# Patient Record
Sex: MALE | Race: WHITE | NOT HISPANIC OR LATINO
[De-identification: names, ages, dates, MRNs, and addresses within clinical notes are randomized per-mention and may not be internally consistent; named-entity substitution may affect disease eponyms.]

---

## 2019-03-03 ENCOUNTER — TRANSCRIPTION ENCOUNTER (OUTPATIENT)
Age: 54
End: 2019-03-03

## 2019-03-19 ENCOUNTER — TRANSCRIPTION ENCOUNTER (OUTPATIENT)
Age: 54
End: 2019-03-19

## 2019-04-20 ENCOUNTER — TRANSCRIPTION ENCOUNTER (OUTPATIENT)
Age: 54
End: 2019-04-20

## 2019-08-17 PROBLEM — Z00.00 ENCOUNTER FOR PREVENTIVE HEALTH EXAMINATION: Status: ACTIVE | Noted: 2019-08-17

## 2019-09-16 ENCOUNTER — APPOINTMENT (OUTPATIENT)
Dept: SURGERY | Facility: CLINIC | Age: 54
End: 2019-09-16
Payer: COMMERCIAL

## 2019-09-16 VITALS
BODY MASS INDEX: 26.08 KG/M2 | WEIGHT: 182.19 LBS | TEMPERATURE: 98.4 F | HEART RATE: 84 BPM | OXYGEN SATURATION: 98 % | HEIGHT: 70 IN | SYSTOLIC BLOOD PRESSURE: 124 MMHG | DIASTOLIC BLOOD PRESSURE: 78 MMHG

## 2019-09-16 PROCEDURE — 99202 OFFICE O/P NEW SF 15 MIN: CPT

## 2019-09-16 NOTE — HISTORY OF PRESENT ILLNESS
[de-identified] : 53 yo male who presents to the office for evaluation of umbilical hernia. He states that the area has gotten more symptomatic over the past few months. He denied any obstructive symptoms associated with the hernia.

## 2019-09-16 NOTE — ASSESSMENT
[FreeTextEntry1] : 53 yo la e with reducible umbilical hernia\par Plan: Open umbilical hernia, possible mesh\par All risk, benefit, alternatives explained and the patient  expressed full understanding.

## 2019-09-16 NOTE — PHYSICAL EXAM
[JVD] : no jugular venous distention  [Normal Breath Sounds] : Normal breath sounds [Normal Heart Sounds] : normal heart sounds [No HSM] : no hepatosplenomegaly [Normal Rate and Rhythm] : normal rate and rhythm [Tender] : was nontender [Enlarged] : not enlarged [No Rash or Lesion] : No rash or lesion [Alert] : alert [Oriented to Person] : oriented to person [Oriented to Place] : oriented to place [Oriented to Time] : oriented to time [Calm] : calm [de-identified] : alert and oriented x 3 [de-identified] : normal [de-identified] : umbilical hernia

## 2019-09-16 NOTE — CONSULT LETTER
[Dear  ___] : Dear  [unfilled], [Please see my note below.] : Please see my note below. [Consult Letter:] : I had the pleasure of evaluating your patient, [unfilled]. [Consult Closing:] : Thank you very much for allowing me to participate in the care of this patient.  If you have any questions, please do not hesitate to contact me. [FreeTextEntry3] : Sincerely, \par \par \par Josue Brush MD, FACS\par \par  of Surgery\par [facility]\par System Chief, Residency Program\par [facility] \par \par [employer] [facility]\par Co-Director of [facility] Clerkship\par [employer] [facility]\par

## 2019-10-14 ENCOUNTER — OUTPATIENT (OUTPATIENT)
Dept: OUTPATIENT SERVICES | Facility: HOSPITAL | Age: 54
LOS: 1 days | Discharge: HOME | End: 2019-10-14
Payer: COMMERCIAL

## 2019-10-14 VITALS
OXYGEN SATURATION: 97 % | DIASTOLIC BLOOD PRESSURE: 81 MMHG | WEIGHT: 179.02 LBS | HEIGHT: 70 IN | SYSTOLIC BLOOD PRESSURE: 122 MMHG | HEART RATE: 86 BPM | RESPIRATION RATE: 18 BRPM | TEMPERATURE: 98 F

## 2019-10-14 DIAGNOSIS — Z98.890 OTHER SPECIFIED POSTPROCEDURAL STATES: Chronic | ICD-10-CM

## 2019-10-14 DIAGNOSIS — K42.9 UMBILICAL HERNIA WITHOUT OBSTRUCTION OR GANGRENE: ICD-10-CM

## 2019-10-14 DIAGNOSIS — Z01.818 ENCOUNTER FOR OTHER PREPROCEDURAL EXAMINATION: ICD-10-CM

## 2019-10-14 LAB
ANION GAP SERPL CALC-SCNC: 5 MMOL/L — SIGNIFICANT CHANGE UP (ref 5–17)
APTT BLD: 33.7 SEC — SIGNIFICANT CHANGE UP (ref 28.5–37)
BASOPHILS # BLD AUTO: 0 K/UL — SIGNIFICANT CHANGE UP (ref 0–0.2)
BASOPHILS NFR BLD AUTO: 0 % — SIGNIFICANT CHANGE UP (ref 0–2)
BUN SERPL-MCNC: 25 MG/DL — HIGH (ref 7–23)
CALCIUM SERPL-MCNC: 9 MG/DL — SIGNIFICANT CHANGE UP (ref 8.5–10.1)
CHLORIDE SERPL-SCNC: 109 MMOL/L — HIGH (ref 96–108)
CO2 SERPL-SCNC: 26 MMOL/L — SIGNIFICANT CHANGE UP (ref 22–31)
CREAT SERPL-MCNC: 1.11 MG/DL — SIGNIFICANT CHANGE UP (ref 0.5–1.3)
EOSINOPHIL # BLD AUTO: 0.04 K/UL — SIGNIFICANT CHANGE UP (ref 0–0.5)
EOSINOPHIL NFR BLD AUTO: 1.6 % — SIGNIFICANT CHANGE UP (ref 0–6)
GLUCOSE SERPL-MCNC: 90 MG/DL — SIGNIFICANT CHANGE UP (ref 70–99)
HCT VFR BLD CALC: 37.6 % — LOW (ref 39–50)
HGB BLD-MCNC: 12.4 G/DL — LOW (ref 13–17)
IMM GRANULOCYTES NFR BLD AUTO: 0.8 % — SIGNIFICANT CHANGE UP (ref 0–1.5)
INR BLD: 1.22 RATIO — HIGH (ref 0.88–1.16)
LYMPHOCYTES # BLD AUTO: 0.52 K/UL — LOW (ref 1–3.3)
LYMPHOCYTES # BLD AUTO: 21 % — SIGNIFICANT CHANGE UP (ref 13–44)
MANUAL SMEAR VERIFICATION: SIGNIFICANT CHANGE UP
MCHC RBC-ENTMCNC: 28.9 PG — SIGNIFICANT CHANGE UP (ref 27–34)
MCHC RBC-ENTMCNC: 33 GM/DL — SIGNIFICANT CHANGE UP (ref 32–36)
MCV RBC AUTO: 87.6 FL — SIGNIFICANT CHANGE UP (ref 80–100)
MONOCYTES # BLD AUTO: 0.3 K/UL — SIGNIFICANT CHANGE UP (ref 0–0.9)
MONOCYTES NFR BLD AUTO: 12.1 % — SIGNIFICANT CHANGE UP (ref 2–14)
NEUTROPHILS # BLD AUTO: 1.6 K/UL — LOW (ref 1.8–7.4)
NEUTROPHILS NFR BLD AUTO: 64.5 % — SIGNIFICANT CHANGE UP (ref 43–77)
NRBC # BLD: 0 /100 WBCS — SIGNIFICANT CHANGE UP (ref 0–0)
PLAT MORPH BLD: NORMAL — SIGNIFICANT CHANGE UP
PLATELET # BLD AUTO: 206 K/UL — SIGNIFICANT CHANGE UP (ref 150–400)
POTASSIUM SERPL-MCNC: 4.2 MMOL/L — SIGNIFICANT CHANGE UP (ref 3.5–5.3)
POTASSIUM SERPL-SCNC: 4.2 MMOL/L — SIGNIFICANT CHANGE UP (ref 3.5–5.3)
PROTHROM AB SERPL-ACNC: 13.7 SEC — HIGH (ref 10–12.9)
RBC # BLD: 4.29 M/UL — SIGNIFICANT CHANGE UP (ref 4.2–5.8)
RBC # FLD: 13.4 % — SIGNIFICANT CHANGE UP (ref 10.3–14.5)
RBC BLD AUTO: NORMAL — SIGNIFICANT CHANGE UP
SODIUM SERPL-SCNC: 140 MMOL/L — SIGNIFICANT CHANGE UP (ref 135–145)
WBC # BLD: 2.48 K/UL — LOW (ref 3.8–10.5)
WBC # FLD AUTO: 2.48 K/UL — LOW (ref 3.8–10.5)

## 2019-10-14 PROCEDURE — 93010 ELECTROCARDIOGRAM REPORT: CPT

## 2019-10-14 NOTE — H&P PST ADULT - ASSESSMENT
56M no pmhx c/o umbilical bulge and discomfort found to have umbilical hernia here for PST for scheduled open umbilical hernia repair, possible mesh  CAPRINI SCORE    AGE RELATED RISK FACTORS                                                       MOBILITY RELATED FACTORS  [x ] Age 41-60 years                                            (1 Point)                  [ ] Bed rest                                                        (1 Point)  [ ] Age: 61-74 years                                           (2 Points)                [ ] Plaster cast                                                   (2 Points)  [ ] Age= 75 years                                              (3 Points)                 [ ] Bed bound for more than 72 hours                   (2 Points)    DISEASE RELATED RISK FACTORS                                               GENDER SPECIFIC FACTORS  [ ] Edema in the lower extremities                       (1 Point)                  [ ] Pregnancy                                                     (1 Point)  [ ] Varicose veins                                               (1 Point)                  [ ] Post-partum < 6 weeks                                   (1 Point)             [x ] BMI > 25 Kg/m2                                            (1 Point)                  [ ] Hormonal therapy  or oral contraception            (1 Point)                 [ ] Sepsis (in the previous month)                        (1 Point)                  [ ] History of pregnancy complications  [ ] Pneumonia or serious lung disease                                               [ ] Unexplained or recurrent                       (1 Point)           (in the previous month)                               (1 Point)  [ ] Abnormal pulmonary function test                     (1 Point)                 SURGERY RELATED RISK FACTORS  [ ] Acute myocardial infarction                              (1 Point)                 [ ]  Section                                            (1 Point)  [ ] Congestive heart failure (in the previous month)  (1 Point)                 [ ] Minor surgery                                                 (1 Point)   [ ] Inflammatory bowel disease                             (1 Point)                 [ ] Arthroscopic surgery                                        (2 Points)  [ ] Central venous access                                    (2 Points)                [x ] General surgery lasting more than 45 minutes   (2 Points)       [ ] Stroke (in the previous month)                          (5 Points)               [ ] Elective arthroplasty                                        (5 Points)                                                                                                                                               HEMATOLOGY RELATED FACTORS                                                 TRAUMA RELATED RISK FACTORS  [ ] Prior episodes of VTE                                     (3 Points)                 [ ] Fracture of the hip, pelvis, or leg                       (5 Points)  [ ] Positive family history for VTE                         (3 Points)                 [ ] Acute spinal cord injury (in the previous month)  (5 Points)  [ ] Prothrombin 65895 A                                      (3 Points)                 [ ] Paralysis  (less than 1 month)                          (5 Points)  [ ] Factor V Leiden                                             (3 Points)                 [ ] Multiple Trauma within 1 month                         (5 Points)  [ ] Lupus anticoagulants                                     (3 Points)                                                           [ ] Anticardiolipin antibodies                                (3 Points)                                                       [ ] High homocysteine in the blood                      (3 Points)                                             [ ] Other congenital or acquired thrombophilia       (3 Points)                                                [ ] Heparin induced thrombocytopenia                  (3 Points)                                          Total Score [      4    ]

## 2019-10-14 NOTE — H&P PST ADULT - NSANTHOSAYNRD_GEN_A_CORE
No. GIL screening performed.  STOP BANG Legend: 0-2 = LOW Risk; 3-4 = INTERMEDIATE Risk; 5-8 = HIGH Risk

## 2019-10-14 NOTE — H&P PST ADULT - NSICDXPASTSURGICALHX_GEN_ALL_CORE_FT
PAST SURGICAL HISTORY:  S/P ACL surgery 2016    S/P hardware removal right femur 1994    S/P ORIF (open reduction internal fixation) fracture right femur 1992

## 2019-10-14 NOTE — H&P PST ADULT - ATTENDING COMMENTS
all risk, benefit, alternatives explained and the patient expressed full understanding.     Pt will get repeat CBC to check WBC.

## 2019-10-14 NOTE — H&P PST ADULT - HISTORY OF PRESENT ILLNESS
56M pmh 56M no pmhx c/o umbilical bulge and discomfort found to have umbilical hernia here for PST for scheduled open umbilical hernia repair, possible mesh

## 2019-10-17 ENCOUNTER — TRANSCRIPTION ENCOUNTER (OUTPATIENT)
Age: 54
End: 2019-10-17

## 2019-10-18 ENCOUNTER — APPOINTMENT (OUTPATIENT)
Dept: SURGERY | Facility: HOSPITAL | Age: 54
End: 2019-10-18
Payer: COMMERCIAL

## 2019-10-18 ENCOUNTER — OUTPATIENT (OUTPATIENT)
Dept: OUTPATIENT SERVICES | Facility: HOSPITAL | Age: 54
LOS: 1 days | Discharge: HOME | End: 2019-10-18
Payer: COMMERCIAL

## 2019-10-18 ENCOUNTER — RESULT REVIEW (OUTPATIENT)
Age: 54
End: 2019-10-18

## 2019-10-18 VITALS
SYSTOLIC BLOOD PRESSURE: 120 MMHG | HEART RATE: 108 BPM | OXYGEN SATURATION: 95 % | DIASTOLIC BLOOD PRESSURE: 89 MMHG | RESPIRATION RATE: 20 BRPM

## 2019-10-18 VITALS
HEART RATE: 109 BPM | HEIGHT: 70 IN | OXYGEN SATURATION: 98 % | DIASTOLIC BLOOD PRESSURE: 86 MMHG | RESPIRATION RATE: 16 BRPM | SYSTOLIC BLOOD PRESSURE: 127 MMHG | TEMPERATURE: 99 F | WEIGHT: 175.93 LBS

## 2019-10-18 DIAGNOSIS — Z98.890 OTHER SPECIFIED POSTPROCEDURAL STATES: Chronic | ICD-10-CM

## 2019-10-18 LAB
HCT VFR BLD CALC: 39.7 % — SIGNIFICANT CHANGE UP (ref 39–50)
HGB BLD-MCNC: 13.1 G/DL — SIGNIFICANT CHANGE UP (ref 13–17)
MCHC RBC-ENTMCNC: 28.7 PG — SIGNIFICANT CHANGE UP (ref 27–34)
MCHC RBC-ENTMCNC: 33 GM/DL — SIGNIFICANT CHANGE UP (ref 32–36)
MCV RBC AUTO: 87.1 FL — SIGNIFICANT CHANGE UP (ref 80–100)
NRBC # BLD: 0 /100 WBCS — SIGNIFICANT CHANGE UP (ref 0–0)
PLATELET # BLD AUTO: 210 K/UL — SIGNIFICANT CHANGE UP (ref 150–400)
RBC # BLD: 4.56 M/UL — SIGNIFICANT CHANGE UP (ref 4.2–5.8)
RBC # FLD: 13.5 % — SIGNIFICANT CHANGE UP (ref 10.3–14.5)
WBC # BLD: 2.88 K/UL — LOW (ref 3.8–10.5)
WBC # FLD AUTO: 2.88 K/UL — LOW (ref 3.8–10.5)

## 2019-10-18 PROCEDURE — 49585: CPT

## 2019-10-18 PROCEDURE — 88302 TISSUE EXAM BY PATHOLOGIST: CPT | Mod: 26

## 2019-10-18 PROCEDURE — 49560: CPT | Mod: AS

## 2019-10-18 RX ORDER — FENTANYL CITRATE 50 UG/ML
25 INJECTION INTRAVENOUS
Refills: 0 | Status: DISCONTINUED | OUTPATIENT
Start: 2019-10-18 | End: 2019-10-18

## 2019-10-18 RX ORDER — FENTANYL CITRATE 50 UG/ML
50 INJECTION INTRAVENOUS ONCE
Refills: 0 | Status: DISCONTINUED | OUTPATIENT
Start: 2019-10-18 | End: 2019-10-18

## 2019-10-18 RX ORDER — SODIUM CHLORIDE 9 MG/ML
3 INJECTION INTRAMUSCULAR; INTRAVENOUS; SUBCUTANEOUS EVERY 8 HOURS
Refills: 0 | Status: DISCONTINUED | OUTPATIENT
Start: 2019-10-18 | End: 2019-10-18

## 2019-10-18 RX ORDER — ONDANSETRON 8 MG/1
4 TABLET, FILM COATED ORAL ONCE
Refills: 0 | Status: DISCONTINUED | OUTPATIENT
Start: 2019-10-18 | End: 2019-10-18

## 2019-10-18 RX ORDER — SODIUM CHLORIDE 9 MG/ML
1000 INJECTION, SOLUTION INTRAVENOUS
Refills: 0 | Status: DISCONTINUED | OUTPATIENT
Start: 2019-10-18 | End: 2019-10-18

## 2019-10-18 NOTE — ASU DISCHARGE PLAN (ADULT/PEDIATRIC) - CARE PROVIDER_API CALL
Josue Brush)  Surgery  [address] [place] [address]  Phone: [phone]  Fax: [phone]  Follow Up Time:

## 2019-10-18 NOTE — ASU DISCHARGE PLAN (ADULT/PEDIATRIC) - ASU DC SPECIAL INSTRUCTIONSFT
Rest as needed. Do not lift anything heavier than 10 pounds. You may take motrin or advil for mild pain as needed, in addition to prescribed narcotic medication. You may take over the counter stool softeners as needed. Call for any fever over 101, nausea, vomiting, severe pain, no passing of gas or bowel movement. You may remove dressing in 48 hours, shower and pat dry abdomen. Leave the white steri strips in place; they will fall off in 5-7 days. Rest as needed. Do not lift anything heavier than 10 pounds. You may take motrin or advil for mild pain as needed, in addition to prescribed narcotic medication. You may take over the counter stool softeners as needed. Call for any fever over 101, nausea, vomiting, severe pain, no passing of gas or bowel movement. You may remove dressing in 48 hours, shower and pat dry abdomen. Leave the white steri strips in place; they will fall off in 5-7 days. Please call to schedule a follow up appointment with Dr Brush in 10-14 days.

## 2019-10-18 NOTE — ASU PATIENT PROFILE, ADULT - PSH
S/P ACL surgery  2016  S/P hardware removal  right femur 1994  S/P ORIF (open reduction internal fixation) fracture  right femur 1992

## 2019-10-18 NOTE — ASU DISCHARGE PLAN (ADULT/PEDIATRIC) - CALL YOUR DOCTOR IF YOU HAVE ANY OF THE FOLLOWING:
Wound/Surgical Site with redness, or foul smelling discharge or pus/Bleeding that does not stop/Swelling that gets worse/Nausea and vomiting that does not stop/Pain not relieved by Medications

## 2019-10-23 DIAGNOSIS — K42.0 UMBILICAL HERNIA WITH OBSTRUCTION, WITHOUT GANGRENE: ICD-10-CM

## 2019-10-28 ENCOUNTER — APPOINTMENT (OUTPATIENT)
Dept: SURGERY | Facility: CLINIC | Age: 54
End: 2019-10-28
Payer: COMMERCIAL

## 2019-10-28 VITALS
TEMPERATURE: 98.1 F | BODY MASS INDEX: 25.65 KG/M2 | DIASTOLIC BLOOD PRESSURE: 75 MMHG | WEIGHT: 179.19 LBS | OXYGEN SATURATION: 97 % | SYSTOLIC BLOOD PRESSURE: 138 MMHG | HEART RATE: 71 BPM | HEIGHT: 70 IN

## 2019-10-28 DIAGNOSIS — K42.9 UMBILICAL HERNIA W/OUT OBSTRUCTION OR GANGRENE: ICD-10-CM

## 2019-10-28 PROCEDURE — 99024 POSTOP FOLLOW-UP VISIT: CPT

## 2019-10-28 NOTE — HISTORY OF PRESENT ILLNESS
[de-identified] : Pt seen and examined. Pt is s/p repair of umbilical hernia. Wound edges are healing well. He will come back in six weeks for follow up visit.

## 2019-10-31 ENCOUNTER — TRANSCRIPTION ENCOUNTER (OUTPATIENT)
Age: 54
End: 2019-10-31

## 2019-12-09 ENCOUNTER — APPOINTMENT (OUTPATIENT)
Dept: SURGERY | Facility: CLINIC | Age: 54
End: 2019-12-09
Payer: COMMERCIAL

## 2019-12-09 VITALS
TEMPERATURE: 98.6 F | OXYGEN SATURATION: 97 % | BODY MASS INDEX: 25.05 KG/M2 | DIASTOLIC BLOOD PRESSURE: 72 MMHG | HEART RATE: 86 BPM | SYSTOLIC BLOOD PRESSURE: 120 MMHG | WEIGHT: 175 LBS | HEIGHT: 70 IN

## 2019-12-09 PROCEDURE — 99024 POSTOP FOLLOW-UP VISIT: CPT

## 2019-12-09 NOTE — HISTORY OF PRESENT ILLNESS
[de-identified] : Pt seen and examined. Pt is s/p repair of umbilical hernia six weeks ago.  Pt states that he had little issue with the wound but said that it got better over time. He denied any other issues and states that he feels well.

## 2020-01-29 ENCOUNTER — TRANSCRIPTION ENCOUNTER (OUTPATIENT)
Age: 55
End: 2020-01-29

## 2020-02-20 ENCOUNTER — OUTPATIENT (OUTPATIENT)
Dept: OUTPATIENT SERVICES | Facility: HOSPITAL | Age: 55
LOS: 1 days | End: 2020-02-20
Payer: COMMERCIAL

## 2020-02-20 VITALS
SYSTOLIC BLOOD PRESSURE: 136 MMHG | DIASTOLIC BLOOD PRESSURE: 88 MMHG | HEART RATE: 96 BPM | WEIGHT: 154.98 LBS | RESPIRATION RATE: 16 BRPM | TEMPERATURE: 99 F | OXYGEN SATURATION: 98 % | HEIGHT: 70 IN

## 2020-02-20 DIAGNOSIS — J34.2 DEVIATED NASAL SEPTUM: ICD-10-CM

## 2020-02-20 DIAGNOSIS — Z98.890 OTHER SPECIFIED POSTPROCEDURAL STATES: Chronic | ICD-10-CM

## 2020-02-20 LAB
HCT VFR BLD CALC: 37.6 % — LOW (ref 39–50)
HGB BLD-MCNC: 12.1 G/DL — LOW (ref 13–17)
MCHC RBC-ENTMCNC: 28.2 PG — SIGNIFICANT CHANGE UP (ref 27–34)
MCHC RBC-ENTMCNC: 32.2 GM/DL — SIGNIFICANT CHANGE UP (ref 32–36)
MCV RBC AUTO: 87.6 FL — SIGNIFICANT CHANGE UP (ref 80–100)
NRBC # BLD: 0 /100 WBCS — SIGNIFICANT CHANGE UP (ref 0–0)
PLATELET # BLD AUTO: 185 K/UL — SIGNIFICANT CHANGE UP (ref 150–400)
RBC # BLD: 4.29 M/UL — SIGNIFICANT CHANGE UP (ref 4.2–5.8)
RBC # FLD: 14.2 % — SIGNIFICANT CHANGE UP (ref 10.3–14.5)
WBC # BLD: 2.63 K/UL — LOW (ref 3.8–10.5)
WBC # FLD AUTO: 2.63 K/UL — LOW (ref 3.8–10.5)

## 2020-02-20 PROCEDURE — 85610 PROTHROMBIN TIME: CPT

## 2020-02-20 PROCEDURE — 85027 COMPLETE CBC AUTOMATED: CPT

## 2020-02-20 PROCEDURE — G0463: CPT

## 2020-02-20 RX ORDER — LIDOCAINE HCL 20 MG/ML
0.2 VIAL (ML) INJECTION ONCE
Refills: 0 | Status: DISCONTINUED | OUTPATIENT
Start: 2020-02-28 | End: 2020-03-14

## 2020-02-20 RX ORDER — SODIUM CHLORIDE 9 MG/ML
3 INJECTION INTRAMUSCULAR; INTRAVENOUS; SUBCUTANEOUS EVERY 8 HOURS
Refills: 0 | Status: DISCONTINUED | OUTPATIENT
Start: 2020-02-28 | End: 2020-03-14

## 2020-02-20 NOTE — H&P PST ADULT - HISTORY OF PRESENT ILLNESS
55 y/o male c/o difficulty breathing through left nostril x 3 years. Toshannon he presents to PST for scheduled Septoplasty on 2/28/20. Denies any palpitations, SOB, N/V, fever or chills.

## 2020-02-20 NOTE — H&P PST ADULT - NSICDXPROBLEM_GEN_ALL_CORE_FT
PROBLEM DIAGNOSES  Problem: Deviated septum  Assessment and Plan: Septoplasty   Pre-op education provided - all questions answered

## 2020-02-21 LAB
INR BLD: 1.14 RATIO — SIGNIFICANT CHANGE UP (ref 0.88–1.16)
PROTHROM AB SERPL-ACNC: 13 SEC — SIGNIFICANT CHANGE UP (ref 10–13.1)

## 2020-02-27 ENCOUNTER — TRANSCRIPTION ENCOUNTER (OUTPATIENT)
Age: 55
End: 2020-02-27

## 2020-02-27 RX ORDER — SODIUM CHLORIDE 9 MG/ML
1000 INJECTION, SOLUTION INTRAVENOUS
Refills: 0 | Status: DISCONTINUED | OUTPATIENT
Start: 2020-02-28 | End: 2020-03-14

## 2020-02-27 RX ORDER — OXYCODONE HYDROCHLORIDE 5 MG/1
5 TABLET ORAL ONCE
Refills: 0 | Status: DISCONTINUED | OUTPATIENT
Start: 2020-02-28 | End: 2020-02-28

## 2020-02-27 RX ORDER — ONDANSETRON 8 MG/1
4 TABLET, FILM COATED ORAL ONCE
Refills: 0 | Status: DISCONTINUED | OUTPATIENT
Start: 2020-02-28 | End: 2020-03-14

## 2020-02-28 ENCOUNTER — OUTPATIENT (OUTPATIENT)
Dept: OUTPATIENT SERVICES | Facility: HOSPITAL | Age: 55
LOS: 1 days | End: 2020-02-28
Payer: COMMERCIAL

## 2020-02-28 ENCOUNTER — RESULT REVIEW (OUTPATIENT)
Age: 55
End: 2020-02-28

## 2020-02-28 VITALS
TEMPERATURE: 98 F | RESPIRATION RATE: 20 BRPM | WEIGHT: 154.98 LBS | DIASTOLIC BLOOD PRESSURE: 79 MMHG | OXYGEN SATURATION: 99 % | SYSTOLIC BLOOD PRESSURE: 127 MMHG | HEIGHT: 70 IN | HEART RATE: 93 BPM

## 2020-02-28 VITALS
OXYGEN SATURATION: 96 % | RESPIRATION RATE: 16 BRPM | HEART RATE: 102 BPM | SYSTOLIC BLOOD PRESSURE: 101 MMHG | DIASTOLIC BLOOD PRESSURE: 67 MMHG

## 2020-02-28 DIAGNOSIS — Z98.890 OTHER SPECIFIED POSTPROCEDURAL STATES: Chronic | ICD-10-CM

## 2020-02-28 DIAGNOSIS — J34.2 DEVIATED NASAL SEPTUM: ICD-10-CM

## 2020-02-28 PROCEDURE — 88300 SURGICAL PATH GROSS: CPT

## 2020-02-28 PROCEDURE — 30520 REPAIR OF NASAL SEPTUM: CPT

## 2020-02-28 PROCEDURE — 88300 SURGICAL PATH GROSS: CPT | Mod: 26

## 2020-02-28 NOTE — ASU DISCHARGE PLAN (ADULT/PEDIATRIC) - NURSING INSTRUCTIONS
Next dose of Tylenol will be on or after ___610pm________ ,today/tonight, If needed for pain/cramps. Your first dose of Tylenol was given at ___1210pm________. Do not exceed more than 4000mg of Tylenol in one 24 hour setting.

## 2020-02-28 NOTE — ASU DISCHARGE PLAN (ADULT/PEDIATRIC) - ASU DC SPECIAL INSTRUCTIONSFT
saline nasal spray 3 sprays both nostrils 5x/day  Bacitracin to left side of nasal opening at site of incision 3x/day  No nose blowing x 2 weeks  Avoid heavy lifting and straining x 2 weeks.

## 2020-03-04 LAB — SURGICAL PATHOLOGY STUDY: SIGNIFICANT CHANGE UP

## 2021-02-12 PROBLEM — J34.2 DEVIATED NASAL SEPTUM: Chronic | Status: ACTIVE | Noted: 2020-02-20

## 2021-03-08 ENCOUNTER — APPOINTMENT (OUTPATIENT)
Dept: SURGERY | Facility: CLINIC | Age: 56
End: 2021-03-08
Payer: COMMERCIAL

## 2021-03-08 VITALS
TEMPERATURE: 98 F | SYSTOLIC BLOOD PRESSURE: 139 MMHG | OXYGEN SATURATION: 97 % | WEIGHT: 175 LBS | DIASTOLIC BLOOD PRESSURE: 82 MMHG | HEART RATE: 104 BPM | BODY MASS INDEX: 25.05 KG/M2 | HEIGHT: 70 IN

## 2021-03-08 DIAGNOSIS — K43.9 VENTRAL HERNIA W/OUT OBSTRUCTION OR GANGRENE: ICD-10-CM

## 2021-03-08 PROCEDURE — 99212 OFFICE O/P EST SF 10 MIN: CPT

## 2021-03-08 PROCEDURE — 99072 ADDL SUPL MATRL&STAF TM PHE: CPT

## 2021-03-08 NOTE — ASSESSMENT
[FreeTextEntry1] : 56 yo male with epigastric hernia.\par Plan: CT of the abdomen to rule out occult midline abdominal wall hernia. Plan of the operation after the CT scan. Based on what we find, we will have various operative options.

## 2021-03-08 NOTE — PHYSICAL EXAM
[JVD] : no jugular venous distention  [Normal Thyroid] : the thyroid was normal [Normal Breath Sounds] : Normal breath sounds [Normal Heart Sounds] : normal heart sounds [Normal Rate and Rhythm] : normal rate and rhythm [Abdominal Masses] : No abdominal masses [Abdomen Tenderness] : ~T ~M No abdominal tenderness [No HSM] : no hepatosplenomegaly [Tender] : was nontender [Enlarged] : not enlarged [No Rash or Lesion] : No rash or lesion [Alert] : alert [Oriented to Person] : oriented to person [Oriented to Place] : oriented to place [Oriented to Time] : oriented to time [Calm] : calm [de-identified] : alert and oriented x 3 [de-identified] : normal [de-identified] : reducible epigastric hernia

## 2021-03-08 NOTE — HISTORY OF PRESENT ILLNESS
[de-identified] : 54 yo male patient known to me from previous surgery who presents to the office for evaluation of periumbilical hernia. He states that it started to bother him and noted a bulge. The bulge is reducible and located supraumbilically. He denied any obstructive symptoms associated with it.

## 2021-03-09 DIAGNOSIS — K57.32 DIVERTICULITIS OF LARGE INTESTINE W/OUT PERFORATION OR ABSCESS W/OUT BLEEDING: ICD-10-CM

## 2021-06-10 NOTE — PRE-ANESTHESIA EVALUATION ADULT - NSANTHTIREDRD_ENT_A_CORE
Refill Approved    Rx renewed per Medication Renewal Policy. Medication was last renewed on 08/04/2019.  Last office visit was 08/07/2019 with PCP.    Pauly Mcpherson, [facility] Triage/Med Refill 7/27/2020     Requested Prescriptions   Pending Prescriptions Disp Refills     metoprolol succinate (TOPROL-XL) 50 MG 24 hr tablet [Pharmacy Med Name: METOPROLOL ER SUCCINATE 50MG TABS] 90 tablet 3     Sig: TAKE 1 TABLET(50 MG) BY MOUTH DAILY       Beta-Blockers Refill Protocol Passed - 7/25/2020  5:07 AM        Passed - PCP or prescribing provider visit in past 12 months or next 3 months     Last office visit with prescriber/PCP: 8/7/2019 Ronnie Bundy MD OR same dept: 8/7/2019 Ronnie Bundy MD OR same specialty: 8/7/2019 Ronnie Bundy MD  Last physical: 1/9/2019 Last MTM visit: Visit date not found   Next visit within 3 mo: Visit date not found  Next physical within 3 mo: Visit date not found  Prescriber OR PCP: Ronnie uBndy MD  Last diagnosis associated with med order: 1. Essential hypertension  - metoprolol succinate (TOPROL-XL) 50 MG 24 hr tablet [Pharmacy Med Name: METOPROLOL ER SUCCINATE 50MG TABS]; TAKE 1 TABLET(50 MG) BY MOUTH DAILY  Dispense: 90 tablet; Refill: 3    If protocol passes may refill for 12 months if within 3 months of last provider visit (or a total of 15 months).             Passed - Blood pressure filed in past 12 months     BP Readings from Last 1 Encounters:   08/07/19 120/72                             No

## 2021-09-11 NOTE — ASU DISCHARGE PLAN (ADULT/PEDIATRIC) - DISCHARGE TO
Problem: Pain:  Goal: Pain level will decrease  Description: Pain level will decrease  9/11/2021 0208 by Bhavin Chambers RN  Outcome: Ongoing  Goal: Control of acute pain  Description: Control of acute pain  9/11/2021 1129 by Viri Ospina RN  Outcome: Ongoing  9/11/2021 0208 by Bhavin Chambers RN  Outcome: Ongoing  Goal: Control of chronic pain  Description: Control of chronic pain  9/11/2021 0208 by Bhavin Chambers RN  Outcome: Ongoing  Goal: Patient's pain/discomfort is manageable  Description: Patient's pain/discomfort is manageable  9/11/2021 0208 by Bhavin Chambers RN  Outcome: Ongoing     Problem: Skin Integrity:  Goal: Will show no infection signs and symptoms  Description: Will show no infection signs and symptoms  9/11/2021 1129 by Viri Ospina RN  Outcome: Ongoing  9/11/2021 0208 by Bhavin Chambers RN  Outcome: Ongoing  Goal: Absence of new skin breakdown  Description: Absence of new skin breakdown  9/11/2021 0208 by Bhavin Chambers RN  Outcome: Ongoing     Problem: Falls - Risk of:  Goal: Will remain free from falls  Description: Will remain free from falls  9/11/2021 0208 by Bhavin Chambers RN  Outcome: Ongoing  Goal: Absence of physical injury  Description: Absence of physical injury  9/11/2021 1129 by Viri Ospina RN  Outcome: Ongoing  9/11/2021 0208 by Bhavin Chambers RN  Outcome: Ongoing     Problem: Nausea/Vomiting:  Goal: Absence of nausea/vomiting  Description: Absence of nausea/vomiting  9/11/2021 0208 by Bhavin Chambers RN  Outcome: Ongoing  Goal: Able to drink  Description: Able to drink  9/11/2021 0208 by Bhavin Chambers RN  Outcome: Ongoing  Goal: Able to eat  Description: Able to eat  9/11/2021 0208 by Bhavin Chambers RN  Outcome: Ongoing  Goal: Ability to achieve adequate nutritional intake will improve  Description: Ability to achieve adequate nutritional intake will improve  9/11/2021 0208 by Bhavin Chambers RN  Outcome: Ongoing     Problem: Infection:  Goal: Will remain free from infection  Description: Will remain free from infection  9/11/2021 0208 by Luis Fernando Anderson RN  Outcome: Ongoing     Problem: Safety:  Goal: Free from accidental physical injury  Description: Free from accidental physical injury  9/11/2021 0208 by Luis Fernando Anderson RN  Outcome: Ongoing  Goal: Free from intentional harm  Description: Free from intentional harm  9/11/2021 0208 by Luis Fernando Anderson RN  Outcome: Ongoing     Problem: Daily Care:  Goal: Daily care needs are met  Description: Daily care needs are met  9/11/2021 0208 by Luis Fernando Anderson RN  Outcome: Ongoing     Problem: Skin Integrity:  Goal: Skin integrity will stabilize  Description: Skin integrity will stabilize  9/11/2021 0208 by Luis Fernando Anderson RN  Outcome: Ongoing     Problem: Discharge Planning:  Goal: Patients continuum of care needs are met  Description: Patients continuum of care needs are met  9/11/2021 0208 by Luis Fernando Anderson RN  Outcome: Ongoing     Problem: Non-Violent Restraints  Goal: Removal from restraints as soon as assessed to be safe  9/11/2021 0208 by Luis Fernando Anderson RN  Outcome: Ongoing  Goal: No harm/injury to patient while restraints in use  9/11/2021 0208 by Luis Fernando Anderson RN  Outcome: Ongoing  Goal: Patient's dignity will be maintained  9/11/2021 0208 by Luis Fernando Anderson RN  Outcome: Ongoing     Problem: Nutrition  Goal: Optimal nutrition therapy  9/11/2021 0208 by Luis Fernando Anderson RN  Outcome: Ongoing Home

## 2022-01-04 ENCOUNTER — APPOINTMENT (OUTPATIENT)
Dept: OTOLARYNGOLOGY | Facility: CLINIC | Age: 57
End: 2022-01-04
Payer: COMMERCIAL

## 2022-01-04 VITALS
SYSTOLIC BLOOD PRESSURE: 136 MMHG | HEIGHT: 70 IN | BODY MASS INDEX: 24.34 KG/M2 | DIASTOLIC BLOOD PRESSURE: 80 MMHG | HEART RATE: 101 BPM | WEIGHT: 170 LBS

## 2022-01-04 PROCEDURE — 99243 OFF/OP CNSLTJ NEW/EST LOW 30: CPT

## 2022-01-04 RX ORDER — CIPROFLOXACIN HYDROCHLORIDE 500 MG/1
500 TABLET, FILM COATED ORAL
Qty: 28 | Refills: 0 | Status: COMPLETED | COMMUNITY
Start: 2021-03-09 | End: 2022-01-04

## 2022-01-04 RX ORDER — METRONIDAZOLE 500 MG/1
500 TABLET ORAL 3 TIMES DAILY
Qty: 42 | Refills: 0 | Status: COMPLETED | COMMUNITY
Start: 2021-03-09 | End: 2022-01-04

## 2022-01-04 NOTE — REASON FOR VISIT
[Initial Consultation] : an initial consultation for [Spouse] : spouse [FreeTextEntry2] : Referred by Dr. Red Haddad for papillary thyroid carcinoma.

## 2022-01-04 NOTE — CONSULT LETTER
[Dear  ___] : Dear  [unfilled], [Consult Letter:] : I had the pleasure of evaluating your patient, [unfilled]. [Please see my note below.] : Please see my note below. [Consult Closing:] : Thank you very much for allowing me to participate in the care of this patient.  If you have any questions, please do not hesitate to contact me. [Sincerely,] : Sincerely, [FreeTextEntry2] : Red Haddad MD ([place], [place]) [FreeTextEntry3] : Paxton Wright MD, FACS\par \par    [facility] [place]\par Associate Chair\par    Department of Otolaryngology\par \par Professor\par Otolaryngology & Molecular Medicine\par [employer]\par

## 2022-01-04 NOTE — HISTORY OF PRESENT ILLNESS
[de-identified] : 56 year old male referred by Dr. Red Haddad (ENT), presents today for initial evaluation of bilateral papillary thyroid carcinomas. FNA completed 12/13/2021 A) Left supraclavicular lymph node: Polymorphous population of lymphocytes. no evidence of mets. B) Thyroid, right: Papillary thyroid carcinoma (malignant, bethesda category VI) C) Papillary thyroid carcinoma L (malignant, bethesda category VI). CT soft tissue neck w/ cont completed 12/27/2021. Patient reports dry mouth, frequent throat clearing and some unintentional weight loss. Denies dysphagia, odynophagia, dyspnea, fever and chills. No history of smoking. Family history of thyroid disease but no history of thyroid cancer.\par \par Thus started w manning hypothyroidism.  Pt had CCT after routine CxR after susp COVID.   On recent neck CT there is evidnce L suprrav clav, paratrac and mediastinal adenopathy.  Did get Vaccination.  Has lost wt loss 18 lbs over 2 years-initially intended.  \par \par Pts endo Florencia Domingo MD in [place]. . NCT ordered by Dr Domingo.

## 2022-01-05 ENCOUNTER — NON-APPOINTMENT (OUTPATIENT)
Age: 57
End: 2022-01-05

## 2022-01-11 ENCOUNTER — OUTPATIENT (OUTPATIENT)
Dept: OUTPATIENT SERVICES | Facility: HOSPITAL | Age: 57
LOS: 1 days | End: 2022-01-11
Payer: COMMERCIAL

## 2022-01-11 ENCOUNTER — APPOINTMENT (OUTPATIENT)
Dept: NUCLEAR MEDICINE | Facility: IMAGING CENTER | Age: 57
End: 2022-01-11
Payer: COMMERCIAL

## 2022-01-11 DIAGNOSIS — Z98.890 OTHER SPECIFIED POSTPROCEDURAL STATES: Chronic | ICD-10-CM

## 2022-01-11 DIAGNOSIS — C73 MALIGNANT NEOPLASM OF THYROID GLAND: ICD-10-CM

## 2022-01-11 DIAGNOSIS — R59.1 GENERALIZED ENLARGED LYMPH NODES: ICD-10-CM

## 2022-01-11 DIAGNOSIS — Z00.8 ENCOUNTER FOR OTHER GENERAL EXAMINATION: ICD-10-CM

## 2022-01-11 PROCEDURE — 78815 PET IMAGE W/CT SKULL-THIGH: CPT | Mod: 26,PI

## 2022-01-11 PROCEDURE — 78815 PET IMAGE W/CT SKULL-THIGH: CPT

## 2022-01-11 PROCEDURE — A9552: CPT

## 2022-01-12 ENCOUNTER — RESULT REVIEW (OUTPATIENT)
Age: 57
End: 2022-01-12

## 2022-01-12 ENCOUNTER — OUTPATIENT (OUTPATIENT)
Dept: OUTPATIENT SERVICES | Facility: HOSPITAL | Age: 57
LOS: 1 days | End: 2022-01-12
Payer: COMMERCIAL

## 2022-01-12 ENCOUNTER — APPOINTMENT (OUTPATIENT)
Dept: ULTRASOUND IMAGING | Facility: IMAGING CENTER | Age: 57
End: 2022-01-12
Payer: COMMERCIAL

## 2022-01-12 DIAGNOSIS — Z98.890 OTHER SPECIFIED POSTPROCEDURAL STATES: Chronic | ICD-10-CM

## 2022-01-12 DIAGNOSIS — Z00.8 ENCOUNTER FOR OTHER GENERAL EXAMINATION: ICD-10-CM

## 2022-01-12 PROCEDURE — 88189 FLOWCYTOMETRY/READ 16 & >: CPT

## 2022-01-12 PROCEDURE — 88172 CYTP DX EVAL FNA 1ST EA SITE: CPT

## 2022-01-12 PROCEDURE — 88365 INSITU HYBRIDIZATION (FISH): CPT | Mod: 26,59

## 2022-01-12 PROCEDURE — 88360 TUMOR IMMUNOHISTOCHEM/MANUAL: CPT

## 2022-01-12 PROCEDURE — 88173 CYTOPATH EVAL FNA REPORT: CPT

## 2022-01-12 PROCEDURE — 88342 IMHCHEM/IMCYTCHM 1ST ANTB: CPT | Mod: 26,59

## 2022-01-12 PROCEDURE — 88364 INSITU HYBRIDIZATION (FISH): CPT | Mod: 26

## 2022-01-12 PROCEDURE — 88188 FLOWCYTOMETRY/READ 9-15: CPT

## 2022-01-12 PROCEDURE — 38505 NEEDLE BIOPSY LYMPH NODES: CPT | Mod: LT

## 2022-01-12 PROCEDURE — 88341 IMHCHEM/IMCYTCHM EA ADD ANTB: CPT | Mod: 26,59

## 2022-01-12 PROCEDURE — 88342 IMHCHEM/IMCYTCHM 1ST ANTB: CPT

## 2022-01-12 PROCEDURE — 76942 ECHO GUIDE FOR BIOPSY: CPT

## 2022-01-12 PROCEDURE — 88341 IMHCHEM/IMCYTCHM EA ADD ANTB: CPT

## 2022-01-12 PROCEDURE — 88185 FLOWCYTOMETRY/TC ADD-ON: CPT

## 2022-01-12 PROCEDURE — 38505 NEEDLE BIOPSY LYMPH NODES: CPT

## 2022-01-12 PROCEDURE — 88173 CYTOPATH EVAL FNA REPORT: CPT | Mod: 26

## 2022-01-12 PROCEDURE — 88360 TUMOR IMMUNOHISTOCHEM/MANUAL: CPT | Mod: 26

## 2022-01-12 PROCEDURE — 88305 TISSUE EXAM BY PATHOLOGIST: CPT | Mod: 26

## 2022-01-12 PROCEDURE — 76942 ECHO GUIDE FOR BIOPSY: CPT | Mod: 26

## 2022-01-12 PROCEDURE — 88184 FLOWCYTOMETRY/ TC 1 MARKER: CPT

## 2022-01-12 PROCEDURE — 88365 INSITU HYBRIDIZATION (FISH): CPT

## 2022-01-12 PROCEDURE — 87205 SMEAR GRAM STAIN: CPT

## 2022-01-12 PROCEDURE — 88305 TISSUE EXAM BY PATHOLOGIST: CPT

## 2022-01-13 ENCOUNTER — RESULT REVIEW (OUTPATIENT)
Age: 57
End: 2022-01-13

## 2022-01-13 LAB — TM INTERPRETATION: SIGNIFICANT CHANGE UP

## 2022-01-14 LAB — NON-GYNECOLOGICAL CYTOLOGY STUDY: SIGNIFICANT CHANGE UP

## 2022-01-21 LAB — NON-GYNECOLOGICAL CYTOLOGY STUDY: SIGNIFICANT CHANGE UP

## 2022-01-24 ENCOUNTER — OUTPATIENT (OUTPATIENT)
Dept: OUTPATIENT SERVICES | Facility: HOSPITAL | Age: 57
LOS: 1 days | End: 2022-01-24

## 2022-01-24 VITALS
RESPIRATION RATE: 18 BRPM | TEMPERATURE: 98 F | WEIGHT: 160.06 LBS | HEIGHT: 67 IN | SYSTOLIC BLOOD PRESSURE: 119 MMHG | HEART RATE: 99 BPM | DIASTOLIC BLOOD PRESSURE: 77 MMHG | OXYGEN SATURATION: 98 %

## 2022-01-24 DIAGNOSIS — C73 MALIGNANT NEOPLASM OF THYROID GLAND: ICD-10-CM

## 2022-01-24 DIAGNOSIS — Z98.890 OTHER SPECIFIED POSTPROCEDURAL STATES: Chronic | ICD-10-CM

## 2022-01-24 DIAGNOSIS — Z90.89 ACQUIRED ABSENCE OF OTHER ORGANS: Chronic | ICD-10-CM

## 2022-01-24 LAB
ANION GAP SERPL CALC-SCNC: 11 MMOL/L — SIGNIFICANT CHANGE UP (ref 7–14)
BUN SERPL-MCNC: 39 MG/DL — HIGH (ref 7–23)
CALCIUM SERPL-MCNC: 8.6 MG/DL — SIGNIFICANT CHANGE UP (ref 8.4–10.5)
CHLORIDE SERPL-SCNC: 107 MMOL/L — SIGNIFICANT CHANGE UP (ref 98–107)
CO2 SERPL-SCNC: 21 MMOL/L — LOW (ref 22–31)
CREAT SERPL-MCNC: 1.62 MG/DL — HIGH (ref 0.5–1.3)
GLUCOSE SERPL-MCNC: 107 MG/DL — HIGH (ref 70–99)
HCT VFR BLD CALC: 25 % — LOW (ref 39–50)
HGB BLD-MCNC: 8.2 G/DL — LOW (ref 13–17)
MCHC RBC-ENTMCNC: 30 PG — SIGNIFICANT CHANGE UP (ref 27–34)
MCHC RBC-ENTMCNC: 32.8 GM/DL — SIGNIFICANT CHANGE UP (ref 32–36)
MCV RBC AUTO: 91.6 FL — SIGNIFICANT CHANGE UP (ref 80–100)
NRBC # BLD: 0 /100 WBCS — SIGNIFICANT CHANGE UP
NRBC # FLD: 0 K/UL — SIGNIFICANT CHANGE UP
PLATELET # BLD AUTO: 167 K/UL — SIGNIFICANT CHANGE UP (ref 150–400)
POTASSIUM SERPL-MCNC: 4.6 MMOL/L — SIGNIFICANT CHANGE UP (ref 3.5–5.3)
POTASSIUM SERPL-SCNC: 4.6 MMOL/L — SIGNIFICANT CHANGE UP (ref 3.5–5.3)
RBC # BLD: 2.73 M/UL — LOW (ref 4.2–5.8)
RBC # FLD: 15.6 % — HIGH (ref 10.3–14.5)
SODIUM SERPL-SCNC: 139 MMOL/L — SIGNIFICANT CHANGE UP (ref 135–145)
WBC # BLD: 3.92 K/UL — SIGNIFICANT CHANGE UP (ref 3.8–10.5)
WBC # FLD AUTO: 3.92 K/UL — SIGNIFICANT CHANGE UP (ref 3.8–10.5)

## 2022-01-24 RX ORDER — SODIUM CHLORIDE 9 MG/ML
1000 INJECTION, SOLUTION INTRAVENOUS
Refills: 0 | Status: DISCONTINUED | OUTPATIENT
Start: 2022-01-27 | End: 2022-01-27

## 2022-01-24 NOTE — H&P PST ADULT - NEGATIVE ENMT SYMPTOMS
no hearing difficulty/no ear pain/no tinnitus/no vertigo/no sinus symptoms/no nasal discharge/no nasal obstruction/no post-nasal discharge/no throat pain no hearing difficulty/no ear pain/no tinnitus/no vertigo/no sinus symptoms/no nasal congestion/no nasal discharge/no nasal obstruction/no post-nasal discharge/no nose bleeds/no abnormal taste sensation/no throat pain

## 2022-01-24 NOTE — H&P PST ADULT - PROBLEM SELECTOR PLAN 1
Schedule for total thyroidectomy with parathyroid Hormone left lymph node biopsy tentatively on 01/27/2022. Pre op instructions, famotidine, chlorhexidine gluconate soap given and explained. Pt verbalized understanding.   Pt reported that covid test was done today

## 2022-01-24 NOTE — H&P PST ADULT - NSICDXPASTMEDICALHX_GEN_ALL_CORE_FT
PAST MEDICAL HISTORY:  Deviated septum      PAST MEDICAL HISTORY:  Deviated septum     Gout     Malignant neoplasm of thyroid gland

## 2022-01-24 NOTE — H&P PST ADULT - NSICDXPASTSURGICALHX_GEN_ALL_CORE_FT
PAST SURGICAL HISTORY:  H/O umbilical hernia repair     History of nasal septoplasty 02/2020    History of tonsillectomy as a child    S/P ACL surgery Right; 2016    S/P hardware removal right femur 1994    S/P ORIF (open reduction internal fixation) fracture right femur 1992

## 2022-01-24 NOTE — H&P PST ADULT - HISTORY OF PRESENT ILLNESS
57 y/o male with h/o Hypothyroidism, thyroid nodule, Gout presents to PST  S/P biopsy of thyroid gland - thyroid cancer 57 y/o male with h/o Hypothyroidism, thyroid nodule, Gout presents to PST for pre op evaluation -S/P biopsy of thyroid gland which showed "thyroid cancer". Now schedule for total thyroidectomy with Parathyroid Hormone assay left lymph node biopsy

## 2022-01-26 ENCOUNTER — NON-APPOINTMENT (OUTPATIENT)
Age: 57
End: 2022-01-26

## 2022-01-26 ENCOUNTER — APPOINTMENT (OUTPATIENT)
Dept: OTOLARYNGOLOGY | Facility: CLINIC | Age: 57
End: 2022-01-26
Payer: COMMERCIAL

## 2022-01-26 ENCOUNTER — TRANSCRIPTION ENCOUNTER (OUTPATIENT)
Age: 57
End: 2022-01-26

## 2022-01-26 PROCEDURE — 99214 OFFICE O/P EST MOD 30 MIN: CPT

## 2022-01-26 NOTE — CONSULT LETTER
[Dear  ___] : Dear  [unfilled], [Courtesy Letter:] : I had the pleasure of seeing your patient, [unfilled], in my office today. [Please see my note below.] : Please see my note below. [Sincerely,] : Sincerely, [FreeTextEntry2] : Red Haddad MD ([place], [place]) [FreeTextEntry3] : Paxton Wright MD, FACS\par \par    [facility] [place]\par Associate Chair\par    Department of Otolaryngology\par \par Professor\par Otolaryngology & Molecular Medicine\par [facility] [employer]\par

## 2022-01-26 NOTE — HISTORY OF PRESENT ILLNESS
[de-identified] : Mr. Bo presents for preop discussion. \par Denies pain, dysphagia, dysphonia and or dyspnea \par \par Has PTCA R and L lobe and supraclav and med adenopathy.  The former atypical node on FNA

## 2022-01-26 NOTE — ASU PATIENT PROFILE, ADULT - VISION (WITH CORRECTIVE LENSES IF THE PATIENT USUALLY WEARS THEM):
Normal vision: sees adequately in most situations; can see medication labels, newsprint distance and labels/Partially impaired: cannot see medication labels or newsprint, but can see obstacles in path, and the surrounding layout; can count fingers at arm's length

## 2022-01-27 ENCOUNTER — RESULT REVIEW (OUTPATIENT)
Age: 57
End: 2022-01-27

## 2022-01-27 ENCOUNTER — INPATIENT (INPATIENT)
Facility: HOSPITAL | Age: 57
LOS: 0 days | Discharge: HOME | End: 2022-01-28
Attending: OTOLARYNGOLOGY | Admitting: OTOLARYNGOLOGY
Payer: COMMERCIAL

## 2022-01-27 ENCOUNTER — APPOINTMENT (OUTPATIENT)
Dept: OTOLARYNGOLOGY | Facility: HOSPITAL | Age: 57
End: 2022-01-27

## 2022-01-27 VITALS
HEART RATE: 100 BPM | OXYGEN SATURATION: 97 % | TEMPERATURE: 99 F | HEIGHT: 67 IN | WEIGHT: 160.06 LBS | RESPIRATION RATE: 16 BRPM | DIASTOLIC BLOOD PRESSURE: 74 MMHG | SYSTOLIC BLOOD PRESSURE: 115 MMHG

## 2022-01-27 DIAGNOSIS — Z98.890 OTHER SPECIFIED POSTPROCEDURAL STATES: Chronic | ICD-10-CM

## 2022-01-27 DIAGNOSIS — Z90.89 ACQUIRED ABSENCE OF OTHER ORGANS: Chronic | ICD-10-CM

## 2022-01-27 DIAGNOSIS — R09.89 OTHER SPECIFIED SYMPTOMS AND SIGNS INVOLVING THE CIRCULATORY AND RESPIRATORY SYSTEMS: ICD-10-CM

## 2022-01-27 DIAGNOSIS — C73 MALIGNANT NEOPLASM OF THYROID GLAND: ICD-10-CM

## 2022-01-27 PROBLEM — M10.9 GOUT, UNSPECIFIED: Chronic | Status: ACTIVE | Noted: 2022-01-24

## 2022-01-27 LAB
ABO + RH PNL BLD: NORMAL
ANISOCYTOSIS BLD QL: SLIGHT — SIGNIFICANT CHANGE UP
BASE EXCESS BLDV CALC-SCNC: -7.2 MMOL/L — LOW (ref -2–3)
BASOPHILS # BLD AUTO: 0 K/UL — SIGNIFICANT CHANGE UP (ref 0–0.2)
BASOPHILS NFR BLD AUTO: 0 % — SIGNIFICANT CHANGE UP (ref 0–2)
CA-I SERPL-SCNC: 1.18 MMOL/L — SIGNIFICANT CHANGE UP (ref 1.15–1.33)
CHLORIDE BLDV-SCNC: SIGNIFICANT CHANGE UP MMOL/L (ref 96–108)
CO2 BLDV-SCNC: 20 MMOL/L — LOW (ref 22–26)
ELLIPTOCYTES BLD QL SMEAR: SLIGHT — SIGNIFICANT CHANGE UP
EOSINOPHIL # BLD AUTO: 0 K/UL — SIGNIFICANT CHANGE UP (ref 0–0.5)
EOSINOPHIL NFR BLD AUTO: 0 % — SIGNIFICANT CHANGE UP (ref 0–6)
GAS PNL BLDV: 139 MMOL/L — SIGNIFICANT CHANGE UP (ref 136–145)
GAS PNL BLDV: SIGNIFICANT CHANGE UP
GAS PNL BLDV: SIGNIFICANT CHANGE UP
GLUCOSE BLDV-MCNC: 95 MG/DL — SIGNIFICANT CHANGE UP (ref 70–99)
HCO3 BLDV-SCNC: 19 MMOL/L — LOW (ref 22–29)
HCT VFR BLD CALC: 27 % — LOW (ref 39–50)
HCT VFR BLDA CALC: 26 % — LOW (ref 39–51)
HGB BLD CALC-MCNC: 8.8 G/DL — LOW (ref 13–17)
HGB BLD-MCNC: 8.6 G/DL — LOW (ref 13–17)
HYPOCHROMIA BLD QL: SLIGHT — SIGNIFICANT CHANGE UP
IANC: 2.43 K/UL — SIGNIFICANT CHANGE UP (ref 1.5–8.5)
LACTATE BLDV-MCNC: 1.5 MMOL/L — SIGNIFICANT CHANGE UP (ref 0.5–2)
LYMPHOCYTES # BLD AUTO: 0.11 K/UL — LOW (ref 1–3.3)
LYMPHOCYTES # BLD AUTO: 3.5 % — LOW (ref 13–44)
MCHC RBC-ENTMCNC: 29.4 PG — SIGNIFICANT CHANGE UP (ref 27–34)
MCHC RBC-ENTMCNC: 31.9 GM/DL — LOW (ref 32–36)
MCV RBC AUTO: 92.2 FL — SIGNIFICANT CHANGE UP (ref 80–100)
METAMYELOCYTES # FLD: 0.9 % — SIGNIFICANT CHANGE UP (ref 0–1)
MONOCYTES # BLD AUTO: 0.05 K/UL — SIGNIFICANT CHANGE UP (ref 0–0.9)
MONOCYTES NFR BLD AUTO: 1.8 % — LOW (ref 2–14)
NEUTROPHILS # BLD AUTO: 2.78 K/UL — SIGNIFICANT CHANGE UP (ref 1.8–7.4)
NEUTROPHILS NFR BLD AUTO: 91.2 % — HIGH (ref 43–77)
OVALOCYTES BLD QL SMEAR: SLIGHT — SIGNIFICANT CHANGE UP
PCO2 BLDV: 39 MMHG — LOW (ref 42–55)
PH BLDV: 7.29 — LOW (ref 7.32–7.43)
PLAT MORPH BLD: NORMAL — SIGNIFICANT CHANGE UP
PLATELET # BLD AUTO: 162 K/UL — SIGNIFICANT CHANGE UP (ref 150–400)
PLATELET COUNT - ESTIMATE: NORMAL — SIGNIFICANT CHANGE UP
PO2 BLDV: 73 MMHG — SIGNIFICANT CHANGE UP
POIKILOCYTOSIS BLD QL AUTO: SIGNIFICANT CHANGE UP
POLYCHROMASIA BLD QL SMEAR: SLIGHT — SIGNIFICANT CHANGE UP
POTASSIUM BLDV-SCNC: 4.4 MMOL/L — SIGNIFICANT CHANGE UP (ref 3.5–5.1)
PTH INTACT, INTRAOP TIMING 2: SIGNIFICANT CHANGE UP
PTH INTACT, INTRAOPERATIVE 2: 40 PG/ML — SIGNIFICANT CHANGE UP (ref 15–65)
PTH-INTACT IO % DIF SERPL: 50 PG/ML — SIGNIFICANT CHANGE UP (ref 15–65)
RBC # BLD: 2.93 M/UL — LOW (ref 4.2–5.8)
RBC # FLD: 15.7 % — HIGH (ref 10.3–14.5)
RBC BLD AUTO: ABNORMAL
SAO2 % BLDV: 96 % — SIGNIFICANT CHANGE UP
SMUDGE CELLS # BLD: PRESENT — SIGNIFICANT CHANGE UP
VARIANT LYMPHS # BLD: 2.6 % — SIGNIFICANT CHANGE UP (ref 0–6)
WBC # BLD: 3.05 K/UL — LOW (ref 3.8–10.5)
WBC # FLD AUTO: 3.05 K/UL — LOW (ref 3.8–10.5)

## 2022-01-27 PROCEDURE — 88307 TISSUE EXAM BY PATHOLOGIST: CPT | Mod: 26

## 2022-01-27 PROCEDURE — 88365 INSITU HYBRIDIZATION (FISH): CPT | Mod: 26,59

## 2022-01-27 PROCEDURE — 38510 BIOPSY/REMOVAL LYMPH NODES: CPT | Mod: GC,LT

## 2022-01-27 PROCEDURE — 88341 IMHCHEM/IMCYTCHM EA ADD ANTB: CPT | Mod: 26,59

## 2022-01-27 PROCEDURE — 88360 TUMOR IMMUNOHISTOCHEM/MANUAL: CPT | Mod: 26

## 2022-01-27 PROCEDURE — G0452: CPT | Mod: 26

## 2022-01-27 PROCEDURE — 88342 IMHCHEM/IMCYTCHM 1ST ANTB: CPT | Mod: 26,59

## 2022-01-27 PROCEDURE — 88189 FLOWCYTOMETRY/READ 16 & >: CPT

## 2022-01-27 PROCEDURE — 60240 REMOVAL OF THYROID: CPT | Mod: GC

## 2022-01-27 PROCEDURE — 88364 INSITU HYBRIDIZATION (FISH): CPT | Mod: 26

## 2022-01-27 PROCEDURE — 88367 INSITU HYBRIDIZATION AUTO: CPT | Mod: 26

## 2022-01-27 DEVICE — SURGICEL 2 X 14": Type: IMPLANTABLE DEVICE | Status: FUNCTIONAL

## 2022-01-27 DEVICE — TUBE EMG NIM TRIVANTAGE 7MM: Type: IMPLANTABLE DEVICE | Status: FUNCTIONAL

## 2022-01-27 DEVICE — LIGATING CLIPS WECK HORIZON MEDIUM (BLUE) 24: Type: IMPLANTABLE DEVICE | Status: FUNCTIONAL

## 2022-01-27 DEVICE — LIGATING CLIPS WECK HORIZON SMALL-WIDE (RED) 24: Type: IMPLANTABLE DEVICE | Status: FUNCTIONAL

## 2022-01-27 RX ORDER — BENZOCAINE AND MENTHOL 5; 1 G/100ML; G/100ML
1 LIQUID ORAL
Refills: 0 | Status: DISCONTINUED | OUTPATIENT
Start: 2022-01-27 | End: 2022-01-28

## 2022-01-27 RX ORDER — OXYCODONE HYDROCHLORIDE 5 MG/1
5 TABLET ORAL EVERY 6 HOURS
Refills: 0 | Status: DISCONTINUED | OUTPATIENT
Start: 2022-01-27 | End: 2022-01-28

## 2022-01-27 RX ORDER — INFLUENZA VIRUS VACCINE 15; 15; 15; 15 UG/.5ML; UG/.5ML; UG/.5ML; UG/.5ML
0.5 SUSPENSION INTRAMUSCULAR ONCE
Refills: 0 | Status: DISCONTINUED | OUTPATIENT
Start: 2022-01-27 | End: 2022-01-28

## 2022-01-27 RX ORDER — OXYCODONE HYDROCHLORIDE 5 MG/1
5 TABLET ORAL ONCE
Refills: 0 | Status: DISCONTINUED | OUTPATIENT
Start: 2022-01-27 | End: 2022-01-27

## 2022-01-27 RX ORDER — OXYCODONE HYDROCHLORIDE 5 MG/1
10 TABLET ORAL EVERY 6 HOURS
Refills: 0 | Status: DISCONTINUED | OUTPATIENT
Start: 2022-01-27 | End: 2022-01-28

## 2022-01-27 RX ORDER — LANOLIN ALCOHOL/MO/W.PET/CERES
3 CREAM (GRAM) TOPICAL AT BEDTIME
Refills: 0 | Status: DISCONTINUED | OUTPATIENT
Start: 2022-01-27 | End: 2022-01-28

## 2022-01-27 RX ORDER — FENTANYL CITRATE 50 UG/ML
50 INJECTION INTRAVENOUS
Refills: 0 | Status: DISCONTINUED | OUTPATIENT
Start: 2022-01-27 | End: 2022-01-27

## 2022-01-27 RX ORDER — ONDANSETRON 8 MG/1
4 TABLET, FILM COATED ORAL ONCE
Refills: 0 | Status: DISCONTINUED | OUTPATIENT
Start: 2022-01-27 | End: 2022-01-27

## 2022-01-27 RX ORDER — SODIUM CHLORIDE 9 MG/ML
500 INJECTION, SOLUTION INTRAVENOUS
Refills: 0 | Status: DISCONTINUED | OUTPATIENT
Start: 2022-01-27 | End: 2022-01-27

## 2022-01-27 RX ORDER — ALLOPURINOL 300 MG
300 TABLET ORAL DAILY
Refills: 0 | Status: DISCONTINUED | OUTPATIENT
Start: 2022-01-27 | End: 2022-01-28

## 2022-01-27 RX ORDER — CEPHALEXIN 500 MG
500 CAPSULE ORAL EVERY 12 HOURS
Refills: 0 | Status: COMPLETED | OUTPATIENT
Start: 2022-01-27 | End: 2022-01-28

## 2022-01-27 RX ORDER — ACETAMINOPHEN 500 MG
650 TABLET ORAL EVERY 6 HOURS
Refills: 0 | Status: DISCONTINUED | OUTPATIENT
Start: 2022-01-27 | End: 2022-01-28

## 2022-01-27 RX ORDER — LEVOTHYROXINE SODIUM 125 MCG
112 TABLET ORAL DAILY
Refills: 0 | Status: DISCONTINUED | OUTPATIENT
Start: 2022-01-27 | End: 2022-01-28

## 2022-01-27 RX ADMIN — FENTANYL CITRATE 50 MICROGRAM(S): 50 INJECTION INTRAVENOUS at 16:20

## 2022-01-27 RX ADMIN — Medication 500 MILLIGRAM(S): at 18:42

## 2022-01-27 RX ADMIN — FENTANYL CITRATE 50 MICROGRAM(S): 50 INJECTION INTRAVENOUS at 16:45

## 2022-01-27 RX ADMIN — SODIUM CHLORIDE 75 MILLILITER(S): 9 INJECTION, SOLUTION INTRAVENOUS at 16:22

## 2022-01-27 RX ADMIN — OXYCODONE HYDROCHLORIDE 10 MILLIGRAM(S): 5 TABLET ORAL at 20:19

## 2022-01-27 RX ADMIN — OXYCODONE HYDROCHLORIDE 10 MILLIGRAM(S): 5 TABLET ORAL at 19:49

## 2022-01-27 NOTE — BRIEF OPERATIVE NOTE - NSICDXBRIEFPROCEDURE_GEN_ALL_CORE_FT
PROCEDURES:  Thyroidectomy, total 27-Jan-2022 15:28:02  Peter Ames  Biopsy, lymph node, open 27-Jan-2022 15:28:13  Peter Ames

## 2022-01-27 NOTE — H&P ADULT - HISTORY OF PRESENT ILLNESS
ENT H&P    HPI: 56M w/ PTC and cervical/mediastinal lymphadenopathy s/p total thyroid and L level IV cervical node biopsy 1/27    INTERVAL:    1/27: No acute periop events. Extubated and transferred to PACU with no issues. Awake, interactive, pain well controlled.    ICU Vital Signs Last 24 Hrs  T(C): 36.9 (27 Jan 2022 18:00), Max: 37.3 (27 Jan 2022 07:58)  T(F): 98.4 (27 Jan 2022 18:00), Max: 99.2 (27 Jan 2022 07:58)  HR: 93 (27 Jan 2022 18:00) (93 - 108)  BP: 132/74 (27 Jan 2022 18:00) (115/74 - 146/88)  BP(mean): 90 (27 Jan 2022 17:15) (3 - 104)  ABP: --  ABP(mean): --  RR: 18 (27 Jan 2022 18:00) (16 - 22)  SpO2: 97% (27 Jan 2022 18:00) (92% - 100%)    PHYSICAL EXAM:    CONSTITUTIONAL: Well nourished, well developed, NON-DYSMORPHIC, and in no acute distress.    HEAD: normocephalic, atraumatic.  NECK: Neck incision c/d/i. OPHELIA 1x ss.  RESPIRATORY: Respirations unlabored, no increased work of breathing with use of accessory muscles and retractions. No stridor.  CARDIAC: Warm extremities, no cyanosis.       A/P: 56M w/ PTC and cervical/mediastinal lymphadenopathy s/p total thyroid and L level IV cervical node biopsy 1/27    - Postop CBC stable, f/u am CBC  - PTH 40, no repletions or Ca checks  - TOV 12am  - C/w home meds  - C/w Keflex  - SCDs  - Pain/nausea control  - Synthroid  - OPHELIA care

## 2022-01-27 NOTE — PATIENT PROFILE ADULT - FALL HARM RISK - HARM RISK INTERVENTIONS

## 2022-01-28 ENCOUNTER — TRANSCRIPTION ENCOUNTER (OUTPATIENT)
Age: 57
End: 2022-01-28

## 2022-01-28 VITALS
RESPIRATION RATE: 18 BRPM | SYSTOLIC BLOOD PRESSURE: 112 MMHG | TEMPERATURE: 99 F | DIASTOLIC BLOOD PRESSURE: 70 MMHG | HEART RATE: 106 BPM | OXYGEN SATURATION: 95 %

## 2022-01-28 LAB
HCT VFR BLD CALC: 29.8 % — LOW (ref 39–50)
HCV AB S/CO SERPL IA: 0.28 S/CO — SIGNIFICANT CHANGE UP (ref 0–0.99)
HCV AB SERPL-IMP: SIGNIFICANT CHANGE UP
HGB BLD-MCNC: 9.6 G/DL — LOW (ref 13–17)
MCHC RBC-ENTMCNC: 29.1 PG — SIGNIFICANT CHANGE UP (ref 27–34)
MCHC RBC-ENTMCNC: 32.2 GM/DL — SIGNIFICANT CHANGE UP (ref 32–36)
MCV RBC AUTO: 90.3 FL — SIGNIFICANT CHANGE UP (ref 80–100)
NRBC # BLD: 0 /100 WBCS — SIGNIFICANT CHANGE UP
NRBC # FLD: 0 K/UL — SIGNIFICANT CHANGE UP
PLATELET # BLD AUTO: 208 K/UL — SIGNIFICANT CHANGE UP (ref 150–400)
RBC # BLD: 3.3 M/UL — LOW (ref 4.2–5.8)
RBC # FLD: 15.9 % — HIGH (ref 10.3–14.5)
WBC # BLD: 6.03 K/UL — SIGNIFICANT CHANGE UP (ref 3.8–10.5)
WBC # FLD AUTO: 6.03 K/UL — SIGNIFICANT CHANGE UP (ref 3.8–10.5)

## 2022-01-28 PROCEDURE — 99024 POSTOP FOLLOW-UP VISIT: CPT

## 2022-01-28 PROCEDURE — 38510 BIOPSY/REMOVAL LYMPH NODES: CPT | Mod: GC,LT

## 2022-01-28 PROCEDURE — 60240 REMOVAL OF THYROID: CPT | Mod: GC

## 2022-01-28 RX ORDER — LEVOTHYROXINE SODIUM 125 MCG
1 TABLET ORAL
Qty: 0 | Refills: 0 | DISCHARGE

## 2022-01-28 RX ORDER — LEVOTHYROXINE SODIUM 125 MCG
1 TABLET ORAL
Qty: 30 | Refills: 0
Start: 2022-01-28 | End: 2022-02-26

## 2022-01-28 RX ORDER — OXYCODONE HYDROCHLORIDE 5 MG/1
1 TABLET ORAL
Qty: 12 | Refills: 0
Start: 2022-01-28 | End: 2022-01-30

## 2022-01-28 RX ADMIN — Medication 500 MILLIGRAM(S): at 05:38

## 2022-01-28 RX ADMIN — Medication 112 MICROGRAM(S): at 05:39

## 2022-01-28 RX ADMIN — OXYCODONE HYDROCHLORIDE 10 MILLIGRAM(S): 5 TABLET ORAL at 02:00

## 2022-01-28 RX ADMIN — OXYCODONE HYDROCHLORIDE 10 MILLIGRAM(S): 5 TABLET ORAL at 02:30

## 2022-01-28 NOTE — DISCHARGE NOTE PROVIDER - NSDCFUADDINST_GEN_ALL_CORE_FT
Wound Care: Keep the incision site clean and dry, do not get wet for at least 48 hours (2 days). Can shower after 48 hours. Let water run over incision site, pat dry, do not scrub.   Keep steri strips (white stickers) to remain in place, will fall off on there own  Pain Control: Alternate Tylenol 650mg and ibuprofen 600mg every 6 hours for pain. Do not take more than 4000mg of either medication in 24 hour period. Oxycodone every 6 hours as needed for severe pain. Take stool softener while taking oxycodone as can cause constipation  Activity: No heavy lifting or strenuous exercise for 2-4 weeks.   Diet: no restrictions, advance as tolerated.  Follow up with Dr. Wright as scheduled

## 2022-01-28 NOTE — DISCHARGE NOTE PROVIDER - NSDCMRMEDTOKEN_GEN_ALL_CORE_FT
allopurinol 300 mg oral tablet: 1 tab(s) orally once a day  levothyroxine 100 mcg (0.1 mg) oral tablet: 1 tab(s) orally once a day

## 2022-01-28 NOTE — DISCHARGE NOTE PROVIDER - CARE PROVIDER_API CALL
Paxton Wright)  [facility]; Otolaryngology  [address] [place] [address]  Phone: [phone]  Fax: [phone]  Follow Up Time:

## 2022-01-28 NOTE — DISCHARGE NOTE PROVIDER - NSDCCPCAREPLAN_GEN_ALL_CORE_FT
PRINCIPAL DISCHARGE DIAGNOSIS  Diagnosis: Malignant neoplasm of thyroid gland  Assessment and Plan of Treatment: - Please follow up Dr. Wright as scheduled

## 2022-01-28 NOTE — PROGRESS NOTE ADULT - SUBJECTIVE AND OBJECTIVE BOX
ENT Progress Note    HPI: 56M w/ PTC and cervical/mediastinal lymphadenopathy s/p total thyroid and L level IV cervical node biopsy 1/27.    Interval:  1/28: examined at bedside this morning. NAEON. No issues. PTH 40, hgb 9.6 this morning.    Vital Signs Last 24 Hrs  T(C): 37.3 (28 Jan 2022 10:04), Max: 37.4 (27 Jan 2022 21:30)  T(F): 99.1 (28 Jan 2022 10:04), Max: 99.3 (27 Jan 2022 21:30)  HR: 106 (28 Jan 2022 10:04) (82 - 108)  BP: 112/70 (28 Jan 2022 10:04) (112/70 - 146/88)  BP(mean): 90 (27 Jan 2022 17:15) (90 - 104)  RR: 18 (28 Jan 2022 10:04) (16 - 22)  SpO2: 95% (28 Jan 2022 10:04) (92% - 100%)    Awake, NAD  Neck soft, flat, incision c/d/i, OPHELIA no output  Nonlabored respiration on RA

## 2022-01-28 NOTE — DISCHARGE NOTE PROVIDER - HOSPITAL COURSE
56 year old male with malignant neoplasm of thyroid gland S/P total thyroidectomy and left level IV cervical lymph node biopsy on 1/27/2022. Had OPHELIA drains that were monitored for output to prevent seroma formation. OPHELIA drains were removed. Calcium and PTH levels are monitored post-operatively, stable. Tolerating regular PO diet, pain is controlled. Patient was cleared for discharge home By Dr. Wright on 1/28/2022. All prescriptions were sent to a pharmacy that was agreed on with the patient.

## 2022-01-28 NOTE — DISCHARGE NOTE NURSING/CASE MANAGEMENT/SOCIAL WORK - NSDCPEFALRISK_GEN_ALL_CORE
For information on Fall & Injury Prevention, visit: [email or web address] OR  [email or web address] OR  https://www.cdc.gov/steadi/patient.html

## 2022-01-28 NOTE — PROGRESS NOTE ADULT - ASSESSMENT
56M w/ PTC and cervical/mediastinal lymphadenopathy s/p total thyroid and L level IV cervical node biopsy 1/27. No issues this morning, minimal OPHELIA output. Hgb stable, afebrile, vitals stable.    - dc OPHELIA  - f/u CBC stable  - dc home

## 2022-01-28 NOTE — DISCHARGE NOTE PROVIDER - CARE PROVIDERS DIRECT ADDRESSES
[email or web address]

## 2022-01-28 NOTE — DISCHARGE NOTE NURSING/CASE MANAGEMENT/SOCIAL WORK - PATIENT PORTAL LINK FT
You can access the FollowMyHealth Patient Portal offered by [facility] by registering at the following website: [email or web address]. By joining [employer]’s FollowMyHealth portal, you will also be able to view your health information using other applications (apps) compatible with our system.

## 2022-02-01 LAB — SURGICAL PATHOLOGY STUDY: SIGNIFICANT CHANGE UP

## 2022-02-03 ENCOUNTER — APPOINTMENT (OUTPATIENT)
Dept: OTOLARYNGOLOGY | Facility: CLINIC | Age: 57
End: 2022-02-03
Payer: COMMERCIAL

## 2022-02-03 VITALS — TEMPERATURE: 97.7 F

## 2022-02-03 LAB — TM INTERPRETATION: SIGNIFICANT CHANGE UP

## 2022-02-03 PROCEDURE — 99024 POSTOP FOLLOW-UP VISIT: CPT

## 2022-02-03 NOTE — REASON FOR VISIT
[Post-Operative Visit] : a post-operative visit [FreeTextEntry2] : s/p excisional biopsy of left level 4 lymph node as well as a total thyroidectomy on 1/27/2022

## 2022-02-03 NOTE — CONSULT LETTER
[Dear  ___] : Dear  [unfilled], [Courtesy Letter:] : I had the pleasure of seeing your patient, [unfilled], in my office today. [Please see my note below.] : Please see my note below. [Sincerely,] : Sincerely, [DrMick  ___] : Dr. GATICA [FreeTextEntry2] : Red Haddad MD ([place], [place])   [FreeTextEntry3] : Jil Castillo NP\par Paxton Wright MD, FACS\par \par    [facility]\par Associate Chair\par    Department of Otolaryngology\par \par Professor\par Otolaryngology & Molecular Medicine\par [facility]\par \par [address] [place] [address]\[place] [address]\par Tel: [phone]\par \par [facility]\par Associate Chair\par Department of Otolaryngology\par Professor of Otolaryngology & Molecular Medicine\par [facility]\par \par

## 2022-02-04 LAB — CHROM ANALY OVERALL INTERP SPEC-IMP: SIGNIFICANT CHANGE UP

## 2022-02-11 LAB — HEMATOPATHOLOGY REPORT: SIGNIFICANT CHANGE UP

## 2022-02-28 LAB
T3 SERPL-MCNC: 69 NG/DL
T3FREE SERPL-MCNC: 1.52 PG/ML
T4 FREE SERPL-MCNC: 1.3 NG/DL
T4 SERPL-MCNC: 9 UG/DL

## 2022-03-15 ENCOUNTER — APPOINTMENT (OUTPATIENT)
Dept: OTOLARYNGOLOGY | Facility: CLINIC | Age: 57
End: 2022-03-15
Payer: COMMERCIAL

## 2022-03-15 DIAGNOSIS — R59.1 GENERALIZED ENLARGED LYMPH NODES: ICD-10-CM

## 2022-03-15 PROCEDURE — 99024 POSTOP FOLLOW-UP VISIT: CPT

## 2022-03-15 NOTE — HISTORY OF PRESENT ILLNESS
[None] : No associated symptoms are reported. [de-identified] : Mr. Bo is accompanied by his wife. He is s/p excisional biopsy of left level 4 lymph node as well as a total thyroidectomy on 1/27/2022  for pathological left lower cervical adenopathy, suspicious for lymphoproliferative and PTCA. Presents today for post op follow up. Reports feeling tired. Following up with hematologist, scheduled for bone marrow biopsy next Thursday. Denies pain, dysphagia,and or dyspnea. Some hoarseness, he was treated with antibiotic.Denies any numbness or tingling. On Levothyroxine 125 mcg daily managed by  Dr. Domingo (Endocrinologist).  \par

## 2022-03-15 NOTE — CONSULT LETTER
[Dear  ___] : Dear  [unfilled], [DrMick  ___] : Dr. GATICA [Courtesy Letter:] : I had the pleasure of seeing your patient, [unfilled], in my office today. [Please see my note below.] : Please see my note below. [Sincerely,] : Sincerely, [FreeTextEntry2] : Red Haddad MD ([place], [place]) \par  \par  [FreeTextEntry3] : Paxton Wright MD, FACS\par \par    [facility]\par Associate Chair\par    Department of Otolaryngology\par \par Professor\par Otolaryngology & Molecular Medicine\par [employer] [employer]\par

## 2022-03-17 ENCOUNTER — INPATIENT (INPATIENT)
Facility: HOSPITAL | Age: 57
LOS: 12 days | Discharge: HOME | End: 2022-03-30
Attending: GENERAL PRACTICE | Admitting: GENERAL PRACTICE
Payer: COMMERCIAL

## 2022-03-17 VITALS
HEIGHT: 67 IN | TEMPERATURE: 98 F | OXYGEN SATURATION: 100 % | SYSTOLIC BLOOD PRESSURE: 109 MMHG | HEART RATE: 117 BPM | DIASTOLIC BLOOD PRESSURE: 57 MMHG | RESPIRATION RATE: 17 BRPM

## 2022-03-17 DIAGNOSIS — R33.9 RETENTION OF URINE, UNSPECIFIED: ICD-10-CM

## 2022-03-17 DIAGNOSIS — Z98.890 OTHER SPECIFIED POSTPROCEDURAL STATES: Chronic | ICD-10-CM

## 2022-03-17 DIAGNOSIS — A41.9 SEPSIS, UNSPECIFIED ORGANISM: ICD-10-CM

## 2022-03-17 DIAGNOSIS — N17.9 ACUTE KIDNEY FAILURE, UNSPECIFIED: ICD-10-CM

## 2022-03-17 DIAGNOSIS — N11.1 CHRONIC OBSTRUCTIVE PYELONEPHRITIS: ICD-10-CM

## 2022-03-17 DIAGNOSIS — D64.9 ANEMIA, UNSPECIFIED: ICD-10-CM

## 2022-03-17 DIAGNOSIS — E03.9 HYPOTHYROIDISM, UNSPECIFIED: ICD-10-CM

## 2022-03-17 DIAGNOSIS — Z90.89 ACQUIRED ABSENCE OF OTHER ORGANS: Chronic | ICD-10-CM

## 2022-03-17 DIAGNOSIS — N12 TUBULO-INTERSTITIAL NEPHRITIS, NOT SPECIFIED AS ACUTE OR CHRONIC: ICD-10-CM

## 2022-03-17 LAB
ALBUMIN SERPL ELPH-MCNC: 3 G/DL — LOW (ref 3.3–5)
ALP SERPL-CCNC: 87 U/L — SIGNIFICANT CHANGE UP (ref 40–120)
ALT FLD-CCNC: 20 U/L — SIGNIFICANT CHANGE UP (ref 4–41)
ANION GAP SERPL CALC-SCNC: 11 MMOL/L — SIGNIFICANT CHANGE UP (ref 7–14)
APPEARANCE UR: ABNORMAL
APTT BLD: 34 SEC — SIGNIFICANT CHANGE UP (ref 27–36.3)
AST SERPL-CCNC: 65 U/L — HIGH (ref 4–40)
BACTERIA # UR AUTO: ABNORMAL
BASOPHILS # BLD AUTO: 0.01 K/UL — SIGNIFICANT CHANGE UP (ref 0–0.2)
BASOPHILS NFR BLD AUTO: 0.3 % — SIGNIFICANT CHANGE UP (ref 0–2)
BILIRUB DIRECT SERPL-MCNC: <0.2 MG/DL — SIGNIFICANT CHANGE UP (ref 0–0.3)
BILIRUB SERPL-MCNC: 0.3 MG/DL — SIGNIFICANT CHANGE UP (ref 0.2–1.2)
BILIRUB UR-MCNC: NEGATIVE — SIGNIFICANT CHANGE UP
BLD GP AB SCN SERPL QL: NEGATIVE — SIGNIFICANT CHANGE UP
BLOOD GAS VENOUS COMPREHENSIVE RESULT: SIGNIFICANT CHANGE UP
BUN SERPL-MCNC: 49 MG/DL — HIGH (ref 7–23)
CA-I BLD-SCNC: 1.22 MMOL/L — SIGNIFICANT CHANGE UP (ref 1.15–1.29)
CALCIUM SERPL-MCNC: 8.8 MG/DL — SIGNIFICANT CHANGE UP (ref 8.4–10.5)
CHLORIDE SERPL-SCNC: 104 MMOL/L — SIGNIFICANT CHANGE UP (ref 98–107)
CO2 SERPL-SCNC: 19 MMOL/L — LOW (ref 22–31)
COLOR SPEC: ABNORMAL
CREAT SERPL-MCNC: 2.23 MG/DL — HIGH (ref 0.5–1.3)
CRP SERPL-MCNC: 39.5 MG/L — HIGH
DAT POLY-SP REAG RBC QL: POSITIVE — SIGNIFICANT CHANGE UP
DIFF PNL FLD: ABNORMAL
EGFR: 33 ML/MIN/1.73M2 — LOW
EOSINOPHIL # BLD AUTO: 0.09 K/UL — SIGNIFICANT CHANGE UP (ref 0–0.5)
EOSINOPHIL NFR BLD AUTO: 2.9 % — SIGNIFICANT CHANGE UP (ref 0–6)
EPI CELLS # UR: 2 /HPF — SIGNIFICANT CHANGE UP (ref 0–5)
ERYTHROCYTE [SEDIMENTATION RATE] IN BLOOD: >140 MM/HR — HIGH (ref 1–15)
FLUAV AG NPH QL: SIGNIFICANT CHANGE UP
FLUBV AG NPH QL: SIGNIFICANT CHANGE UP
GLUCOSE SERPL-MCNC: 88 MG/DL — SIGNIFICANT CHANGE UP (ref 70–99)
GLUCOSE UR QL: NEGATIVE — SIGNIFICANT CHANGE UP
HAPTOGLOB SERPL-MCNC: 126 MG/DL — SIGNIFICANT CHANGE UP (ref 34–200)
HCT VFR BLD CALC: 24.1 % — LOW (ref 39–50)
HGB BLD-MCNC: 7.7 G/DL — LOW (ref 13–17)
IANC: 2.52 K/UL — SIGNIFICANT CHANGE UP (ref 1.5–8.5)
IMM GRANULOCYTES NFR BLD AUTO: 1 % — SIGNIFICANT CHANGE UP (ref 0–1.5)
INR BLD: 1.11 RATIO — SIGNIFICANT CHANGE UP (ref 0.88–1.16)
KETONES UR-MCNC: ABNORMAL
LDH SERPL L TO P-CCNC: 439 U/L — HIGH (ref 135–225)
LEUKOCYTE ESTERASE UR-ACNC: ABNORMAL
LYMPHOCYTES # BLD AUTO: 0.27 K/UL — LOW (ref 1–3.3)
LYMPHOCYTES # BLD AUTO: 8.7 % — LOW (ref 13–44)
MAGNESIUM SERPL-MCNC: 2 MG/DL — SIGNIFICANT CHANGE UP (ref 1.6–2.6)
MCHC RBC-ENTMCNC: 28.7 PG — SIGNIFICANT CHANGE UP (ref 27–34)
MCHC RBC-ENTMCNC: 32 GM/DL — SIGNIFICANT CHANGE UP (ref 32–36)
MCV RBC AUTO: 89.9 FL — SIGNIFICANT CHANGE UP (ref 80–100)
MONOCYTES # BLD AUTO: 0.19 K/UL — SIGNIFICANT CHANGE UP (ref 0–0.9)
MONOCYTES NFR BLD AUTO: 6.1 % — SIGNIFICANT CHANGE UP (ref 2–14)
NEUTROPHILS # BLD AUTO: 2.52 K/UL — SIGNIFICANT CHANGE UP (ref 1.8–7.4)
NEUTROPHILS NFR BLD AUTO: 81 % — HIGH (ref 43–77)
NITRITE UR-MCNC: NEGATIVE — SIGNIFICANT CHANGE UP
NRBC # BLD: 0 /100 WBCS — SIGNIFICANT CHANGE UP
NRBC # FLD: 0 K/UL — SIGNIFICANT CHANGE UP
PH UR: 6 — SIGNIFICANT CHANGE UP (ref 5–8)
PHOSPHATE SERPL-MCNC: 4.8 MG/DL — HIGH (ref 2.5–4.5)
PLATELET # BLD AUTO: 193 K/UL — SIGNIFICANT CHANGE UP (ref 150–400)
POTASSIUM SERPL-MCNC: 4.6 MMOL/L — SIGNIFICANT CHANGE UP (ref 3.5–5.3)
POTASSIUM SERPL-SCNC: 4.6 MMOL/L — SIGNIFICANT CHANGE UP (ref 3.5–5.3)
PROT SERPL-MCNC: 8 G/DL — SIGNIFICANT CHANGE UP (ref 6–8.3)
PROT UR-MCNC: ABNORMAL
PROTHROM AB SERPL-ACNC: 12.9 SEC — SIGNIFICANT CHANGE UP (ref 10.5–13.4)
RBC # BLD: 2.68 M/UL — LOW (ref 4.2–5.8)
RBC # FLD: 16 % — HIGH (ref 10.3–14.5)
RBC CASTS # UR COMP ASSIST: >10 /HPF — HIGH (ref 0–4)
RH IG SCN BLD-IMP: POSITIVE — SIGNIFICANT CHANGE UP
RSV RNA NPH QL NAA+NON-PROBE: SIGNIFICANT CHANGE UP
SARS-COV-2 RNA SPEC QL NAA+PROBE: SIGNIFICANT CHANGE UP
SODIUM SERPL-SCNC: 134 MMOL/L — LOW (ref 135–145)
SP GR SPEC: 1.01 — SIGNIFICANT CHANGE UP (ref 1–1.05)
TSH SERPL-MCNC: 4.06 UIU/ML — SIGNIFICANT CHANGE UP (ref 0.27–4.2)
URATE SERPL-MCNC: 7.3 MG/DL — SIGNIFICANT CHANGE UP (ref 3.4–8.8)
UROBILINOGEN FLD QL: SIGNIFICANT CHANGE UP
WBC # BLD: 3.11 K/UL — LOW (ref 3.8–10.5)
WBC # FLD AUTO: 3.11 K/UL — LOW (ref 3.8–10.5)
WBC UR QL: 10 /HPF — HIGH (ref 0–5)

## 2022-03-17 PROCEDURE — 99223 1ST HOSP IP/OBS HIGH 75: CPT

## 2022-03-17 PROCEDURE — 86077 PHYS BLOOD BANK SERV XMATCH: CPT

## 2022-03-17 PROCEDURE — 71045 X-RAY EXAM CHEST 1 VIEW: CPT | Mod: 26

## 2022-03-17 PROCEDURE — 99285 EMERGENCY DEPT VISIT HI MDM: CPT

## 2022-03-17 RX ORDER — ALLOPURINOL 300 MG
300 TABLET ORAL DAILY
Refills: 0 | Status: DISCONTINUED | OUTPATIENT
Start: 2022-03-17 | End: 2022-03-23

## 2022-03-17 RX ORDER — LANOLIN ALCOHOL/MO/W.PET/CERES
3 CREAM (GRAM) TOPICAL AT BEDTIME
Refills: 0 | Status: DISCONTINUED | OUTPATIENT
Start: 2022-03-17 | End: 2022-03-30

## 2022-03-17 RX ORDER — ONDANSETRON 8 MG/1
4 TABLET, FILM COATED ORAL EVERY 8 HOURS
Refills: 0 | Status: DISCONTINUED | OUTPATIENT
Start: 2022-03-17 | End: 2022-03-30

## 2022-03-17 RX ORDER — ACETAMINOPHEN 500 MG
650 TABLET ORAL ONCE
Refills: 0 | Status: COMPLETED | OUTPATIENT
Start: 2022-03-17 | End: 2022-03-17

## 2022-03-17 RX ORDER — INFLUENZA VIRUS VACCINE 15; 15; 15; 15 UG/.5ML; UG/.5ML; UG/.5ML; UG/.5ML
0.5 SUSPENSION INTRAMUSCULAR ONCE
Refills: 0 | Status: DISCONTINUED | OUTPATIENT
Start: 2022-03-17 | End: 2022-03-30

## 2022-03-17 RX ORDER — ACETAMINOPHEN 500 MG
650 TABLET ORAL EVERY 6 HOURS
Refills: 0 | Status: DISCONTINUED | OUTPATIENT
Start: 2022-03-17 | End: 2022-03-30

## 2022-03-17 RX ORDER — CEFTRIAXONE 500 MG/1
1000 INJECTION, POWDER, FOR SOLUTION INTRAMUSCULAR; INTRAVENOUS EVERY 24 HOURS
Refills: 0 | Status: DISCONTINUED | OUTPATIENT
Start: 2022-03-18 | End: 2022-03-19

## 2022-03-17 RX ORDER — SODIUM CHLORIDE 9 MG/ML
1000 INJECTION, SOLUTION INTRAVENOUS ONCE
Refills: 0 | Status: COMPLETED | OUTPATIENT
Start: 2022-03-17 | End: 2022-03-17

## 2022-03-17 RX ORDER — ALPRAZOLAM 0.25 MG
0.25 TABLET ORAL ONCE
Refills: 0 | Status: DISCONTINUED | OUTPATIENT
Start: 2022-03-17 | End: 2022-03-17

## 2022-03-17 RX ORDER — CEFTRIAXONE 500 MG/1
1000 INJECTION, POWDER, FOR SOLUTION INTRAMUSCULAR; INTRAVENOUS ONCE
Refills: 0 | Status: COMPLETED | OUTPATIENT
Start: 2022-03-17 | End: 2022-03-17

## 2022-03-17 RX ORDER — SODIUM CHLORIDE 9 MG/ML
1000 INJECTION, SOLUTION INTRAVENOUS
Refills: 0 | Status: DISCONTINUED | OUTPATIENT
Start: 2022-03-17 | End: 2022-03-20

## 2022-03-17 RX ORDER — LEVOTHYROXINE SODIUM 125 MCG
112 TABLET ORAL DAILY
Refills: 0 | Status: DISCONTINUED | OUTPATIENT
Start: 2022-03-17 | End: 2022-03-30

## 2022-03-17 RX ADMIN — SODIUM CHLORIDE 100 MILLILITER(S): 9 INJECTION, SOLUTION INTRAVENOUS at 23:41

## 2022-03-17 RX ADMIN — Medication 650 MILLIGRAM(S): at 18:50

## 2022-03-17 RX ADMIN — Medication 0.25 MILLIGRAM(S): at 23:41

## 2022-03-17 RX ADMIN — SODIUM CHLORIDE 1000 MILLILITER(S): 9 INJECTION, SOLUTION INTRAVENOUS at 19:25

## 2022-03-17 RX ADMIN — CEFTRIAXONE 100 MILLIGRAM(S): 500 INJECTION, POWDER, FOR SOLUTION INTRAMUSCULAR; INTRAVENOUS at 18:50

## 2022-03-17 NOTE — ED PROVIDER NOTE - CLINICAL SUMMARY MEDICAL DECISION MAKING FREE TEXT BOX
Angelo, PGY3: likely pt with pyelo on presentation today, unclear if pt with another underlying undiagnosed heme/onc/ai disorder. Will send septic labs, hemolytic labs, urine, cultures, fluids, abx. GUERDA Edwards

## 2022-03-17 NOTE — ED ADULT TRIAGE NOTE - CHIEF COMPLAINT QUOTE
Pt c/o hematuria. Pt was seen at [facility] last night for urinary retention and Wayne was placed. Pt was also found to be anemic and received 1 unit on PRBCs. PMH anemia, thyroid CA

## 2022-03-17 NOTE — H&P ADULT - NSHPPHYSICALEXAM_GEN_ALL_CORE
PHYSICAL EXAM:  GENERAL: NAD, well-developed, well-nourished  HEAD:  Atraumatic, Normocephalic  EYES: EOMI, PERRL, conjunctiva and sclera clear  NECK: Supple, No JVD  CHEST/LUNG: Clear to auscultation bilaterally; No wheezes, rales or rhonchi  HEART: Regular rate and rhythm; No murmurs, rubs, or gallops, (+)S1, S2  : red tinged urine   ABDOMEN: Soft, Nontender, Nondistended; Normal Bowel sounds, left flank tenderness  EXTREMITIES:  2+ Peripheral Pulses, No clubbing, cyanosis, or edema  PSYCH: normal mood and affect  NEUROLOGY: AAOx3, non-focal  SKIN: No rashes or lesions

## 2022-03-17 NOTE — ED ADULT NURSE NOTE - CHIEF COMPLAINT QUOTE
Pt c/o hematuria. Pt was seen at [facility] last night for urinary retention and Wayne was placed. Pt was also found to be anemic and received 1 unit on PRBCs. PMH anemia, thyroid CA detailed exam

## 2022-03-17 NOTE — ED ADULT TRIAGE NOTE - MODE OF ARRIVAL
[History reviewed] : History reviewed. [Medications and Allergies reviewed] : Medications and allergies reviewed. Walk in

## 2022-03-17 NOTE — ED PROVIDER NOTE - ATTENDING CONTRIBUTION TO CARE
Patient is a 57 yo M with history of thyroid cancer here for hematuria and difficulty urinating. He went to [facility], had a cho placed and had a CT done. He reports persistent left flank pain. He states he Dr. Muhammad sent him in for admission for bone marrow biopsy. Patient has been having weight loss and fatigue and was found to be jammie positive with polyclonal gammopathy.     VS noted  Gen. no acute distress, Non toxic   HEENT: EOMI, mmm  Lungs: CTAB/L no C/ W /R   CVS: tachycardia  Abd; Soft non tender, non distended, left CVA tenderness  Ext: no edema  Skin: no rash  Neuro AAOx3 non focal clear speech  a/p: pyelonephritis - sent in for further evaluation for jammie positive test and persistent symptoms of fatigue/ weight loss. Plan for Abx, labs, admission.   - Dione DIAZ

## 2022-03-17 NOTE — ED PROVIDER NOTE - CARE PLAN
1 Principal Discharge DX:	Pyelonephritis  Secondary Diagnosis:	Fatigue  Secondary Diagnosis:	Weight loss

## 2022-03-17 NOTE — H&P ADULT - PROBLEM SELECTOR PLAN 5
Acute on chronic   Hb 7.7 ( baseline 8-9)  Reports he was to have this followed up as an outpatient - per ED was to have a BM biopsy tomorrow?  Hematology consult in AM

## 2022-03-17 NOTE — ED PROVIDER NOTE - OBJECTIVE STATEMENT
56M PMH Thyroid Ca (s/p surgery on synthroid), Gout on Allopurinol, presents to the ED with hematuria for the past 5 days, had trouble urinating this morning passing small clots and went to [facility], had CT scan done (no obstructive stone, no renal mass), had cho placed morning of 3/17, got cefpodoxime took one dose today, states he's been having L flank pain since he went to [facility] this morning.   Of note, pt states that he's been having fatigue, unintentional 10 lb wt loss for the past few months, seen by Dr. 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 Spoke with Dr. Muhammad, states that pt had cervical LAD not long ago with negative biopsies. Dr. Muhammad sent pt to hospital to get bone marrow bx (spoke with Dr. Felix with IR, states he can get bm bx on 3/18 and to admit pt to Dr. Edwards).

## 2022-03-17 NOTE — ED ADULT NURSE NOTE - OBJECTIVE STATEMENT
Break coverage RN: Pt arriving to room 23 A&Ox4 ambulatory c/o hematuria x 5 days. Pt had Wayne catheter placed at [facility] today. Pt endorsing worsening L flank pain. Pt arriving with leg bag. Bag replaced in ED. Wayne is draining red urine. Pt rectally febrile. Respirations even and unlabored. Sinus tachy on monitor. Denies CP, SOB. 20g IV placed in LAC. Labs drawn and sent. Medicated as per EMAR.

## 2022-03-17 NOTE — H&P ADULT - NSHPREVIEWOFSYSTEMS_GEN_ALL_CORE

## 2022-03-17 NOTE — H&P ADULT - NSHPLABSRESULTS_GEN_ALL_CORE
labs reviewed                        7.7    3.11  )-----------( 193      ( 17 Mar 2022 18:54 )             24.1       03-17    134<L>  |  104  |  49<H>  ----------------------------<  88  4.6   |  19<L>  |  2.23<H>    Ca    8.8      17 Mar 2022 18:46  Phos  4.8     03-17  Mg     2.00     -17    TPro  8.0  /  Alb  3.0<L>  /  TBili  0.3  /  DBili  <0.2  /  AST  65<H>  /  ALT  20  /  AlkPhos  87  03-17            PT/INR - ( 17 Mar 2022 18:54 )   PT: 12.9 sec;   INR: 1.11 ratio         PTT - ( 17 Mar 2022 18:54 )  PTT:34.0 sec    18:54 - VBG - pH: 7.32  | pCO2: 39    | pO2: 28    | Lactate: 0.9        Urinalysis Basic - ( 17 Mar 2022 19:29 )    Color: Red / Appearance: Turbid / S.014 / pH: x  Gluc: x / Ketone: Trace  / Bili: Negative / Urobili: <2 mg/dL   Blood: x / Protein: 100 mg/dL / Nitrite: Negative   Leuk Esterase: Moderate / RBC: >10 /HPF / WBC 10 /HPF   Sq Epi: x / Non Sq Epi: 2 /HPF / Bacteria: Few        CXR interpreted by myself: : bilateral unspecific pulmonary opacities

## 2022-03-17 NOTE — ED PROVIDER NOTE - PHYSICAL EXAMINATION
GENERAL: no acute distress, non-toxic appearing, tactile warmth  HEENT: normal conjunctiva, oral mucosa moist  CARDIAC: tachy rate to 110s and regular rhythm, bp reassuring  PULM: clear to ascultation bilaterally, sats mid 90s on RA, slightly tachypnic  GI: abdomen nondistended, soft, nontender  : +L CVA tenderness, no suprapubic tenderness, cho in place with straw colored urine  NEURO: alert and oriented x 3, normal speech, moving all extremities without lateralization  MSK: no visible deformities, no peripheral edema, calf tenderness/redness/swelling  SKIN: no visible rashes  PSYCH: appropriate mood and affect

## 2022-03-17 NOTE — H&P ADULT - HISTORY OF PRESENT ILLNESS
56 yr old male with a pmh of Thyroid Ca (s/p surgery on synthroid), Gout on Allopurinol, presents to the ED with hematuria for the past 5 days - went to [facility] with negative imaging and was sent home with abx- today developed left flank pain and urinary rentention.   Patient has had fatigue and unintentional weight loss over the past few months. Per ED note "seen by Dr. 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 Spoke with Dr. Muhammad, states that pt had cervical LAD not long ago with negative biopsies. Dr. Muhammad sent pt to hospital to get bone marrow bx (spoke with Dr. Felix with IR, states he can get bm bx on 3/18 and to admit pt to Dr. Edwards)"    Denies  headache, dizziness, chest pain, palpitations, SOB, joint pain, diarrhea/constipation  Vitals in the ED: T 101, , /85, RR 23 satting 97% RA

## 2022-03-17 NOTE — H&P ADULT - PROBLEM SELECTOR PLAN 4
New  Cr 2.23 (1.6 in 1/2022)  likely 2/2 retention  Urine studies ordered   IVF   avoid nephrotoxic agents

## 2022-03-17 NOTE — PATIENT PROFILE ADULT - FALL HARM RISK - HARM RISK INTERVENTIONS

## 2022-03-17 NOTE — ED PROVIDER NOTE - SEPSIS CONTRAINDICATION FLUID ADMINISTRATION
Please see message from patient. Patient also sent another message after this:        PS - can I confess I kind of don't want to stop it because it's been such a relief not to be clawing holes in my feet? Maybe there's a safer version of this drug I could try?     Not applicable

## 2022-03-17 NOTE — ED PROVIDER NOTE - PROGRESS NOTE DETAILS
Angelo, PGY3: called dr west pts admitted  Told him that I will hold off on placing IR procedure order/consult until pts acute pyelo is treated. He understands plan  Fluid status reassessed, HR improved, bp reassuring, will hold further fluid bolus at this time

## 2022-03-18 ENCOUNTER — RESULT REVIEW (OUTPATIENT)
Age: 57
End: 2022-03-18

## 2022-03-18 DIAGNOSIS — R31.0 GROSS HEMATURIA: ICD-10-CM

## 2022-03-18 LAB
ANA TITR SER: NEGATIVE — SIGNIFICANT CHANGE UP
ANION GAP SERPL CALC-SCNC: 12 MMOL/L — SIGNIFICANT CHANGE UP (ref 7–14)
B PERT DNA SPEC QL NAA+PROBE: SIGNIFICANT CHANGE UP
B PERT+PARAPERT DNA PNL SPEC NAA+PROBE: SIGNIFICANT CHANGE UP
BASOPHILS # BLD AUTO: 0.01 K/UL — SIGNIFICANT CHANGE UP (ref 0–0.2)
BASOPHILS NFR BLD AUTO: 0.4 % — SIGNIFICANT CHANGE UP (ref 0–2)
BORDETELLA PARAPERTUSSIS (RAPRVP): SIGNIFICANT CHANGE UP
BUN SERPL-MCNC: 44 MG/DL — HIGH (ref 7–23)
C PNEUM DNA SPEC QL NAA+PROBE: SIGNIFICANT CHANGE UP
CALCIUM SERPL-MCNC: 8.7 MG/DL — SIGNIFICANT CHANGE UP (ref 8.4–10.5)
CHLORIDE SERPL-SCNC: 108 MMOL/L — HIGH (ref 98–107)
CO2 SERPL-SCNC: 17 MMOL/L — LOW (ref 22–31)
CREAT SERPL-MCNC: 1.85 MG/DL — HIGH (ref 0.5–1.3)
D DIMER BLD IA.RAPID-MCNC: 504 NG/ML DDU — HIGH
EGFR: 42 ML/MIN/1.73M2 — LOW
EOSINOPHIL # BLD AUTO: 0.08 K/UL — SIGNIFICANT CHANGE UP (ref 0–0.5)
EOSINOPHIL NFR BLD AUTO: 3.5 % — SIGNIFICANT CHANGE UP (ref 0–6)
FLUAV SUBTYP SPEC NAA+PROBE: SIGNIFICANT CHANGE UP
FLUBV RNA SPEC QL NAA+PROBE: SIGNIFICANT CHANGE UP
GLUCOSE SERPL-MCNC: 88 MG/DL — SIGNIFICANT CHANGE UP (ref 70–99)
HADV DNA SPEC QL NAA+PROBE: SIGNIFICANT CHANGE UP
HCOV 229E RNA SPEC QL NAA+PROBE: SIGNIFICANT CHANGE UP
HCOV HKU1 RNA SPEC QL NAA+PROBE: SIGNIFICANT CHANGE UP
HCOV NL63 RNA SPEC QL NAA+PROBE: SIGNIFICANT CHANGE UP
HCOV OC43 RNA SPEC QL NAA+PROBE: SIGNIFICANT CHANGE UP
HCT VFR BLD CALC: 24.1 % — LOW (ref 39–50)
HCT VFR BLD CALC: 25.5 % — LOW (ref 39–50)
HGB BLD-MCNC: 8 G/DL — LOW (ref 13–17)
HGB BLD-MCNC: 8 G/DL — LOW (ref 13–17)
HMPV RNA SPEC QL NAA+PROBE: SIGNIFICANT CHANGE UP
HPIV1 RNA SPEC QL NAA+PROBE: SIGNIFICANT CHANGE UP
HPIV2 RNA SPEC QL NAA+PROBE: SIGNIFICANT CHANGE UP
HPIV3 RNA SPEC QL NAA+PROBE: SIGNIFICANT CHANGE UP
HPIV4 RNA SPEC QL NAA+PROBE: SIGNIFICANT CHANGE UP
IANC: 1.7 K/UL — SIGNIFICANT CHANGE UP (ref 1.5–8.5)
IMM GRANULOCYTES NFR BLD AUTO: 1.3 % — SIGNIFICANT CHANGE UP (ref 0–1.5)
LYMPHOCYTES # BLD AUTO: 0.33 K/UL — LOW (ref 1–3.3)
LYMPHOCYTES # BLD AUTO: 14.4 % — SIGNIFICANT CHANGE UP (ref 13–44)
M PNEUMO DNA SPEC QL NAA+PROBE: SIGNIFICANT CHANGE UP
MCHC RBC-ENTMCNC: 28.2 PG — SIGNIFICANT CHANGE UP (ref 27–34)
MCHC RBC-ENTMCNC: 29.5 PG — SIGNIFICANT CHANGE UP (ref 27–34)
MCHC RBC-ENTMCNC: 31.4 GM/DL — LOW (ref 32–36)
MCHC RBC-ENTMCNC: 33.2 GM/DL — SIGNIFICANT CHANGE UP (ref 32–36)
MCV RBC AUTO: 88.9 FL — SIGNIFICANT CHANGE UP (ref 80–100)
MCV RBC AUTO: 89.8 FL — SIGNIFICANT CHANGE UP (ref 80–100)
MONOCYTES # BLD AUTO: 0.14 K/UL — SIGNIFICANT CHANGE UP (ref 0–0.9)
MONOCYTES NFR BLD AUTO: 6.1 % — SIGNIFICANT CHANGE UP (ref 2–14)
NEUTROPHILS # BLD AUTO: 1.7 K/UL — LOW (ref 1.8–7.4)
NEUTROPHILS NFR BLD AUTO: 74.3 % — SIGNIFICANT CHANGE UP (ref 43–77)
NRBC # BLD: 0 /100 WBCS — SIGNIFICANT CHANGE UP
NRBC # BLD: 0 /100 WBCS — SIGNIFICANT CHANGE UP
NRBC # FLD: 0 K/UL — SIGNIFICANT CHANGE UP
NRBC # FLD: 0 K/UL — SIGNIFICANT CHANGE UP
PLATELET # BLD AUTO: 190 K/UL — SIGNIFICANT CHANGE UP (ref 150–400)
PLATELET # BLD AUTO: 204 K/UL — SIGNIFICANT CHANGE UP (ref 150–400)
POTASSIUM SERPL-MCNC: 4.3 MMOL/L — SIGNIFICANT CHANGE UP (ref 3.5–5.3)
POTASSIUM SERPL-SCNC: 4.3 MMOL/L — SIGNIFICANT CHANGE UP (ref 3.5–5.3)
RAPID RVP RESULT: SIGNIFICANT CHANGE UP
RBC # BLD: 2.71 M/UL — LOW (ref 4.2–5.8)
RBC # BLD: 2.84 M/UL — LOW (ref 4.2–5.8)
RBC # FLD: 16.1 % — HIGH (ref 10.3–14.5)
RBC # FLD: 16.2 % — HIGH (ref 10.3–14.5)
RHEUMATOID FACT SERPL-ACNC: <10 IU/ML — SIGNIFICANT CHANGE UP (ref 0–13)
RSV RNA SPEC QL NAA+PROBE: SIGNIFICANT CHANGE UP
RV+EV RNA SPEC QL NAA+PROBE: SIGNIFICANT CHANGE UP
SARS-COV-2 RNA SPEC QL NAA+PROBE: SIGNIFICANT CHANGE UP
SODIUM SERPL-SCNC: 137 MMOL/L — SIGNIFICANT CHANGE UP (ref 135–145)
WBC # BLD: 2.29 K/UL — LOW (ref 3.8–10.5)
WBC # BLD: 3.35 K/UL — LOW (ref 3.8–10.5)
WBC # FLD AUTO: 2.29 K/UL — LOW (ref 3.8–10.5)
WBC # FLD AUTO: 3.35 K/UL — LOW (ref 3.8–10.5)

## 2022-03-18 PROCEDURE — 88360 TUMOR IMMUNOHISTOCHEM/MANUAL: CPT | Mod: 26

## 2022-03-18 PROCEDURE — 88313 SPECIAL STAINS GROUP 2: CPT | Mod: 26

## 2022-03-18 PROCEDURE — 77012 CT SCAN FOR NEEDLE BIOPSY: CPT | Mod: 26

## 2022-03-18 PROCEDURE — 88291 CYTO/MOLECULAR REPORT: CPT

## 2022-03-18 PROCEDURE — 99222 1ST HOSP IP/OBS MODERATE 55: CPT

## 2022-03-18 PROCEDURE — 88189 FLOWCYTOMETRY/READ 16 & >: CPT

## 2022-03-18 PROCEDURE — 99254 IP/OBS CNSLTJ NEW/EST MOD 60: CPT | Mod: GC

## 2022-03-18 PROCEDURE — 88365 INSITU HYBRIDIZATION (FISH): CPT | Mod: 26,59

## 2022-03-18 PROCEDURE — 88342 IMHCHEM/IMCYTCHM 1ST ANTB: CPT | Mod: 26,59

## 2022-03-18 PROCEDURE — 88367 INSITU HYBRIDIZATION AUTO: CPT | Mod: 26

## 2022-03-18 PROCEDURE — 85097 BONE MARROW INTERPRETATION: CPT

## 2022-03-18 PROCEDURE — 38222 DX BONE MARROW BX & ASPIR: CPT | Mod: RT

## 2022-03-18 PROCEDURE — 88341 IMHCHEM/IMCYTCHM EA ADD ANTB: CPT | Mod: 26,59

## 2022-03-18 PROCEDURE — 88364 INSITU HYBRIDIZATION (FISH): CPT | Mod: 26

## 2022-03-18 RX ORDER — FOLIC ACID 0.8 MG
1 TABLET ORAL DAILY
Refills: 0 | Status: DISCONTINUED | OUTPATIENT
Start: 2022-03-18 | End: 2022-03-30

## 2022-03-18 RX ORDER — CHLORHEXIDINE GLUCONATE 213 G/1000ML
1 SOLUTION TOPICAL DAILY
Refills: 0 | Status: DISCONTINUED | OUTPATIENT
Start: 2022-03-18 | End: 2022-03-30

## 2022-03-18 RX ORDER — SENNA PLUS 8.6 MG/1
2 TABLET ORAL AT BEDTIME
Refills: 0 | Status: DISCONTINUED | OUTPATIENT
Start: 2022-03-18 | End: 2022-03-30

## 2022-03-18 RX ADMIN — Medication 300 MILLIGRAM(S): at 13:18

## 2022-03-18 RX ADMIN — SENNA PLUS 2 TABLET(S): 8.6 TABLET ORAL at 21:59

## 2022-03-18 RX ADMIN — Medication 650 MILLIGRAM(S): at 21:59

## 2022-03-18 RX ADMIN — CHLORHEXIDINE GLUCONATE 1 APPLICATION(S): 213 SOLUTION TOPICAL at 11:55

## 2022-03-18 RX ADMIN — Medication 112 MICROGRAM(S): at 05:23

## 2022-03-18 RX ADMIN — Medication 650 MILLIGRAM(S): at 22:30

## 2022-03-18 RX ADMIN — CEFTRIAXONE 100 MILLIGRAM(S): 500 INJECTION, POWDER, FOR SOLUTION INTRAMUSCULAR; INTRAVENOUS at 18:56

## 2022-03-18 NOTE — CONSULT NOTE ADULT - ASSESSMENT
Interventional Radiology Brief Consult Note     Evaluate for Procedure: Bone marrow biopsy      HPI: 56 yr old male with history of Thyroid Ca (s/p surgery on synthroid) admitted for hematuria with flank pain and urinary retention. Found to have positive jammie test with polyclonal gammopathy. IR consulted for bone marrow biopsy.        Allergies:      Medications (Abx/Cardiac/Anticoagulation/Blood Products)   cefTRIAXone   IVPB: 100 mL/Hr IV Intermittent (03-17 @ 18:50)     Data:   T(C): 37.3   HR: 106   BP: 115/71   RR: 18   SpO2: 97%     -WBC 2.29 / HgB 8.0 / Hct 24.1 / Plt 190   -Na 137 / Cl 108 / BUN 44 / Glucose 88   -K 4.3 / CO2 17 / Cr 1.85   -ALT -- / Alk Phos -- / T.Bili --   -INR 1.11 / PTT 34.0     Assessment/Plan:      56 yr old male with history of Thyroid Ca (s/p surgery on synthroid) admitted for hematuria with flank pain and urinary retention. Found to have positive jammie test with polyclonal gammopathy. IR consulted for bone marrow biopsy.     -Bone marrow biopsy today, 03/18/2022   -Please place IR procedure order under Dr. Felix and write pre-procedure note if not already performed

## 2022-03-18 NOTE — PROCEDURE NOTE - PROCEDURE FINDINGS AND DETAILS
CT guided bone marrow biopsy. Right iliac marrow aspirate and core sent to lab for further evaluation.  Pt tolerated procedure without difficulty. Full report to follow

## 2022-03-18 NOTE — CONSULT NOTE ADULT - ASSESSMENT
56 years old male with PMHx of Papillary thyroid cancer s/p total thyroidectomy on synthroid 1/27/22, Gout on Allopurinol, presents to the ED with hematuria for the past 5 days    ID is consulted for UTI  Presented with painless hematuria and urinary retention then left flank pain  Febrile on admission with leukopenia  UA 10 WBC and grossly hematuria; UCx pending  Reported with unintentional weight loss, fatigue and sometimes night sweat  CXR showed nonspecific peripheral opacities  ESR >140, CRP 39.5  BCx pending    Started on Rocephin 3/17    Positive GIANNA 1:640 in 2021  UA is not indicative of UTI, left flank pain is likely secondary to urinary retention  Patient also reports recent sinusitis, treated with Augmentin  Will undergo bone marrow biopsy today to evaluate anemia and leukopenia  Could this be secondary to autoimmune process?      IMPRESSION:  Painless hematuria  Leukopenia  Anemia  Fever    RECOMMENDATIONS:  - D/C Rocephin  - CT chest and A/P without contrast  - Follow up blood culture and urine culture  - Follow up bone marrow biopsy result  - Trend WBC, fever curve, transaminases, creatinine daily  - Will continue to follow      * THIS IS AN INCOMPLETE NOTE. FINAL RECOMMENDATION WILL BE UPDATED AFTER DISCUSSING WITH ATTENDING *   56 years old male with PMHx of Papillary thyroid cancer s/p total thyroidectomy on synthroid 1/27/22, Gout on Allopurinol, presents to the ED with hematuria for the past 5 days    ID is consulted for UTI  Presented with painless hematuria and urinary retention then left flank pain  Febrile on admission with leukopenia  UA 10 WBC and grossly hematuria; UCx pending  Reported with unintentional weight loss, fatigue and sometimes night sweat  CXR showed nonspecific peripheral opacities  ESR >140, CRP 39.5  BCx pending    Started on Rocephin 3/17    Has not been feeling well since 2 yrs ago, had extreme fatigue in June 2021, improved in winter but recur again in January 2022  Positive GIANNA 1:640 in 2021 but repeat rheum panel done in Sept 2021 was unremarkable, including negative GIANNA  Had CT A/P done showing kidney stone and GGOs in lower lungs  UA is not indicative of UTI, left flank pain is likely secondary to urinary retention  Patient also reports recent sinusitis, treated with Augmentin  Will undergo bone marrow biopsy today to evaluate anemia and leukopenia  Could this be secondary to autoimmune process? less likely tick-borne diseases or viral process      IMPRESSION:  Painless hematuria  Leukopenia  Anemia  Fever    RECOMMENDATIONS:  - D/C Rocephin  - CT chest  - Check HIV, QuantiFeron, RVP panel  - Follow up blood culture and urine culture  - Follow up bone marrow biopsy result  - Consider Rheumatology eval  - Trend WBC, fever curve, transaminases, creatinine daily  - Will continue to follow      Patient is seen and examined with attending and case is discussed with primary team      Radha Trujillo DO  PGY4 ID fellow  Please contact me via page or text through [employer] Teams  If after 5PM or on weekends, please call [phone]     56 years old male with PMHx of Papillary thyroid cancer s/p total thyroidectomy on synthroid 1/27/22, Gout on Allopurinol, presents to the ED with hematuria for the past 5 days    ID is consulted for UTI  Presented with painless hematuria and urinary retention then left flank pain  Febrile on admission with leukopenia  UA 10 WBC and grossly hematuria; UCx pending  Reported with unintentional weight loss, fatigue and sometimes night sweat  CXR showed nonspecific peripheral opacities  ESR >140, CRP 39.5  BCx pending    Started on Rocephin 3/17    Has not been feeling well since 2 yrs ago, had extreme fatigue in June 2021, improved in winter but recur again in January 2022  Positive GIANNA 1:640 in 2021 but repeat rheum panel done in Sept 2021 was unremarkable, including negative GIANNA  Had CT A/P done showing kidney stone and GGOs in lower lungs  UA is not indicative of UTI, left flank pain is likely secondary to urinary retention  Patient also reports recent sinusitis, treated with Augmentin  Will undergo bone marrow biopsy today to evaluate anemia and leukopenia  Could this be secondary to autoimmune process? less likely tick-borne diseases or viral process      IMPRESSION:  Painless hematuria  Leukopenia  Anemia  Fever    RECOMMENDATIONS:  - D/C Rocephin  - CT chest  - Check HIV, QuantiFeron, RVP panel, mycoplasma IgM and IgG, Parvo PCR, urine legionella  - Follow up blood culture and urine culture  - Follow up bone marrow biopsy result  - Consider Rheumatology eval  - Trend WBC, fever curve, transaminases, creatinine daily  - Will continue to follow      Patient is seen and examined with attending and case is discussed with primary team      Radha Trujillo,   PGY4 ID fellow  Please contact me via page or text through [employer] Teams  If after 5PM or on weekends, please call [phone]

## 2022-03-18 NOTE — CONSULT NOTE ADULT - PROBLEM SELECTOR RECOMMENDATION 9
Urinary catheter removed since was no longer draining  Pt voided about 250 cc of red urine - no clots - and his PVR = 0-25cc  Would leave catheter out and monitor color and output  Pain Control  Trend Creatinine  Treatment of UTI as per ID  IR bx as per Med  Upload outside imaging studies  No evidence of Urologic obstruction so there is no acute urologic intervention at present

## 2022-03-18 NOTE — CONSULT NOTE ADULT - SUBJECTIVE AND OBJECTIVE BOX
HPI:  56 yr old male with a pmh of Thyroid Ca (s/p surgery on synthroid), Gout on Allopurinol, presented to the [facility] ED with hematuria for the past 5 days.  On Tuesday he was seen by his Urologist, Dr. Godinez, who ordered a CT A/P w Contrast.  On Wednesday night he was unable to void and  went to [facility] where a cho was placed for retention.  He was discharged and subsequently developed L flank pain on Thursday and came to [facility] ER for eval.   Additionally,  patient has had fatigue and unintentional weight loss over the past few months.  He was recently seen by Dr. Muhammad (hematologist), and found to be jammie positive with polyclonal gammopathy.  Dr. Muhammad sent pt to hospital to get bone marrow bx (spoke with Dr. Felix with IR, states he can get bm bx on 3/18 and to admit pt to Dr. Edwards)    Of note, Pt admits to nocturia x 2-3 and being started on flomax this week by his Urologist, Dr. Godinez.  [facility] shows 8mm R renal stone, 9mm L renal stone, and 3mm bladder stone.  No evidence of renal / bladder mass or hydronephrosis.  Additionally, he states that his kidney stones are known and his Urologist is making plans to address them on an outpatient elective basis,    PAST MEDICAL & SURGICAL HISTORY:    Deviated septum    Gout    Malignant neoplasm of thyroid gland    S/P ORIF (open reduction internal fixation) fracture  right femur     S/P ACL surgery  Right; 2016    S/P hardware removal  right femur     H/O umbilical hernia repair    History of nasal septoplasty  2020    History of tonsillectomy  as a child        MEDICATIONS  (STANDING):    allopurinol 300 milliGRAM(s) Oral daily  cefTRIAXone   IVPB 1000 milliGRAM(s) IV Intermittent every 24 hours  chlorhexidine 2% Cloths 1 Application(s) Topical daily  folic acid 1 milliGRAM(s) Oral daily  influenza   Vaccine 0.5 milliLiter(s) IntraMuscular once  lactated ringers. 1000 milliLiter(s) (100 mL/Hr) IV Continuous <Continuous>  levothyroxine 112 MICROGram(s) Oral daily  senna 2 Tablet(s) Oral at bedtime    MEDICATIONS  (PRN):  acetaminophen     Tablet .. 650 milliGRAM(s) Oral every 6 hours PRN Temp greater or equal to 38C (100.4F), Mild Pain (1 - 3)  aluminum hydroxide/magnesium hydroxide/simethicone Suspension 30 milliLiter(s) Oral every 4 hours PRN Dyspepsia  bisacodyl Suppository 10 milliGRAM(s) Rectal once PRN Constipation  melatonin 3 milliGRAM(s) Oral at bedtime PRN Insomnia  ondansetron Injectable 4 milliGRAM(s) IV Push every 8 hours PRN Nausea and/or Vomiting      FAMILY HISTORY:  No pertinent family history in first degree relatives        Allergies    No Known Allergies        SOCIAL HISTORY:      Does not use tobacco products, consume alcohol or partake in illicit drug use      REVIEW OF SYSTEMS: Otherwise negative as stated in HPI      Vital Signs Last 24 Hrs  T(C): 37.3 (18 Mar 2022 12:22), Max: 38.3 (17 Mar 2022 18:29)  T(F): 99.2 (18 Mar 2022 12:22), Max: 101 (17 Mar 2022 18:29)  HR: 106 (18 Mar 2022 12:22) (101 - 123)  BP: 115/71 (18 Mar 2022 12:22) (115/71 - 128/88)  BP(mean): --  RR: 18 (18 Mar 2022 12:22) (18 - 23)  SpO2: 97% (18 Mar 2022 12:22) (96% - 100%)    PHYSICAL EXAM:    General: [x ] Awake and Alert in no acute distress    Respiratory and Thorax: [x  ] no resp distress   	  Cardiovascular: [x ] S1,S2 Reg Rate    Gastrointestinal: [x ]soft  [x ] non tender,   Mild L CVAT     Genitourinary: Glans Circumcised [x ]Y  [x ]N, [ ]lesions,  + 16f cho noted not draining                               Testes  Descended [x ]Y  [ ]N,    Tender [ ]Y  [x ]N,   Epididymis Tender  [ ]Y [x ]N,                         	    Musculoskeletal / Extremities:  Edema [ ]Y [x ]N,  AROM x 4 [x ]Y  [ ]N  	          LABS:                        8.0    2.29  )-----------( 190      ( 18 Mar 2022 06:59 )             24.1     03-18    137  |  108<H>  |  44<H>  ----------------------------<  88  4.3   |  17<L>  |  1.85<H>    Ca    8.7      18 Mar 2022 06:59  Phos  4.8       Mg     2.00         TPro  8.0  /  Alb  3.0<L>  /  TBili  0.3  /  DBili  <0.2  /  AST  65<H>  /  ALT  20  /  AlkPhos  87      PT/INR - ( 17 Mar 2022 18:54 )   PT: 12.9 sec;   INR: 1.11 ratio         PTT - ( 17 Mar 2022 18:54 )  PTT:34.0 sec  Urinalysis Basic - ( 17 Mar 2022 19:29 )    Color: Red / Appearance: Turbid / S.014 / pH: x  Gluc: x / Ketone: Trace  / Bili: Negative / Urobili: <2 mg/dL   Blood: x / Protein: 100 mg/dL / Nitrite: Negative   Leuk Esterase: Moderate / RBC: >10 /HPF / WBC 10 /HPF   Sq Epi: x / Non Sq Epi: 2 /HPF / Bacteria: Few      URINE CX:  pending    RADIOLOGY:  Gemma Taveras : 8mm R renal stone, 9mm L renal stone, and 3mm bladder stone.  No evidence of renal / bladder mass or hydronephrosis.   HPI:  56 yr old male with a pmh of Thyroid Ca (s/p surgery on synthroid), Gout on Allopurinol, presented to the [facility] ED with hematuria for the past 5 days.  On Tuesday he was seen by his Urologist, Dr. Godinez, who ordered a CT A/P w Contrast.  On Wednesday night he was unable to void and  went to [facility] where a cho was placed for retention.  He was discharged and subsequently developed L flank pain on Thursday and came to [facility] ER for eval.   Additionally,  patient has had fatigue and unintentional weight loss over the past few months.  He was recently seen by Dr. Muhammad (hematologist), and found to be jammie positive with polyclonal gammopathy.  Dr. Muhammad sent pt to hospital to get bone marrow bx (spoke with Dr. Felix with IR, states he can get bm bx on 3/18 and to admit pt to Dr. Edwards)    Of note, Pt admits to nocturia x 2-3 and being started on flomax this week by his Urologist, Dr. Godinez. Denies prior history of urinary retention or hematuria. Denies smoking history. States he had recent prior cystoscopy during workup for nephrolithiasis that was normal. Also states he has his PSA checked annually without any issue.   [facility] shows 8mm R renal stone, 9mm L renal stone, and 3mm bladder stone.  No evidence of renal / bladder mass or hydronephrosis.  Additionally, he states that his kidney stones are known and his Urologist is making plans to address them on an outpatient elective basis.     PAST MEDICAL & SURGICAL HISTORY:    Deviated septum    Gout    Malignant neoplasm of thyroid gland    S/P ORIF (open reduction internal fixation) fracture  right femur     S/P ACL surgery  Right; 2016    S/P hardware removal  right femur     H/O umbilical hernia repair    History of nasal septoplasty  2020    History of tonsillectomy  as a child        MEDICATIONS  (STANDING):    allopurinol 300 milliGRAM(s) Oral daily  cefTRIAXone   IVPB 1000 milliGRAM(s) IV Intermittent every 24 hours  chlorhexidine 2% Cloths 1 Application(s) Topical daily  folic acid 1 milliGRAM(s) Oral daily  influenza   Vaccine 0.5 milliLiter(s) IntraMuscular once  lactated ringers. 1000 milliLiter(s) (100 mL/Hr) IV Continuous <Continuous>  levothyroxine 112 MICROGram(s) Oral daily  senna 2 Tablet(s) Oral at bedtime    MEDICATIONS  (PRN):  acetaminophen     Tablet .. 650 milliGRAM(s) Oral every 6 hours PRN Temp greater or equal to 38C (100.4F), Mild Pain (1 - 3)  aluminum hydroxide/magnesium hydroxide/simethicone Suspension 30 milliLiter(s) Oral every 4 hours PRN Dyspepsia  bisacodyl Suppository 10 milliGRAM(s) Rectal once PRN Constipation  melatonin 3 milliGRAM(s) Oral at bedtime PRN Insomnia  ondansetron Injectable 4 milliGRAM(s) IV Push every 8 hours PRN Nausea and/or Vomiting      FAMILY HISTORY:  No pertinent family history in first degree relatives        Allergies    No Known Allergies        SOCIAL HISTORY:      Does not use tobacco products, consume alcohol or partake in illicit drug use      REVIEW OF SYSTEMS: Otherwise negative as stated in HPI      Vital Signs Last 24 Hrs  T(C): 37.3 (18 Mar 2022 12:22), Max: 38.3 (17 Mar 2022 18:29)  T(F): 99.2 (18 Mar 2022 12:22), Max: 101 (17 Mar 2022 18:29)  HR: 106 (18 Mar 2022 12:22) (101 - 123)  BP: 115/71 (18 Mar 2022 12:22) (115/71 - 128/88)  BP(mean): --  RR: 18 (18 Mar 2022 12:22) (18 - 23)  SpO2: 97% (18 Mar 2022 12:22) (96% - 100%)    PHYSICAL EXAM:    General: [x ] Awake and Alert in no acute distress    Respiratory and Thorax: [x  ] no resp distress   	  Cardiovascular: [x ] S1,S2 Reg Rate    Gastrointestinal: [x ]soft  [x ] non tender,   Mild L CVAT     Genitourinary: Glans Circumcised [x ]Y  [x ]N, [ ]lesions,  + 16f cho noted not draining well, clear pink urine. Cho removed. Voided twice with low PVR and pain resolved. On reassessment 2 hours later, patient comfortable with soft abdomen.                                Testes  Descended [x ]Y  [ ]N,    Tender [ ]Y  [x ]N,   Epididymis Tender  [ ]Y [x ]N,                         	    Musculoskeletal / Extremities:  Edema [ ]Y [x ]N,  AROM x 4 [x ]Y  [ ]N  	          LABS:                        8.0    2.29  )-----------( 190      ( 18 Mar 2022 06:59 )             24.1     03-18    137  |  108<H>  |  44<H>  ----------------------------<  88  4.3   |  17<L>  |  1.85<H>    Ca    8.7      18 Mar 2022 06:59  Phos  4.8     03-17  Mg     2.00     -17    TPro  8.0  /  Alb  3.0<L>  /  TBili  0.3  /  DBili  <0.2  /  AST  65<H>  /  ALT  20  /  AlkPhos  87  -17    PT/INR - ( 17 Mar 2022 18:54 )   PT: 12.9 sec;   INR: 1.11 ratio         PTT - ( 17 Mar 2022 18:54 )  PTT:34.0 sec  Urinalysis Basic - ( 17 Mar 2022 19:29 )    Color: Red / Appearance: Turbid / S.014 / pH: x  Gluc: x / Ketone: Trace  / Bili: Negative / Urobili: <2 mg/dL   Blood: x / Protein: 100 mg/dL / Nitrite: Negative   Leuk Esterase: Moderate / RBC: >10 /HPF / WBC 10 /HPF   Sq Epi: x / Non Sq Epi: 2 /HPF / Bacteria: Few      URINE CX:  pending    RADIOLOGY:  Gemma Laureanoiri : 8mm R renal stone, 9mm L renal stone, and 3mm bladder stone.  No evidence of renal / bladder mass or hydronephrosis.    < from: NM PET/CT Onc FDG Skull to Thigh, Inital (22 @ 00:00) >  KIDNEYS/URINARY BLADDER: Physiologic excreted FDG activity. Bilateral   nonobstructing lower pole and left interpolar renal calculi.    < end of copied text >

## 2022-03-18 NOTE — CHART NOTE - NSCHARTNOTEFT_GEN_A_CORE
PRE-INTERVENTIONAL RADIOLOGY PROCEDURE NOTE    Patient Age: 56    Patient Gender: male    Procedure (including site / side if known): bone marrow bx    Diagnosis / Indication: thyroid ca    Interventional Radiology Attending Physician: dr myers    Ordering Attending Physician: dr. west    Pertinent medical history: thyroid ca    Patient and Family aware? Yes

## 2022-03-18 NOTE — CONSULT NOTE ADULT - ASSESSMENT
patient with reported adenopathy and had a biopsy and reportedly negative for malignancy. recent CT A/P was done and there was no adenopathy and no other evidence of malignancy. He is anemic. Evaluation notes an AOCD process based upon high ferritin and low iron and TIBC/ LDH high, but normal haptoglobin and bilirubin so despite that he is Aguila positive it would appear that he is not actively hemolyzing. can start folic acid. Anemia in part due to hematuria and he also has kidney disease. Also reportedly guaiac positive. He also has a leucopenia and mild neutropenia, which could be an auto immune process. he will be seen by rheumatology. ANC is adequate and no need for G-CSF at this time. he will be having a bone marrow biopsy with IR and anemia should be managed supportively. Hematuria management per urology. Would also get a GI evaluation for guaiac positive stool. labs done in office negative for a monoclonal protein and he was not iron/B12 deficient.

## 2022-03-18 NOTE — CONSULT NOTE ADULT - ASSESSMENT
56y male with recent dx of jammie positive with polyclonal gammopathy and nephrolithiasis having  gross hematuria and recent episode of urinary retention  56y male with recent dx of jammie positive with polyclonal gammopathy and nephrolithiasis having  gross hematuria and recent episode of urinary retention. Cho catheter removed, pain resolved and patient voiding well with PVR 0-25cc.     -- no acute  intervention  -- continue flomax  -- continue to monitor outputs  -- monitor urine color and HCT  -- check PVR q 8 hrs for 24 hours  -- please call urology if signs/symptoms of urinary retention; discussed with patient possibility of replacing cho catheter if necessary   -- discussed with patient possible etiology of hematuria including nephrolithiasis and BPH, however patient will follow-up with primary urologist, Dr. Godinez for complete hematuria workup.   -- f/u ID, IR and hematology     Discussed with on-call attending, Dr. Torres.     Parveen Longo   Urology #[id number]     56y male with recent dx of jammie positive with polyclonal gammopathy and nephrolithiasis having  gross hematuria and recent episode of urinary retention. Cho catheter removed, pain resolved and patient voiding well with PVR 0-25cc.     -- no acute  intervention  -- continue flomax  -- continue to monitor outputs  -- monitor urine color and HCT  -- check PVR q 8 hrs for 24 hours  -- please call urology if signs/symptoms of urinary retention; discussed with patient possibility of replacing cho catheter if necessary   -- discussed with patient possible etiology of hematuria including nephrolithiasis and BPH, however patient will follow-up with primary urologist, Dr. Godinez for complete hematuria workup.   -- f/u urine culture   -- f/u ID, IR and hematology     Discussed with on-call attending, Dr. Torres.     Parveen Longo   Urology #[id number]

## 2022-03-18 NOTE — CONSULT NOTE ADULT - ATTENDING COMMENTS
56 year old with prior diagnosis of thyroid cancer  Recent presentation with hematuria with retention    Presented for evaluation.  Fever at the time of presentation    1) Hematuria  given vantin prior to presenting but pt did not take it  UA without significant pyuria    Not clear that he has UTI  Hold antibiotics pending urine and blood cultures    Consider CT a/p to rule out obstruction/ pyelonephritis    2) Fever  Follow urine and blood cultures    H/o + GIANNA; elevated inflammatory markers  Has sinus disease as well  Consider rheum eval    3) Anemia and leukopenia  heme eval in progress 56 year old with prior diagnosis of thyroid cancer  Recent presentation with hematuria with retention    Overall states, he has not felt well for over 6 months.  Over the summer, he had a period of fatigue. It got better but then began to feel worse in December    Prior CT chest had ground glass opacity (8/2021) Was found to have + GIANNA (althought reepat was engative)  He was given steroids with improvement in symptoms.    Does not photsesntivity with rash after sun exposure.     Presented for evaluation of hematuria.  Fever at the time of presentation    1) Hematuria  given vantin prior to presenting but pt did not take it  UA without significant pyuria    Not clear that he has UTI  Hold antibiotics pending urine and blood cultures    Recent CT a/p with stones    2) Fever  Follow urine and blood cultures    H/o + GIANNA; elevated inflammatory markers  Has sinus disease as well  CT with ground glass in August- has chronic cough    Check urine legionella  check Mycoplasma IgM and IgG    Consider rheum eval  Repeat CT chest  Check HIV status    3) Anemia and leukopenia  heme eval in progress  Check parvovirus PCR    Not clear that this is infectious cause of his presentation 56 year old with prior diagnosis of thyroid cancer  Recent presentation with hematuria with retention    Overall states, he has not felt well for over 6 months.  Over the summer, he had a period of fatigue. It got better but then began to feel worse in December    Prior CT chest had ground glass opacity (8/2021) Was found to have + GIANNA (althought reepat was engative)  He was given steroids with improvement in symptoms.    Does not photosensitivity with rash after sun exposure.     Presented for evaluation of hematuria.  Fever at the time of presentation    1) Hematuria  given vantin prior to presenting but pt did not take it  UA without significant pyuria    Not clear that he has UTI  Hold antibiotics pending urine and blood cultures    Recent CT a/p with stones    2) Fever  Follow urine and blood cultures    H/o + GIANNA; elevated inflammatory markers  Has sinus disease as well  CT with ground glass in August- has chronic cough    Check HIV status- pt provided verbal consent  Check urine legionella  check Mycoplasma IgM and IgG    Consider rheum eval  Repeat CT chest  Check HIV status    3) Anemia and leukopenia  heme eval in progress  Check parvovirus PCR    Not clear that this is infectious cause of his presentation

## 2022-03-18 NOTE — CONSULT NOTE ADULT - SUBJECTIVE AND OBJECTIVE BOX
INFECTIOUS DISEASE CONSULT NOTE    Patient is a 56y old  Male who presents with a chief complaint of urinary retention (17 Mar 2022 23:17)    HPI:  56 yr old male with a pmh of Thyroid Ca (s/p surgery on synthroid), Gout on Allopurinol, presents to the ED with hematuria for the past 5 days - went to [facility] with negative imaging and was sent home with abx- today developed left flank pain and urinary rentention.   Patient has had fatigue and unintentional weight loss over the past few months. Per ED note "seen by Dr. 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 Spoke with Dr. Muhammad, states that pt had cervical LAD not long ago with negative biopsies. Dr. Muhammad sent pt to hospital to get bone marrow bx (spoke with Dr. Felix with IR, states he can get bm bx on 3/18 and to admit pt to Dr. Edwards)"    Denies  headache, dizziness, chest pain, palpitations, SOB, joint pain, diarrhea/constipation  Vitals in the ED: T 101, , /85, RR 23 satting 97% RA (17 Mar 2022 23:17)       REVIEW OF SYSTEMS:  CONSTITUTIONAL: No unintentional weight loss; no weakness; no fevers or chills  HEENT: No decrease hearing, tinnitus, ear pain, rhinorrhea, congestion, or sore throat  RESPIRATORY: No cough, wheezing, hemoptysis; no shortness of breath/shortness of breath on exertion; no orthopnea  CARDIOVASCULAR: No chest pain or palpitations  GASTROINTESTINAL: No abdominal or epigastric pain; no change in appetite; no early satiety; no nausea, vomiting, or hematemesis; no diarrhea or constipation; no melena or hematochezia; no change of stool caliber  GENITOURINARY: No dysuria, increased in urinary frequency or hematuria; no urethral discharge  NEUROLOGICAL: No headache/dizziness; no focal numbness or motor weakness  MSK: No joint pain, erythema, or swelling; no back pain  SKIN: No itching, rashes; no recent insect bites  All other ROS negative except noted above    Prior hospital charts reviewed [Yes]  Primary team notes reviewed [Yes]  Other consultant notes reviewed [Yes]    PAST MEDICAL & SURGICAL HISTORY:  Deviated septum    Gout    Malignant neoplasm of thyroid gland    S/P ORIF (open reduction internal fixation) fracture  right femur     S/P ACL surgery  Right; 2016    S/P hardware removal  right femur     H/O umbilical hernia repair    History of nasal septoplasty  2020    History of tonsillectomy  as a child        SOCIAL HISTORY:  - Born in [place], [place] in 19[place]  - Currently working as / Retired  - Lives with _____; no pets  - No recent travel  - Denies tobacco use  - Denies alcohol use  - Denies illicit drug use  - Currently sexually active, has one male/female sexual partner    FAMILY HISTORY:  No pertinent family history in first degree relatives        Allergies:  No Known Allergies      ANTIMICROBIALS:  cefTRIAXone   IVPB 1000 every 24 hours      ANTIMICROBIALS (past 90 days):  MEDICATIONS  (STANDING):    cefTRIAXone   IVPB   100 mL/Hr IV Intermittent (22 @ 18:50)        OTHER MEDS:   MEDICATIONS  (STANDING):  acetaminophen     Tablet .. 650 every 6 hours PRN  allopurinol 300 daily  aluminum hydroxide/magnesium hydroxide/simethicone Suspension 30 every 4 hours PRN  bisacodyl Suppository 10 once PRN  influenza   Vaccine 0.5 once  levothyroxine 112 daily  melatonin 3 at bedtime PRN  ondansetron Injectable 4 every 8 hours PRN  senna 2 at bedtime      VITALS:  Vital Signs Last 24 Hrs  T(F): 99.2 (22 @ 12:22), Max: 101 (22 @ 18:29)    Vital Signs Last 24 Hrs  HR: 106 (22 @ 12:22) (101 - 123)  BP: 115/71 (22 @ 12:22) (109/57 - 128/88)  RR: 18 (22 @ 12:22)  SpO2: 97% (22 @ 12:22) (96% - 100%)  Wt(kg): --    EXAM:  GENERAL: NAD, lying in bed comfortably  HEAD: Atraumatic, Normocephalic  EYES: EOMI, PERRLA, conjunctiva pink and cornea white  ENT: Normal external ears and nose, no discharges; moist mucous membranes, no erythema on posterior oropharynx  NECK: Supple, nontender to palpation; no JVD  CHEST/LUNG: Clear to auscultation bilaterally; No rales, rhonchi, wheezing, or rubs. Unlabored respirations  HEART: Regular rate and rhythm; No murmurs, rubs, or gallops  ABDOMEN: Soft, nontender, nondistended; no rebound tenderness, no guarding; no hepatomegaly; normoactive/hyperactive/hypoactive bowel sounds  EXTREMITIES:  2+ Peripheral Pulses, brisk capillary refill. No clubbing, cyanosis, or petal edema  NERVOUS SYSTEM: Alert and oriented to person, time, place and situation, speech clear. No focal deficits   MSK: FROM all 4 extremities, full and equal strength; no joint erythema, swelling or pain  SKIN: No rashes or lesions, no superficial thrombophlebitis    Labs:                        8.0    2.29  )-----------( 190      ( 18 Mar 2022 06:59 )             24.1         137  |  108<H>  |  44<H>  ----------------------------<  88  4.3   |  17<L>  |  1.85<H>    Ca    8.7      18 Mar 2022 06:59  Phos  4.8       Mg     2.00         TPro  8.0  /  Alb  3.0<L>  /  TBili  0.3  /  DBili  <0.2  /  AST  65<H>  /  ALT  20  /  AlkPhos  87        WBC Trend:  WBC Count: 2.29 (22 @ 06:59)  WBC Count: 3.11 (22 @ 18:54)      Auto Neutrophil #: 1.70 K/uL (22 @ 06:59)  Auto Neutrophil #: 2.52 K/uL (22 @ 18:54)  Auto Neutrophil #: 2.78 K/uL (22 @ 15:40)      Creatine Trend:  Creatinine, Serum: 1.85 ()  Creatinine, Serum: 2.23 ()      Liver Biochemical Testing Trend:  Alanine Aminotransferase (ALT/SGPT): 20 ()  Aspartate Aminotransferase (AST/SGOT): 65 (22 @ 18:46)  Bilirubin Total, Serum: 0.3 ()  Bilirubin Direct, Serum: <0.2 ()      Trend LDH  22 @ 18:46  439<H>      Auto Eosinophil %: 3.5 % (22 @ 06:59)  Auto Eosinophil %: 2.9 % (22 @ 18:54)      Urinalysis Basic - ( 17 Mar 2022 19:29 )    Color: Red / Appearance: Turbid / S.014 / pH: x  Gluc: x / Ketone: Trace  / Bili: Negative / Urobili: <2 mg/dL   Blood: x / Protein: 100 mg/dL / Nitrite: Negative   Leuk Esterase: Moderate / RBC: >10 /HPF / WBC 10 /HPF   Sq Epi: x / Non Sq Epi: 2 /HPF / Bacteria: Few        MICROBIOLOGY:                                    C-Reactive Protein, Serum: 39.5 ()        Lactate Dehydrogenase, Serum: 439 ()        Blood Gas Venous - Lactate: 0.9 ( @ 18:54)        RADIOLOGY:  imaging below personally reviewed   INFECTIOUS DISEASE CONSULT NOTE    Patient is a 56y old  Male who presents with a chief complaint of urinary retention (17 Mar 2022 23:17)    HPI:  56 yr old male with a pmh of Thyroid Ca (s/p surgery on synthroid), Gout on Allopurinol, presents to the ED with hematuria for the past 5 days - went to [facility] with negative imaging and was sent home with abx- today developed left flank pain and urinary rentention.   Patient has had fatigue and unintentional weight loss over the past few months. Per ED note "seen by Dr. 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 Spoke with Dr. Muhammad, states that pt had cervical LAD not long ago with negative biopsies. Dr. Muhammad sent pt to hospital to get bone marrow bx (spoke with Dr. Felix with IR, states he can get bm bx on 3/18 and to admit pt to Dr. Edwards)"    Denies  headache, dizziness, chest pain, palpitations, SOB, joint pain, diarrhea/constipation  Vitals in the ED: T 101, , /85, RR 23 satting 97% RA (17 Mar 2022 23:17)       REVIEW OF SYSTEMS:  CONSTITUTIONAL: + fever, chronic fatigue   HEENT: No sore throat  RESPIRATORY: No cough; no shortness of breath  CARDIOVASCULAR: No chest pain or palpitations  GASTROINTESTINAL: No abdominal or epigastric pain  GENITOURINARY: + hematuria, No dysuria  NEUROLOGICAL: No headache/dizziness  MSK: No joint pain, erythema, or swelling  SKIN: No itching, rashes  All other ROS negative except noted above    Prior hospital charts reviewed [Yes]  Primary team notes reviewed [Yes]  Other consultant notes reviewed [Yes]    PAST MEDICAL & SURGICAL HISTORY:  Deviated septum    Gout    Malignant neoplasm of thyroid gland    S/P ORIF (open reduction internal fixation) fracture  right femur     S/P ACL surgery  Right; 2016    S/P hardware removal  right femur     H/O umbilical hernia repair    History of nasal septoplasty  2020    History of tonsillectomy  as a child        SOCIAL HISTORY:  - [place] in [place]  - Currently working in a repair shop for cars  - Lives with wife daughter and son; no pets  - No recent travel since Pandemic, no travel to [facility] area  - Denies tobacco use  - Denies alcohol use  - Denies illicit drug use    FAMILY HISTORY:  No pertinent family history in first degree relatives        Allergies:  No Known Allergies      ANTIMICROBIALS:  cefTRIAXone   IVPB 1000 every 24 hours      ANTIMICROBIALS (past 90 days):  MEDICATIONS  (STANDING):    cefTRIAXone   IVPB   100 mL/Hr IV Intermittent (22 @ 18:50)        OTHER MEDS:   MEDICATIONS  (STANDING):  acetaminophen     Tablet .. 650 every 6 hours PRN  allopurinol 300 daily  aluminum hydroxide/magnesium hydroxide/simethicone Suspension 30 every 4 hours PRN  bisacodyl Suppository 10 once PRN  influenza   Vaccine 0.5 once  levothyroxine 112 daily  melatonin 3 at bedtime PRN  ondansetron Injectable 4 every 8 hours PRN  senna 2 at bedtime      VITALS:  Vital Signs Last 24 Hrs  T(F): 99.2 (22 @ 12:22), Max: 101 (22 @ 18:29)    Vital Signs Last 24 Hrs  HR: 106 (22 @ 12:22) (101 - 123)  BP: 115/71 (22 @ 12:22) (109/57 - 128/88)  RR: 18 (22 @ 12:22)  SpO2: 97% (22 @ 12:22) (96% - 100%)  Wt(kg): --    EXAM:  GENERAL: NAD, lying in bed comfortably  HEAD: Atraumatic, Normocephalic  EYES: EOMI, PERRLA, conjunctiva pink and cornea white  ENT: Normal external ears and nose, no discharges; moist mucous membranes, no erythema on posterior oropharynx  NECK: Supple, nontender to palpation; no JVD  CHEST/LUNG: Clear to auscultation bilaterally; No rales, rhonchi, wheezing, or rubs. Unlabored respirations  HEART: Regular rate and rhythm; No murmurs, rubs, or gallops  ABDOMEN: Soft, nontender, nondistended; no rebound tenderness, no guarding; no hepatomegaly; normoactive/hyperactive/hypoactive bowel sounds  EXTREMITIES:  2+ Peripheral Pulses, brisk capillary refill. No clubbing, cyanosis, or petal edema  NERVOUS SYSTEM: Alert and oriented to person, time, place and situation, speech clear. No focal deficits   MSK: FROM all 4 extremities, full and equal strength; no joint erythema, swelling or pain  SKIN: No rashes or lesions, no superficial thrombophlebitis    Labs:                        8.0    2.29  )-----------( 190      ( 18 Mar 2022 06:59 )             24.1         137  |  108<H>  |  44<H>  ----------------------------<  88  4.3   |  17<L>  |  1.85<H>    Ca    8.7      18 Mar 2022 06:59  Phos  4.8       Mg     2.00         TPro  8.0  /  Alb  3.0<L>  /  TBili  0.3  /  DBili  <0.2  /  AST  65<H>  /  ALT  20  /  AlkPhos  87        WBC Trend:  WBC Count: 2.29 (22 @ 06:59)  WBC Count: 3.11 (22 @ 18:54)      Auto Neutrophil #: 1.70 K/uL (22 @ 06:59)  Auto Neutrophil #: 2.52 K/uL (22 @ 18:54)  Auto Neutrophil #: 2.78 K/uL (22 @ 15:40)      Creatine Trend:  Creatinine, Serum: 1.85 ()  Creatinine, Serum: 2.23 ()      Liver Biochemical Testing Trend:  Alanine Aminotransferase (ALT/SGPT): 20 ()  Aspartate Aminotransferase (AST/SGOT): 65 (22 @ 18:46)  Bilirubin Total, Serum: 0.3 ()  Bilirubin Direct, Serum: <0.2 ()      Trend LDH  22 @ 18:46  439<H>      Auto Eosinophil %: 3.5 % (22 @ 06:59)  Auto Eosinophil %: 2.9 % (22 @ 18:54)      Urinalysis Basic - ( 17 Mar 2022 19:29 )    Color: Red / Appearance: Turbid / S.014 / pH: x  Gluc: x / Ketone: Trace  / Bili: Negative / Urobili: <2 mg/dL   Blood: x / Protein: 100 mg/dL / Nitrite: Negative   Leuk Esterase: Moderate / RBC: >10 /HPF / WBC 10 /HPF   Sq Epi: x / Non Sq Epi: 2 /HPF / Bacteria: Few        MICROBIOLOGY:    C-Reactive Protein, Serum: 39.5 ()        Lactate Dehydrogenase, Serum: 439 ()        Blood Gas Venous - Lactate: 0.9 ( @ 18:54)        RADIOLOGY:  imaging below personally reviewed    < from: Xray Chest 1 View- PORTABLE-Urgent (Xray Chest 1 View- PORTABLE-Urgent .) (22 @ 17:51) >    IMPRESSION: Nonspecific faint peripheral bilateral opacities could be   seen in early multifocal pneumonia such as Covid unlikely to represent   pulmonary metastases.        < end of copied text >

## 2022-03-18 NOTE — CONSULT NOTE ADULT - SUBJECTIVE AND OBJECTIVE BOX
Chief Complaint:  Patient is a 56y old  Male who presents with a chief complaint of urinary retention (18 Mar 2022 13:02)      HPI: patient known to Dr. Muhammad. He was seen in the office once. he was seen for anemia and leucopenia. The anemia evaluation noted a high LDH but normal haptoglobin and bilirubin. The ferritin was high and the iron and TIBC were low. the B12 and folate were not low. The SHYANNE was polyclonal. He has had some adenopathy and reportedly a LN biopsy was negative for malignancy. He did just have a CT A/P done a couple of weeks ago and there was no adenopathy of sign of malignancy. He was at [facility] with hematuria. He was transfused with PRBc x 1. He went home. He is now admitted here with hematuria and urology to see patient now. he does have a cho in. He is scheduled for an IR bone marrow biopsy today. Reportedly he was guaiac positive in ER at [facility]. He reports that he last had a colonoscopy in 2019.     Medications:  acetaminophen     Tablet .. 650 milliGRAM(s) Oral every 6 hours PRN  allopurinol 300 milliGRAM(s) Oral daily  aluminum hydroxide/magnesium hydroxide/simethicone Suspension 30 milliLiter(s) Oral every 4 hours PRN  bisacodyl Suppository 10 milliGRAM(s) Rectal once PRN  cefTRIAXone   IVPB 1000 milliGRAM(s) IV Intermittent every 24 hours  chlorhexidine 2% Cloths 1 Application(s) Topical daily  influenza   Vaccine 0.5 milliLiter(s) IntraMuscular once  lactated ringers. 1000 milliLiter(s) IV Continuous <Continuous>  levothyroxine 112 MICROGram(s) Oral daily  melatonin 3 milliGRAM(s) Oral at bedtime PRN  ondansetron Injectable 4 milliGRAM(s) IV Push every 8 hours PRN  senna 2 Tablet(s) Oral at bedtime        Allergies:  No Known Allergies      FAMILY HISTORY:  Mother had colon cancer. father had lymphoma.        PMH/PSurghx:    PAST MEDICAL & SURGICAL HISTORY:  Deviated septum    Gout    Malignant neoplasm of thyroid gland    S/P ORIF (open reduction internal fixation) fracture  right femur 1992    S/P ACL surgery  Right; 2016    S/P hardware removal  right femur 1994    H/O umbilical hernia repair    History of nasal septoplasty  02/2020    History of tonsillectomy  as a child    REVIEW OF SYSTEMS      General: fatigue, decreased appetitive and weight loss. No fevers, chills, sweats    Skin/Breast: No rash, bruising  	  Ophthalmologic: No change  	  ENMT:	No nosebleeds or sore throat    Respiratory and Thorax: Mild MALAGON. Mild cough. No wheeze or hemoptysis  	  Cardiovascular:	No CP    Gastrointestinal:	No N/V/D, abd pain    Genitourinary:	has lower pelvic discomfort with hematuria    Musculoskeletal:	 No back pain, leg pain, swelling    Neurological:	No HA, dizziness, numbness    Psychiatric: A little anxiety with all this is going on      Vitals:  Vital Signs Last 24 Hrs  T(C): 37.3 (18 Mar 2022 12:22), Max: 38.3 (17 Mar 2022 18:29)  T(F): 99.2 (18 Mar 2022 12:22), Max: 101 (17 Mar 2022 18:29)  HR: 106 (18 Mar 2022 12:22) (101 - 123)  BP: 115/71 (18 Mar 2022 12:22) (109/57 - 128/88)  BP(mean): --  RR: 18 (18 Mar 2022 12:22) (17 - 23)  SpO2: 97% (18 Mar 2022 12:22) (96% - 100%)    Pex:  alert NAD  EOMI anicteric sclera  Neck Supple No LNA  Cv s1 S2 RRR  Lungs clear B/L  abd soft NT ND +BS  No LE edema or tenderness    Labs:                        8.0    2.29  )-----------( 190      ( 18 Mar 2022 06:59 )             24.1     CBC Full  -  ( 18 Mar 2022 06:59 )  WBC Count : 2.29 K/uL  RBC Count : 2.71 M/uL  Hemoglobin : 8.0 g/dL  Hematocrit : 24.1 %  Platelet Count - Automated : 190 K/uL  Mean Cell Volume : 88.9 fL  Mean Cell Hemoglobin : 29.5 pg  Mean Cell Hemoglobin Concentration : 33.2 gm/dL  Auto Neutrophil # : 1.70 K/uL  Auto Lymphocyte # : 0.33 K/uL  Auto Monocyte # : 0.14 K/uL  Auto Eosinophil # : 0.08 K/uL  Auto Basophil # : 0.01 K/uL  Auto Neutrophil % : 74.3 %  Auto Lymphocyte % : 14.4 %  Auto Monocyte % : 6.1 %  Auto Eosinophil % : 3.5 %  Auto Basophil % : 0.4 %    03-18    137  |  108<H>  |  44<H>  ----------------------------<  88  4.3   |  17<L>  |  1.85<H>    Ca    8.7      18 Mar 2022 06:59  Phos  4.8     03-17  Mg     2.00     03-17    TPro  8.0  /  Alb  3.0<L>  /  TBili  0.3  /  DBili  <0.2  /  AST  65<H>  /  ALT  20  /  AlkPhos  87  03-17    PT/INR - ( 17 Mar 2022 18:54 )   PT: 12.9 sec;   INR: 1.11 ratio         PTT - ( 17 Mar 2022 18:54 )  PTT:34.0 sec    LDH  439    haptoglobin 126      uric acid 7.3    Direct Aguila positive    [id number]

## 2022-03-18 NOTE — CHART NOTE - NSCHARTNOTEFT_GEN_A_CORE
Seen and evaluated patient at bedside with Dr. Edwards-     pt with ana hematuria noted in cho bag- catheter was placed for urinary retention at outside hospital.  hx of cholelithiasis and has had recent imaging this past week- Will try to obtain results. KUB XRAY ordered to evaluate for stone. Pt known to Dr. Godinez (urology)- house urology called for hematuria- will f/u recommendations    ID consulted for pyelonephritis- currently on IV CTX, awaiting blood and urine cx     Per hematology- rheumatology eval requested for autoimmune workup. Pt is scheduled for bone marrow biopsy today. Seen and evaluated patient at bedside with Dr. Edwards-     pt with ana hematuria noted in cho bag- catheter was placed for urinary retention at outside hospital.  hx of cholelithiasis and has had recent imaging this past week- Will try to obtain results- Wife at bedside will attempt to get copy of CD today. KUB XRAY ordered to evaluate for stone. Pt known to Dr. Godinez (urology, will notify his private urologist of admission)- house urology called for hematuria- will f/u recommendations    ID consulted for pyelonephritis- currently on IV CTX, awaiting blood and urine cx     Per hematology- rheumatology eval requested for autoimmune workup. Pt is scheduled for bone marrow biopsy today.

## 2022-03-18 NOTE — PROGRESS NOTE ADULT - ASSESSMENT
_________________________________________________________________________________________  ========>>  M E D I C A L   A T T E N D I N G    F O L L O W  U P  N O T E  <<=========  -----------------------------------------------------------------------------------------------------    - Patient seen and examined by me earlier today.   - In summary,  KATHRYN MALIN is a 56y year old man admitted with hematuria..   - Patient today overall doing ok, comfortable, eating OK. pt nervous and anxious being in the hospital...       + some discomfort with cho     ==================>> REVIEW OF SYSTEM <<=================    GEN: no fever, no chills, as above   RESP: no SOB, no cough, no sputum  CVS: no chest pain, no palpitations, no edema  GI: no abdominal pain, no nausea, + had normal brown stool today   : ++ hematuria in foely bag, + some discomfort with cho   Neuro: no headache, no dizziness  Derm : no itching, no rash    ==================>> PHYSICAL EXAM <<=================    GEN: A&O X 3 , NAD , comfortable, pleasant, a bit anxious   HEENT: NCAT, PERRL, MMM, hearing intact  Neck: supple , no JVD appreciated  CVS: S1S2 , regular , a bit tachy  PULM: CTA B/L,  no W/R/R appreciated  ABD.: soft. non tender, non distended,  bowel sounds present  Extrem: intact pulses , no edema      + cho with red urine        + left sided CVA tenderness   PSYCH : flat affect, somewhat anxious                            ( Note Written / Date of service :  22 )    ==================>> MEDICATIONS <<====================    MEDICATIONS  (STANDING):  allopurinol 300 milliGRAM(s) Oral daily  cefTRIAXone   IVPB 1000 milliGRAM(s) IV Intermittent every 24 hours  chlorhexidine 2% Cloths 1 Application(s) Topical daily  folic acid 1 milliGRAM(s) Oral daily  influenza   Vaccine 0.5 milliLiter(s) IntraMuscular once  lactated ringers. 1000 milliLiter(s) (100 mL/Hr) IV Continuous <Continuous>  levothyroxine 112 MICROGram(s) Oral daily  senna 2 Tablet(s) Oral at bedtime    MEDICATIONS  (PRN):  acetaminophen     Tablet .. 650 milliGRAM(s) Oral every 6 hours PRN Temp greater or equal to 38C (100.4F), Mild Pain (1 - 3)  aluminum hydroxide/magnesium hydroxide/simethicone Suspension 30 milliLiter(s) Oral every 4 hours PRN Dyspepsia  bisacodyl Suppository 10 milliGRAM(s) Rectal once PRN Constipation  melatonin 3 milliGRAM(s) Oral at bedtime PRN Insomnia  ondansetron Injectable 4 milliGRAM(s) IV Push every 8 hours PRN Nausea and/or Vomiting    ___________  Active diet:  Diet, NPO:   NPO for Procedure/Test     NPO Start Date: 18-Mar-2022,   NPO Start Time: 08:45  Except Medications  Diet, Regular  ___________________    ==================>> VITAL SIGNS <<==================    T(C): 37.3 (22 @ 12:22), Max: 38.3 (22 @ 18:29)  HR: 106 (22 @ 12:22) (101 - 123)  BP: 115/71 (22 @ 12:22) (109/57 - 128/88)  RR: 18 (22 @ 12:22) (17 - 23)  SpO2: 97% (22 @ 12:22) (96% - 100%)     I&O's Summary    17 Mar 2022 07:01  -  18 Mar 2022 07:00  --------------------------------------------------------  IN: 0 mL / OUT: 900 mL / NET: -900 mL     ==================>> LAB AND IMAGING <<==================                        8.0    2.29  )-----------( 190      ( 18 Mar 2022 06:59 )             24.1        03-18    137  |  108<H>  |  44<H>  ----------------------------<  88  4.3   |  17<L>  |  1.85<H>    Creatinine:  1.85  <<==, 2.23  <<==    Ca    8.7      18 Mar 2022 06:59  Phos  4.8       Mg     2.00         TPro  8.0  /  Alb  3.0<L>  /  TBili  0.3  /  DBili  <0.2  /  AST  65<H>  /  ALT  20  /  AlkPhos  87  -    PT/INR - ( 17 Mar 2022 18:54 )   PT: 12.9 sec;   INR: 1.11 ratio    PTT - ( 17 Mar 2022 18:54 )  PTT:34.0 sec               Urinalysis Basic - ( 17 Mar 2022 19:29 )  Color: Red / Appearance: Turbid / S.014 / pH: x  Gluc: x / Ketone: Trace  / Bili: Negative / Urobili: <2 mg/dL   Blood: x / Protein: 100 mg/dL / Nitrite: Negative   Leuk Esterase: Moderate / RBC: >10 /HPF / WBC 10 /HPF   Sq Epi: x / Non Sq Epi: 2 /HPF / Bacteria: Few    TSH:      4.06   (22)           ^^^ Inflammatory markers :  ^^^  C R P :          39.5 (22)  <<--      E S R :        >140 (22)          D-Dimer :          504 (22) <<--  ___________________________________________________________________________________  ===============>>  A S S E S S M E N T   A N D   P L A N <<===============  ------------------------------------------------------------------------------------------    · Assessment	  56 yr old male presenting with urinary retention found to have sepsis 2/2 pyelonephritis     Problem/Plan - 1:  ·  Problem: sepsis, likely acute hemorrhagic cystitis with pyelonephritis with urinary retention and obstructive Uropathy with JULIO      possible / rule out infected stone  : pt with history of nephrolithiasis, had Urinary retention, with cho in outside hospital> AMA> here > cho changed in ED  follow cultures  on Rocephin  ID,  to follow   KUB > CT abd vs Urogram with contrast when crea better ?   Avoid nephrotoxic medications.  monitor BMP, I/O     crea improved with IVH > continue     Problem/Plan - 2:  ·  Problem: pt with past + GIANNA, + jammie +      pt also with h/o sinusitis, and now Centaurea      rule out autoimmune disorders ./ Trever's etc..   rheumatology to eval  Bone marrow already arranged by hematologist >will follow    Problem/Plan - 3:  ·  Problem: Anemia.   ·  Plan: Acute on chronic   BM biopsy tomorrow today   Hematology on board   montior  transfuse as needed  check occult blood     Problem/Plan - 6:  ·  Problem: Hypothyroidism post total thyroidectomy for cancer   ·  Plan: Chronic stable  Continue home levothyroxine.  Thyroid Stimulating Hormone, Serum: 4.06 uIU/mL (22 @ 18:46)    ** pt with elevated D Dimer      reported hypoxemia > now seems to be doing ok on room air as documented..      tachycardia from sepsis     pt was alos very anxious     creatinine elevated so will hold off of CTA..     will check LE venous duplex for now   monitor    -GI/DVT Prophylaxis per protocol.    --------------------------------------------  Case discussed with pt, family at bedside, PA, specialists     Today I had a prolonged conversation with the patient, KATHRYN MALIN, and next of kin at bedside, specialists, ..  re diagnosis, findings, and plan of care, options, alternatives, prognosis... . Patient verbalized clear understanding, all questions answered.    patient seen according to [facility] cost code ( 30375 )     Additional time spent  35 min.    Education given on findings and plan of care  ___________________________  H. TAZ Edwards.  Pager: [phone]

## 2022-03-18 NOTE — PRE PROCEDURE NOTE - HISTORY OF PRESENT ILLNESS
Interventional Radiology  Pre-Procedure Note    This is a 56y  Male  with pancytopenia    HPI:  56 yr old male with a pmh of Thyroid Ca (s/p surgery on synthroid), Gout on Allopurinol, presents to the ED with hematuria for the past 5 days - went to [facility] with negative imaging and was sent home with abx- today developed left flank pain and urinary rentention.   Patient has had fatigue and unintentional weight loss over the past few months. Per ED note "seen by Dr. 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 Spoke with Dr. Muhammad, states that pt had cervical LAD not long ago with negative biopsies. Dr. Muhammad sent pt to hospital to get bone marrow bx (spoke with Dr. Felix with IR, states he can get bm bx on 3/18 and to admit pt to Dr. Edwards)"    Denies  headache, dizziness, chest pain, palpitations, SOB, joint pain, diarrhea/constipation  Vitals in the ED: T 101, , /85, RR 23 satting 97% RA (17 Mar 2022 23:17)      PAST MEDICAL & SURGICAL HISTORY:  Deviated septum    Gout    Malignant neoplasm of thyroid gland    S/P ORIF (open reduction internal fixation) fracture  right femur 1992    S/P ACL surgery  Right; 2016    S/P hardware removal  right femur 1994    H/O umbilical hernia repair    History of nasal septoplasty  02/2020    History of tonsillectomy  as a child        Social History:     FAMILY HISTORY:  No pertinent family history in first degree relatives        Allergies: No Known Allergies      Current Medications: acetaminophen     Tablet .. 650 milliGRAM(s) Oral every 6 hours PRN  allopurinol 300 milliGRAM(s) Oral daily  aluminum hydroxide/magnesium hydroxide/simethicone Suspension 30 milliLiter(s) Oral every 4 hours PRN  bisacodyl Suppository 10 milliGRAM(s) Rectal once PRN  cefTRIAXone   IVPB 1000 milliGRAM(s) IV Intermittent every 24 hours  chlorhexidine 2% Cloths 1 Application(s) Topical daily  folic acid 1 milliGRAM(s) Oral daily  influenza   Vaccine 0.5 milliLiter(s) IntraMuscular once  lactated ringers. 1000 milliLiter(s) IV Continuous <Continuous>  levothyroxine 112 MICROGram(s) Oral daily  melatonin 3 milliGRAM(s) Oral at bedtime PRN  ondansetron Injectable 4 milliGRAM(s) IV Push every 8 hours PRN  senna 2 Tablet(s) Oral at bedtime      Labs:                         8.0    2.29  )-----------( 190      ( 18 Mar 2022 06:59 )             24.1       03-18    137  |  108<H>  |  44<H>  ----------------------------<  88  4.3   |  17<L>  |  1.85<H>    Ca    8.7      18 Mar 2022 06:59  Phos  4.8     03-17  Mg     2.00     03-17    TPro  8.0  /  Alb  3.0<L>  /  TBili  0.3  /  DBili  <0.2  /  AST  65<H>  /  ALT  20  /  AlkPhos  87  03-17      Assessment/Plan:   This is a 56y Male  presents with pancytopenia  Patient presents to IR for bone marrow biopsy. Case discussed with Dr. Muhammad.   Procedure/ risks/ benefits/ goals/ alternatives were explained. All questions answered. Informed content obtained from patient. Consent placed in chart.

## 2022-03-19 LAB
ALBUMIN SERPL ELPH-MCNC: 2.9 G/DL — LOW (ref 3.3–5)
ALP SERPL-CCNC: 77 U/L — SIGNIFICANT CHANGE UP (ref 40–120)
ALT FLD-CCNC: 17 U/L — SIGNIFICANT CHANGE UP (ref 4–41)
ANION GAP SERPL CALC-SCNC: 12 MMOL/L — SIGNIFICANT CHANGE UP (ref 7–14)
AST SERPL-CCNC: 69 U/L — HIGH (ref 4–40)
BASOPHILS # BLD AUTO: 0.01 K/UL — SIGNIFICANT CHANGE UP (ref 0–0.2)
BASOPHILS NFR BLD AUTO: 0.3 % — SIGNIFICANT CHANGE UP (ref 0–2)
BILIRUB SERPL-MCNC: 0.3 MG/DL — SIGNIFICANT CHANGE UP (ref 0.2–1.2)
BUN SERPL-MCNC: 41 MG/DL — HIGH (ref 7–23)
CALCIUM SERPL-MCNC: 8.6 MG/DL — SIGNIFICANT CHANGE UP (ref 8.4–10.5)
CHLORIDE SERPL-SCNC: 104 MMOL/L — SIGNIFICANT CHANGE UP (ref 98–107)
CO2 SERPL-SCNC: 20 MMOL/L — LOW (ref 22–31)
CREAT SERPL-MCNC: 1.78 MG/DL — HIGH (ref 0.5–1.3)
CULTURE RESULTS: NO GROWTH — SIGNIFICANT CHANGE UP
EGFR: 44 ML/MIN/1.73M2 — LOW
EOSINOPHIL # BLD AUTO: 0.06 K/UL — SIGNIFICANT CHANGE UP (ref 0–0.5)
EOSINOPHIL NFR BLD AUTO: 1.9 % — SIGNIFICANT CHANGE UP (ref 0–6)
GLUCOSE SERPL-MCNC: 93 MG/DL — SIGNIFICANT CHANGE UP (ref 70–99)
HCT VFR BLD CALC: 23.6 % — LOW (ref 39–50)
HGB BLD-MCNC: 7.4 G/DL — LOW (ref 13–17)
HIV 1+2 AB+HIV1 P24 AG SERPL QL IA: (no result)
IANC: 2.2 K/UL — SIGNIFICANT CHANGE UP (ref 1.5–8.5)
IMM GRANULOCYTES NFR BLD AUTO: 0.6 % — SIGNIFICANT CHANGE UP (ref 0–1.5)
LYMPHOCYTES # BLD AUTO: 0.67 K/UL — LOW (ref 1–3.3)
LYMPHOCYTES # BLD AUTO: 21.1 % — SIGNIFICANT CHANGE UP (ref 13–44)
MAGNESIUM SERPL-MCNC: 1.8 MG/DL — SIGNIFICANT CHANGE UP (ref 1.6–2.6)
MCHC RBC-ENTMCNC: 28.6 PG — SIGNIFICANT CHANGE UP (ref 27–34)
MCHC RBC-ENTMCNC: 31.4 GM/DL — LOW (ref 32–36)
MCV RBC AUTO: 91.1 FL — SIGNIFICANT CHANGE UP (ref 80–100)
MONOCYTES # BLD AUTO: 0.22 K/UL — SIGNIFICANT CHANGE UP (ref 0–0.9)
MONOCYTES NFR BLD AUTO: 6.9 % — SIGNIFICANT CHANGE UP (ref 2–14)
MRSA PCR RESULT.: SIGNIFICANT CHANGE UP
NEUTROPHILS # BLD AUTO: 2.2 K/UL — SIGNIFICANT CHANGE UP (ref 1.8–7.4)
NEUTROPHILS NFR BLD AUTO: 69.2 % — SIGNIFICANT CHANGE UP (ref 43–77)
NRBC # BLD: 0 /100 WBCS — SIGNIFICANT CHANGE UP
NRBC # FLD: 0 K/UL — SIGNIFICANT CHANGE UP
PHOSPHATE SERPL-MCNC: 3.9 MG/DL — SIGNIFICANT CHANGE UP (ref 2.5–4.5)
PLATELET # BLD AUTO: 214 K/UL — SIGNIFICANT CHANGE UP (ref 150–400)
POTASSIUM SERPL-MCNC: 4.8 MMOL/L — SIGNIFICANT CHANGE UP (ref 3.5–5.3)
POTASSIUM SERPL-SCNC: 4.8 MMOL/L — SIGNIFICANT CHANGE UP (ref 3.5–5.3)
PROT SERPL-MCNC: 7.8 G/DL — SIGNIFICANT CHANGE UP (ref 6–8.3)
RBC # BLD: 2.59 M/UL — LOW (ref 4.2–5.8)
RBC # FLD: 16.1 % — HIGH (ref 10.3–14.5)
S AUREUS DNA NOSE QL NAA+PROBE: DETECTED
SODIUM SERPL-SCNC: 136 MMOL/L — SIGNIFICANT CHANGE UP (ref 135–145)
SPECIMEN SOURCE: SIGNIFICANT CHANGE UP
WBC # BLD: 3.18 K/UL — LOW (ref 3.8–10.5)
WBC # FLD AUTO: 3.18 K/UL — LOW (ref 3.8–10.5)

## 2022-03-19 PROCEDURE — 99232 SBSQ HOSP IP/OBS MODERATE 35: CPT | Mod: GC

## 2022-03-19 PROCEDURE — 93970 EXTREMITY STUDY: CPT | Mod: 26

## 2022-03-19 PROCEDURE — 99253 IP/OBS CNSLTJ NEW/EST LOW 45: CPT

## 2022-03-19 RX ORDER — ACETAMINOPHEN 500 MG
1000 TABLET ORAL ONCE
Refills: 0 | Status: COMPLETED | OUTPATIENT
Start: 2022-03-19 | End: 2022-03-19

## 2022-03-19 RX ORDER — MUPIROCIN 20 MG/G
1 OINTMENT TOPICAL
Refills: 0 | Status: DISCONTINUED | OUTPATIENT
Start: 2022-03-19 | End: 2022-03-30

## 2022-03-19 RX ADMIN — Medication 400 MILLIGRAM(S): at 21:29

## 2022-03-19 RX ADMIN — Medication 1000 MILLIGRAM(S): at 21:44

## 2022-03-19 RX ADMIN — CHLORHEXIDINE GLUCONATE 1 APPLICATION(S): 213 SOLUTION TOPICAL at 12:05

## 2022-03-19 RX ADMIN — Medication 650 MILLIGRAM(S): at 08:50

## 2022-03-19 RX ADMIN — Medication 650 MILLIGRAM(S): at 13:56

## 2022-03-19 RX ADMIN — Medication 112 MICROGRAM(S): at 06:43

## 2022-03-19 RX ADMIN — Medication 100 MILLIGRAM(S): at 21:32

## 2022-03-19 RX ADMIN — CEFTRIAXONE 100 MILLIGRAM(S): 500 INJECTION, POWDER, FOR SOLUTION INTRAMUSCULAR; INTRAVENOUS at 17:38

## 2022-03-19 RX ADMIN — Medication 650 MILLIGRAM(S): at 07:55

## 2022-03-19 RX ADMIN — SENNA PLUS 2 TABLET(S): 8.6 TABLET ORAL at 21:33

## 2022-03-19 RX ADMIN — Medication 650 MILLIGRAM(S): at 14:50

## 2022-03-19 RX ADMIN — Medication 300 MILLIGRAM(S): at 12:02

## 2022-03-19 RX ADMIN — Medication 1 MILLIGRAM(S): at 12:05

## 2022-03-19 NOTE — PROGRESS NOTE ADULT - SUBJECTIVE AND OBJECTIVE BOX
Date of Service  : 22 @ 16:50    INTERVAL HPI/OVERNIGHT EVENTS: Seen and examined with wife and son in room . I feel fine. When I can go home ?   Vital Signs Last 24 Hrs  T(C): 36.7 (19 Mar 2022 13:30), Max: 38.4 (18 Mar 2022 21:23)  T(F): 98.1 (19 Mar 2022 13:30), Max: 101.1 (18 Mar 2022 21:23)  HR: 101 (19 Mar 2022 13:30) (75 - 112)  BP: 117/71 (19 Mar 2022 13:30) (109/66 - 123/77)  BP(mean): --  RR: 18 (19 Mar 2022 13:30) (17 - 18)  SpO2: 98% (19 Mar 2022 13:30) (93% - 98%)  I&O's Summary    18 Mar 2022 07:01  -  19 Mar 2022 07:00  --------------------------------------------------------  IN: 1910 mL / OUT: 1460 mL / NET: 450 mL      MEDICATIONS  (STANDING):  allopurinol 300 milliGRAM(s) Oral daily  cefTRIAXone   IVPB 1000 milliGRAM(s) IV Intermittent every 24 hours  chlorhexidine 2% Cloths 1 Application(s) Topical daily  folic acid 1 milliGRAM(s) Oral daily  influenza   Vaccine 0.5 milliLiter(s) IntraMuscular once  lactated ringers. 1000 milliLiter(s) (100 mL/Hr) IV Continuous <Continuous>  levothyroxine 112 MICROGram(s) Oral daily  senna 2 Tablet(s) Oral at bedtime    MEDICATIONS  (PRN):  acetaminophen     Tablet .. 650 milliGRAM(s) Oral every 6 hours PRN Temp greater or equal to 38C (100.4F), Mild Pain (1 - 3)  aluminum hydroxide/magnesium hydroxide/simethicone Suspension 30 milliLiter(s) Oral every 4 hours PRN Dyspepsia  bisacodyl Suppository 10 milliGRAM(s) Rectal once PRN Constipation  melatonin 3 milliGRAM(s) Oral at bedtime PRN Insomnia  ondansetron Injectable 4 milliGRAM(s) IV Push every 8 hours PRN Nausea and/or Vomiting    LABS:                        7.4    3.18  )-----------( 214      ( 19 Mar 2022 07:03 )             23.6     03-    136  |  104  |  41<H>  ----------------------------<  93  4.8   |  20<L>  |  1.78<H>    Ca    8.6      19 Mar 2022 07:03  Phos  3.9     -  Mg     1.80         TPro  7.8  /  Alb  2.9<L>  /  TBili  0.3  /  DBili  x   /  AST  69<H>  /  ALT  17  /  AlkPhos  77  -    PT/INR - ( 17 Mar 2022 18:54 )   PT: 12.9 sec;   INR: 1.11 ratio         PTT - ( 17 Mar 2022 18:54 )  PTT:34.0 sec  Urinalysis Basic - ( 17 Mar 2022 19:29 )    Color: Red / Appearance: Turbid / S.014 / pH: x  Gluc: x / Ketone: Trace  / Bili: Negative / Urobili: <2 mg/dL   Blood: x / Protein: 100 mg/dL / Nitrite: Negative   Leuk Esterase: Moderate / RBC: >10 /HPF / WBC 10 /HPF   Sq Epi: x / Non Sq Epi: 2 /HPF / Bacteria: Few      CAPILLARY BLOOD GLUCOSE            Urinalysis Basic - ( 17 Mar 2022 19:29 )    Color: Red / Appearance: Turbid / S.014 / pH: x  Gluc: x / Ketone: Trace  / Bili: Negative / Urobili: <2 mg/dL   Blood: x / Protein: 100 mg/dL / Nitrite: Negative   Leuk Esterase: Moderate / RBC: >10 /HPF / WBC 10 /HPF   Sq Epi: x / Non Sq Epi: 2 /HPF / Bacteria: Few      REVIEW OF SYSTEMS:  CONSTITUTIONAL: No fever, weight loss, or fatigue  EYES: No eye pain, visual disturbances, or discharge  ENMT:  No difficulty hearing, tinnitus, vertigo; No sinus or throat pain  NECK: No pain or stiffness  RESPIRATORY: No cough, wheezing, chills or hemoptysis; No shortness of breath  CARDIOVASCULAR: No chest pain, palpitations, dizziness, or leg swelling  GASTROINTESTINAL: No abdominal or epigastric pain. No nausea, vomiting, or hematemesis; No diarrhea or constipation. No melena or hematochezia.  GENITOURINARY: No dysuria, frequency, hematuria, or incontinence  NEUROLOGICAL: No headaches, memory loss, loss of strength, numbness, or tremors      Consultant(s) Notes Reviewed:  [x ] YES  [ ] NO    PHYSICAL EXAM:  GENERAL: NAD, well-groomed, well-developed,not in any distress ,  HEAD:  Atraumatic, Normocephalic  NECK: Supple, No JVD, Normal thyroid  NERVOUS SYSTEM:  Alert & Oriented X3, No focal deficit   CHEST/LUNG: Good air entry bilateral with no  rales, rhonchi, wheezing, or rubs  HEART: Regular rate and rhythm; No murmurs, rubs, or gallops  ABDOMEN: Soft, Nontender, Nondistended; Bowel sounds present  EXTREMITIES:  2+ Peripheral Pulses, No clubbing, cyanosis, or edema    Care Discussed with Consultants/Other Providers [ x] YES  [ ] NO

## 2022-03-19 NOTE — PROGRESS NOTE ADULT - SUBJECTIVE AND OBJECTIVE BOX
Follow Up:  fever, hematuria    Interval History: pt still febrile and tachy last night, prelim HIV came back positive    ROS:      All other systems negative    Constitutional: + fever  Head: no trauma  Eyes: no vision changes, no eye pain  ENT:  no sore throat, no rhinorrhea  Cardiovascular:  no chest pain, no palpitation  Respiratory:  no SOB, no cough  GI:  no abd pain, no vomiting, no diarrhea  urinary: cho removed, voiding well  musculoskeletal:  no joint pain, no joint swelling  skin:  no rash        Allergies  No Known Allergies        ANTIMICROBIALS:  cefTRIAXone   IVPB 1000 every 24 hours      OTHER MEDS:  acetaminophen     Tablet .. 650 milliGRAM(s) Oral every 6 hours PRN  allopurinol 300 milliGRAM(s) Oral daily  aluminum hydroxide/magnesium hydroxide/simethicone Suspension 30 milliLiter(s) Oral every 4 hours PRN  bisacodyl Suppository 10 milliGRAM(s) Rectal once PRN  chlorhexidine 2% Cloths 1 Application(s) Topical daily  folic acid 1 milliGRAM(s) Oral daily  influenza   Vaccine 0.5 milliLiter(s) IntraMuscular once  lactated ringers. 1000 milliLiter(s) IV Continuous <Continuous>  levothyroxine 112 MICROGram(s) Oral daily  melatonin 3 milliGRAM(s) Oral at bedtime PRN  ondansetron Injectable 4 milliGRAM(s) IV Push every 8 hours PRN  senna 2 Tablet(s) Oral at bedtime      Vital Signs Last 24 Hrs  T(C): 36.7 (19 Mar 2022 13:30), Max: 38.4 (18 Mar 2022 21:23)  T(F): 98.1 (19 Mar 2022 13:30), Max: 101.1 (18 Mar 2022 21:23)  HR: 101 (19 Mar 2022 13:30) (75 - 112)  BP: 117/71 (19 Mar 2022 13:30) (109/66 - 123/77)  BP(mean): --  RR: 18 (19 Mar 2022 13:30) (17 - 18)  SpO2: 98% (19 Mar 2022 13:30) (93% - 98%)    Physical Exam:  General:    NAD,  non toxic  Cardio:      S1, S2,  Respiratory:    clear b/l,    no wheezing  abd:     soft,   BS +,   no tenderness  :   no CVAT,  no suprapubic tenderness,   no  cho  Musculoskeletal:   no joint swelling  vascular: no phlebitis  Skin:    no rash                          7.4    3.18  )-----------( 214      ( 19 Mar 2022 07:03 )             23.6           136  |  104  |  41<H>  ----------------------------<  93  4.8   |  20<L>  |  1.78<H>    Ca    8.6      19 Mar 2022 07:03  Phos  3.9       Mg     1.80         TPro  7.8  /  Alb  2.9<L>  /  TBili  0.3  /  DBili  x   /  AST  69<H>  /  ALT  17  /  AlkPhos  77        Urinalysis Basic - ( 17 Mar 2022 19:29 )    Color: Red / Appearance: Turbid / S.014 / pH: x  Gluc: x / Ketone: Trace  / Bili: Negative / Urobili: <2 mg/dL   Blood: x / Protein: 100 mg/dL / Nitrite: Negative   Leuk Esterase: Moderate / RBC: >10 /HPF / WBC 10 /HPF   Sq Epi: x / Non Sq Epi: 2 /HPF / Bacteria: Few        MICROBIOLOGY:  v  .Blood Blood-Venous  22   No growth to date.  --  --      Clean Catch Clean Catch (Midstream)  22   No growth  --  --          Rapid RVP Result: NotDetec ( @ 19:47)        RADIOLOGY:  Images independently visualized and reviewed personally, findings as below  < from: US Duplex Venous Lower Ext Complete, Bilateral (22 @ 11:36) >  IMPRESSION:  No evidence of deep venous thrombosis in either lower extremity.    < end of copied text >  < from: Xray Chest 1 View- PORTABLE-Urgent (Xray Chest 1 View- PORTABLE-Urgent .) (22 @ 17:51) >    IMPRESSION: Nonspecific faint peripheral bilateral opacities could be   seen in early multifocal pneumonia such as Covid unlikely to represent   pulmonary metastases.    < end of copied text >

## 2022-03-19 NOTE — PROGRESS NOTE ADULT - SUBJECTIVE AND OBJECTIVE BOX
leukopenia, anemia  hpi  55 yo man presented with hematuria still of unclear etiology has leukopenia, anemia  workup as outpt revealed jammie pos mild elevation ldh nl retic , hapto  elevated ferritin nl spep  now s/p transfusion, had IR bone marrow biopsy yesterday  pmh sh fh unchanged 7 pt ros hemturia persists otherwise neg  physical  comfortable  vs  t100 75 110/68 18 95 sat  lungs clear  cor s1s2  abd soft nontender no hsm  ext no edema  skin warm dry    data  wbc 3180 hgb 7.4 plt [id number] urine dark red  nl diff  cr 1.78 alb 2.9 ast69

## 2022-03-19 NOTE — PROGRESS NOTE ADULT - SUBJECTIVE AND OBJECTIVE BOX
Subjective  Voiding cherry, no clots. PVR 185cc this AM. Patient is comfortable, feels he is emptying well. Denies additional  complaints.    Objective    Vital signs  T(F): , Max: 101.1 (03-18-22 @ 21:23)  HR: 75 (03-19-22 @ 06:00)  BP: 110/68 (03-19-22 @ 06:00)  SpO2: 95% (03-19-22 @ 06:00)  Wt(kg): --    Output     OUT:    Indwelling Catheter - Urethral (mL): 600 mL    Voided (mL): 860 mL  Total OUT: 1460 mL    Total NET: -1460 mL          Gen: NAD  Abd: soft, nontender, nondistended  : no cho, cherry urine in urinal    Labs      03-19 @ 07:03    WBC 3.18  / Hct 23.6  / SCr 1.78     03-18 @ 19:08    WBC 3.35  / Hct 25.5  / SCr --           Culture - Blood (collected 03-17-22 @ 22:54)  Source: .Blood Blood-Peripheral  Preliminary Report (03-18-22 @ 23:02):    No growth to date.    Culture - Blood (collected 03-17-22 @ 22:54)  Source: .Blood Blood-Venous  Preliminary Report (03-18-22 @ 23:02):    No growth to date.    Culture - Urine (collected 03-17-22 @ 18:50)  Source: Clean Catch Clean Catch (Midstream)  Final Report (03-19-22 @ 07:35):    No growth

## 2022-03-19 NOTE — PROGRESS NOTE ADULT - ASSESSMENT
56 m with Papillary thyroid cancer s/p total thyroidectomy on synthroid 1/27/22, Gout on Allopurinol, presents to the ED with hematuria for the past 5 days  had painless hematuria and urinary retention then left flank pain  Febrile with leukopenia  UA 10 WBC and grossly hematuria; UCx negative, blood cx negative  Reported with unintentional weight loss, fatigue and sometimes night sweat  CXR showed nonspecific peripheral opacities  ESR >140, CRP 39.5, Positive GIANNA 1:640 in 2021 but repeat rheum panel done in Sept 2021 was unremarkable, including negative GIANNA, now jammie positive but no other signs of hemolysis  s/p bone marrow biopsy 3/18  now prelim HIV positive  Started on Rocephin 3/17      hematuria with negative urine cx, fever with recent weight loss, fatigue, leukopenia, anemia, positive jammie but  negative other hemolysis labs, had positive GIANNA before  now prelim HIV positive    * f/u the final HIV test  * f/u the bone marrow biopsy  * chest/abd/pelvis CT with contrast  * discontinue ceftriaxone

## 2022-03-19 NOTE — PROGRESS NOTE ADULT - ASSESSMENT
56 yr old male presenting with urinary retention found to have sepsis 2/2 pyelonephritis      Problem/Plan - 1:  ·  Problem: sepsis, likely acute hemorrhagic cystitis with pyelonephritis with urinary retention and obstructive Uropathy with JULIO      possible / rule out infected stone  : Resolving .   pt with history of nephrolithiasis, had Urinary retention, with cho in outside hospital> AMA> here > cho changed in ED  follow cultures  on Rocephin  ID,  to follow   KUB > CT abd vs Urogram with contrast when crea better ?   Avoid nephrotoxic medications.  monitor BMP, I/O     crea improved with IVH > continue      Problem/Plan - 2:  ·  Problem: pt with past + GIANNA, + jammie +      pt also with h/o sinusitis, and now Centaurea      rule out autoimmune disorders ./ Trever's etc..   rheumatology to evaluated .   Hematology  helping.   S/P Bone marrow .     Problem/Plan - 3:  ·  Problem: Anemia.   ·  Plan: Acute on chronic   S/P BM biopsy .  Hematology on board   montior  transfuse as needed  check occult blood      Problem/Plan - 6:  ·  Problem: Hypothyroidism post total thyroidectomy for cancer   ·  Plan: Chronic stable  Continue home levothyroxine.  Thyroid Stimulating Hormone, Serum: 4.06 uIU/mL (03.17.22 @ 18:46)    ** pt with elevated D Dimer      reported hypoxemia > now seems to be doing ok on room air as documented..      tachycardia from sepsis     pt was alos very anxious     creatinine elevated so will hold off of CTA..      venous duplex Negative for DVT .     -GI/DVT Prophylaxis per protocol.

## 2022-03-19 NOTE — PROGRESS NOTE ADULT - ASSESSMENT
leukopenia anemia unclear etiology  ckd likely contributory but out of proportion  hematuria contributing but not iron deficient  jammie pos but no other signs of hemolysis  neg LN biopsy  await bone marrow results

## 2022-03-19 NOTE — PROGRESS NOTE ADULT - ASSESSMENT
56y male with recent dx of jammie positive with polyclonal gammopathy and nephrolithiasis having  gross hematuria and recent episode of urinary retention. Cho catheter removed, pain resolved and patient voiding well with PVR 0-25cc.     -- no acute  intervention  -- continue flomax  -- continue to monitor outputs  -- monitor urine color and HCT  -- check PVR q 8 hrs for 24 hours, most recent PVR 185cc (acceptable)  -- please call urology if signs/symptoms of urinary retention; discussed with patient possibility of replacing cho catheter if necessary  -- discussed with patient possible etiology of hematuria including nephrolithiasis and BPH in setting of coagulopathy, however patient will follow-up with primary urologist, Dr. Godinez for complete hematuria workup  -- f/u urine culture   -- f/u ID, IR and hematology     Patient seen and discussed with on-call attending, Dr. Torres.

## 2022-03-20 LAB
ALBUMIN SERPL ELPH-MCNC: 2.6 G/DL — LOW (ref 3.3–5)
ALP SERPL-CCNC: 64 U/L — SIGNIFICANT CHANGE UP (ref 40–120)
ALT FLD-CCNC: 16 U/L — SIGNIFICANT CHANGE UP (ref 4–41)
ANION GAP SERPL CALC-SCNC: 11 MMOL/L — SIGNIFICANT CHANGE UP (ref 7–14)
ANION GAP SERPL CALC-SCNC: 11 MMOL/L — SIGNIFICANT CHANGE UP (ref 7–14)
APPEARANCE UR: ABNORMAL
AST SERPL-CCNC: 67 U/L — HIGH (ref 4–40)
BACTERIA # UR AUTO: ABNORMAL
BASOPHILS # BLD AUTO: 0.01 K/UL — SIGNIFICANT CHANGE UP (ref 0–0.2)
BASOPHILS NFR BLD AUTO: 0.5 % — SIGNIFICANT CHANGE UP (ref 0–2)
BILIRUB SERPL-MCNC: 0.3 MG/DL — SIGNIFICANT CHANGE UP (ref 0.2–1.2)
BILIRUB UR-MCNC: NEGATIVE — SIGNIFICANT CHANGE UP
BLD GP AB SCN SERPL QL: NEGATIVE — SIGNIFICANT CHANGE UP
BUN SERPL-MCNC: 37 MG/DL — HIGH (ref 7–23)
BUN SERPL-MCNC: 37 MG/DL — HIGH (ref 7–23)
CALCIUM SERPL-MCNC: 8.1 MG/DL — LOW (ref 8.4–10.5)
CALCIUM SERPL-MCNC: 8.2 MG/DL — LOW (ref 8.4–10.5)
CHLORIDE SERPL-SCNC: 96 MMOL/L — LOW (ref 98–107)
CHLORIDE SERPL-SCNC: 98 MMOL/L — SIGNIFICANT CHANGE UP (ref 98–107)
CO2 SERPL-SCNC: 18 MMOL/L — LOW (ref 22–31)
CO2 SERPL-SCNC: 19 MMOL/L — LOW (ref 22–31)
COLOR SPEC: ABNORMAL
CREAT ?TM UR-MCNC: 28 MG/DL — SIGNIFICANT CHANGE UP
CREAT SERPL-MCNC: 1.61 MG/DL — HIGH (ref 0.5–1.3)
CREAT SERPL-MCNC: 1.71 MG/DL — HIGH (ref 0.5–1.3)
DIFF PNL FLD: ABNORMAL
EGFR: 46 ML/MIN/1.73M2 — LOW
EGFR: 49 ML/MIN/1.73M2 — LOW
EOSINOPHIL # BLD AUTO: 0.01 K/UL — SIGNIFICANT CHANGE UP (ref 0–0.5)
EOSINOPHIL NFR BLD AUTO: 0.5 % — SIGNIFICANT CHANGE UP (ref 0–6)
EPI CELLS # UR: 1 /HPF — SIGNIFICANT CHANGE UP (ref 0–5)
GLUCOSE SERPL-MCNC: 130 MG/DL — HIGH (ref 70–99)
GLUCOSE SERPL-MCNC: 88 MG/DL — SIGNIFICANT CHANGE UP (ref 70–99)
GLUCOSE UR QL: NEGATIVE — SIGNIFICANT CHANGE UP
HCT VFR BLD CALC: 18.6 % — CRITICAL LOW (ref 39–50)
HCT VFR BLD CALC: 20.3 % — CRITICAL LOW (ref 39–50)
HGB BLD-MCNC: 6.3 G/DL — CRITICAL LOW (ref 13–17)
HGB BLD-MCNC: 6.6 G/DL — CRITICAL LOW (ref 13–17)
HYALINE CASTS # UR AUTO: 1 /LPF — SIGNIFICANT CHANGE UP (ref 0–7)
IANC: 1.44 K/UL — LOW (ref 1.5–8.5)
IMM GRANULOCYTES NFR BLD AUTO: 1.4 % — SIGNIFICANT CHANGE UP (ref 0–1.5)
KETONES UR-MCNC: NEGATIVE — SIGNIFICANT CHANGE UP
LEUKOCYTE ESTERASE UR-ACNC: ABNORMAL
LYMPHOCYTES # BLD AUTO: 0.46 K/UL — LOW (ref 1–3.3)
LYMPHOCYTES # BLD AUTO: 22.1 % — SIGNIFICANT CHANGE UP (ref 13–44)
M PNEUMO IGG SER IA-ACNC: 1.13 INDEX — HIGH (ref 0–0.9)
M PNEUMO IGG SER IA-ACNC: POSITIVE
M PNEUMO IGM SER-ACNC: 0.26 INDEX — SIGNIFICANT CHANGE UP (ref 0–0.9)
MAGNESIUM SERPL-MCNC: 1.6 MG/DL — SIGNIFICANT CHANGE UP (ref 1.6–2.6)
MAGNESIUM SERPL-MCNC: 1.6 MG/DL — SIGNIFICANT CHANGE UP (ref 1.6–2.6)
MCHC RBC-ENTMCNC: 28.8 PG — SIGNIFICANT CHANGE UP (ref 27–34)
MCHC RBC-ENTMCNC: 29.4 PG — SIGNIFICANT CHANGE UP (ref 27–34)
MCHC RBC-ENTMCNC: 32.5 GM/DL — SIGNIFICANT CHANGE UP (ref 32–36)
MCHC RBC-ENTMCNC: 33.9 GM/DL — SIGNIFICANT CHANGE UP (ref 32–36)
MCV RBC AUTO: 86.9 FL — SIGNIFICANT CHANGE UP (ref 80–100)
MCV RBC AUTO: 88.6 FL — SIGNIFICANT CHANGE UP (ref 80–100)
MONOCYTES # BLD AUTO: 0.13 K/UL — SIGNIFICANT CHANGE UP (ref 0–0.9)
MONOCYTES NFR BLD AUTO: 6.3 % — SIGNIFICANT CHANGE UP (ref 2–14)
MYCOPLASMA AG SPEC QL: NEGATIVE — SIGNIFICANT CHANGE UP
NEUTROPHILS # BLD AUTO: 1.44 K/UL — LOW (ref 1.8–7.4)
NEUTROPHILS NFR BLD AUTO: 69.2 % — SIGNIFICANT CHANGE UP (ref 43–77)
NITRITE UR-MCNC: NEGATIVE — SIGNIFICANT CHANGE UP
NRBC # BLD: 0 /100 WBCS — SIGNIFICANT CHANGE UP
NRBC # BLD: 0 /100 WBCS — SIGNIFICANT CHANGE UP
NRBC # FLD: 0 K/UL — SIGNIFICANT CHANGE UP
NRBC # FLD: 0 K/UL — SIGNIFICANT CHANGE UP
OB PNL STL: POSITIVE
OSMOLALITY SERPL: 280 MOSM/KG — SIGNIFICANT CHANGE UP (ref 275–295)
OSMOLALITY UR: 209 MOSM/KG — SIGNIFICANT CHANGE UP (ref 50–1200)
PH UR: 6.5 — SIGNIFICANT CHANGE UP (ref 5–8)
PHOSPHATE SERPL-MCNC: 4.1 MG/DL — SIGNIFICANT CHANGE UP (ref 2.5–4.5)
PHOSPHATE SERPL-MCNC: 4.8 MG/DL — HIGH (ref 2.5–4.5)
PLATELET # BLD AUTO: 173 K/UL — SIGNIFICANT CHANGE UP (ref 150–400)
PLATELET # BLD AUTO: 173 K/UL — SIGNIFICANT CHANGE UP (ref 150–400)
POTASSIUM SERPL-MCNC: 3.8 MMOL/L — SIGNIFICANT CHANGE UP (ref 3.5–5.3)
POTASSIUM SERPL-MCNC: 4.4 MMOL/L — SIGNIFICANT CHANGE UP (ref 3.5–5.3)
POTASSIUM SERPL-SCNC: 3.8 MMOL/L — SIGNIFICANT CHANGE UP (ref 3.5–5.3)
POTASSIUM SERPL-SCNC: 4.4 MMOL/L — SIGNIFICANT CHANGE UP (ref 3.5–5.3)
PROT ?TM UR-MCNC: 355 MG/DL — SIGNIFICANT CHANGE UP
PROT SERPL-MCNC: 7.2 G/DL — SIGNIFICANT CHANGE UP (ref 6–8.3)
PROT UR-MCNC: ABNORMAL
PROT/CREAT UR-RTO: 12.7 RATIO — HIGH (ref 0–0.2)
RBC # BLD: 2.14 M/UL — LOW (ref 4.2–5.8)
RBC # BLD: 2.29 M/UL — LOW (ref 4.2–5.8)
RBC # FLD: 15.8 % — HIGH (ref 10.3–14.5)
RBC # FLD: 15.9 % — HIGH (ref 10.3–14.5)
RBC CASTS # UR COMP ASSIST: >50 /HPF — HIGH (ref 0–4)
RH IG SCN BLD-IMP: POSITIVE — SIGNIFICANT CHANGE UP
SODIUM SERPL-SCNC: 126 MMOL/L — LOW (ref 135–145)
SODIUM SERPL-SCNC: 127 MMOL/L — LOW (ref 135–145)
SODIUM SERPL-SCNC: 127 MMOL/L — LOW (ref 135–145)
SODIUM UR-SCNC: 50 MMOL/L — SIGNIFICANT CHANGE UP
SP GR SPEC: 1.01 — SIGNIFICANT CHANGE UP (ref 1–1.05)
URATE SERPL-MCNC: 6 MG/DL — SIGNIFICANT CHANGE UP (ref 3.4–8.8)
URATE UR-MCNC: 9.6 MG/DL — SIGNIFICANT CHANGE UP
UROBILINOGEN FLD QL: SIGNIFICANT CHANGE UP
WBC # BLD: 2.08 K/UL — LOW (ref 3.8–10.5)
WBC # BLD: 2.32 K/UL — LOW (ref 3.8–10.5)
WBC # FLD AUTO: 2.08 K/UL — LOW (ref 3.8–10.5)
WBC # FLD AUTO: 2.32 K/UL — LOW (ref 3.8–10.5)
WBC UR QL: 4 /HPF — SIGNIFICANT CHANGE UP (ref 0–5)

## 2022-03-20 PROCEDURE — 74176 CT ABD & PELVIS W/O CONTRAST: CPT | Mod: 26

## 2022-03-20 RX ORDER — SODIUM CHLORIDE 9 MG/ML
1 INJECTION INTRAMUSCULAR; INTRAVENOUS; SUBCUTANEOUS THREE TIMES A DAY
Refills: 0 | Status: DISCONTINUED | OUTPATIENT
Start: 2022-03-20 | End: 2022-03-21

## 2022-03-20 RX ORDER — AZITHROMYCIN 500 MG/1
TABLET, FILM COATED ORAL
Refills: 0 | Status: DISCONTINUED | OUTPATIENT
Start: 2022-03-20 | End: 2022-03-23

## 2022-03-20 RX ORDER — ACETAMINOPHEN 500 MG
1000 TABLET ORAL ONCE
Refills: 0 | Status: COMPLETED | OUTPATIENT
Start: 2022-03-20 | End: 2022-03-20

## 2022-03-20 RX ORDER — MUPIROCIN 20 MG/G
1 OINTMENT TOPICAL ONCE
Refills: 0 | Status: DISCONTINUED | OUTPATIENT
Start: 2022-03-20 | End: 2022-03-20

## 2022-03-20 RX ORDER — AZITHROMYCIN 500 MG/1
500 TABLET, FILM COATED ORAL ONCE
Refills: 0 | Status: COMPLETED | OUTPATIENT
Start: 2022-03-20 | End: 2022-03-20

## 2022-03-20 RX ORDER — SODIUM CHLORIDE 9 MG/ML
1000 INJECTION INTRAMUSCULAR; INTRAVENOUS; SUBCUTANEOUS
Refills: 0 | Status: DISCONTINUED | OUTPATIENT
Start: 2022-03-20 | End: 2022-03-23

## 2022-03-20 RX ORDER — AZITHROMYCIN 500 MG/1
500 TABLET, FILM COATED ORAL EVERY 24 HOURS
Refills: 0 | Status: DISCONTINUED | OUTPATIENT
Start: 2022-03-21 | End: 2022-03-23

## 2022-03-20 RX ORDER — IBUPROFEN 200 MG
400 TABLET ORAL ONCE
Refills: 0 | Status: COMPLETED | OUTPATIENT
Start: 2022-03-20 | End: 2022-03-20

## 2022-03-20 RX ORDER — SODIUM CHLORIDE 0.65 %
1 AEROSOL, SPRAY (ML) NASAL
Refills: 0 | Status: DISCONTINUED | OUTPATIENT
Start: 2022-03-20 | End: 2022-03-30

## 2022-03-20 RX ORDER — OXYCODONE HYDROCHLORIDE 5 MG/1
5 TABLET ORAL ONCE
Refills: 0 | Status: DISCONTINUED | OUTPATIENT
Start: 2022-03-20 | End: 2022-03-20

## 2022-03-20 RX ADMIN — SODIUM CHLORIDE 100 MILLILITER(S): 9 INJECTION, SOLUTION INTRAVENOUS at 11:08

## 2022-03-20 RX ADMIN — SODIUM CHLORIDE 75 MILLILITER(S): 9 INJECTION INTRAMUSCULAR; INTRAVENOUS; SUBCUTANEOUS at 12:53

## 2022-03-20 RX ADMIN — Medication 650 MILLIGRAM(S): at 17:35

## 2022-03-20 RX ADMIN — OXYCODONE HYDROCHLORIDE 5 MILLIGRAM(S): 5 TABLET ORAL at 13:10

## 2022-03-20 RX ADMIN — Medication 1000 MILLIGRAM(S): at 21:23

## 2022-03-20 RX ADMIN — Medication 650 MILLIGRAM(S): at 15:54

## 2022-03-20 RX ADMIN — Medication 300 MILLIGRAM(S): at 11:07

## 2022-03-20 RX ADMIN — MUPIROCIN 1 APPLICATION(S): 20 OINTMENT TOPICAL at 06:29

## 2022-03-20 RX ADMIN — Medication 650 MILLIGRAM(S): at 07:28

## 2022-03-20 RX ADMIN — Medication 112 MICROGRAM(S): at 06:28

## 2022-03-20 RX ADMIN — CHLORHEXIDINE GLUCONATE 1 APPLICATION(S): 213 SOLUTION TOPICAL at 11:08

## 2022-03-20 RX ADMIN — Medication 3 MILLIGRAM(S): at 00:21

## 2022-03-20 RX ADMIN — Medication 650 MILLIGRAM(S): at 06:28

## 2022-03-20 RX ADMIN — Medication 1 SPRAY(S): at 03:38

## 2022-03-20 RX ADMIN — Medication 400 MILLIGRAM(S): at 21:08

## 2022-03-20 RX ADMIN — AZITHROMYCIN 255 MILLIGRAM(S): 500 TABLET, FILM COATED ORAL at 17:48

## 2022-03-20 RX ADMIN — SODIUM CHLORIDE 1 GRAM(S): 9 INJECTION INTRAMUSCULAR; INTRAVENOUS; SUBCUTANEOUS at 17:29

## 2022-03-20 RX ADMIN — MUPIROCIN 1 APPLICATION(S): 20 OINTMENT TOPICAL at 17:29

## 2022-03-20 RX ADMIN — OXYCODONE HYDROCHLORIDE 5 MILLIGRAM(S): 5 TABLET ORAL at 12:13

## 2022-03-20 RX ADMIN — Medication 1 MILLIGRAM(S): at 11:07

## 2022-03-20 NOTE — CONSULT NOTE ADULT - SUBJECTIVE AND OBJECTIVE BOX
Patient is a 56y Male whom presented to the hospital with ckd and todd , hyponatremia    PAST MEDICAL & SURGICAL HISTORY:  Deviated septum    Gout    Malignant neoplasm of thyroid gland    S/P ORIF (open reduction internal fixation) fracture  right femur 1992    S/P ACL surgery  Right; 2016    S/P hardware removal  right femur 1994    H/O umbilical hernia repair    History of nasal septoplasty  02/2020    History of tonsillectomy  as a child        MEDICATIONS  (STANDING):  allopurinol 300 milliGRAM(s) Oral daily  azithromycin  IVPB      chlorhexidine 2% Cloths 1 Application(s) Topical daily  folic acid 1 milliGRAM(s) Oral daily  ibuprofen  Tablet. 400 milliGRAM(s) Oral once  influenza   Vaccine 0.5 milliLiter(s) IntraMuscular once  levothyroxine 112 MICROGram(s) Oral daily  mupirocin 2% Ointment 1 Application(s) Both Nostrils two times a day  senna 2 Tablet(s) Oral at bedtime  sodium chloride 1 Gram(s) Oral three times a day  sodium chloride 0.9%. 1000 milliLiter(s) (75 mL/Hr) IV Continuous <Continuous>      Allergies    No Known Allergies    Intolerances        SOCIAL HISTORY:  Denies ETOh,Smoking,     FAMILY HISTORY:  No pertinent family history in first degree relatives        REVIEW OF SYSTEMS:    CONSTITUTIONAL: No weakness, fevers or chills  RESPIRATORY: No cough, wheezing, hemoptysis; No shortness of breath  CARDIOVASCULAR: No chest pain or palpitations  GASTROINTESTINAL: No abdominal or epigastric pain. No nausea, vomiting,     No diarrhea or constipation. No melena     VITAL:  T(C): , Max: 39.2 (03-20-22 @ 15:50)  T(F): , Max: 102.5 (03-20-22 @ 15:50)  HR: 97 (03-20-22 @ 12:46)  BP: 116/76 (03-20-22 @ 12:46)  BP(mean): --  RR: 18 (03-20-22 @ 12:46)  SpO2: 97% (03-20-22 @ 12:46)  Wt(kg): --    I and O's:    03-19 @ 07:01  -  03-20 @ 07:00  --------------------------------------------------------  IN: 0 mL / OUT: 875 mL / NET: -875 mL    03-20 @ 07:01  -  03-20 @ 19:37  --------------------------------------------------------  IN: 0 mL / OUT: 1000 mL / NET: -1000 mL          PHYSICAL EXAM:    Constitutional: NAD  HEENT: conjunctive   clear   Neck:  No JVD  Respiratory: CTAB  Cardiovascular: S1 and S2  Gastrointestinal: BS+, soft,   Extremities: No peripheral edema  Neurological: A/O x 3, no focal deficits    LABS:                        6.6    2.32  )-----------( 173      ( 20 Mar 2022 09:46 )             20.3     03-20    127<L>  |  x   |  x   ----------------------------<  x   x    |  x   |  x     Ca    8.1<L>      20 Mar 2022 11:51  Phos  4.8     03-20  Mg     1.60     03-20    TPro  7.2  /  Alb  2.6<L>  /  TBili  0.3  /  DBili  x   /  AST  67<H>  /  ALT  16  /  AlkPhos  64  03-20      Urine Studies:    Sodium, Random Urine: 50 mmol/L (03-20 @ 17:48)  Osmolality, Random Urine: 209 mosm/kg (03-20 @ 17:48)        RADIOLOGY & ADDITIONAL STUDIES:

## 2022-03-20 NOTE — CHART NOTE - NSCHARTNOTEFT_GEN_A_CORE
Notified by RN bright red blood in stool.   Informed , told to transfuse and trend CBC.  Signed off to night team.

## 2022-03-20 NOTE — CHART NOTE - NSCHARTNOTEFT_GEN_A_CORE
Rheumatology called for evaluation of vasculitis. Chart reviewed with attending, formal consult to be seen in the AM    56 yr old male with a pmh of Thyroid Ca (s/p surgery on synthroid), Gout on Allopurinol, presents to the ED with hematuria for the past 5 days - went to [facility] with negative imaging and was sent home with abx- today developed left flank pain and urinary rentention.   Patient has had fatigue and unintentional weight loss over the past few months. Per ED note "seen by Dr. 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 Dr. Muhammad sent pt to hospital to get bone marrow bx (spoke with Dr. Felix with IR, states he can get bm bx on 3/18 and to admit pt to Dr. Edwards)"  On admission found to have SIRS criteria positive, leukopenia and anemia (lysis labs negative), JULIO on CKD, +UA with hematuria, pyruria, proteinuria, prelim+ HIV, CT A/P capturing GGO in the lungs b/l and also with blood products in collecting system, hydronephrosis.   s/p BMBX  ID, Heme/Onc, and urology following.     Vasculitis can present with constitutional symptoms, diffuse LAD, pulmonary + renal involvement, cytopenias. Other etiologies such as infectious and malignant must also be considered, especially given prelim HIV (?opportunistic infection) and recent thyroid CA dx. Reported positive GIANNA 1:640 per ID note, however repeat this admission has GIANNA negative.   -repeat UA. Check urine p/c ratio, ANCA, MPO, PR3, hepatitis, quantiferon TB, cryoglobulins, ferriting, trig   -f/u infectious w/u  -formal eval in AM    NANO Mauricio Rheumatology called for evaluation of vasculitis. Chart reviewed with attending, formal consult to be seen in the AM    56 yr old male with a pmh of Thyroid Ca (s/p surgery on synthroid), Gout on Allopurinol, presents to the ED with hematuria for the past 5 days - went to [facility] with negative imaging and was sent home with abx- today developed left flank pain and urinary rentention.   Patient has had fatigue and unintentional weight loss over the past few months. Per ED note "seen by Dr. 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 Dr. Muhammad sent pt to hospital to get bone marrow bx (spoke with Dr. Felix with IR, states he can get bm bx on 3/18 and to admit pt to Dr. Edwards)"  On admission found to have SIRS criteria positive, leukopenia and anemia (lysis labs negative), JULIO on CKD, +UA with hematuria, pyruria, proteinuria, prelim+ HIV, CT A/P capturing GGO in the lungs b/l and also with blood products in collecting system, hydronephrosis.   s/p BMBX  ID, Heme/Onc, and urology following.     Vasculitis can present with constitutional symptoms, diffuse LAD, pulmonary + renal involvement, cytopenias. Other etiologies such as infectious and malignant must also be considered, especially given prelim HIV (?opportunistic infection), comment of EBV+ cells on LN biopsy and recent thyroid CA dx. Reported positive GIANNA 1:640 per ID note, however repeat this admission has GIANNA negative.   -repeat UA. Check urine p/c ratio, ANCA, MPO, PR3, hepatitis, quantiferon TB, cryoglobulins, ferriting, trig, EBV serologies   -f/u infectious w/u  -formal eval in AM    DW Dr. Mauricio

## 2022-03-20 NOTE — CONSULT NOTE ADULT - ASSESSMENT
56 yr old male with a pmh of Thyroid Ca (s/p surgery on synthroid), Gout on Allopurinol, presents to the ED with hematuria for the past 5 days - went to [facility] with negative imaging and was sent home with abx- today developed left flank pain and urinary rentention.   Patient has had fatigue and unintentional weight loss over the past few months. Per ED note "seen by Dr. 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 Spoke with Dr. Muhammad, states that pt had cervical LAD not long ago with negative biopsies. Dr. Muhammad sent pt to hospital to get bone marrow bx   poke with Dr. Felix with IR, states he can get bm bx on 3/18 and to admit pt to Dr. Edwards)"  Denies  headache, dizziness, chest pain, palpitations, SOB, joint pain, diarrhea/constipation  Vitals in the ED: T 101, , /85, RR 23 satting 97% RA (17 Mar 2022 23:17)      ACUTE RENAL FAILURE:   Serum creatinine is improving   There is no progression . No uremic symptoms  No evidence of anemia .  Fluid status stable.  Will continue to avoid nephrotoxic drugs.  Patient remains asymptomatic.   Continue current therapy.  hold  diuretic.  hold   ACE inhibitor.  hold   ARB.  Additional evaluation:   ECG,    echocardiogram,     CXR,  will obtained recent   renal ultrasound to evalaute kidney size and possible stones , if cr is not improving     hyponatremia will check ua , urine osmolality , urine sodium , urine uric acid , serum sodium , serum osmolality , serum uric acid , f/u with hyponatremia work up , f/u with bmp , monitor i and o

## 2022-03-20 NOTE — PROGRESS NOTE ADULT - ASSESSMENT
_________________________________________________________________________________________  ========>>  M E D I C A L   A T T E N D I N G    F O L L O W  U P  N O T E  <<=========  -----------------------------------------------------------------------------------------------------    - Patient seen and examined by me earlier today.   - In summary,  KATHRYN MALIN is a 56y year old man admitted with hematuria..   - Patient today overall doing ok, comfortable, eating OK.  david      noted events and discussed with medical team : pt with urinary retention,. post cho re-insertion, pt with hematuria ...      ==================>> REVIEW OF SYSTEM <<=================    GEN: no fever, no chills, as above   RESP: no SOB, no cough, no sputum  CVS: no chest pain, no palpitations, no edema  GI: no abdominal pain, no nausea, + had normal brown stool , no bleeding reported   : ++ hematuria in foely bag,  some discomfort with cho   Neuro: no headache, no dizziness  Derm : no itching, no rash    ==================>> PHYSICAL EXAM <<=================    GEN: A&O X 3 , NAD , comfortable, pleasant, less anxious   HEENT: NCAT, PERRL, MMM, hearing intact  Neck: supple , no JVD appreciated  CVS: S1S2 , regular , a bit tachy  PULM: CTA B/L,  no W/R/R appreciated  ABD.: soft. non tender, non distended,  bowel sounds present  Extrem: intact pulses , no edema      + cho with red urine        + left sided CVA tenderness   PSYCH : flat affect, somewhat anxious                             ( Note written / Date of service 03-20-22 )    ==================>> MEDICATIONS <<====================    allopurinol 300 milliGRAM(s) Oral daily  chlorhexidine 2% Cloths 1 Application(s) Topical daily  folic acid 1 milliGRAM(s) Oral daily  influenza   Vaccine 0.5 milliLiter(s) IntraMuscular once  lactated ringers. 1000 milliLiter(s) IV Continuous <Continuous>  levothyroxine 112 MICROGram(s) Oral daily  mupirocin 2% Ointment 1 Application(s) Both Nostrils two times a day  senna 2 Tablet(s) Oral at bedtime    MEDICATIONS  (PRN):  acetaminophen     Tablet .. 650 milliGRAM(s) Oral every 6 hours PRN Temp greater or equal to 38C (100.4F), Mild Pain (1 - 3)  aluminum hydroxide/magnesium hydroxide/simethicone Suspension 30 milliLiter(s) Oral every 4 hours PRN Dyspepsia  bisacodyl Suppository 10 milliGRAM(s) Rectal once PRN Constipation  guaiFENesin Oral Liquid (Sugar-Free) 100 milliGRAM(s) Oral every 6 hours PRN Cough  melatonin 3 milliGRAM(s) Oral at bedtime PRN Insomnia  ondansetron Injectable 4 milliGRAM(s) IV Push every 8 hours PRN Nausea and/or Vomiting  sodium chloride 0.65% Nasal 1 Spray(s) Both Nostrils four times a day PRN Nasal Congestion    ___________  Active diet:  Diet, Regular  ___________________    ==================>> VITAL SIGNS <<==================  Height (cm): 177.8  Weight (kg): 69.4  BMI (kg/m2): 22  Vital Signs Last 24 HrsT(C): 38.1 (03-20-22 @ 07:49)  T(F): 100.6 (03-20-22 @ 07:49), Max: 102.4 (03-19-22 @ 20:58)  HR: 112 (03-20-22 @ 06:08) (101 - 112)  BP: 116/73 (03-20-22 @ 06:08)  RR: 18 (03-20-22 @ 06:08) (18 - 18)  SpO2: 97% (03-20-22 @ 06:08) (94% - 98%)       ==================>> LAB AND IMAGING <<==================                        6.6    2.32  )-----------( 173      ( 20 Mar 2022 09:46 )             20.3        03-20    127<L>  |  98  |  37<H>  ----------------------------<  88  4.4   |  18<L>  |  1.61<H>    Sodium:   127  <==, 136  <==, 137  <==, 134  <==  Creatinine:  1.61  <<==, 1.78  <<==, 1.85  <<==, 2.23  <<==    Ca    8.2<L>      20 Mar 2022 07:44  Phos  4.1     03-20  Mg     1.60     03-20    TPro  7.2  /  Alb  2.6<L>  /  TBili  0.3  /  DBili  x   /  AST  67<H>  /  ALT  16  /  AlkPhos  64  03-20    WBC count:   2.32 <<== ,  2.08 <<== ,  3.18 <<== ,  3.35 <<== ,  2.29 <<== ,  3.11 <<==   Hemoglobin:   6.6 <<==,  6.3 <<==,  7.4 <<==,  8.0 <<==,  8.0 <<==,  7.7 <<==  platelets:  173 <==, 173 <==, 214 <==, 204 <==, 190 <==, 193 <==       AST:          67 <== , 69 <== , 65 <==      ALT:        16  <== , 17  <== , 20  <==      AP:        64  <=, 77  <=, 87  <=     Bili:        0.3  <=, 0.3  <=, 0.3  <=    _______________________  C U L T U R E S :    Culture - Blood (collected 17 Mar 2022 22:54)  Source: .Blood Blood-Peripheral  Preliminary Report (18 Mar 2022 23:02):    No growth to date.    Culture - Blood (collected 17 Mar 2022 22:54)  Source: .Blood Blood-Venous  Preliminary Report (18 Mar 2022 23:02):    No growth to date.    Culture - Urine (collected 17 Mar 2022 18:50)  Source: Clean Catch Clean Catch (Midstream)  Final Report (19 Mar 2022 07:35):    No growth    HIV-1/2 Combo Result: Prelim Reactive The HIV Ag/Ab Combo test performed screens for HIV-1 p24 antigen,  antibodies to HIV-1 (group M and group O), and antibodies to HIV-2. All  specimens repeatedly reactive will reflex to an HIV 1/2 antibody  confirmation and differentiation test. This assay detects p24 antigen  which may be present prior to the development of HIV antibodies,  therefore a reactive result with a negative HIV 1/2 AB Confirmation  should be followed up with HIV-1 RNA, HIV-2 RNA and repeat testing in 4-8  weeks. A nonreactive result does not preclude previous exposure to or  infection with HIV-1 or HIV-2. (03.19.22 @ 07:03)    pending HIV confirmatory tests    PENDNING BONE marrow Bx results    KUB never done !! >> changed to CT non con today, pending    ___________________________________________________________________________________  ===============>>  A S S E S S M E N T   A N D   P L A N <<===============  ------------------------------------------------------------------------------------------    · Assessment	  56 yr old male presenting with urinary retention found to have sepsis 2/2 pyelonephritis     Problem/Plan - 1:  ·  Problem: sepsis, likely acute hemorrhagic cystitis with pyelonephritis with urinary retention and obstructive Uropathy with JULIO      concern for infected / impacted stone    follow cultures  off Abx for now : monitoring   ID,  on board  pending HIV test results    CT 'stone hunt' ...   monitor BMP, I/O     crea improved with IVH > continue      to consider Pan CT with contrast per ID if no sources otherwise and crea batter     Problem/Plan - 2:  ·  Problem: pt with past + GIANNA, + jammie +      pt also with h/o sinusitis, and now Centaurea      rule out autoimmune disorders ./ Trever's etc..   rheumatology has not seen pt yet !!  Bone marrow Bx done >follow results   GIANNA, RA negative     Problem/Plan - 3:  ·  Problem: Anemia.   ·  Plan: Acute on chronic   BM biopsy > follow results   Hematology on board   montior  transfuse one unite today   check occult blood  >> not done yet !!    Problem/Plan - 6:  ·  Problem: Hypothyroidism post total thyroidectomy for cancer   ·  Plan: Chronic stable  Continue home levothyroxine.  Thyroid Stimulating Hormone, Serum: 4.06 uIU/mL (03.17.22 @ 18:46)    ** pt with elevated D Dimer      reported hypoxemia > now seems to be doing ok on room air as documented..      tachycardia from sepsis     pt was alos very anxious<> better now and remains off O2     creatinine elevated so will hold off of CTA..     LE venous duplex negative for DVT   monitor    -GI/DVT Prophylaxis per protocol.    --------------------------------------------  Case discussed with pt, family at bedside, PA    Today I had a prolonged conversation with the patient, KATHRYN MALIN, and next of kin at bedside, specialists, ..  re diagnosis, findings, and plan of care, options, alternatives, prognosis... . Patient verbalized clear understanding, all questions answered.    patient seen according to [facility] cost code ( 94801 )     Additional time spent  35 min.    Education given on findings and plan of care  ___________________________  H. TAZ Edwards.  Pager: [phone]       _________________________________________________________________________________________  ========>>  M E D I C A L   A T T E N D I N G    F O L L O W  U P  N O T E  <<=========  -----------------------------------------------------------------------------------------------------    - Patient seen and examined by me earlier today.   - In summary,  KATHRYN MALIN is a 56y year old man admitted with hematuria..   - Patient today overall doing ok, comfortable, eating OK.  david      noted events and discussed with medical team : pt with urinary retention,. post cho re-insertion, pt with hematuria ...      ==================>> REVIEW OF SYSTEM <<=================    GEN: no fever, no chills, as above   RESP: no SOB, no cough, no sputum  CVS: no chest pain, no palpitations, no edema  GI: no abdominal pain, no nausea, + had normal brown stool , no bleeding reported   : ++ hematuria in foely bag,  some discomfort with cho   Neuro: no headache, no dizziness  Derm : no itching, no rash    ==================>> PHYSICAL EXAM <<=================    GEN: A&O X 3 , NAD , comfortable, pleasant, less anxious   HEENT: NCAT, PERRL, MMM, hearing intact  Neck: supple , no JVD appreciated  CVS: S1S2 , regular , a bit tachy  PULM: CTA B/L,  no W/R/R appreciated  ABD.: soft. non tender, non distended,  bowel sounds present  Extrem: intact pulses , no edema      + cho with red urine        + left sided CVA tenderness   PSYCH : flat affect, somewhat anxious                             ( Note written / Date of service 03-20-22 )    ==================>> MEDICATIONS <<====================    allopurinol 300 milliGRAM(s) Oral daily  chlorhexidine 2% Cloths 1 Application(s) Topical daily  folic acid 1 milliGRAM(s) Oral daily  influenza   Vaccine 0.5 milliLiter(s) IntraMuscular once  lactated ringers. 1000 milliLiter(s) IV Continuous <Continuous>  levothyroxine 112 MICROGram(s) Oral daily  mupirocin 2% Ointment 1 Application(s) Both Nostrils two times a day  senna 2 Tablet(s) Oral at bedtime    MEDICATIONS  (PRN):  acetaminophen     Tablet .. 650 milliGRAM(s) Oral every 6 hours PRN Temp greater or equal to 38C (100.4F), Mild Pain (1 - 3)  aluminum hydroxide/magnesium hydroxide/simethicone Suspension 30 milliLiter(s) Oral every 4 hours PRN Dyspepsia  bisacodyl Suppository 10 milliGRAM(s) Rectal once PRN Constipation  guaiFENesin Oral Liquid (Sugar-Free) 100 milliGRAM(s) Oral every 6 hours PRN Cough  melatonin 3 milliGRAM(s) Oral at bedtime PRN Insomnia  ondansetron Injectable 4 milliGRAM(s) IV Push every 8 hours PRN Nausea and/or Vomiting  sodium chloride 0.65% Nasal 1 Spray(s) Both Nostrils four times a day PRN Nasal Congestion    ___________  Active diet:  Diet, Regular  ___________________    ==================>> VITAL SIGNS <<==================  Height (cm): 177.8  Weight (kg): 69.4  BMI (kg/m2): 22  Vital Signs Last 24 HrsT(C): 38.1 (03-20-22 @ 07:49)  T(F): 100.6 (03-20-22 @ 07:49), Max: 102.4 (03-19-22 @ 20:58)  HR: 112 (03-20-22 @ 06:08) (101 - 112)  BP: 116/73 (03-20-22 @ 06:08)  RR: 18 (03-20-22 @ 06:08) (18 - 18)  SpO2: 97% (03-20-22 @ 06:08) (94% - 98%)       ==================>> LAB AND IMAGING <<==================                        6.6    2.32  )-----------( 173      ( 20 Mar 2022 09:46 )             20.3        03-20    127<L>  |  98  |  37<H>  ----------------------------<  88  4.4   |  18<L>  |  1.61<H>    Sodium:   127  <==, 136  <==, 137  <==, 134  <==  Creatinine:  1.61  <<==, 1.78  <<==, 1.85  <<==, 2.23  <<==    Ca    8.2<L>      20 Mar 2022 07:44  Phos  4.1     03-20  Mg     1.60     03-20    TPro  7.2  /  Alb  2.6<L>  /  TBili  0.3  /  DBili  x   /  AST  67<H>  /  ALT  16  /  AlkPhos  64  03-20    WBC count:   2.32 <<== ,  2.08 <<== ,  3.18 <<== ,  3.35 <<== ,  2.29 <<== ,  3.11 <<==   Hemoglobin:   6.6 <<==,  6.3 <<==,  7.4 <<==,  8.0 <<==,  8.0 <<==,  7.7 <<==  platelets:  173 <==, 173 <==, 214 <==, 204 <==, 190 <==, 193 <==       AST:          67 <== , 69 <== , 65 <==      ALT:        16  <== , 17  <== , 20  <==      AP:        64  <=, 77  <=, 87  <=     Bili:        0.3  <=, 0.3  <=, 0.3  <=    _______________________  C U L T U R E S :    Culture - Blood (collected 17 Mar 2022 22:54)  Source: .Blood Blood-Peripheral  Preliminary Report (18 Mar 2022 23:02):    No growth to date.    Culture - Blood (collected 17 Mar 2022 22:54)  Source: .Blood Blood-Venous  Preliminary Report (18 Mar 2022 23:02):    No growth to date.    Culture - Urine (collected 17 Mar 2022 18:50)  Source: Clean Catch Clean Catch (Midstream)  Final Report (19 Mar 2022 07:35):    No growth    HIV-1/2 Combo Result: Prelim Reactive The HIV Ag/Ab Combo test performed screens for HIV-1 p24 antigen,  antibodies to HIV-1 (group M and group O), and antibodies to HIV-2. All  specimens repeatedly reactive will reflex to an HIV 1/2 antibody  confirmation and differentiation test. This assay detects p24 antigen  which may be present prior to the development of HIV antibodies,  therefore a reactive result with a negative HIV 1/2 AB Confirmation  should be followed up with HIV-1 RNA, HIV-2 RNA and repeat testing in 4-8  weeks. A nonreactive result does not preclude previous exposure to or  infection with HIV-1 or HIV-2. (03.19.22 @ 07:03)    pending HIV confirmatory tests    PENDNING BONE marrow Bx results    KUB never done !! >> changed to CT non con today, pending    ___________________________________________________________________________________  ===============>>  A S S E S S M E N T   A N D   P L A N <<===============  ------------------------------------------------------------------------------------------    · Assessment	  56 yr old male presenting with urinary retention found to have sepsis 2/2 pyelonephritis     Problem/Plan - 1:  ·  Problem: sepsis, likely acute hemorrhagic cystitis with pyelonephritis with urinary retention and obstructive Uropathy with JULIO      concern for infected / impacted stone    follow cultures  off Abx for now : monitoring   ID,  on board  pending HIV test results    CT 'stone hunt' ...   monitor BMP, I/O     crea improved with IVH > continue      to consider Pan CT with contrast per ID if no sources otherwise and crea batter     Problem/Plan - 2:  ·  Problem: pt with past + GIANNA, + jammie +      pt also with h/o sinusitis, and now Centaurea      rule out autoimmune disorders ./ Trever's etc..   rheumatology has not seen pt yet !!  Bone marrow Bx done >follow results   GIANNA, RA negative     Problem/Plan - 3:  ·  Problem: Anemia.   ·  Plan: Acute on chronic   BM biopsy > follow results   Hematology on board   montior  transfuse one unite today   check occult blood  >> not done yet !!    Problem/Plan - 6:  ·  Problem: Hypothyroidism post total thyroidectomy for cancer   ·  Plan: Chronic stable  Continue home levothyroxine.  Thyroid Stimulating Hormone, Serum: 4.06 uIU/mL (03.17.22 @ 18:46)    ** pt with elevated D Dimer      reported hypoxemia > now seems to be doing ok on room air as documented..      tachycardia from sepsis     pt was alos very anxious<> better now and remains off O2     creatinine elevated so will hold off of CTA..     LE venous duplex negative for DVT   monitor    **Hyponatremia     repeat labs ordered     pt with flank pain > suspect SIADH      for fluid restriction and increase PO diet discussed       nephrology consulted     -GI/DVT Prophylaxis per protocol.    --------------------------------------------  Case discussed with pt, family at bedside, PA  Education given on findings and plan of care    I had a prolonged conversation with the patient, KATHRYN MALIN, and next of kin at bedside, specialists, ..  re diagnosis, findings, and plan of care, options, alternatives, prognosis... . Patient verbalized clear understanding, all questions answered.    ___________________________  H. TAZ Edwards.  Pager: [phone]

## 2022-03-20 NOTE — PROGRESS NOTE ADULT - ASSESSMENT
56y male with recent dx of jammie positive with polyclonal gammopathy and nephrolithiasis having  gross hematuria and recent episode of urinary retention. Cho catheter removed initially and patient was voiding well with low PVRs. Urine and blood cultures negative.   3/20: overnight retaining >400 cc after 1 straight catheterization, cho replaced. Draining well, 40cc clot irrigated and clear without CBI. HCT dropped to 18.6 from 23.6.     -- f/u repeat CBC; transfuse prn   -- f/u CT a/p without contrast per primary team  -- continue flomax  -- continue to monitor outputs  -- monitor urine color and HCT  -- hand irrigate cho via drainage tubing (not side port) with catheter tipped syringe 60 cc x 2 q 8 hours  -- please call urology if signs/symptoms of urinary retention; discussed with patient possibility of replacing cho catheter if necessary  -- discussed with patient possible etiology of hematuria including nephrolithiasis and BPH in setting of coagulopathy, however patient will follow-up with primary urologist, Dr. Godinez for complete hematuria workup  -- f/u ID and hematology     Discussed with on-call attending, Dr. Torres.  56y male with recent dx of jammie positive with polyclonal gammopathy and nephrolithiasis having  gross hematuria and recent episode of urinary retention. Cho catheter removed initially and patient was voiding well with low PVRs. Urine and blood cultures negative.   3/20: overnight retaining >400 cc after 1 straight catheterization, cho replaced. Draining well, 40cc clot irrigated and clear without CBI. HCT dropped to 18.6 from 23.6.     -- f/u repeat CBC; transfuse prn   -- f/u CT a/p without contrast per primary team  -- continue flomax  -- continue to monitor outputs  -- monitor urine color and HCT  -- hand irrigate cho via drainage tubing (not side port) with catheter tipped syringe 60 cc x 2 q 8 hours   -- discussed with patient possible etiology of hematuria including nephrolithiasis and BPH in setting of coagulopathy, however patient will follow-up with primary urologist, Dr. Godinez for complete hematuria workup  -- current hematuria controlled, would not suspect accounting for significant anemia but will continue to monitor   -- f/u ID and hematology     Discussed with on-call attending, Dr. Torres.

## 2022-03-20 NOTE — CHART NOTE - NSCHARTNOTEFT_GEN_A_CORE
Notified by RN that pt is having dysuria and is getting up multiple times to go to the bathroom. Bladder scan was performed on 3/19 at 10PM which showed >300ccs of urine. Straight cath was performed. Pt continued to have dysuria and suprapubic discomfort throughout the night. Bladder scan was performed again at 2:49 AM which showed 410ccs. Pt had a cho catheter on admission which was removed on 3/18 by urology. Pt was previously voiding well with PVR of 0-25ccs. Cho catheter was placed and pt now no longer complaining of symptoms.

## 2022-03-20 NOTE — PROGRESS NOTE ADULT - SUBJECTIVE AND OBJECTIVE BOX
Subjective    Patient seen and examined. Cho replaced overnight for retention with hematuria and PVR>400. Patient complains of left flank/back pain since retention. Denies issues with catheter. Denies fevers/chills/lightheadedness.    Objective    Vital signs  T(F): , Max: 102.4 (03-19-22 @ 20:58)  HR: 112 (03-20-22 @ 06:08)  BP: 116/73 (03-20-22 @ 06:08)  SpO2: 97% (03-20-22 @ 06:08)  Wt(kg): --    Output     03-19 @ 07:01  -  03-20 @ 07:00  --------------------------------------------------------  IN: 0 mL / OUT: 875 mL / NET: -875 mL        General: NAD  Abdomen: soft/non-tender/non-distended  : 14f cho in place draining dark red urine. Hand irrigated ~40 cc dark clot and color cleared up to very light pink.    Labs      03-20 @ 07:44    WBC 2.08  / Hct 18.6  / SCr 1.61     03-19 @ 07:03    WBC 3.18  / Hct 23.6  / SCr 1.78     Culture - Blood (03.17.22 @ 22:54)    Specimen Source: .Blood Blood-Peripheral    Culture Results:   No growth to date.    Culture - Urine (03.17.22 @ 18:50)    Specimen Source: Clean Catch Clean Catch (Midstream)    Culture Results:   No growth    Imaging:  CT A/p pending

## 2022-03-21 LAB
ALBUMIN SERPL ELPH-MCNC: 2.5 G/DL — LOW (ref 3.3–5)
ALBUMIN SERPL ELPH-MCNC: 2.8 G/DL — LOW (ref 3.3–5)
ALP SERPL-CCNC: 60 U/L — SIGNIFICANT CHANGE UP (ref 40–120)
ALP SERPL-CCNC: 71 U/L — SIGNIFICANT CHANGE UP (ref 40–120)
ALT FLD-CCNC: 18 U/L — SIGNIFICANT CHANGE UP (ref 4–41)
ALT FLD-CCNC: 20 U/L — SIGNIFICANT CHANGE UP (ref 4–41)
ANION GAP SERPL CALC-SCNC: 10 MMOL/L — SIGNIFICANT CHANGE UP (ref 7–14)
ANION GAP SERPL CALC-SCNC: 11 MMOL/L — SIGNIFICANT CHANGE UP (ref 7–14)
AST SERPL-CCNC: 65 U/L — HIGH (ref 4–40)
AST SERPL-CCNC: 74 U/L — HIGH (ref 4–40)
BASOPHILS # BLD AUTO: 0 K/UL — SIGNIFICANT CHANGE UP (ref 0–0.2)
BASOPHILS # BLD AUTO: 0.01 K/UL — SIGNIFICANT CHANGE UP (ref 0–0.2)
BASOPHILS NFR BLD AUTO: 0 % — SIGNIFICANT CHANGE UP (ref 0–2)
BASOPHILS NFR BLD AUTO: 0.3 % — SIGNIFICANT CHANGE UP (ref 0–2)
BILIRUB SERPL-MCNC: 0.3 MG/DL — SIGNIFICANT CHANGE UP (ref 0.2–1.2)
BILIRUB SERPL-MCNC: 0.4 MG/DL — SIGNIFICANT CHANGE UP (ref 0.2–1.2)
BUN SERPL-MCNC: 33 MG/DL — HIGH (ref 7–23)
BUN SERPL-MCNC: 35 MG/DL — HIGH (ref 7–23)
C TRACH RRNA SPEC QL NAA+PROBE: SIGNIFICANT CHANGE UP
CALCIUM SERPL-MCNC: 7.9 MG/DL — LOW (ref 8.4–10.5)
CALCIUM SERPL-MCNC: 8.7 MG/DL — SIGNIFICANT CHANGE UP (ref 8.4–10.5)
CHLORIDE SERPL-SCNC: 102 MMOL/L — SIGNIFICANT CHANGE UP (ref 98–107)
CHLORIDE SERPL-SCNC: 104 MMOL/L — SIGNIFICANT CHANGE UP (ref 98–107)
CO2 SERPL-SCNC: 20 MMOL/L — LOW (ref 22–31)
CO2 SERPL-SCNC: 20 MMOL/L — LOW (ref 22–31)
CREAT SERPL-MCNC: 1.46 MG/DL — HIGH (ref 0.5–1.3)
CREAT SERPL-MCNC: 1.73 MG/DL — HIGH (ref 0.5–1.3)
EBV EA AB SER IA-ACNC: <5 U/ML — SIGNIFICANT CHANGE UP
EBV EA AB TITR SER IF: POSITIVE
EBV EA IGG SER-ACNC: NEGATIVE — SIGNIFICANT CHANGE UP
EBV NA IGG SER IA-ACNC: 363 U/ML — HIGH
EBV PATRN SPEC IB-IMP: SIGNIFICANT CHANGE UP
EBV VCA IGG AVIDITY SER QL IA: POSITIVE
EBV VCA IGM SER IA-ACNC: 258 U/ML — HIGH
EBV VCA IGM SER IA-ACNC: <10 U/ML — SIGNIFICANT CHANGE UP
EBV VCA IGM TITR FLD: NEGATIVE — SIGNIFICANT CHANGE UP
EGFR: 45 ML/MIN/1.73M2 — LOW
EGFR: 55 ML/MIN/1.73M2 — LOW
EOSINOPHIL # BLD AUTO: 0.03 K/UL — SIGNIFICANT CHANGE UP (ref 0–0.5)
EOSINOPHIL # BLD AUTO: 0.07 K/UL — SIGNIFICANT CHANGE UP (ref 0–0.5)
EOSINOPHIL NFR BLD AUTO: 1.8 % — SIGNIFICANT CHANGE UP (ref 0–6)
EOSINOPHIL NFR BLD AUTO: 2 % — SIGNIFICANT CHANGE UP (ref 0–6)
FERRITIN SERPL-MCNC: 1239 NG/ML — HIGH (ref 30–400)
GAMMA INTERFERON BACKGROUND BLD IA-ACNC: 0.1 IU/ML — SIGNIFICANT CHANGE UP
GLUCOSE SERPL-MCNC: 82 MG/DL — SIGNIFICANT CHANGE UP (ref 70–99)
GLUCOSE SERPL-MCNC: 90 MG/DL — SIGNIFICANT CHANGE UP (ref 70–99)
GRAM STN FLD: SIGNIFICANT CHANGE UP
HAPTOGLOB SERPL-MCNC: 109 MG/DL — SIGNIFICANT CHANGE UP (ref 34–200)
HAV IGM SER-ACNC: SIGNIFICANT CHANGE UP
HBV CORE AB SER-ACNC: SIGNIFICANT CHANGE UP
HBV CORE IGM SER-ACNC: SIGNIFICANT CHANGE UP
HBV SURFACE AB SER-ACNC: SIGNIFICANT CHANGE UP
HBV SURFACE AG SER-ACNC: SIGNIFICANT CHANGE UP
HCT VFR BLD CALC: 20.8 % — CRITICAL LOW (ref 39–50)
HCT VFR BLD CALC: 23.1 % — LOW (ref 39–50)
HCV AB S/CO SERPL IA: 0.27 S/CO — SIGNIFICANT CHANGE UP (ref 0–0.99)
HCV AB SERPL-IMP: SIGNIFICANT CHANGE UP
HGB BLD-MCNC: 6.8 G/DL — CRITICAL LOW (ref 13–17)
HGB BLD-MCNC: 7.9 G/DL — LOW (ref 13–17)
HIV-1 VIRAL LOAD RESULT: ABNORMAL
HIV1 RNA # SERPL NAA+PROBE: SIGNIFICANT CHANGE UP
HIV1 RNA SER-IMP: SIGNIFICANT CHANGE UP
HIV1 RNA SERPL NAA+PROBE-ACNC: ABNORMAL
HIV1 RNA SERPL NAA+PROBE-LOG#: 6.78 — SIGNIFICANT CHANGE UP
IANC: 1.23 K/UL — LOW (ref 1.5–8.5)
IANC: 2.59 K/UL — SIGNIFICANT CHANGE UP (ref 1.5–8.5)
IMM GRANULOCYTES NFR BLD AUTO: 0.9 % — SIGNIFICANT CHANGE UP (ref 0–1.5)
LDH SERPL L TO P-CCNC: 479 U/L — HIGH (ref 135–225)
LYMPHOCYTES # BLD AUTO: 0.24 K/UL — LOW (ref 1–3.3)
LYMPHOCYTES # BLD AUTO: 0.56 K/UL — LOW (ref 1–3.3)
LYMPHOCYTES # BLD AUTO: 12.8 % — LOW (ref 13–44)
LYMPHOCYTES # BLD AUTO: 16.3 % — SIGNIFICANT CHANGE UP (ref 13–44)
M TB IFN-G BLD-IMP: ABNORMAL
M TB IFN-G CD4+ BCKGRND COR BLD-ACNC: 0.01 IU/ML — SIGNIFICANT CHANGE UP
M TB IFN-G CD4+CD8+ BCKGRND COR BLD-ACNC: 0.01 IU/ML — SIGNIFICANT CHANGE UP
MAGNESIUM SERPL-MCNC: 1.7 MG/DL — SIGNIFICANT CHANGE UP (ref 1.6–2.6)
MAGNESIUM SERPL-MCNC: 1.8 MG/DL — SIGNIFICANT CHANGE UP (ref 1.6–2.6)
MCHC RBC-ENTMCNC: 28.6 PG — SIGNIFICANT CHANGE UP (ref 27–34)
MCHC RBC-ENTMCNC: 28.8 PG — SIGNIFICANT CHANGE UP (ref 27–34)
MCHC RBC-ENTMCNC: 32.7 GM/DL — SIGNIFICANT CHANGE UP (ref 32–36)
MCHC RBC-ENTMCNC: 34.2 GM/DL — SIGNIFICANT CHANGE UP (ref 32–36)
MCV RBC AUTO: 84.3 FL — SIGNIFICANT CHANGE UP (ref 80–100)
MCV RBC AUTO: 87.4 FL — SIGNIFICANT CHANGE UP (ref 80–100)
MONOCYTES # BLD AUTO: 0.05 K/UL — SIGNIFICANT CHANGE UP (ref 0–0.9)
MONOCYTES # BLD AUTO: 0.18 K/UL — SIGNIFICANT CHANGE UP (ref 0–0.9)
MONOCYTES NFR BLD AUTO: 2.8 % — SIGNIFICANT CHANGE UP (ref 2–14)
MONOCYTES NFR BLD AUTO: 5.2 % — SIGNIFICANT CHANGE UP (ref 2–14)
N GONORRHOEA RRNA SPEC QL NAA+PROBE: SIGNIFICANT CHANGE UP
NEUTROPHILS # BLD AUTO: 1.48 K/UL — LOW (ref 1.8–7.4)
NEUTROPHILS # BLD AUTO: 2.59 K/UL — SIGNIFICANT CHANGE UP (ref 1.8–7.4)
NEUTROPHILS NFR BLD AUTO: 75.3 % — SIGNIFICANT CHANGE UP (ref 43–77)
NEUTROPHILS NFR BLD AUTO: 78 % — HIGH (ref 43–77)
NRBC # BLD: 0 /100 WBCS — SIGNIFICANT CHANGE UP
NRBC # FLD: 0 K/UL — SIGNIFICANT CHANGE UP
PHOSPHATE SERPL-MCNC: 3.8 MG/DL — SIGNIFICANT CHANGE UP (ref 2.5–4.5)
PHOSPHATE SERPL-MCNC: 4.9 MG/DL — HIGH (ref 2.5–4.5)
PLATELET # BLD AUTO: 159 K/UL — SIGNIFICANT CHANGE UP (ref 150–400)
PLATELET # BLD AUTO: 202 K/UL — SIGNIFICANT CHANGE UP (ref 150–400)
POTASSIUM SERPL-MCNC: 4.2 MMOL/L — SIGNIFICANT CHANGE UP (ref 3.5–5.3)
POTASSIUM SERPL-MCNC: 4.4 MMOL/L — SIGNIFICANT CHANGE UP (ref 3.5–5.3)
POTASSIUM SERPL-SCNC: 4.2 MMOL/L — SIGNIFICANT CHANGE UP (ref 3.5–5.3)
POTASSIUM SERPL-SCNC: 4.4 MMOL/L — SIGNIFICANT CHANGE UP (ref 3.5–5.3)
PROT SERPL-MCNC: 6.8 G/DL — SIGNIFICANT CHANGE UP (ref 6–8.3)
PROT SERPL-MCNC: 7.5 G/DL — SIGNIFICANT CHANGE UP (ref 6–8.3)
QUANT TB PLUS MITOGEN MINUS NIL: 0.08 IU/ML — SIGNIFICANT CHANGE UP
RBC # BLD: 2.38 M/UL — LOW (ref 4.2–5.8)
RBC # BLD: 2.74 M/UL — LOW (ref 4.2–5.8)
RBC # FLD: 16.3 % — HIGH (ref 10.3–14.5)
RBC # FLD: 17 % — HIGH (ref 10.3–14.5)
SODIUM SERPL-SCNC: 132 MMOL/L — LOW (ref 135–145)
SODIUM SERPL-SCNC: 135 MMOL/L — SIGNIFICANT CHANGE UP (ref 135–145)
SPECIMEN SOURCE: SIGNIFICANT CHANGE UP
SPECIMEN SOURCE: SIGNIFICANT CHANGE UP
TRIGL SERPL-MCNC: 161 MG/DL — HIGH
WBC # BLD: 1.9 K/UL — LOW (ref 3.8–10.5)
WBC # BLD: 3.44 K/UL — LOW (ref 3.8–10.5)
WBC # FLD AUTO: 1.9 K/UL — LOW (ref 3.8–10.5)
WBC # FLD AUTO: 3.44 K/UL — LOW (ref 3.8–10.5)

## 2022-03-21 PROCEDURE — 99255 IP/OBS CONSLTJ NEW/EST HI 80: CPT | Mod: GC

## 2022-03-21 PROCEDURE — 99233 SBSQ HOSP IP/OBS HIGH 50: CPT

## 2022-03-21 RX ORDER — OXYCODONE HYDROCHLORIDE 5 MG/1
2.5 TABLET ORAL ONCE
Refills: 0 | Status: DISCONTINUED | OUTPATIENT
Start: 2022-03-21 | End: 2022-03-21

## 2022-03-21 RX ORDER — ACETAMINOPHEN 500 MG
1000 TABLET ORAL ONCE
Refills: 0 | Status: COMPLETED | OUTPATIENT
Start: 2022-03-21 | End: 2022-03-21

## 2022-03-21 RX ORDER — ATOVAQUONE 750 MG/5ML
750 SUSPENSION ORAL
Refills: 0 | Status: DISCONTINUED | OUTPATIENT
Start: 2022-03-21 | End: 2022-03-30

## 2022-03-21 RX ORDER — CALCIUM ACETATE 667 MG
667 TABLET ORAL
Refills: 0 | Status: COMPLETED | OUTPATIENT
Start: 2022-03-21 | End: 2022-03-21

## 2022-03-21 RX ORDER — SODIUM BICARBONATE 1 MEQ/ML
650 SYRINGE (ML) INTRAVENOUS THREE TIMES A DAY
Refills: 0 | Status: COMPLETED | OUTPATIENT
Start: 2022-03-21 | End: 2022-03-23

## 2022-03-21 RX ADMIN — Medication 650 MILLIGRAM(S): at 17:38

## 2022-03-21 RX ADMIN — SODIUM CHLORIDE 75 MILLILITER(S): 9 INJECTION INTRAMUSCULAR; INTRAVENOUS; SUBCUTANEOUS at 18:27

## 2022-03-21 RX ADMIN — AZITHROMYCIN 255 MILLIGRAM(S): 500 TABLET, FILM COATED ORAL at 17:36

## 2022-03-21 RX ADMIN — Medication 112 MICROGRAM(S): at 05:44

## 2022-03-21 RX ADMIN — SODIUM CHLORIDE 1 GRAM(S): 9 INJECTION INTRAMUSCULAR; INTRAVENOUS; SUBCUTANEOUS at 14:35

## 2022-03-21 RX ADMIN — Medication 1000 MILLIGRAM(S): at 07:23

## 2022-03-21 RX ADMIN — OXYCODONE HYDROCHLORIDE 2.5 MILLIGRAM(S): 5 TABLET ORAL at 21:18

## 2022-03-21 RX ADMIN — CHLORHEXIDINE GLUCONATE 1 APPLICATION(S): 213 SOLUTION TOPICAL at 11:18

## 2022-03-21 RX ADMIN — Medication 667 MILLIGRAM(S): at 14:35

## 2022-03-21 RX ADMIN — Medication 667 MILLIGRAM(S): at 17:37

## 2022-03-21 RX ADMIN — Medication 650 MILLIGRAM(S): at 18:41

## 2022-03-21 RX ADMIN — SENNA PLUS 2 TABLET(S): 8.6 TABLET ORAL at 21:19

## 2022-03-21 RX ADMIN — Medication 400 MILLIGRAM(S): at 07:08

## 2022-03-21 RX ADMIN — OXYCODONE HYDROCHLORIDE 2.5 MILLIGRAM(S): 5 TABLET ORAL at 22:18

## 2022-03-21 RX ADMIN — SODIUM CHLORIDE 75 MILLILITER(S): 9 INJECTION INTRAMUSCULAR; INTRAVENOUS; SUBCUTANEOUS at 01:43

## 2022-03-21 RX ADMIN — Medication 300 MILLIGRAM(S): at 11:18

## 2022-03-21 RX ADMIN — SODIUM CHLORIDE 1 GRAM(S): 9 INJECTION INTRAMUSCULAR; INTRAVENOUS; SUBCUTANEOUS at 05:43

## 2022-03-21 RX ADMIN — Medication 667 MILLIGRAM(S): at 09:45

## 2022-03-21 RX ADMIN — Medication 1 MILLIGRAM(S): at 11:18

## 2022-03-21 RX ADMIN — ATOVAQUONE 750 MILLIGRAM(S): 750 SUSPENSION ORAL at 17:40

## 2022-03-21 RX ADMIN — MUPIROCIN 1 APPLICATION(S): 20 OINTMENT TOPICAL at 05:43

## 2022-03-21 RX ADMIN — MUPIROCIN 1 APPLICATION(S): 20 OINTMENT TOPICAL at 17:36

## 2022-03-21 RX ADMIN — Medication 650 MILLIGRAM(S): at 22:11

## 2022-03-21 NOTE — ADVANCED PRACTICE NURSE CONSULT - RECOMMEDATIONS
Topical recommendations:     Sacral/gluteal fold- Cleanse with soap and water, pat dry. Apply [employer] moisture barrier ointment twice a day.     Continue low air loss bed therapy, elevate heels while in bed, encourage to turn & reposition q2h with Z-flow fluidized pillow, continue moisture management with barrier creams as specified above & single breathable pad.   Plan discussed with patient- educated on topical wound therapy to optimize wound healing. Questions answered.     Recommendations discussed with primary RN and ACP team.     Please contact Wound/Ostomy Care Service Line if we can be of further assistance (ext [phone]).

## 2022-03-21 NOTE — PROGRESS NOTE ADULT - ASSESSMENT
56 m with Papillary thyroid cancer s/p total thyroidectomy on synthroid 1/27/22, Gout on Allopurinol, presents to the ED with hematuria for the past 5 days    had painless hematuria and urinary retention then left flank pain    Febrile with leukopenia/ Anemia    UA 10 WBC and grossly hematuria; UCx negative, blood cx negative  Reported with unintentional weight loss, fatigue and sometimes night sweat    CXR showed nonspecific peripheral opacities  ESR >140, CRP 39.5, Positive GIANNA 1:640 in 2021 but repeat rheum panel done in Sept 2021 was unremarkable, including negative GIANNA, now jammie positive but no other signs of hemolysis  s/p bone marrow biopsy 3/18      1) HIV  positive screen and multispot  Await Viral load  Add T cell subsets  Check genotype    2) Ground glass opacity  O2 sat at 91 % room air  Check B D Glucan  Empiric Mepron for PCP    3) JULIO with hematuria  Continue to monitor CR    4) Cytopenia  prior Lymph node biopsy with EBV + cells  ? EBV associated lymphoproliferative disorder  Await bone marrow biopsy    D/w patient and wife  D/w acp

## 2022-03-21 NOTE — CONSULT NOTE ADULT - CONSULT REQUESTED DATE/TIME
20-Mar-2022 14:03
21-Mar-2022 12:32
17-Mar-2022
18-Mar-2022
18-Mar-2022 13:02
21-Mar-2022 12:59
18-Mar-2022

## 2022-03-21 NOTE — CONSULT NOTE ADULT - REASON FOR ADMISSION
urinary retention

## 2022-03-21 NOTE — CONSULT NOTE ADULT - ASSESSMENT
56 yr old male with a pmh of Thyroid Ca (s/p surgery on synthroid), Gout on Allopurinol, presented with hematuria for the past 5 days - went to [facility] with negative imaging and was sent home with abx- today developed left flank pain and urinary rentention. CT chest showed GGO in GARY, RML and LLL, and hemorrhagic product in L. renal collecting system     Impression  *** urine P/Cr 12.7   CT chest showed GGO in GARY, RML and LLL  -> possible opportunistic infection, would send for CD4, pulm consult? pt currently on 3L of NC   hemorrhagic product in L. renal collecting system   prelim HIV is reactive, confirmatory test pending     Vasculitis can present with constitutional symptoms, diffuse LAD, pulmonary + renal involvement, cytopenias. Other etiologies such as infectious and malignant must also be considered, especially given prelim HIV (?opportunistic infection), comment of EBV+ cells on LN biopsy and recent thyroid CA dx.  Reported positive GIANNA 1:640 per ID note, however repeat this admission ha  GIANNA negative.   ANCA, MPO, PR3, cryoglobulins, ferritin, lipid panel, EBV serologies   quant gold indeterminate 3/20  repeat pending  will likely need DIOMEDES Jacobson, PGY-4  Rheumatology Fellow   Pager: [phone] 56 yr old male with a pmh of Thyroid Ca (s/p surgery on synthroid), Gout on Allopurinol, presented with hematuria for the past 5 days - went to [facility] with negative imaging and was sent home with abx- today developed left flank pain and urinary rentention. CT chest showed GGO in GARY, RML and LLL, and hemorrhagic product in L. renal collecting system. Rheumatology consulted for vasculitis workup     Impression: Vasculitis can present with constitutional symptoms, diffuse LAD, pulmonary + renal involvement, cytopenias. However, given positive HIV test on screening and confirmatory test, high suspicion for opportunist infection in the lungs, causing GGO, also suspect possible EBV-associated maligancy given EBV+ cells on LN biopsy and recent thyroid CA dx. Differential for hematuria in active HIV pt includes CMV-induced hemorrhagic cystitis, and HIV-associated nephropathy     Plan:    - Reported positive GIANNA 1:640 per ID note, however repeat  GIANNA negative.  - will f/u on ANCA, MPO, PR3, cryoglobulins, ferritin, lipid panel  - check CMV serologies, CD4 count   - quant gold indeterminate 3/20. repeat pending   - f/u ID rec   - urine P/Cr 12.7, f/u nephrology       Case discussed with attending     Eduardo Jacobson, PGY-4  Rheumatology Fellow   Pager: [phone]

## 2022-03-21 NOTE — ADVANCED PRACTICE NURSE CONSULT - ASSESSMENT
General: A&Ox4, ambulates independently, continent of stool, urinary retention primary RN inserting indwelling catheter at bedside.  Patient thin, frail prominent bony prominences. Skin warm, dry.  General: A&Ox4, ambulates independently, continent of stool, urinary retention primary RN inserting indwelling catheter at bedside.  Patient thin, prominent bony prominences. Skin warm, dry. Lower back with dried sanguinous drainage s/p bone biopsy.     Sacral/gluteal fold with moisture associated dermatitis with area of denudation to right inner buttock exposing pink moist dermis, not located over bony prominence, irregular borders. Increased moisture noted.

## 2022-03-21 NOTE — PROGRESS NOTE ADULT - ASSESSMENT
56y male with recent dx of jammie positive with polyclonal gammopathy and nephrolithiasis having  gross hematuria and recent episode of urinary retention. Cho catheter removed initially and patient was voiding well with low PVRs. Urine and blood cultures negative.   3/20: overnight retaining >400 cc after 1 straight catheterization, cho replaced. Draining well, 40cc clot irrigated and clear without CBI. HCT dropped to 18.6 from 23.6.     -- trend CBC, transfuse prn. +Hematochezia yesterday, could explain H/H drop in addition to hematuria  -- Consider GI consult  -- f/u CT a/p without contrast per primary team  -- continue flomax  -- continue to monitor outputs  -- monitor urine color and HCT  -- hand irrigate cho via drainage tubing (not side port) with catheter tipped syringe 60 cc x 2 q 8 hours   -- discussed with patient possible etiology of hematuria including nephrolithiasis and BPH in setting of coagulopathy, however patient will follow-up with primary urologist, Dr. Godinez for complete hematuria workup  -- current hematuria controlled, would not suspect accounting for significant anemia but will continue to monitor   -- f/u ID and hematology

## 2022-03-21 NOTE — PROGRESS NOTE ADULT - SUBJECTIVE AND OBJECTIVE BOX
Subjective    Pt seen and examined. Noted transluscent punch urine in tubing, irrigated small clots.   Yesterday with bright red bowel movement, +FOBT.     Objective  Vital Signs Last 24 Hrs  T(C): 37.4 (21 Mar 2022 10:44), Max: 39.2 (20 Mar 2022 15:50)  T(F): 99.3 (21 Mar 2022 10:44), Max: 102.5 (20 Mar 2022 15:50)  HR: 91 (21 Mar 2022 10:44) (70 - 104)  BP: 115/79 (21 Mar 2022 10:44) (99/59 - 125/83)  BP(mean): --  RR: 18 (21 Mar 2022 10:44) (18 - 18)  SpO2: 91% (21 Mar 2022 10:44) (91% - 100%)    03-20-22 @ 07:01  -  03-21-22 @ 07:00  --------------------------------------------------------  IN: 50 mL / OUT: 4130 mL / NET: -4080 mL    03-21-22 @ 07:01  -  03-21-22 @ 12:42  --------------------------------------------------------  IN: 300 mL / OUT: 950 mL / NET: -650 mL            General: NAD  Abdomen: soft/non-tender/non-distended  : 14f cho in place draining clear light watermelon/pink urine    Labs  CBC (03-21 @ 03:44)                              6.8<LL>                         1.90<L>  )----------------(  159        78.0<H>% Neutrophils, 12.8<L>% Lymphocytes, ANC: 1.48<L>                              20.8<LL>              CBC (03-20 @ 09:46)                              6.6<LL>                         2.32<L>  )----------------(  173        --    % Neutrophils, --    % Lymphocytes, ANC: --                                  20.3<LL>                BMP (03-21 @ 03:44)             132<L>  |  102     |  35<H> 		Ca++ --      Ca 7.9<L>             ---------------------------------( 82    		Mg 1.80               4.2     |  20<L>   |  1.73<H>			Ph 4.9<H>  BMP (03-20 @ 18:52)             127<L>  |  --      |  --    		Ca++ --      Ca --                 ---------------------------------( --    		Mg --                 --      |  --      |  --    			Ph --        LFTs (03-21 @ 03:44)      TPro 6.8 / Alb 2.5<L> / TBili 0.3 / DBili -- / AST 65<H> / ALT 18 / AlkPhos 60  LFTs (03-20 @ 07:44)      TPro 7.2 / Alb 2.6<L> / TBili 0.3 / DBili -- / AST 67<H> / ALT 16 / AlkPhos 64          -> .Blood Blood-Venous Culture (03-17 @ 22:54)     NG    NG    No growth to date.    -> Clean Catch Clean Catch (Midstream) Culture (03-17 @ 18:50)     NG    NG    No growth

## 2022-03-21 NOTE — CONSULT NOTE ADULT - CONSULT REASON
FOBT
ckd and todd , hyponatremia
vasculitis work up
Bone marrow biopsy
Pyelonephritis
anemia, leucopenia
gross Hematuria

## 2022-03-21 NOTE — PROGRESS NOTE ADULT - SUBJECTIVE AND OBJECTIVE BOX
Follow Up:      Inverval History/ROS:Patient is a 56y old  Male who presents with a chief complaint of urinary retention (21 Mar 2022 12:59)    No fever    Pt was told re his HIV + screen and positive multispot  Allergies    No Known Allergies    Intolerances        ANTIMICROBIALS:  atovaquone  Suspension 750 two times a day  azithromycin  IVPB    azithromycin  IVPB 500 every 24 hours      OTHER MEDS:  acetaminophen     Tablet .. 650 milliGRAM(s) Oral every 6 hours PRN  allopurinol 300 milliGRAM(s) Oral daily  aluminum hydroxide/magnesium hydroxide/simethicone Suspension 30 milliLiter(s) Oral every 4 hours PRN  bisacodyl Suppository 10 milliGRAM(s) Rectal once PRN  calcium acetate 667 milliGRAM(s) Oral three times a day with meals  chlorhexidine 2% Cloths 1 Application(s) Topical daily  folic acid 1 milliGRAM(s) Oral daily  guaiFENesin Oral Liquid (Sugar-Free) 100 milliGRAM(s) Oral every 6 hours PRN  influenza   Vaccine 0.5 milliLiter(s) IntraMuscular once  levothyroxine 112 MICROGram(s) Oral daily  melatonin 3 milliGRAM(s) Oral at bedtime PRN  mupirocin 2% Ointment 1 Application(s) Both Nostrils two times a day  ondansetron Injectable 4 milliGRAM(s) IV Push every 8 hours PRN  senna 2 Tablet(s) Oral at bedtime  sodium chloride 1 Gram(s) Oral three times a day  sodium chloride 0.65% Nasal 1 Spray(s) Both Nostrils four times a day PRN  sodium chloride 0.9%. 1000 milliLiter(s) IV Continuous <Continuous>      Vital Signs Last 24 Hrs  T(C): 37.4 (21 Mar 2022 10:44), Max: 39.2 (20 Mar 2022 15:50)  T(F): 99.3 (21 Mar 2022 10:44), Max: 102.5 (20 Mar 2022 15:50)  HR: 91 (21 Mar 2022 10:44) (70 - 104)  BP: 115/79 (21 Mar 2022 10:44) (99/59 - 125/83)  BP(mean): --  RR: 18 (21 Mar 2022 10:44) (18 - 18)  SpO2: 91% (21 Mar 2022 10:44) (91% - 100%)    PHYSICAL EXAM:  General: [x ] non-toxic  HEAD/EYES: [ ] PERRL [x ] white sclera [ ] icterus  ENT:  [ ] normal [ ] supple [ ] thrush [ ] pharyngeal exudate  Cardiovascular:   [ ] murmur [x ] normal [ ] PPM/AICD  Respiratory:  [x ] clear to ausculation bilaterally  GI:  [ x] soft, non-tender, normal bowel sounds  :  [ ] cho [ ] no CVA tenderness   Musculoskeletal:  [ ] no synovitis  Neurologic:  [ ] non-focal exam   Skin:  [x ] no rash  Lymph: x[ ] no lymphadenopathy  Psychiatric:  [ ] appropriate affect [ ] alert & oriented  Lines:  [x ] no phlebitis [ ] central line                                6.8    1.90  )-----------( 159      ( 21 Mar 2022 03:44 )             20.8       03-    132<L>  |  102  |  35<H>  ----------------------------<  82  4.2   |  20<L>  |  1.73<H>    Ca    7.9<L>      21 Mar 2022 03:44  Phos  4.9     -  Mg     1.80     -    TPro  6.8  /  Alb  2.5<L>  /  TBili  0.3  /  DBili  x   /  AST  65<H>  /  ALT  18  /  AlkPhos  60  -      Urinalysis Basic - ( 20 Mar 2022 21:57 )    Color: Red / Appearance: Slightly Turbid / S.008 / pH: x  Gluc: x / Ketone: Negative  / Bili: Negative / Urobili: <2 mg/dL   Blood: x / Protein: 100 mg/dL / Nitrite: Negative   Leuk Esterase: Moderate / RBC: >50 /HPF / WBC 4 /HPF   Sq Epi: x / Non Sq Epi: 1 /HPF / Bacteria: Few        MICROBIOLOGY:Culture Results:   No growth to date. (22 @ 22:54)  Culture Results:   No growth to date. (22 @ 22:54)  Culture Results:   No growth (22 @ 18:50)      RADIOLOGY:

## 2022-03-21 NOTE — CONSULT NOTE ADULT - ASSESSMENT
56 yr old male with a pmh of Thyroid Ca (s/p surgery on synthroid), Gout on Allopurinol, presents to the ED with hematuria for the past 5 days    Rectal Bleeding   pt reports now resolved (last bled yesterday); having Active Ana Hematuria   possibly resolving diverticular bleed; last Colonoscopy 2019 w/diverticulosis  CT without acute gi pathology; f/u  for bladder pathology with hemorrhagic products present   cont to monitor cbc and transfuse to keep Hgb 7.5 or greater   recommend NM Bleeding Scan if further GI bleeding   recommend Hematology eval  if further rectal bleeding will consider endoscopic eval; will need medical clearance at that time     Gross Hematuria   ana blood noted in cho bag   CT noted   cont to follow up Urology team recs    I reviewed the overnight course of events on the unit, re-confirming the patient history. I discussed the care with the patient and their family. The plan of care was discussed with the physician assistant and modifications were made to the notation where appropriate. Differential diagnosis and plan of care discussed with patient after the evaluation. Advanced care planning was discussed with patient and family.  Advanced care planning forms were reviewed and discussed.  Risks, benefits and alternatives of gastroenterologic procedures were discussed in detail and all questions were answered. 35 minutes spent on total encounter of which more than fifty percent of the encounter was spent counseling and/or coordinating care by the attending physician.    56 yr old male with a pmh of Thyroid Ca (s/p surgery on synthroid), Gout on Allopurinol, presents to the ED with hematuria for the past 5 days    Rectal Bleeding   pt reports now resolved (last bled yesterday); having Active Ana Hematuria   possibly resolving diverticular bleed; last Colonoscopy 2019 w/diverticulosis  CT without acute gi pathology; f/u  for bladder pathology with hemorrhagic products present   cont to monitor cbc and transfuse to keep Hgb 7.5 or greater   recommend NM Bleeding Scan if further GI bleeding   recommend Hematology eval  if further rectal bleeding will consider endoscopic eval; will need medical clearance at that time   f/u ID re: HIV Status; ?related to rectal bleeding and hematuria; would also need ID clearance if were to scope     Gross Hematuria   ana blood noted in cho bag   CT noted   cont to follow up Urology team recs    I reviewed the overnight course of events on the unit, re-confirming the patient history. I discussed the care with the patient and their family. The plan of care was discussed with the physician assistant and modifications were made to the notation where appropriate. Differential diagnosis and plan of care discussed with patient after the evaluation. Advanced care planning was discussed with patient and family.  Advanced care planning forms were reviewed and discussed.  Risks, benefits and alternatives of gastroenterologic procedures were discussed in detail and all questions were answered. 35 minutes spent on total encounter of which more than fifty percent of the encounter was spent counseling and/or coordinating care by the attending physician.

## 2022-03-21 NOTE — CONSULT NOTE ADULT - SUBJECTIVE AND OBJECTIVE BOX
Chief Complaint:  Patient is a 56y old  Male who presents with a chief complaint of urinary retention (21 Mar 2022 12:41)    No pertinent past medical history    Deviated septum    Gout    Malignant neoplasm of thyroid gland    S/P ORIF (open reduction internal fixation) fracture    S/P ACL surgery    S/P hardware removal    H/O umbilical hernia repair    History of nasal septoplasty    History of tonsillectomy       HPI:  Interventional Radiology  Pre-Procedure Note    This is a 56y  Male  with pancytopenia    HPI:  56 yr old male with a pmh of Thyroid Ca (s/p surgery on synthroid), Gout on Allopurinol, presents to the ED with hematuria for the past 5 days - went to [facility] with negative imaging and was sent home with abx- today developed left flank pain and urinary rentention.  Patient has had fatigue and unintentional weight loss over the past few months. Per ED note "seen by Dr. 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 Spoke with Dr. Muhammad, states that pt had cervical LAD not long ago with negative biopsies. Dr. Muhammad sent pt to hospital to get bone marrow bx (spoke with Dr. Felix with IR, states he can get bm bx on 3/18 and to admit pt to Dr. Edwards)"  GI Consulted. The patient had episode of brbpr w/clots x 1 yesterday, this morning with brown stool, non-bloody as per patient. He has no associated abdominal pain or nausea or vomiting. He does endorse some unintentional weight loss, appetite is fair. He had Colonoscopy in 2019 with Diverticulosis, never had an EGD.     Denies  headache, dizziness, chest pain, palpitations, SOB, joint pain, diarrhea/constipation  Vitals in the ED: T 101, , /85, RR 23 satting 97% RA (17 Mar 2022 23:17)      PAST MEDICAL & SURGICAL HISTORY:  Deviated septum    Gout    Malignant neoplasm of thyroid gland    S/P ORIF (open reduction internal fixation) fracture  right femur     S/P ACL surgery  Right; 2016    S/P hardware removal  right femur     H/O umbilical hernia repair    History of nasal septoplasty  2020    History of tonsillectomy  as a child        Social History:     FAMILY HISTORY:  No pertinent family history in first degree relatives        Allergies: No Known Allergies      Current Medications: acetaminophen     Tablet .. 650 milliGRAM(s) Oral every 6 hours PRN  allopurinol 300 milliGRAM(s) Oral daily  aluminum hydroxide/magnesium hydroxide/simethicone Suspension 30 milliLiter(s) Oral every 4 hours PRN  bisacodyl Suppository 10 milliGRAM(s) Rectal once PRN  cefTRIAXone   IVPB 1000 milliGRAM(s) IV Intermittent every 24 hours  chlorhexidine 2% Cloths 1 Application(s) Topical daily  folic acid 1 milliGRAM(s) Oral daily  influenza   Vaccine 0.5 milliLiter(s) IntraMuscular once  lactated ringers. 1000 milliLiter(s) IV Continuous <Continuous>  levothyroxine 112 MICROGram(s) Oral daily  melatonin 3 milliGRAM(s) Oral at bedtime PRN  ondansetron Injectable 4 milliGRAM(s) IV Push every 8 hours PRN  senna 2 Tablet(s) Oral at bedtime      Labs:                         8.0    2.29  )-----------( 190      ( 18 Mar 2022 06:59 )             24.1       03-18    137  |  108<H>  |  44<H>  ----------------------------<  88  4.3   |  17<L>  |  1.85<H>    Ca    8.7      18 Mar 2022 06:59  Phos  4.8     03-17  Mg     2.00     -17    TPro  8.0  /  Alb  3.0<L>  /  TBili  0.3  /  DBili  <0.2  /  AST  65<H>  /  ALT  20  /  AlkPhos  87  -17      Assessment/Plan:   This is a 56y Male  presents with pancytopenia  Patient presents to IR for bone marrow biopsy. Case discussed with Dr. Muhammad.   Procedure/ risks/ benefits/ goals/ alternatives were explained. All questions answered. Informed content obtained from patient. Consent placed in chart.      (18 Mar 2022 16:58)      No Known Allergies      acetaminophen     Tablet .. 650 milliGRAM(s) Oral every 6 hours PRN  allopurinol 300 milliGRAM(s) Oral daily  aluminum hydroxide/magnesium hydroxide/simethicone Suspension 30 milliLiter(s) Oral every 4 hours PRN  atovaquone  Suspension 750 milliGRAM(s) Oral two times a day  azithromycin  IVPB      azithromycin  IVPB 500 milliGRAM(s) IV Intermittent every 24 hours  bisacodyl Suppository 10 milliGRAM(s) Rectal once PRN  calcium acetate 667 milliGRAM(s) Oral three times a day with meals  chlorhexidine 2% Cloths 1 Application(s) Topical daily  folic acid 1 milliGRAM(s) Oral daily  guaiFENesin Oral Liquid (Sugar-Free) 100 milliGRAM(s) Oral every 6 hours PRN  influenza   Vaccine 0.5 milliLiter(s) IntraMuscular once  levothyroxine 112 MICROGram(s) Oral daily  melatonin 3 milliGRAM(s) Oral at bedtime PRN  mupirocin 2% Ointment 1 Application(s) Both Nostrils two times a day  ondansetron Injectable 4 milliGRAM(s) IV Push every 8 hours PRN  senna 2 Tablet(s) Oral at bedtime  sodium chloride 1 Gram(s) Oral three times a day  sodium chloride 0.65% Nasal 1 Spray(s) Both Nostrils four times a day PRN  sodium chloride 0.9%. 1000 milliLiter(s) IV Continuous <Continuous>        FAMILY HISTORY:  No pertinent family history in first degree relatives          Review of Systems:    General:  No wt loss, fevers, chills, night sweats, fatigue  Eyes:  Good vision, no reported pain  ENT:  No sore throat, pain, runny nose, dysphagia  CV:  No pain, palpitations, no lightheadedness  Resp:  No dyspnea, cough, tachypnea, wheezing  GI: as above  :  No pain, bleeding, incontinence, nocturia  Muscle:  No pain, weakness  Neuro:  No weakness, tingling, memory problems  Psych:  No fatigue, insomnia, mood problems, depression  Endocrine:  No polyuria, polydypsia, cold/heat intolerance  Heme:  No petechiae, ecchymosis, easy bruisability  Skin:  No rash, tattoos, scars, edema    Relevant Family History:   n/c    Relevant Social History: n/c      Physical Exam:    Vital Signs:  Vital Signs Last 24 Hrs  T(C): 37.4 (21 Mar 2022 10:44), Max: 39.2 (20 Mar 2022 15:50)  T(F): 99.3 (21 Mar 2022 10:44), Max: 102.5 (20 Mar 2022 15:50)  HR: 91 (21 Mar 2022 10:44) (70 - 104)  BP: 115/79 (21 Mar 2022 10:44) (99/59 - 125/83)  BP(mean): --  RR: 18 (21 Mar 2022 10:44) (18 - 18)  SpO2: 91% (21 Mar 2022 10:44) (91% - 100%)  Daily     Daily     General:  Appears stated age, well-groomed, nad  HEENT:  NC/AT,  conjunctivae clear and pink, no thyromegaly, nodules, adenopathy, no JVD  Chest:  Full & symmetric excursion, no increased effort, breath sounds clear  Cardiovascular:  Regular rhythm, S1, S2, no murmur/rub/S3/S4, no abdominal bruit, no edema  Abdomen:  Soft, non-tender, non-distended, normoactive bowel sounds,  no masses ,no hepatosplenomeagaly, no signs of chronic liver disease +hematuria  Extremities:  no cyanosis,clubbing or edema  Skin:  No rash/erythema/ecchymoses/petechiae/wounds/abscess/warm/dry  Neuro/Psych:  A&Ox3  , no asterixis, no tremor, no encephalopathy    Laboratory:                            6.8    1.90  )-----------( 159      ( 21 Mar 2022 03:44 )             20.8     03-21    132<L>  |  102  |  35<H>  ----------------------------<  82  4.2   |  20<L>  |  1.73<H>    Ca    7.9<L>      21 Mar 2022 03:44  Phos  4.9     03-21  Mg     1.80     03-21    TPro  6.8  /  Alb  2.5<L>  /  TBili  0.3  /  DBili  x   /  AST  65<H>  /  ALT  18  /  AlkPhos  60  03-21    LIVER FUNCTIONS - ( 21 Mar 2022 03:44 )  Alb: 2.5 g/dL / Pro: 6.8 g/dL / ALK PHOS: 60 U/L / ALT: 18 U/L / AST: 65 U/L / GGT: x             Urinalysis Basic - ( 20 Mar 2022 21:57 )    Color: Red / Appearance: Slightly Turbid / S.008 / pH: x  Gluc: x / Ketone: Negative  / Bili: Negative / Urobili: <2 mg/dL   Blood: x / Protein: 100 mg/dL / Nitrite: Negative   Leuk Esterase: Moderate / RBC: >50 /HPF / WBC 4 /HPF   Sq Epi: x / Non Sq Epi: 1 /HPF / Bacteria: Few        Imaging:      < from: CT Abdomen and Pelvis No Cont (22 @ 15:11) >    ACC: [id number] EXAM:  CT ABDOMEN AND PELVIS                          PROCEDURE DATE:  2022          INTERPRETATION:  CLINICAL INFORMATION: Abdominal pain on    COMPARISON: PET/CT 2022    CONTRAST/COMPLICATIONS:  IV Contrast: NONE  Oral Contrast: NONE  Complications: None reported at time of study completion    PROCEDURE:  CT of the Abdomen and Pelvis was performed in the prone position.  Sagittal and coronal reformats were performed.    FINDINGS:  Motion degradation.    LOWER CHEST: Groundglass opacities are partially visualized within the   left upper lobe and right middle lobe. Additional groundglass opacity   within the left lower lobe. These appear new from 2022.    LIVER: Within normal limits.  BILE DUCTS: Normal caliber.  GALLBLADDER: Within normal limits.  SPLEEN: Within normal limits.  PANCREAS: Within normal limits.  ADRENALS: Within normal limits.  KIDNEYS/URETERS: 0.9 cm nonobstructing left lower pole calculus, as seen   on prior study. Additional 0.8 cm nonobstructing right lower pole renal   calculus, also seen on prior. Mild left hydronephrosis with intraluminal   hyperdensity raising a question of hemorrhagic products. No calculi are   seen within the ureters bilaterally.    BLADDER: Wayne catheter withintraluminal air. Heterogeneous hyperdense   material seen within the bladder, which may represent blood products.  REPRODUCTIVE ORGANS: Prostate within normal limits.    BOWEL: Colonic diverticulosis without evidence of diverticulitis. No   bowel obstruction. Appendix is normal.  PERITONEUM: No ascites.  VESSELS: Atherosclerotic changes.  RETROPERITONEUM/LYMPH NODES: No lymphadenopathy.  ABDOMINAL WALL: Within normal limits.  BONES: Retrolisthesis of L2 on L3. Degenerative changes of the spine.    IMPRESSION:  New groundglass opacities are seen within the left upper lobe, right   middle lobe and left lower lobe likely representing infection.    Mild left hydronephrosis without evidence of obstructing calculi.   Nonobstructing calculi are seen bilaterally within the kidneys.    Heterogeneous hyperdense material within the bladder and left renal   collecting system, which may represent hemorrhagic products.        --- End of Report ---          ADELAIDE DICKEY MD; Resident Radiologist  This document has been electronically signed.  KERA GUZMAN MD; Attending Radiologist  This document has been electronically signed. Mar 20 2022  3:42PM    < end of copied text >

## 2022-03-21 NOTE — PROGRESS NOTE ADULT - ASSESSMENT
_________________________________________________________________________________________  ========>>  M E D I C A L   A T T E N D I N G    F O L L O W  U P  N O T E  <<=========  -----------------------------------------------------------------------------------------------------    - Patient seen and examined by me earlier today.   - In summary,  KATHRYN MALIN is a 56y year old man admitted with hematuria..   - Patient today overall doing fairly, comfortable, eating OK.  upset and anxious about his clinical course      noted events and discussed with PA throughout the day .. appreciated all specialist follow up and mgmt     ==================>> REVIEW OF SYSTEM <<=================    GEN: no fever, no chills, as above   RESP: no SOB at rest, ++ occasional cough, mostly clear sputum ( only one time reported with streaks of blood)   CVS: no chest pain, no palpitations, no edema  GI: no abdominal pain, no nausea, + had normal brown stool today but ++ hematochezia reported yesterday   : ++ hematuria in foely bag,  some discomfort with cho   Neuro: no headache, no dizziness  Derm : no itching, no rash    ==================>> PHYSICAL EXAM <<=================    GEN: A&O X 3 , NAD , comfortable, pleasant, anxious   HEENT: NCAT, PERRL, MMM, hearing intact  Neck: supple , no JVD appreciated  CVS: S1S2 , regular , a bit tachy  PULM: CTA B/L,  limited exam as not taking deep breaths   ABD.: soft. non tender, non distended,  bowel sounds present  Extrem: intact pulses , no edema      + cho with red urine        left sided CVA tenderness improved   PSYCH : flat affect, somewhat anxious                             ( Note written / Date of service 03-21-22 )    ==================>> MEDICATIONS <<====================    allopurinol 300 milliGRAM(s) Oral daily  atovaquone  Suspension 750 milliGRAM(s) Oral two times a day  azithromycin  IVPB      azithromycin  IVPB 500 milliGRAM(s) IV Intermittent every 24 hours  calcium acetate 667 milliGRAM(s) Oral three times a day with meals  chlorhexidine 2% Cloths 1 Application(s) Topical daily  folic acid 1 milliGRAM(s) Oral daily  influenza   Vaccine 0.5 milliLiter(s) IntraMuscular once  levothyroxine 112 MICROGram(s) Oral daily  mupirocin 2% Ointment 1 Application(s) Both Nostrils two times a day  senna 2 Tablet(s) Oral at bedtime  sodium chloride 1 Gram(s) Oral three times a day  sodium chloride 0.9%. 1000 milliLiter(s) IV Continuous <Continuous>    MEDICATIONS  (PRN):  acetaminophen     Tablet .. 650 milliGRAM(s) Oral every 6 hours PRN Temp greater or equal to 38C (100.4F), Mild Pain (1 - 3)  aluminum hydroxide/magnesium hydroxide/simethicone Suspension 30 milliLiter(s) Oral every 4 hours PRN Dyspepsia  bisacodyl Suppository 10 milliGRAM(s) Rectal once PRN Constipation  guaiFENesin Oral Liquid (Sugar-Free) 100 milliGRAM(s) Oral every 6 hours PRN Cough  melatonin 3 milliGRAM(s) Oral at bedtime PRN Insomnia  ondansetron Injectable 4 milliGRAM(s) IV Push every 8 hours PRN Nausea and/or Vomiting  sodium chloride 0.65% Nasal 1 Spray(s) Both Nostrils four times a day PRN Nasal Congestion    ___________  Active diet:  Diet, Regular:   1200mL Fluid Restriction ([id number])  ___________________    ==================>> VITAL SIGNS <<==================    Vital Signs Last 24 HrsT(C): 37.4 (03-21-22 @ 10:44)  T(F): 99.3 (03-21-22 @ 10:44), Max: 101 (03-21-22 @ 06:39)  HR: 91 (03-21-22 @ 10:44) (70 - 104)  BP: 115/79 (03-21-22 @ 10:44)  RR: 18 (03-21-22 @ 15:54) (18 - 18)  SpO2: 95% (03-21-22 @ 15:54) (91% - 100%)      CAPILLARY BLOOD GLUCOSE         ==================>> LAB AND IMAGING <<==================                        6.8    1.90  )-----------( 159      ( 21 Mar 2022 03:44 )             20.8        03-21    132<L>  |  102  |  35<H>  ----------------------------<  82  4.2   |  20<L>  |  1.73<H>    Ca    7.9<L>      21 Mar 2022 03:44  Phos  4.9     03-21  Mg     1.80     03-21    TPro  6.8  /  Alb  2.5<L>  /  TBili  0.3  /  DBili  x   /  AST  65<H>  /  ALT  18  /  AlkPhos  60  03-21    WBC count:   1.90 <<== ,  2.32 <<== ,  2.08 <<== ,  3.18 <<== ,  3.35 <<== ,  2.29 <<==   Hemoglobin:   6.8 <<==,  6.6 <<==,  6.3 <<==,  7.4 <<==,  8.0 <<==,  8.0 <<==  platelets:  159 <==, 173 <==, 173 <==, 214 <==, 204 <==, 190 <==, 193 <==    Creatinine:  1.73  <<==, 1.71  <<==, 1.61  <<==, 1.78  <<==, 1.85  <<==, 2.23  <<==  Sodium:   132  <==, 127  <==, 126  <==, 127  <==, 136  <==, 137  <==, 134  <==       AST:          65 <== , 67 <== , 69 <== , 65 <==      ALT:        18  <== , 16  <== , 17  <== , 20  <==      AP:        60  <=, 64  <=, 77  <=, 87  <=     Bili:        0.3  <=, 0.3  <=, 0.3  <=, 0.3  <=    _______________________  C U L T U R E S :    Culture - Blood (collected 17 Mar 2022 22:54)  Source: .Blood Blood-Peripheral  Preliminary Report (18 Mar 2022 23:02):    No growth to date.    Culture - Blood (collected 17 Mar 2022 22:54)  Source: .Blood Blood-Venous  Preliminary Report (18 Mar 2022 23:02):    No growth to date.    Culture - Urine (collected 17 Mar 2022 18:50)  Source: Clean Catch Clean Catch (Midstream)  Final Report (19 Mar 2022 07:35):    No growth    HIV Result: HIV-1 Pos (03.19.22 @ 12:54) ( confirmatory test )  HIV-1 RNA Quantitative, Viral Load: [id number] (03.20.22 @ 11:59)    PENDNING BONE marrow Bx results      < from: CT Abdomen and Pelvis No Cont (03.20.22 @ 15:11) >  IMPRESSION:  New groundglass opacities are seen within the left upper lobe, right   middle lobe and left lower lobe likely representing infection.    Mild left hydronephrosis without evidence of obstructing calculi.   Nonobstructing calculi are seen bilaterally within the kidneys.    Heterogeneous hyperdense material within the bladder and left renal   collecting system, which may represent hemorrhagic products.  < end of copied text >    ___________________________________________________________________________________  ===============>>  A S S E S S M E N T   A N D   P L A N <<===============  ------------------------------------------------------------------------------------------    · Assessment	  56 yr old male presenting with urinary retention found to have sepsis 2/2 pyelonephritis     Problem/Plan - 1:  ·  Problem: sepsis, in setting of acute HIV / possible AIDS with suspect opportunistic infections / PCP / EBV..       concern for hemorrhagic cystitis / pyelonephritis /infected / impacted stone    workup in process   off Abx for now : monitoring   ID appreciated  to decide on HAART  on Mepron  started on Zithromax yesterday based on CT for possible atypical bacterial PNA ( could also be PCP or viral PNA)     Problem/Plan - 2:  ·  Problem: pt with past + GIANNA, + jammie +      pt also with h/o sinusitis, and now Centaurea      rule out autoimmune disorders ./ Trever's etc..      rheumatology appreciated          work up for vasculitis in process   Bone marrow Bx done >follow results   GIANNA, RA negative     Problem/Plan - 3:  ·  Problem: Anemia.   ·  Plan: Acute on chronic   BM biopsy > follow results   Hematology on board   montior  transfuse one unite today   check occult blood  >> not done yet !!    Problem/Plan - 4:  ·  Problem: urinary retention and obstructive Uropathy with JULIO   monitor BMP, I/O     crea improved with IVH > continue     **Hyponatremia     repeat labs ordered     pt with flank pain > suspect SIADH      for fluid restriction and increase PO diet discussed       nephrology on board, following     Problem/Plan - 5:  ·  Problem: Hypothyroidism post total thyroidectomy for cancer   Continue home levothyroxine.  Thyroid Stimulating Hormone, Serum: 4.06 uIU/mL (03.17.22 @ 18:46)      -GI/DVT Prophylaxis per protocol.    --------------------------------------------  Case discussed with pt, family at bedside, PA...   Education given on findings and plan of care    ___________________________  H. TAZ Edwards.  Pager: [phone]       _________________________________________________________________________________________  ========>>  M E D I C A L   A T T E N D I N G    F O L L O W  U P  N O T E  <<=========  -----------------------------------------------------------------------------------------------------    - Patient seen and examined by me earlier today.   - In summary,  KATHRYN MALIN is a 56y year old man admitted with hematuria..   - Patient today overall doing fairly, comfortable, eating OK.  upset and anxious about his clinical course      noted events and discussed with PA throughout the day .. appreciated all specialist follow up and mgmt     ==================>> REVIEW OF SYSTEM <<=================    GEN: no fever, no chills, as above   RESP: no SOB at rest, ++ occasional cough, mostly clear sputum ( only one time reported with streaks of blood)   CVS: no chest pain, no palpitations, no edema  GI: no abdominal pain, no nausea, + had normal brown stool today but ++ hematochezia reported yesterday   : ++ hematuria in foely bag,  some discomfort with cho   Neuro: no headache, no dizziness  Derm : no itching, no rash    ==================>> PHYSICAL EXAM <<=================    GEN: A&O X 3 , NAD , comfortable, pleasant, anxious   HEENT: NCAT, PERRL, MMM, hearing intact  Neck: supple , no JVD appreciated  CVS: S1S2 , regular , a bit tachy  PULM: CTA B/L,  limited exam as not taking deep breaths   ABD.: soft. non tender, non distended,  bowel sounds present  Extrem: intact pulses , no edema      + cho with red urine        left sided CVA tenderness improved   PSYCH : flat affect, somewhat anxious                             ( Note written / Date of service 03-21-22 )    ==================>> MEDICATIONS <<====================    allopurinol 300 milliGRAM(s) Oral daily  atovaquone  Suspension 750 milliGRAM(s) Oral two times a day  azithromycin  IVPB      azithromycin  IVPB 500 milliGRAM(s) IV Intermittent every 24 hours  calcium acetate 667 milliGRAM(s) Oral three times a day with meals  chlorhexidine 2% Cloths 1 Application(s) Topical daily  folic acid 1 milliGRAM(s) Oral daily  influenza   Vaccine 0.5 milliLiter(s) IntraMuscular once  levothyroxine 112 MICROGram(s) Oral daily  mupirocin 2% Ointment 1 Application(s) Both Nostrils two times a day  senna 2 Tablet(s) Oral at bedtime  sodium chloride 1 Gram(s) Oral three times a day  sodium chloride 0.9%. 1000 milliLiter(s) IV Continuous <Continuous>    MEDICATIONS  (PRN):  acetaminophen     Tablet .. 650 milliGRAM(s) Oral every 6 hours PRN Temp greater or equal to 38C (100.4F), Mild Pain (1 - 3)  aluminum hydroxide/magnesium hydroxide/simethicone Suspension 30 milliLiter(s) Oral every 4 hours PRN Dyspepsia  bisacodyl Suppository 10 milliGRAM(s) Rectal once PRN Constipation  guaiFENesin Oral Liquid (Sugar-Free) 100 milliGRAM(s) Oral every 6 hours PRN Cough  melatonin 3 milliGRAM(s) Oral at bedtime PRN Insomnia  ondansetron Injectable 4 milliGRAM(s) IV Push every 8 hours PRN Nausea and/or Vomiting  sodium chloride 0.65% Nasal 1 Spray(s) Both Nostrils four times a day PRN Nasal Congestion    ___________  Active diet:  Diet, Regular:   1200mL Fluid Restriction ([id number])  ___________________    ==================>> VITAL SIGNS <<==================    Vital Signs Last 24 HrsT(C): 37.4 (03-21-22 @ 10:44)  T(F): 99.3 (03-21-22 @ 10:44), Max: 101 (03-21-22 @ 06:39)  HR: 91 (03-21-22 @ 10:44) (70 - 104)  BP: 115/79 (03-21-22 @ 10:44)  RR: 18 (03-21-22 @ 15:54) (18 - 18)  SpO2: 95% (03-21-22 @ 15:54) (91% - 100%)      CAPILLARY BLOOD GLUCOSE         ==================>> LAB AND IMAGING <<==================                        6.8    1.90  )-----------( 159      ( 21 Mar 2022 03:44 )             20.8        03-21    132<L>  |  102  |  35<H>  ----------------------------<  82  4.2   |  20<L>  |  1.73<H>    Ca    7.9<L>      21 Mar 2022 03:44  Phos  4.9     03-21  Mg     1.80     03-21    TPro  6.8  /  Alb  2.5<L>  /  TBili  0.3  /  DBili  x   /  AST  65<H>  /  ALT  18  /  AlkPhos  60  03-21    WBC count:   1.90 <<== ,  2.32 <<== ,  2.08 <<== ,  3.18 <<== ,  3.35 <<== ,  2.29 <<==   Hemoglobin:   6.8 <<==,  6.6 <<==,  6.3 <<==,  7.4 <<==,  8.0 <<==,  8.0 <<==  platelets:  159 <==, 173 <==, 173 <==, 214 <==, 204 <==, 190 <==, 193 <==    Creatinine:  1.73  <<==, 1.71  <<==, 1.61  <<==, 1.78  <<==, 1.85  <<==, 2.23  <<==  Sodium:   132  <==, 127  <==, 126  <==, 127  <==, 136  <==, 137  <==, 134  <==       AST:          65 <== , 67 <== , 69 <== , 65 <==      ALT:        18  <== , 16  <== , 17  <== , 20  <==      AP:        60  <=, 64  <=, 77  <=, 87  <=     Bili:        0.3  <=, 0.3  <=, 0.3  <=, 0.3  <=    _______________________  C U L T U R E S :    Culture - Blood (collected 17 Mar 2022 22:54)  Source: .Blood Blood-Peripheral  Preliminary Report (18 Mar 2022 23:02):    No growth to date.    Culture - Blood (collected 17 Mar 2022 22:54)  Source: .Blood Blood-Venous  Preliminary Report (18 Mar 2022 23:02):    No growth to date.    Culture - Urine (collected 17 Mar 2022 18:50)  Source: Clean Catch Clean Catch (Midstream)  Final Report (19 Mar 2022 07:35):    No growth    HIV Result: HIV-1 Pos (03.19.22 @ 12:54) ( confirmatory test )  HIV-1 RNA Quantitative, Viral Load: [id number] (03.20.22 @ 11:59)    PENDNING BONE marrow Bx results      < from: CT Abdomen and Pelvis No Cont (03.20.22 @ 15:11) >  IMPRESSION:  New groundglass opacities are seen within the left upper lobe, right   middle lobe and left lower lobe likely representing infection.    Mild left hydronephrosis without evidence of obstructing calculi.   Nonobstructing calculi are seen bilaterally within the kidneys.    Heterogeneous hyperdense material within the bladder and left renal   collecting system, which may represent hemorrhagic products.  < end of copied text >    ___________________________________________________________________________________  ===============>>  A S S E S S M E N T   A N D   P L A N <<===============  ------------------------------------------------------------------------------------------    · Assessment	  56 yr old male presenting with urinary retention found to have sepsis 2/2 pyelonephritis     Problem/Plan - 1:  ·  Problem: sepsis, in setting of acute HIV / possible AIDS with suspect opportunistic infections / PCP / EBV..       concern for hemorrhagic cystitis / pyelonephritis /infected / impacted stone    workup in process   off Abx for now : monitoring   ID appreciated  to decide on HAART  on Mepron  started on Zithromax yesterday based on CT for possible atypical bacterial PNA ( could also be PCP or viral PNA)     Problem/Plan - 2:  ·  Problem: pt with past + GIANNA, + jammie +      pt also with h/o sinusitis, and now Centaurea      rule out autoimmune disorders ./ Trever's etc..      rheumatology appreciated          work up for vasculitis in process   Bone marrow Bx done >follow results   GIANNA, RA negative     Problem/Plan - 3:  ·  Problem: Anemia.   ·  Plan: Acute on chronic   BM biopsy > follow results   Hematology on board   montior  transfused second unite today   occult blood  + and + hematochezia last PM  GI consulted / appreciated      eventual scope before DC     Problem/Plan - 4:  ·  Problem: urinary retention and obstructive Uropathy with JULIO   monitor BMP, I/O     crea improved with IVH > continue     **Hyponatremia     repeat labs ordered     pt with flank pain > suspect SIADH      for fluid restriction and increase PO diet discussed       nephrology on board, following     Problem/Plan - 5:  ·  Problem: Hypothyroidism post total thyroidectomy for cancer   Continue home levothyroxine.  Thyroid Stimulating Hormone, Serum: 4.06 uIU/mL (03.17.22 @ 18:46)      -GI/DVT Prophylaxis per protocol.    --------------------------------------------  Case discussed with pt, family at bedside, PA...   Education given on findings and plan of care    ___________________________  H. TAZ Edwards.  Pager: [phone]

## 2022-03-21 NOTE — CONSULT NOTE ADULT - ATTENDING COMMENTS
pt seen and examined by me personally   agree with above  case d/w RUDY DIAZ As well  will follow   please call with questions or acute status changes

## 2022-03-21 NOTE — ADVANCED PRACTICE NURSE CONSULT - REASON FOR CONSULT
Patient seen on skin care rounds after wound care referral received for assessment of skin impairment and recommendations of topical management. Chart reviewed: WBC 1.90, H/H 6.8/20.8, Platelets 159, Serum albumin 2.5, US duplex (-)DVT, Ferdinand 18. Patient H/O of Papillary thyroid cancer s/p total thyroidectomy on synthroid 1/27/22, Gout on Allopurinol, presents to the ED with hematuria for the past 5 days, had painless hematuria and urinary retention then left flank pain, Febrile with leukopenia/ Anemia s/p bone marrow biopsy 3/18, (+) HIV.    Patient seen on skin care rounds after wound care referral received for assessment of skin impairment and recommendations of topical management. Chart reviewed: WBC 1.90, H/H 6.8/20.8, Platelets 159, Serum albumin 2.5, US duplex (-)DVT, Ferdinand 18. Patient H/O of Papillary thyroid cancer s/p total thyroidectomy on synthroid 1/27/22, Gout on Allopurinol, presents to the ED with hematuria for the past 5 days, had painless hematuria and urinary retention then left flank pain, Febrile with leukopenia/ Anemia s/p bone marrow biopsy 3/18 with interventional radiology, (+) HIV. Patient seen by Infectious disease for pyelonephritis, Hematology/Oncology for anemia, leukopenia, Urology for gross hematuria and Nephrology for CKD,JULIO, hyponatremia.        Patient seen on skin care rounds after wound care referral received for assessment of skin impairment and recommendations of topical management. Chart reviewed: WBC 1.90, H/H 6.8/20.8, Platelets 159, Serum albumin 2.5, US duplex (-)DVT, Ferdinand 18. Patient H/O of Papillary thyroid cancer s/p total thyroidectomy on synthroid 1/27/22, Gout on Allopurinol, presents to the ED with hematuria for the past 5 days, had painless hematuria and urinary retention then left flank pain, Febrile with leukopenia/ Anemia s/p bone marrow biopsy 3/18 with interventional radiology, (+) HIV. Patient seen by Infectious disease for pyelonephritis, Hematology/Oncology for anemia, leukopenia, Urology for gross hematuria and Nephrology for CKD,JULIO, hyponatremia.

## 2022-03-21 NOTE — CHART NOTE - NSCHARTNOTEFT_GEN_A_CORE
Per RN, blood clots irrigated from cho, patient with distended bladder. Cho changed by RN with emptying of bladder. Cho tubing noted to have cherry red urine. Provider contacted urology to reassess patient for possible need for CBI. Will follow recs.

## 2022-03-21 NOTE — PROGRESS NOTE ADULT - ASSESSMENT
56 yr old male with a pmh of Thyroid Ca (s/p surgery on synthroid), Gout on Allopurinol, presents to the ED with hematuria for the past 5 days - went to [facility] with negative imaging and was sent home with abx- today developed left flank pain and urinary rentention.   Patient has had fatigue and unintentional weight loss over the past few months. Per ED note "seen by Dr. 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 Spoke with Dr. Muhammad, states that pt had cervical LAD not long ago with negative biopsies. Dr. Muhammad sent pt to hospital to get bone marrow bx   poke with Dr. Felix with IR, states he can get bm bx on 3/18 and to admit pt to Dr. Edwards)"  Denies  headache, dizziness, chest pain, palpitations, SOB, joint pain, diarrhea/constipation  Vitals in the ED: T 101, , /85, RR 23 satting 97% RA (17 Mar 2022 23:17)      ACUTE RENAL FAILURE:   Serum creatinine is improving   There is no progression . No uremic symptoms  No evidence of anemia .  Fluid status stable.  Will continue to avoid nephrotoxic drugs.  Patient remains asymptomatic.   Continue current therapy.  hold  diuretic.  hold   ACE inhibitor.  hold   ARB.  Additional evaluation:   ECG,    echocardiogram,     CXR,  will obtained recent   renal ultrasound to evalaute kidney size and possible stones , if cr is not improving     hyponatremia will check ua , urine osmolality , urine sodium , urine uric acid , serum sodium , serum osmolality , serum uric acid , f/u with hyponatremia work up , f/u with bmp , monitor i and o

## 2022-03-21 NOTE — CONSULT NOTE ADULT - SUBJECTIVE AND OBJECTIVE BOX
KATHRYN GALVANPETRA  [id number]    HISTORY OF PRESENT ILLNESS:    56 yr old male with a pmh of Thyroid Ca (s/p surgery on synthroid), Gout on Allopurinol, presents to the ED with hematuria for the past 5 days - went to [facility] with negative imaging and was sent home with abx- today developed left flank pain and urinary rentention.   Patient has had fatigue and unintentional weight loss over the past few months. Per ED note "seen by Dr. 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 Dr. Muhammad sent pt to hospital to get bone marrow bx (spoke with Dr. Felix with IR, states he can get bm bx on 3/18 and to admit pt to Dr. Edwards)"  On admission found to have SIRS criteria positive, leukopenia and anemia (lysis labs negative), JULIO on CKD, +UA with hematuria, pyruria, proteinuria, prelim+ HIV, CT A/P capturing GGO in the lungs b/l and also with blood products in collecting system, hydronephrosis.   s/p BMBX  ID, Heme/Onc, and urology following.     Vasculitis can present with constitutional symptoms, diffuse LAD, pulmonary + renal involvement, cytopenias. Other etiologies such as infectious and malignant must also be considered, especially given prelim HIV (?opportunistic infection), comment of EBV+ cells on LN biopsy and recent thyroid CA dx. Reported positive GIANNA 1:640 per ID note, however repeat this admission has GIANNA negative.     ANCA, MPO, PR3, cryoglobulins, ferritin, lipid panel, EBV serologies     quant gold indeterminate 3/20  repeat pending  will likely need PPD       PAST MEDICAL & SURGICAL HISTORY:  Deviated septum    Gout    Malignant neoplasm of thyroid gland    S/P ORIF (open reduction internal fixation) fracture  right femur     S/P ACL surgery  Right; 2016    S/P hardware removal  right femur     H/O umbilical hernia repair    History of nasal septoplasty  2020    History of tonsillectomy  as a child        Review of Systems:  Gen:  No fevers/chills, weight loss  HEENT: No blurry vision, no difficulty swallowing, no oral or nasal ulcers  CVS: No chest pain/palpitations  Resp: No SOB/wheezing  GI: No N/V/C/D/abdominal pain  MSK:  Skin: No new rashes  Neuro: No headaches    MEDICATIONS  (STANDING):  allopurinol 300 milliGRAM(s) Oral daily  azithromycin  IVPB      azithromycin  IVPB 500 milliGRAM(s) IV Intermittent every 24 hours  calcium acetate 667 milliGRAM(s) Oral three times a day with meals  chlorhexidine 2% Cloths 1 Application(s) Topical daily  folic acid 1 milliGRAM(s) Oral daily  influenza   Vaccine 0.5 milliLiter(s) IntraMuscular once  levothyroxine 112 MICROGram(s) Oral daily  mupirocin 2% Ointment 1 Application(s) Both Nostrils two times a day  senna 2 Tablet(s) Oral at bedtime  sodium chloride 1 Gram(s) Oral three times a day  sodium chloride 0.9%. 1000 milliLiter(s) (75 mL/Hr) IV Continuous <Continuous>    MEDICATIONS  (PRN):  acetaminophen     Tablet .. 650 milliGRAM(s) Oral every 6 hours PRN Temp greater or equal to 38C (100.4F), Mild Pain (1 - 3)  aluminum hydroxide/magnesium hydroxide/simethicone Suspension 30 milliLiter(s) Oral every 4 hours PRN Dyspepsia  bisacodyl Suppository 10 milliGRAM(s) Rectal once PRN Constipation  guaiFENesin Oral Liquid (Sugar-Free) 100 milliGRAM(s) Oral every 6 hours PRN Cough  melatonin 3 milliGRAM(s) Oral at bedtime PRN Insomnia  ondansetron Injectable 4 milliGRAM(s) IV Push every 8 hours PRN Nausea and/or Vomiting  sodium chloride 0.65% Nasal 1 Spray(s) Both Nostrils four times a day PRN Nasal Congestion      Allergies    No Known Allergies    Intolerances        PERTINENT MEDICATION HISTORY:    SOCIAL HISTORY:  OCCUPATION:  TRAVEL HISTORY:    FAMILY HISTORY:  No pertinent family history in first degree relatives        Vital Signs Last 24 Hrs  T(C): 37.4 (21 Mar 2022 10:44), Max: 39.2 (20 Mar 2022 15:50)  T(F): 99.3 (21 Mar 2022 10:44), Max: 102.5 (20 Mar 2022 15:50)  HR: 91 (21 Mar 2022 10:44) (70 - 104)  BP: 115/79 (21 Mar 2022 10:44) (99/59 - 125/83)  BP(mean): --  RR: 18 (21 Mar 2022 10:44) (18 - 18)  SpO2: 91% (21 Mar 2022 10:44) (91% - 100%)    Physical Exam:  General: No apparent distress  HEENT: EOMI, MMM  CVS: +S1/S2, RRR, no murmurs/rubs/gallops  Resp: CTA b/l. No crackles/wheezing  GI: Soft, NT/ND +BS  MSK:  Neuro: AAOx3  Skin: no visible rashes    LABS:                        6.8    1.90  )-----------( 159      ( 21 Mar 2022 03:44 )             20.8     03-21    132<L>  |  102  |  35<H>  ----------------------------<  82  4.2   |  20<L>  |  1.73<H>    Ca    7.9<L>      21 Mar 2022 03:44  Phos  4.9     03-21  Mg     1.80     03-21    TPro  6.8  /  Alb  2.5<L>  /  TBili  0.3  /  DBili  x   /  AST  65<H>  /  ALT  18  /  AlkPhos  60  03-21      Urinalysis Basic - ( 20 Mar 2022 21:57 )    Color: Red / Appearance: Slightly Turbid / S.008 / pH: x  Gluc: x / Ketone: Negative  / Bili: Negative / Urobili: <2 mg/dL   Blood: x / Protein: 100 mg/dL / Nitrite: Negative   Leuk Esterase: Moderate / RBC: >50 /HPF / WBC 4 /HPF   Sq Epi: x / Non Sq Epi: 1 /HPF / Bacteria: Few        RADIOLOGY & ADDITIONAL STUDIES:     KATHRYN DEA  [id number]    HISTORY OF PRESENT ILLNESS:    56 yr old male with a pmh of Thyroid Ca (s/p surgery on synthroid), Gout on Allopurinol, presents to the ED with hematuria for the past 5 days - went to [facility] with negative imaging and was sent home with abx- today developed left flank pain and urinary rentention.   Patient has had fatigue and unintentional weight loss over the past few months. Per ED note "seen by Dr. 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 Dr. Muhammad sent pt to hospital to get bone marrow bx (spoke with Dr. Felix with IR, states he can get bm bx on 3/18 and to admit pt to Dr. Edwards   s/p BMBX  ID, Heme/Onc, and urology following.       PAST MEDICAL & SURGICAL HISTORY:  Deviated septum    Gout    Malignant neoplasm of thyroid gland    S/P ORIF (open reduction internal fixation) fracture  right femur     S/P ACL surgery  Right;     S/P hardware removal  right femur     H/O umbilical hernia repair    History of nasal septoplasty  2020    History of tonsillectomy  as a child    Review of Systems:  Gen:  No fevers/chills, weight loss  HEENT: No blurry vision, no difficulty swallowing, no oral or nasal ulcers  CVS: No chest pain/palpitations  Resp: No SOB/wheezing  GI: No N/V/C/D/abdominal pain  MSK:  Skin: No new rashes  Neuro: No headaches    MEDICATIONS  (STANDING):  allopurinol 300 milliGRAM(s) Oral daily  azithromycin  IVPB      azithromycin  IVPB 500 milliGRAM(s) IV Intermittent every 24 hours  calcium acetate 667 milliGRAM(s) Oral three times a day with meals  chlorhexidine 2% Cloths 1 Application(s) Topical daily  folic acid 1 milliGRAM(s) Oral daily  influenza   Vaccine 0.5 milliLiter(s) IntraMuscular once  levothyroxine 112 MICROGram(s) Oral daily  mupirocin 2% Ointment 1 Application(s) Both Nostrils two times a day  senna 2 Tablet(s) Oral at bedtime  sodium chloride 1 Gram(s) Oral three times a day  sodium chloride 0.9%. 1000 milliLiter(s) (75 mL/Hr) IV Continuous <Continuous>    MEDICATIONS  (PRN):  acetaminophen     Tablet .. 650 milliGRAM(s) Oral every 6 hours PRN Temp greater or equal to 38C (100.4F), Mild Pain (1 - 3)  aluminum hydroxide/magnesium hydroxide/simethicone Suspension 30 milliLiter(s) Oral every 4 hours PRN Dyspepsia  bisacodyl Suppository 10 milliGRAM(s) Rectal once PRN Constipation  guaiFENesin Oral Liquid (Sugar-Free) 100 milliGRAM(s) Oral every 6 hours PRN Cough  melatonin 3 milliGRAM(s) Oral at bedtime PRN Insomnia  ondansetron Injectable 4 milliGRAM(s) IV Push every 8 hours PRN Nausea and/or Vomiting  sodium chloride 0.65% Nasal 1 Spray(s) Both Nostrils four times a day PRN Nasal Congestion      Allergies    No Known Allergies    Intolerances        PERTINENT MEDICATION HISTORY:    SOCIAL HISTORY:  OCCUPATION:  TRAVEL HISTORY:    FAMILY HISTORY:  No pertinent family history in first degree relatives      Vital Signs Last 24 Hrs  T(C): 37.4 (21 Mar 2022 10:44), Max: 39.2 (20 Mar 2022 15:50)  T(F): 99.3 (21 Mar 2022 10:44), Max: 102.5 (20 Mar 2022 15:50)  HR: 91 (21 Mar 2022 10:44) (70 - 104)  BP: 115/79 (21 Mar 2022 10:44) (99/59 - 125/83)  BP(mean): --  RR: 18 (21 Mar 2022 10:44) (18 - 18)  SpO2: 91% (21 Mar 2022 10:44) (91% - 100%)    Physical Exam:  General: No apparent distress  HEENT: EOMI, MMM  CVS: +S1/S2, RRR, no murmurs/rubs/gallops  Resp: CTA b/l. No crackles/wheezing  GI: Soft, NT/ND +BS  MSK:  Neuro: AAOx3  Skin: no visible rashes    LABS:                        6.8    1.90  )-----------( 159      ( 21 Mar 2022 03:44 )             20.8     03-21    132<L>  |  102  |  35<H>  ----------------------------<  82  4.2   |  20<L>  |  1.73<H>    Ca    7.9<L>      21 Mar 2022 03:44  Phos  4.9     -  Mg     1.80     -    TPro  6.8  /  Alb  2.5<L>  /  TBili  0.3  /  DBili  x   /  AST  65<H>  /  ALT  18  /  AlkPhos  60  -    Urinalysis Basic - ( 20 Mar 2022 21:57 )  Color: Red / Appearance: Slightly Turbid / S.008 / pH: x  Gluc: x / Ketone: Negative  / Bili: Negative / Urobili: <2 mg/dL   Blood: x / Protein: 100 mg/dL / Nitrite: Negative   Leuk Esterase: Moderate / RBC: >50 /HPF / WBC 4 /HPF   Sq Epi: x / Non Sq Epi: 1 /HPF / Bacteria: Few    RADIOLOGY & ADDITIONAL STUDIES:  < from: CT Abdomen and Pelvis No Cont (22 @ 15:11) >  LOWER CHEST: Groundglass opacities are partially visualized within the   left upper lobe and right middle lobe. Additional groundglass opacity   within the left lower lobe. These appear new from 2022.    LIVER: Within normal limits.  BILE DUCTS: Normal caliber.  GALLBLADDER: Within normal limits.  SPLEEN: Within normal limits.  PANCREAS: Within normal limits.  ADRENALS: Within normal limits.  KIDNEYS/URETERS: 0.9 cm nonobstructing left lower pole calculus, as seen   on prior study. Additional 0.8 cm nonobstructing right lower pole renal   calculus, also seen on prior. Mild left hydronephrosis with intraluminal   hyperdensity raising a question of hemorrhagic products. No calculi are   seen within the ureters bilaterally.    BLADDER: Wayne catheter withintraluminal air. Heterogeneous hyperdense   material seen within the bladder, which may represent blood products.  REPRODUCTIVE ORGANS: Prostate within normal limits.    BOWEL: Colonic diverticulosis without evidence of diverticulitis. No   bowel obstruction. Appendix is normal.  PERITONEUM: No ascites.  VESSELS: Atherosclerotic changes.  RETROPERITONEUM/LYMPH NODES: No lymphadenopathy.  ABDOMINAL WALL: Within normal limits.  BONES: Retrolisthesis of L2 on L3. Degenerative changes of the spine.    IMPRESSION:  New groundglass opacities are seen within the left upper lobe, right   middle lobe and left lower lobe likely representing infection.    Mild left hydronephrosis without evidence of obstructing calculi.   Nonobstructing calculi are seen bilaterally within the kidneys.    Heterogeneous hyperdense material within the bladder and left renal   collecting system, which may represent hemorrhagic products.    < end of copied text >     KATHRYN DEA  [id number]    HISTORY OF PRESENT ILLNESS:  56 yr old male with a pmh of Thyroid Ca (s/p surgery on synthroid), Gout on Allopurinol, presents to the ED with hematuria for the past 5 days - went to [facility] with negative imaging and was sent home with abx- today developed left flank pain and urinary rentention.   Patient has had fatigue and unintentional weight loss over the past few months. Per ED note "seen by Dr. 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 Dr. Muhammad sent pt to hospital to get bone marrow bx (spoke with Dr. Felix with IR, states he can get bm bx on 3/18 and to admit pt to Dr. Edwards   s/p BMBX    pt denies previous history of hematuria, coughed up blood tinge sputum earlier this morning  denies joint swelling/pain/erythema, nasal congestion or recurrent sinus infection, hx of Asthma   no sensory loss in extremities, no new skin lesions       PAST MEDICAL & SURGICAL HISTORY:  Deviated septum    Gout    Malignant neoplasm of thyroid gland    S/P ORIF (open reduction internal fixation) fracture  right femur     S/P ACL surgery  Right; 2016    S/P hardware removal  right femur     H/O umbilical hernia repair    History of nasal septoplasty  2020    History of tonsillectomy  as a child    Review of Systems:  Gen:  No fevers/chills, weight loss  HEENT: No blurry vision, no difficulty swallowing, no oral or nasal ulcers  CVS: No chest pain/palpitations  Resp: No SOB/wheezing  GI: No N/V/C/D/abdominal pain  MSK: see HPI   Skin: No new rashes  Neuro: No headaches    MEDICATIONS  (STANDING):  allopurinol 300 milliGRAM(s) Oral daily  azithromycin  IVPB      azithromycin  IVPB 500 milliGRAM(s) IV Intermittent every 24 hours  calcium acetate 667 milliGRAM(s) Oral three times a day with meals  chlorhexidine 2% Cloths 1 Application(s) Topical daily  folic acid 1 milliGRAM(s) Oral daily  influenza   Vaccine 0.5 milliLiter(s) IntraMuscular once  levothyroxine 112 MICROGram(s) Oral daily  mupirocin 2% Ointment 1 Application(s) Both Nostrils two times a day  senna 2 Tablet(s) Oral at bedtime  sodium chloride 1 Gram(s) Oral three times a day  sodium chloride 0.9%. 1000 milliLiter(s) (75 mL/Hr) IV Continuous <Continuous>    MEDICATIONS  (PRN):  acetaminophen     Tablet .. 650 milliGRAM(s) Oral every 6 hours PRN Temp greater or equal to 38C (100.4F), Mild Pain (1 - 3)  aluminum hydroxide/magnesium hydroxide/simethicone Suspension 30 milliLiter(s) Oral every 4 hours PRN Dyspepsia  bisacodyl Suppository 10 milliGRAM(s) Rectal once PRN Constipation  guaiFENesin Oral Liquid (Sugar-Free) 100 milliGRAM(s) Oral every 6 hours PRN Cough  melatonin 3 milliGRAM(s) Oral at bedtime PRN Insomnia  ondansetron Injectable 4 milliGRAM(s) IV Push every 8 hours PRN Nausea and/or Vomiting  sodium chloride 0.65% Nasal 1 Spray(s) Both Nostrils four times a day PRN Nasal Congestion      Allergies    No Known Allergies    Intolerances        PERTINENT MEDICATION HISTORY:    SOCIAL HISTORY:  OCCUPATION:  TRAVEL HISTORY:    FAMILY HISTORY:  No pertinent family history in first degree relatives      Vital Signs Last 24 Hrs  T(C): 37.4 (21 Mar 2022 10:44), Max: 39.2 (20 Mar 2022 15:50)  T(F): 99.3 (21 Mar 2022 10:44), Max: 102.5 (20 Mar 2022 15:50)  HR: 91 (21 Mar 2022 10:44) (70 - 104)  BP: 115/79 (21 Mar 2022 10:44) (99/59 - 125/83)  BP(mean): --  RR: 18 (21 Mar 2022 10:44) (18 - 18)  SpO2: 91% (21 Mar 2022 10:44) (91% - 100%)    Physical Exam:  General: NAD on room air   HEENT: EOMI, MMM  CVS: +S1/S2, RRR,  Resp: decreased breath sound b/l, + crackles   GI: Soft, NT/ND +BS  MSK: no synovitis, joints NTP, ROM wnl   : + gross hematuria in cho bag   Neuro: AAOx3  Skin: no visible rashes    LABS:                        6.8    1.90  )-----------( 159      ( 21 Mar 2022 03:44 )             20.8     03-21    132<L>  |  102  |  35<H>  ----------------------------<  82  4.2   |  20<L>  |  1.73<H>    Ca    7.9<L>      21 Mar 2022 03:44  Phos  4.9       Mg     1.80         TPro  6.8  /  Alb  2.5<L>  /  TBili  0.3  /  DBili  x   /  AST  65<H>  /  ALT  18  /  AlkPhos  60      Urinalysis Basic - ( 20 Mar 2022 21:57 )  Color: Red / Appearance: Slightly Turbid / S.008 / pH: x  Gluc: x / Ketone: Negative  / Bili: Negative / Urobili: <2 mg/dL   Blood: x / Protein: 100 mg/dL / Nitrite: Negative   Leuk Esterase: Moderate / RBC: >50 /HPF / WBC 4 /HPF   Sq Epi: x / Non Sq Epi: 1 /HPF / Bacteria: Few    RADIOLOGY & ADDITIONAL STUDIES:  < from: CT Abdomen and Pelvis No Cont (22 @ 15:11) >  LOWER CHEST: Groundglass opacities are partially visualized within the   left upper lobe and right middle lobe. Additional groundglass opacity   within the left lower lobe. These appear new from 2022.    LIVER: Within normal limits.  BILE DUCTS: Normal caliber.  GALLBLADDER: Within normal limits.  SPLEEN: Within normal limits.  PANCREAS: Within normal limits.  ADRENALS: Within normal limits.  KIDNEYS/URETERS: 0.9 cm nonobstructing left lower pole calculus, as seen   on prior study. Additional 0.8 cm nonobstructing right lower pole renal   calculus, also seen on prior. Mild left hydronephrosis with intraluminal   hyperdensity raising a question of hemorrhagic products. No calculi are   seen within the ureters bilaterally.    BLADDER: Cho catheter withintraluminal air. Heterogeneous hyperdense   material seen within the bladder, which may represent blood products.  REPRODUCTIVE ORGANS: Prostate within normal limits.    BOWEL: Colonic diverticulosis without evidence of diverticulitis. No   bowel obstruction. Appendix is normal.  PERITONEUM: No ascites.  VESSELS: Atherosclerotic changes.  RETROPERITONEUM/LYMPH NODES: No lymphadenopathy.  ABDOMINAL WALL: Within normal limits.  BONES: Retrolisthesis of L2 on L3. Degenerative changes of the spine.    IMPRESSION:  New groundglass opacities are seen within the left upper lobe, right   middle lobe and left lower lobe likely representing infection.    Mild left hydronephrosis without evidence of obstructing calculi.   Nonobstructing calculi are seen bilaterally within the kidneys.    Heterogeneous hyperdense material within the bladder and left renal   collecting system, which may represent hemorrhagic products.    < end of copied text >

## 2022-03-22 ENCOUNTER — APPOINTMENT (OUTPATIENT)
Dept: OTOLARYNGOLOGY | Facility: CLINIC | Age: 57
End: 2022-03-22

## 2022-03-22 LAB
4/8 RATIO: 0.03 RATIO — LOW (ref 0.9–3.6)
ABS CD8: 522 /UL — SIGNIFICANT CHANGE UP (ref 142–740)
ALBUMIN SERPL ELPH-MCNC: 2.6 G/DL — LOW (ref 3.3–5)
ALP SERPL-CCNC: 63 U/L — SIGNIFICANT CHANGE UP (ref 40–120)
ALT FLD-CCNC: 16 U/L — SIGNIFICANT CHANGE UP (ref 4–41)
ANION GAP SERPL CALC-SCNC: 11 MMOL/L — SIGNIFICANT CHANGE UP (ref 7–14)
AST SERPL-CCNC: 56 U/L — HIGH (ref 4–40)
AUTO DIFF PNL BLD: NEGATIVE — SIGNIFICANT CHANGE UP
B19V DNA FLD QL NAA+PROBE: SIGNIFICANT CHANGE UP IU/ML
BASOPHILS # BLD AUTO: 0.01 K/UL — SIGNIFICANT CHANGE UP (ref 0–0.2)
BASOPHILS NFR BLD AUTO: 0.3 % — SIGNIFICANT CHANGE UP (ref 0–2)
BILIRUB SERPL-MCNC: 0.3 MG/DL — SIGNIFICANT CHANGE UP (ref 0.2–1.2)
BUN SERPL-MCNC: 31 MG/DL — HIGH (ref 7–23)
C-ANCA SER-ACNC: NEGATIVE — SIGNIFICANT CHANGE UP
CALCIUM SERPL-MCNC: 8 MG/DL — LOW (ref 8.4–10.5)
CD3 BLASTS SPEC-ACNC: 556 /UL — LOW (ref 672–1870)
CD3 BLASTS SPEC-ACNC: 81 % — SIGNIFICANT CHANGE UP (ref 59–83)
CD4 %: 3 % — LOW (ref 30–62)
CD8 %: 76 % — HIGH (ref 12–36)
CHLORIDE SERPL-SCNC: 99 MMOL/L — SIGNIFICANT CHANGE UP (ref 98–107)
CO2 SERPL-SCNC: 18 MMOL/L — LOW (ref 22–31)
COMPLIMENT ALTERNATIVE PATHWAY (AH50) FUNCTIONAL: >110 — SIGNIFICANT CHANGE UP
CREAT SERPL-MCNC: 1.73 MG/DL — HIGH (ref 0.5–1.3)
CULTURE RESULTS: SIGNIFICANT CHANGE UP
CULTURE RESULTS: SIGNIFICANT CHANGE UP
EGFR: 45 ML/MIN/1.73M2 — LOW
EOSINOPHIL # BLD AUTO: 0.08 K/UL — SIGNIFICANT CHANGE UP (ref 0–0.5)
EOSINOPHIL NFR BLD AUTO: 2.5 % — SIGNIFICANT CHANGE UP (ref 0–6)
GAMMA INTERFERON BACKGROUND BLD IA-ACNC: 0.1 IU/ML — SIGNIFICANT CHANGE UP
GLUCOSE SERPL-MCNC: 99 MG/DL — SIGNIFICANT CHANGE UP (ref 70–99)
HCT VFR BLD CALC: 20.3 % — CRITICAL LOW (ref 39–50)
HCT VFR BLD CALC: 21.1 % — LOW (ref 39–50)
HGB BLD-MCNC: 6.8 G/DL — CRITICAL LOW (ref 13–17)
HGB BLD-MCNC: 7.1 G/DL — LOW (ref 13–17)
IANC: 2.15 K/UL — SIGNIFICANT CHANGE UP (ref 1.5–8.5)
IMM GRANULOCYTES NFR BLD AUTO: 0.9 % — SIGNIFICANT CHANGE UP (ref 0–1.5)
LYMPHOCYTES # BLD AUTO: 0.78 K/UL — LOW (ref 1–3.3)
LYMPHOCYTES # BLD AUTO: 24.1 % — SIGNIFICANT CHANGE UP (ref 13–44)
M TB IFN-G BLD-IMP: ABNORMAL
M TB IFN-G CD4+ BCKGRND COR BLD-ACNC: 0.01 IU/ML — SIGNIFICANT CHANGE UP
M TB IFN-G CD4+CD8+ BCKGRND COR BLD-ACNC: 0.01 IU/ML — SIGNIFICANT CHANGE UP
MCHC RBC-ENTMCNC: 28 PG — SIGNIFICANT CHANGE UP (ref 27–34)
MCHC RBC-ENTMCNC: 28.5 PG — SIGNIFICANT CHANGE UP (ref 27–34)
MCHC RBC-ENTMCNC: 33.5 GM/DL — SIGNIFICANT CHANGE UP (ref 32–36)
MCHC RBC-ENTMCNC: 33.6 GM/DL — SIGNIFICANT CHANGE UP (ref 32–36)
MCV RBC AUTO: 83.1 FL — SIGNIFICANT CHANGE UP (ref 80–100)
MCV RBC AUTO: 84.9 FL — SIGNIFICANT CHANGE UP (ref 80–100)
MONOCYTES # BLD AUTO: 0.19 K/UL — SIGNIFICANT CHANGE UP (ref 0–0.9)
MONOCYTES NFR BLD AUTO: 5.9 % — SIGNIFICANT CHANGE UP (ref 2–14)
MYELOPEROXIDASE AB SER-ACNC: <5 UNITS — SIGNIFICANT CHANGE UP
MYELOPEROXIDASE CELLS FLD QL: NEGATIVE — SIGNIFICANT CHANGE UP
NEUTROPHILS # BLD AUTO: 2.15 K/UL — SIGNIFICANT CHANGE UP (ref 1.8–7.4)
NEUTROPHILS NFR BLD AUTO: 66.3 % — SIGNIFICANT CHANGE UP (ref 43–77)
NRBC # BLD: 0 /100 WBCS — SIGNIFICANT CHANGE UP
NRBC # BLD: 0 /100 WBCS — SIGNIFICANT CHANGE UP
NRBC # FLD: 0 K/UL — SIGNIFICANT CHANGE UP
NRBC # FLD: 0 K/UL — SIGNIFICANT CHANGE UP
O2 CT VFR BLD CALC: ABNORMAL
OSMOLALITY UR: 357 MOSM/KG — SIGNIFICANT CHANGE UP (ref 50–1200)
P-ANCA SER-ACNC: POSITIVE
PLATELET # BLD AUTO: 189 K/UL — SIGNIFICANT CHANGE UP (ref 150–400)
PLATELET # BLD AUTO: 193 K/UL — SIGNIFICANT CHANGE UP (ref 150–400)
POTASSIUM SERPL-MCNC: 3.8 MMOL/L — SIGNIFICANT CHANGE UP (ref 3.5–5.3)
POTASSIUM SERPL-SCNC: 3.8 MMOL/L — SIGNIFICANT CHANGE UP (ref 3.5–5.3)
PROT SERPL-MCNC: 7.1 G/DL — SIGNIFICANT CHANGE UP (ref 6–8.3)
PROTEINASE3 AB FLD-ACNC: 9.6 UNITS — SIGNIFICANT CHANGE UP
PROTEINASE3 AB SER-ACNC: NEGATIVE — SIGNIFICANT CHANGE UP
QUANT TB PLUS MITOGEN MINUS NIL: 0.08 IU/ML — SIGNIFICANT CHANGE UP
RBC # BLD: 2.39 M/UL — LOW (ref 4.2–5.8)
RBC # BLD: 2.54 M/UL — LOW (ref 4.2–5.8)
RBC # FLD: 16.8 % — HIGH (ref 10.3–14.5)
RBC # FLD: 16.8 % — HIGH (ref 10.3–14.5)
SODIUM SERPL-SCNC: 128 MMOL/L — LOW (ref 135–145)
SODIUM UR-SCNC: 81 MMOL/L — SIGNIFICANT CHANGE UP
SPECIMEN SOURCE: SIGNIFICANT CHANGE UP
SPECIMEN SOURCE: SIGNIFICANT CHANGE UP
T-CELL CD4 SUBSET PNL BLD: 18 /UL — LOW (ref 489–1457)
URATE UR-MCNC: 20.5 MG/DL — SIGNIFICANT CHANGE UP
WBC # BLD: 3.24 K/UL — LOW (ref 3.8–10.5)
WBC # BLD: 3.81 K/UL — SIGNIFICANT CHANGE UP (ref 3.8–10.5)
WBC # FLD AUTO: 3.24 K/UL — LOW (ref 3.8–10.5)
WBC # FLD AUTO: 3.81 K/UL — SIGNIFICANT CHANGE UP (ref 3.8–10.5)

## 2022-03-22 PROCEDURE — 99233 SBSQ HOSP IP/OBS HIGH 50: CPT

## 2022-03-22 RX ORDER — OXYCODONE HYDROCHLORIDE 5 MG/1
5 TABLET ORAL EVERY 6 HOURS
Refills: 0 | Status: DISCONTINUED | OUTPATIENT
Start: 2022-03-22 | End: 2022-03-24

## 2022-03-22 RX ORDER — SODIUM CHLORIDE 9 MG/ML
1 INJECTION INTRAMUSCULAR; INTRAVENOUS; SUBCUTANEOUS THREE TIMES A DAY
Refills: 0 | Status: DISCONTINUED | OUTPATIENT
Start: 2022-03-23 | End: 2022-03-30

## 2022-03-22 RX ORDER — PANTOPRAZOLE SODIUM 20 MG/1
40 TABLET, DELAYED RELEASE ORAL
Refills: 0 | Status: DISCONTINUED | OUTPATIENT
Start: 2022-03-22 | End: 2022-03-30

## 2022-03-22 RX ORDER — BICTEGRAVIR SODIUM, EMTRICITABINE, AND TENOFOVIR ALAFENAMIDE FUMARATE 30; 120; 15 MG/1; MG/1; MG/1
1 TABLET ORAL DAILY
Refills: 0 | Status: DISCONTINUED | OUTPATIENT
Start: 2022-03-22 | End: 2022-03-30

## 2022-03-22 RX ORDER — OXYCODONE HYDROCHLORIDE 5 MG/1
5 TABLET ORAL ONCE
Refills: 0 | Status: DISCONTINUED | OUTPATIENT
Start: 2022-03-22 | End: 2022-03-22

## 2022-03-22 RX ORDER — SODIUM CHLORIDE 9 MG/ML
2 INJECTION INTRAMUSCULAR; INTRAVENOUS; SUBCUTANEOUS ONCE
Refills: 0 | Status: DISCONTINUED | OUTPATIENT
Start: 2022-03-22 | End: 2022-03-23

## 2022-03-22 RX ORDER — OXYBUTYNIN CHLORIDE 5 MG
5 TABLET ORAL
Refills: 0 | Status: DISCONTINUED | OUTPATIENT
Start: 2022-03-22 | End: 2022-03-30

## 2022-03-22 RX ADMIN — PANTOPRAZOLE SODIUM 40 MILLIGRAM(S): 20 TABLET, DELAYED RELEASE ORAL at 18:54

## 2022-03-22 RX ADMIN — Medication 1 MILLIGRAM(S): at 11:25

## 2022-03-22 RX ADMIN — OXYCODONE HYDROCHLORIDE 5 MILLIGRAM(S): 5 TABLET ORAL at 11:25

## 2022-03-22 RX ADMIN — CHLORHEXIDINE GLUCONATE 1 APPLICATION(S): 213 SOLUTION TOPICAL at 12:03

## 2022-03-22 RX ADMIN — Medication 300 MILLIGRAM(S): at 11:25

## 2022-03-22 RX ADMIN — Medication 650 MILLIGRAM(S): at 15:22

## 2022-03-22 RX ADMIN — Medication 650 MILLIGRAM(S): at 06:35

## 2022-03-22 RX ADMIN — MUPIROCIN 1 APPLICATION(S): 20 OINTMENT TOPICAL at 06:33

## 2022-03-22 RX ADMIN — OXYCODONE HYDROCHLORIDE 5 MILLIGRAM(S): 5 TABLET ORAL at 17:55

## 2022-03-22 RX ADMIN — Medication 650 MILLIGRAM(S): at 22:07

## 2022-03-22 RX ADMIN — Medication 112 MICROGRAM(S): at 06:32

## 2022-03-22 RX ADMIN — OXYCODONE HYDROCHLORIDE 5 MILLIGRAM(S): 5 TABLET ORAL at 18:55

## 2022-03-22 RX ADMIN — Medication 650 MILLIGRAM(S): at 06:33

## 2022-03-22 RX ADMIN — Medication 650 MILLIGRAM(S): at 17:55

## 2022-03-22 RX ADMIN — AZITHROMYCIN 255 MILLIGRAM(S): 500 TABLET, FILM COATED ORAL at 18:54

## 2022-03-22 RX ADMIN — ATOVAQUONE 750 MILLIGRAM(S): 750 SUSPENSION ORAL at 11:25

## 2022-03-22 RX ADMIN — Medication 5 MILLIGRAM(S): at 12:01

## 2022-03-22 RX ADMIN — Medication 650 MILLIGRAM(S): at 07:35

## 2022-03-22 RX ADMIN — ATOVAQUONE 750 MILLIGRAM(S): 750 SUSPENSION ORAL at 18:09

## 2022-03-22 RX ADMIN — OXYCODONE HYDROCHLORIDE 5 MILLIGRAM(S): 5 TABLET ORAL at 12:20

## 2022-03-22 RX ADMIN — Medication 5 MILLIGRAM(S): at 18:09

## 2022-03-22 NOTE — PROGRESS NOTE ADULT - SUBJECTIVE AND OBJECTIVE BOX
Patient is a 56y Male whom presented to the hospital with ckd and todd , hyponatremia    PAST MEDICAL & SURGICAL HISTORY:  Deviated septum    Gout    Malignant neoplasm of thyroid gland    S/P ORIF (open reduction internal fixation) fracture  right femur     S/P ACL surgery  Right; 2016    S/P hardware removal  right femur     H/O umbilical hernia repair    History of nasal septoplasty  2020    History of tonsillectomy  as a child        MEDICATIONS  (STANDING):  allopurinol 300 milliGRAM(s) Oral daily  azithromycin  IVPB      chlorhexidine 2% Cloths 1 Application(s) Topical daily  folic acid 1 milliGRAM(s) Oral daily  ibuprofen  Tablet. 400 milliGRAM(s) Oral once  influenza   Vaccine 0.5 milliLiter(s) IntraMuscular once  levothyroxine 112 MICROGram(s) Oral daily  mupirocin 2% Ointment 1 Application(s) Both Nostrils two times a day  senna 2 Tablet(s) Oral at bedtime  sodium chloride 1 Gram(s) Oral three times a day  sodium chloride 0.9%. 1000 milliLiter(s) (75 mL/Hr) IV Continuous <Continuous>      Allergies    No Known Allergies    Intolerances        SOCIAL HISTORY:  Denies ETOh,Smoking,     FAMILY HISTORY:  No pertinent family history in first degree relatives        REVIEW OF SYSTEMS:    CONSTITUTIONAL: No weakness, fevers or chills  RESPIRATORY: No cough, wheezing, hemoptysis; No shortness of breath  CARDIOVASCULAR: No chest pain or palpitations  GASTROINTESTINAL: No abdominal or epigastric pain. No nausea, vomiting,     No diarrhea or constipation. No melena                                            7.1    3.81  )-----------( 189      ( 22 Mar 2022 18:55 )             21.1       CBC Full  -  ( 22 Mar 2022 18:55 )  WBC Count : 3.81 K/uL  RBC Count : 2.54 M/uL  Hemoglobin : 7.1 g/dL  Hematocrit : 21.1 %  Platelet Count - Automated : 189 K/uL  Mean Cell Volume : 83.1 fL  Mean Cell Hemoglobin : 28.0 pg  Mean Cell Hemoglobin Concentration : 33.6 gm/dL  Auto Neutrophil # : x  Auto Lymphocyte # : x  Auto Monocyte # : x  Auto Eosinophil # : x  Auto Basophil # : x  Auto Neutrophil % : x  Auto Lymphocyte % : x  Auto Monocyte % : x  Auto Eosinophil % : x  Auto Basophil % : x      03-22    128<L>  |  99  |  31<H>  ----------------------------<  99  3.8   |  18<L>  |  1.73<H>    Ca    8.0<L>      22 Mar 2022 07:01  Phos  3.8       Mg     1.70         TPro  7.1  /  Alb  2.6<L>  /  TBili  0.3  /  DBili  x   /  AST  56<H>  /  ALT  16  /  AlkPhos  63        CAPILLARY BLOOD GLUCOSE          Vital Signs Last 24 Hrs  T(C): 38 (22 Mar 2022 17:28), Max: 38 (22 Mar 2022 17:)  T(F): 100.4 (22 Mar 2022 17:), Max: 100.4 (22 Mar 2022 17:)  HR: 105 (22 Mar 2022 16:22) (92 - 108)  BP: 112/71 (22 Mar 2022 16:22) (99/72 - 112/71)  BP(mean): --  RR: 17 (22 Mar 2022 16:22) (17 - 18)  SpO2: 95% (22 Mar 2022 16:22) (94% - 98%)    Urinalysis Basic - ( 20 Mar 2022 21:57 )    Color: Red / Appearance: Slightly Turbid / S.008 / pH: x  Gluc: x / Ketone: Negative  / Bili: Negative / Urobili: <2 mg/dL   Blood: x / Protein: 100 mg/dL / Nitrite: Negative   Leuk Esterase: Moderate / RBC: >50 /HPF / WBC 4 /HPF   Sq Epi: x / Non Sq Epi: 1 /HPF / Bacteria: Few            PHYSICAL EXAM:    Constitutional: NAD  HEENT: conjunctive   clear   Neck:  No JVD  Respiratory: CTAB  Cardiovascular: S1 and S2  Gastrointestinal: BS+, soft,   Extremities: No peripheral edema  Neurological: A/O x 3, no focal deficits

## 2022-03-22 NOTE — PROGRESS NOTE ADULT - SUBJECTIVE AND OBJECTIVE BOX
KATHRYN MALIN  MRN-[id number]    Patient is a 56y old  Male who presents with a chief complaint of urinary retention (21 Mar 2022 16:29)      Review of System  REVIEW OF SYSTEMS      General:	Denies fatigue, fevers, chills, sweats, decreased appetite.    Skin/Breast: denies pruritis, rash  	  Ophthalmologic: no change in vision or blurring  	  HEENT	Denies dry mouth, oral sores, dysphagia,  change in hearing.    Respiratory and Thorax:  cough, sob, wheeze, hemoptysis  	  Cardiovascular:	no cp , palp, orthopnea    Gastrointestinal:	no n/v/d constipation    Genitourinary:	no dysuria of frequency, no hematuria, no flank pain    Musculoskeletal:	no bone or joint pain. no muscle aches.     Neurological:	no change in sensory or motor function. no headache. no weakness.     Psychiatric:	no depression, no anxiety, insomnia.     Hematology/Lymphatics:	no bleeding or bruising        Current Meds  MEDICATIONS  (STANDING):  allopurinol 300 milliGRAM(s) Oral daily  atovaquone  Suspension 750 milliGRAM(s) Oral two times a day  azithromycin  IVPB      azithromycin  IVPB 500 milliGRAM(s) IV Intermittent every 24 hours  chlorhexidine 2% Cloths 1 Application(s) Topical daily  folic acid 1 milliGRAM(s) Oral daily  influenza   Vaccine 0.5 milliLiter(s) IntraMuscular once  levothyroxine 112 MICROGram(s) Oral daily  mupirocin 2% Ointment 1 Application(s) Both Nostrils two times a day  senna 2 Tablet(s) Oral at bedtime  sodium bicarbonate 650 milliGRAM(s) Oral three times a day  sodium chloride 0.9%. 1000 milliLiter(s) (75 mL/Hr) IV Continuous <Continuous>    MEDICATIONS  (PRN):  acetaminophen     Tablet .. 650 milliGRAM(s) Oral every 6 hours PRN Temp greater or equal to 38C (100.4F), Mild Pain (1 - 3)  aluminum hydroxide/magnesium hydroxide/simethicone Suspension 30 milliLiter(s) Oral every 4 hours PRN Dyspepsia  bisacodyl Suppository 10 milliGRAM(s) Rectal once PRN Constipation  guaiFENesin Oral Liquid (Sugar-Free) 100 milliGRAM(s) Oral every 6 hours PRN Cough  melatonin 3 milliGRAM(s) Oral at bedtime PRN Insomnia  ondansetron Injectable 4 milliGRAM(s) IV Push every 8 hours PRN Nausea and/or Vomiting  sodium chloride 0.65% Nasal 1 Spray(s) Both Nostrils four times a day PRN Nasal Congestion      Vitals  Vital Signs Last 24 Hrs  T(C): 37.8 (22 Mar 2022 06:18), Max: 38.3 (21 Mar 2022 06:39)  T(F): 100 (22 Mar 2022 06:18), Max: 101 (21 Mar 2022 06:39)  HR: 104 (22 Mar 2022 06:18) (91 - 104)  BP: 106/74 (22 Mar 2022 06:18) (100/65 - 122/68)  BP(mean): --  RR: 18 (22 Mar 2022 06:18) (18 - 18)  SpO2: 95% (22 Mar 2022 06:18) (91% - 95%)    Physical Exam  PHYSICAL EXAM:      Constitutional: NAD    Eyes: PERRLA EOMI, anicteric sclera    Heent :No oral sores, no pharyngeal injection. moist mucosa.    Neck: supple, no jvd, no LAD    Respiratory: CTA b/l     Cardiovascular: s1s2, no m/g/r    Gastrointestinal: soft, nt, nd, + BS    Extremities: no c/c/e    Neurological:A&O x 3 moves all ext.    Skin: no rash on exposed skin    Lymph Nodes: no lymphadenopathy.              Lab  CBC Full  -  ( 21 Mar 2022 17:20 )  WBC Count : 3.44 K/uL  RBC Count : 2.74 M/uL  Hemoglobin : 7.9 g/dL  Hematocrit : 23.1 %  Platelet Count - Automated : 202 K/uL  Mean Cell Volume : 84.3 fL  Mean Cell Hemoglobin : 28.8 pg  Mean Cell Hemoglobin Concentration : 34.2 gm/dL  Auto Neutrophil # : 2.59 K/uL  Auto Lymphocyte # : 0.56 K/uL  Auto Monocyte # : 0.18 K/uL  Auto Eosinophil # : 0.07 K/uL  Auto Basophil # : 0.01 K/uL  Auto Neutrophil % : 75.3 %  Auto Lymphocyte % : 16.3 %  Auto Monocyte % : 5.2 %  Auto Eosinophil % : 2.0 %  Auto Basophil % : 0.3 %    03-21    135  |  104  |  33<H>  ----------------------------<  90  4.4   |  20<L>  |  1.46<H>    Ca    8.7      21 Mar 2022 17:20  Phos  3.8     03-21  Mg     1.70     03-21    TPro  7.5  /  Alb  2.8<L>  /  TBili  0.4  /  DBili  x   /  AST  74<H>  /  ALT  20  /  AlkPhos  71  03-21        Rad:    Assessment/Plan   KATHRYN MALIN  MRN-[id number]    Patient is a 56y old  Male who presents with a chief complaint of urinary retention (21 Mar 2022 16:29)      Review of System    Patient is anxious regarding diagnosis.   otherwise comfortable    General:	Denies , fevers, chills, sweats, decreased appetite.    Skin/Breast: denies pruritis, rash  	  Ophthalmologic: no change in vision or blurring  	  HEENT	Denies dry mouth, oral sores, dysphagia,  change in hearing.    Respiratory and Thorax:  cough, sob, wheeze, hemoptysis  	  Cardiovascular:	no cp , palp, orthopnea    Gastrointestinal:	no n/v/d constipation    Genitourinary:	no dysuria of frequency,. + hematuria and low back pain    Neurological:	no change in sensory or motor function. no headache. no weakness.     Psychiatric:	no depression, no anxiety, insomnia.     Hematology/Lymphatics:	no bleeding or bruising        Current Meds  MEDICATIONS  (STANDING):  allopurinol 300 milliGRAM(s) Oral daily  atovaquone  Suspension 750 milliGRAM(s) Oral two times a day  azithromycin  IVPB      azithromycin  IVPB 500 milliGRAM(s) IV Intermittent every 24 hours  chlorhexidine 2% Cloths 1 Application(s) Topical daily  folic acid 1 milliGRAM(s) Oral daily  influenza   Vaccine 0.5 milliLiter(s) IntraMuscular once  levothyroxine 112 MICROGram(s) Oral daily  mupirocin 2% Ointment 1 Application(s) Both Nostrils two times a day  senna 2 Tablet(s) Oral at bedtime  sodium bicarbonate 650 milliGRAM(s) Oral three times a day  sodium chloride 0.9%. 1000 milliLiter(s) (75 mL/Hr) IV Continuous <Continuous>    MEDICATIONS  (PRN):  acetaminophen     Tablet .. 650 milliGRAM(s) Oral every 6 hours PRN Temp greater or equal to 38C (100.4F), Mild Pain (1 - 3)  aluminum hydroxide/magnesium hydroxide/simethicone Suspension 30 milliLiter(s) Oral every 4 hours PRN Dyspepsia  bisacodyl Suppository 10 milliGRAM(s) Rectal once PRN Constipation  guaiFENesin Oral Liquid (Sugar-Free) 100 milliGRAM(s) Oral every 6 hours PRN Cough  melatonin 3 milliGRAM(s) Oral at bedtime PRN Insomnia  ondansetron Injectable 4 milliGRAM(s) IV Push every 8 hours PRN Nausea and/or Vomiting  sodium chloride 0.65% Nasal 1 Spray(s) Both Nostrils four times a day PRN Nasal Congestion      Vitals  Vital Signs Last 24 Hrs  T(C): 37.8 (22 Mar 2022 06:18), Max: 38.3 (21 Mar 2022 06:39)  T(F): 100 (22 Mar 2022 06:18), Max: 101 (21 Mar 2022 06:39)  HR: 104 (22 Mar 2022 06:18) (91 - 104)  BP: 106/74 (22 Mar 2022 06:18) (100/65 - 122/68)  BP(mean): --  RR: 18 (22 Mar 2022 06:18) (18 - 18)  SpO2: 95% (22 Mar 2022 06:18) (91% - 95%)    Physical Exam  PHYSICAL EXAM:      Constitutional: NAD    Eyes: PERRLA EOMI, anicteric sclera    Heent :No oral sores, no pharyngeal injection. moist mucosa.    Neck: supple, no jvd, no LAD    Respiratory: CTA b/l     Cardiovascular: s1s2, no m/g/r    Gastrointestinal: soft, nt, nd, + BS    Extremities: no c/c/e    Neurological:A&O x 3 moves all ext.    Skin: no rash on exposed skin    Lymph Nodes: no lymphadenopathy.              Lab  CBC Full  -  ( 21 Mar 2022 17:20 )  WBC Count : 3.44 K/uL  RBC Count : 2.74 M/uL  Hemoglobin : 7.9 g/dL  Hematocrit : 23.1 %  Platelet Count - Automated : 202 K/uL  Mean Cell Volume : 84.3 fL  Mean Cell Hemoglobin : 28.8 pg  Mean Cell Hemoglobin Concentration : 34.2 gm/dL  Auto Neutrophil # : 2.59 K/uL  Auto Lymphocyte # : 0.56 K/uL  Auto Monocyte # : 0.18 K/uL  Auto Eosinophil # : 0.07 K/uL  Auto Basophil # : 0.01 K/uL  Auto Neutrophil % : 75.3 %  Auto Lymphocyte % : 16.3 %  Auto Monocyte % : 5.2 %  Auto Eosinophil % : 2.0 %  Auto Basophil % : 0.3 %    03-21    135  |  104  |  33<H>  ----------------------------<  90  4.4   |  20<L>  |  1.46<H>    Ca    8.7      21 Mar 2022 17:20  Phos  3.8     03-21  Mg     1.70     03-21    TPro  7.5  /  Alb  2.8<L>  /  TBili  0.4  /  DBili  x   /  AST  74<H>  /  ALT  20  /  AlkPhos  71  03-21        Rad:    Assessment/Plan

## 2022-03-22 NOTE — PROGRESS NOTE ADULT - ASSESSMENT
56 yr old male with a pmh of Thyroid Ca (s/p surgery on synthroid), Gout on Allopurinol, presents to the ED with hematuria for the past 5 days - went to [facility] with negative imaging and was sent home with abx- today developed left flank pain and urinary rentention.   Patient has had fatigue and unintentional weight loss over the past few months. Per ED note "seen by Dr. 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 Spoke with Dr. Muhammad, states that pt had cervical LAD not long ago with negative biopsies. Dr. Muhammad sent pt to hospital to get bone marrow bx   poke with Dr. Felix with IR, states he can get bm bx on 3/18 and to admit pt to Dr. Edwards)"  Denies  headache, dizziness, chest pain, palpitations, SOB, joint pain, diarrhea/constipation  Vitals in the ED: T 101, , /85, RR 23 satting 97% RA (17 Mar 2022 23:17)      ACUTE RENAL FAILURE:   Serum creatinine is improving   There is no progression . No uremic symptoms  No evidence of anemia .  Fluid status stable.  Will continue to avoid nephrotoxic drugs.  Patient remains asymptomatic.   Continue current therapy.  hold  diuretic.  hold   ACE inhibitor.  hold   ARB.  Additional evaluation:   ECG,    echocardiogram,     CXR,  will obtained recent   renal ultrasound to evalaute kidney size and possible stones , if cr is not improving     hyponatremia will check ua , urine osmolality , urine sodium , urine uric acid , serum sodium , serum osmolality , serum uric acid , f/u with hyponatremia work up , f/u with bmp , monitor i and o

## 2022-03-22 NOTE — PROGRESS NOTE ADULT - ASSESSMENT
55 y/o man with weight loss and fatigue in recent months.  Lymphadenopathy, fairly small in size and not particularly avid on a prior pet scan.  Patient with prior LN needle bx and excisional bx, neither of which showed a Lymphoproliferative disorder.  Findings on entirety of work up thus far have been more consistent with a polyconal/inflammatory process.    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    Patient had a + jammie test in my office, but blood work does not suggest an active hhemolytic process     following, having issues with hematuria, which could be contributing to his anemia    Patient s/p bmbx, I reached out to pathology yesterday, trying to obtain initial interpretation.

## 2022-03-22 NOTE — PROGRESS NOTE ADULT - SUBJECTIVE AND OBJECTIVE BOX
Follow Up:      Inverval History/ROS:Patient is a 56y old  Male who presents with a chief complaint of urinary retention (22 Mar 2022 12:11)    No fever  + hematuria    Allergies    No Known Allergies    Intolerances        ANTIMICROBIALS:  atovaquone  Suspension 750 two times a day  azithromycin  IVPB    azithromycin  IVPB 500 every 24 hours  bictegravir 50 mG/emtricitabine 200 mG/tenofovir alafenamide 25 mG (BIKTARVY) 1 daily      OTHER MEDS:  acetaminophen     Tablet .. 650 milliGRAM(s) Oral every 6 hours PRN  allopurinol 300 milliGRAM(s) Oral daily  aluminum hydroxide/magnesium hydroxide/simethicone Suspension 30 milliLiter(s) Oral every 4 hours PRN  bisacodyl Suppository 10 milliGRAM(s) Rectal once PRN  chlorhexidine 2% Cloths 1 Application(s) Topical daily  folic acid 1 milliGRAM(s) Oral daily  guaiFENesin Oral Liquid (Sugar-Free) 100 milliGRAM(s) Oral every 6 hours PRN  influenza   Vaccine 0.5 milliLiter(s) IntraMuscular once  levothyroxine 112 MICROGram(s) Oral daily  melatonin 3 milliGRAM(s) Oral at bedtime PRN  mupirocin 2% Ointment 1 Application(s) Both Nostrils two times a day  ondansetron Injectable 4 milliGRAM(s) IV Push every 8 hours PRN  oxybutynin 5 milliGRAM(s) Oral two times a day  pantoprazole  Injectable 40 milliGRAM(s) IV Push two times a day  senna 2 Tablet(s) Oral at bedtime  sodium bicarbonate 650 milliGRAM(s) Oral three times a day  sodium chloride 0.65% Nasal 1 Spray(s) Both Nostrils four times a day PRN  sodium chloride 0.9%. 1000 milliLiter(s) IV Continuous <Continuous>      Vital Signs Last 24 Hrs  T(C): 36.6 (22 Mar 2022 13:34), Max: 37.8 (22 Mar 2022 06:18)  T(F): 97.9 (22 Mar 2022 13:34), Max: 100 (22 Mar 2022 06:18)  HR: 100 (22 Mar 2022 13:34) (92 - 108)  BP: 99/72 (22 Mar 2022 13:34) (99/72 - 122/68)  BP(mean): --  RR: 17 (22 Mar 2022 13:34) (17 - 18)  SpO2: 94% (22 Mar 2022 13:34) (94% - 98%)    PHYSICAL EXAM:  General: [x ] non-toxic  HEAD/EYES: [ ] PERRL [x ] white sclera [ ] icterus  ENT:  [ ] normal [x ] supple [ ] thrush [ ] pharyngeal exudate  Cardiovascular:   [ ] murmur [x ] normal [ ] PPM/AICD  Respiratory:  [ x] clear to ausculation bilaterally  GI:  [ x] soft, non-tender, normal bowel sounds  :  [x ] cho [ ] no CVA tenderness   Musculoskeletal:  [ ] no synovitis  Neurologic:  [ ] non-focal exam   Skin:  [x ] no rash  Lymph: [ x] no lymphadenopathy  Psychiatric:  [x ] appropriate affect [ ] alert & oriented  Lines:  [x ] no phlebitis [ ] central line                                6.8    3.24  )-----------( 193      ( 22 Mar 2022 07:01 )             20.3           128<L>  |  99  |  31<H>  ----------------------------<  99  3.8   |  18<L>  |  1.73<H>    Ca    8.0<L>      22 Mar 2022 07:01  Phos  3.8       Mg     1.70         TPro  7.1  /  Alb  2.6<L>  /  TBili  0.3  /  DBili  x   /  AST  56<H>  /  ALT  16  /  AlkPhos  63        Urinalysis Basic - ( 20 Mar 2022 21:57 )    Color: Red / Appearance: Slightly Turbid / S.008 / pH: x  Gluc: x / Ketone: Negative  / Bili: Negative / Urobili: <2 mg/dL   Blood: x / Protein: 100 mg/dL / Nitrite: Negative   Leuk Esterase: Moderate / RBC: >50 /HPF / WBC 4 /HPF   Sq Epi: x / Non Sq Epi: 1 /HPF / Bacteria: Few        MICROBIOLOGY:Culture Results:   No growth to date. (22 @ 22:54)  Culture Results:   No growth to date. (22 @ 22:54)  Culture Results:   No growth (22 @ 18:50)      RADIOLOGY:

## 2022-03-22 NOTE — PROGRESS NOTE ADULT - ASSESSMENT
56 yr old male with a pmh of Thyroid Ca (s/p surgery on synthroid), Gout on Allopurinol, presents to the ED with hematuria for the past 5 days    Rectal Bleed  RESOLVED; possibly resolved diverticular bleed but favor contaminant from hematuria/clots  last Colonoscopy 2019 w/diverticulosis  CT without acute gi pathology; f/u  for bladder pathology with hemorrhagic products present   cont to monitor cbc and transfuse to keep Hgb 7.5 or greater   check NM Bleeding Scan only IF further GI bleeding   Hematology care appreciated   Hematuria likely source of anemia as rectal bleeding resolved 2 days ago (only 1 episode at that time)    Gross Hematuria   ana blood w/clots noted in cho bag; this is likely source of Anemia as rectal bleeding resolved 2 days ago (present for 1d only)  CT noted   cont to follow up Urology team recs    HIV   further w/u per medicine and ID appreciated     I reviewed the overnight course of events on the unit, re-confirming the patient history. I discussed the care with the patient and their family. The plan of care was discussed with the physician assistant and modifications were made to the notation where appropriate. Differential diagnosis and plan of care discussed with patient after the evaluation. Advanced care planning was discussed with patient and family.  Advanced care planning forms were reviewed and discussed.  Risks, benefits and alternatives of gastroenterologic procedures were discussed in detail and all questions were answered. 35 minutes spent on total encounter of which more than fifty percent of the encounter was spent counseling and/or coordinating care by the attending physician.

## 2022-03-22 NOTE — PROGRESS NOTE ADULT - ASSESSMENT
_________________________________________________________________________________________  ========>>  M E D I C A L   A T T E N D I N G    F O L L O W  U P  N O T E  <<=========  -----------------------------------------------------------------------------------------------------    - Patient seen and examined by me earlier today.   - In summary,  KATHRYN MALIN is a 56y year old man admitted with hematuria..   - Patient today overall doing fairly, comfortable, eating OK.  calmer today        ==================>> REVIEW OF SYSTEM <<=================    GEN: no fever, no chills, as above   RESP: no SOB at rest, + occasional cough, mostly clear sputum ( only one time reported with streaks of blood)   CVS: no chest pain, no palpitations, no edema  GI: no abdominal pain, no nausea, + had normal brown stool today but ++ hematochezia reported before >> resolved   : ++ hematuria in foely bag,  some discomfort with cho   Neuro: no headache, no dizziness  Derm : no itching, no rash    ==================>> PHYSICAL EXAM <<=================    GEN: A&O X 3 , NAD , comfortable, pleasant, less anxious   HEENT: NCAT, PERRL, MMM, hearing intact  Neck: supple , no JVD appreciated  CVS: S1S2 , regular , a bit tachy  PULM: CTA B/L,  limited exam as not taking deep breaths   ABD.: soft. non tender, non distended,  bowel sounds present  Extrem: intact pulses , no edema      + cho with red urine   PSYCH : flat affect, somewhat anxious                             ( Note written / Date of service 03-22-22 )    ==================>> MEDICATIONS <<====================    allopurinol 300 milliGRAM(s) Oral daily  atovaquone  Suspension 750 milliGRAM(s) Oral two times a day  azithromycin  IVPB      azithromycin  IVPB 500 milliGRAM(s) IV Intermittent every 24 hours  bictegravir 50 mG/emtricitabine 200 mG/tenofovir alafenamide 25 mG (BIKTARVY) 1 Tablet(s) Oral daily  chlorhexidine 2% Cloths 1 Application(s) Topical daily  folic acid 1 milliGRAM(s) Oral daily  influenza   Vaccine 0.5 milliLiter(s) IntraMuscular once  levothyroxine 112 MICROGram(s) Oral daily  mupirocin 2% Ointment 1 Application(s) Both Nostrils two times a day  oxybutynin 5 milliGRAM(s) Oral two times a day  pantoprazole  Injectable 40 milliGRAM(s) IV Push two times a day  senna 2 Tablet(s) Oral at bedtime  sodium bicarbonate 650 milliGRAM(s) Oral three times a day  sodium chloride 0.9%. 1000 milliLiter(s) IV Continuous <Continuous>    MEDICATIONS  (PRN):  acetaminophen     Tablet .. 650 milliGRAM(s) Oral every 6 hours PRN Temp greater or equal to 38C (100.4F), Mild Pain (1 - 3)  aluminum hydroxide/magnesium hydroxide/simethicone Suspension 30 milliLiter(s) Oral every 4 hours PRN Dyspepsia  bisacodyl Suppository 10 milliGRAM(s) Rectal once PRN Constipation  guaiFENesin Oral Liquid (Sugar-Free) 100 milliGRAM(s) Oral every 6 hours PRN Cough  melatonin 3 milliGRAM(s) Oral at bedtime PRN Insomnia  ondansetron Injectable 4 milliGRAM(s) IV Push every 8 hours PRN Nausea and/or Vomiting  oxyCODONE    IR 5 milliGRAM(s) Oral every 6 hours PRN moderate pain 4-6, severe pain 7-10  sodium chloride 0.65% Nasal 1 Spray(s) Both Nostrils four times a day PRN Nasal Congestion    ___________  Active diet:  Diet, Regular:   1200mL Fluid Restriction ([id number])  ___________________    ==================>> VITAL SIGNS <<==================    Vital Signs Last 24 HrsT(C): 37.8 (03-22-22 @ 16:22)  T(F): 100 (03-22-22 @ 16:22), Max: 100 (03-22-22 @ 06:18)  HR: 105 (03-22-22 @ 16:22) (92 - 108)  BP: 112/71 (03-22-22 @ 16:22)  RR: 17 (03-22-22 @ 16:22) (17 - 18)  SpO2: 95% (03-22-22 @ 16:22) (94% - 98%)         ==================>> LAB AND IMAGING <<==================                        6.8    3.24  )-----------( 193      ( 22 Mar 2022 07:01 )             20.3        03-22    128<L>  |  99  |  31<H>  ----------------------------<  99  3.8   |  18<L>  |  1.73<H>    Ca    8.0<L>      22 Mar 2022 07:01  Phos  3.8     03-21  Mg     1.70     03-21    TPro  7.1  /  Alb  2.6<L>  /  TBili  0.3  /  DBili  x   /  AST  56<H>  /  ALT  16  /  AlkPhos  63  03-22    WBC count:   3.24 <<== ,  3.44 <<== ,  1.90 <<== ,  2.32 <<== ,  2.08 <<== ,  3.18 <<==   Hemoglobin:   6.8 <<==,  7.9 <<==,  6.8 <<==,  6.6 <<==,  6.3 <<==,  7.4 <<==  platelets:  193 <==, 202 <==, 159 <==, 173 <==, 173 <==, 214 <==, 204 <==    Creatinine:  1.73  <<==, 1.46  <<==, 1.73  <<==, 1.71  <<==, 1.61  <<==, 1.78  <<==  Sodium:   128  <==, 135  <==, 132  <==, 127  <==, 126  <==, 127  <==, 136  <==       AST:          56 <== , 74 <== , 65 <== , 67 <== , 69 <==      ALT:        16  <== , 20  <== , 18  <== , 16  <== , 17  <==      AP:        63  <=, 71  <=, 60  <=, 64  <=, 77  <=     Bili:        0.3  <=, 0.4  <=, 0.3  <=, 0.3  <=, 0.3  <=    _______________________  C U L T U R E S :    Culture - Sputum (collected 21 Mar 2022 20:54)  Source: .Sputum Sputum  Gram Stain (21 Mar 2022 23:27):    Few polymorphonuclear leukocytes per low power field    Rare Squamous epithelial cells per low power field    Few Gram Negative Rods per oil power field    Few Gram positive cocci in pairs per oil power field    Few Yeast like cells per oil power field    Few Gram Positive Rods per oil power field  Preliminary Report (22 Mar 2022 16:27):    Normal Respiratory Jana present    Culture - Blood (collected 17 Mar 2022 22:54)  Source: .Blood Blood-Peripheral  Preliminary Report (18 Mar 2022 23:02):    No growth to date.    Culture - Blood (collected 17 Mar 2022 22:54)  Source: .Blood Blood-Venous  Preliminary Report (18 Mar 2022 23:02):    No growth to date.    Culture - Urine (collected 17 Mar 2022 18:50)  Source: Clean Catch Clean Catch (Midstream)  Final Report (19 Mar 2022 07:35):    No growth      HIV Result: HIV-1 Pos (03.19.22 @ 12:54) ( confirmatory test )  HIV-1 RNA Quantitative, Viral Load: [id number] (03.20.22 @ 11:59)  ABS CD4: 18 /uL (03.22.22 @ 09:13)      PENDNING BONE marrow Bx results      < from: CT Abdomen and Pelvis No Cont (03.20.22 @ 15:11) >  IMPRESSION:  New groundglass opacities are seen within the left upper lobe, right   middle lobe and left lower lobe likely representing infection.    Mild left hydronephrosis without evidence of obstructing calculi.   Nonobstructing calculi are seen bilaterally within the kidneys.    Heterogeneous hyperdense material within the bladder and left renal   collecting system, which may represent hemorrhagic products.  < end of copied text >    ___________________________________________________________________________________  ===============>>  A S S E S S M E N T   A N D   P L A N <<===============  ------------------------------------------------------------------------------------------    · Assessment	  56 yr old male presenting with urinary retention found to have sepsis 2/2 pyelonephritis     Problem/Plan - 1:  ·  Problem: sepsis, in setting of acute HIV / possible AIDS with suspect opportunistic infections / PCP / EBV..       concern for hemorrhagic cystitis / pyelonephritis /infected / impacted stone    workup in process   off Abx for now : monitoring   ID appreciated   started on HAART /  Mepron for PCP prophylaxis  finish Zithromax as ordered   concern for CMV infection > workup per I D     Problem/Plan - 2:  ·  Problem: pt with past + GIANNA, + jammie +      pt also with h/o sinusitis, and now [facility]a      rule out autoimmune disorders ./ Trever's etc..      rheumatology appreciated          work up for vasculitis in process   most likely all related to HIV / AIDS   Bone marrow Bx done >follow results     Problem/Plan - 3:  ·  Problem: Anemia.   ·  Plan: Acute on chronic   BM biopsy > follow results   Hematology on board   montior  transfused second unite today   occult blood  + and + hematochezia last PM  GI consulted / appreciated      eventual scope before DC     Problem/Plan - 4:  ·  Problem: urinary retention and obstructive Uropathy with JULIO   monitor BMP, I/O     crea improved with IVH > continue     **Hyponatremia     repeat labs ordered     pt with flank pain > suspect SIADH      for fluid restriction and increase PO diet discussed       nephrology on board, following     Problem/Plan - 5:  ·  Problem: Hypothyroidism post total thyroidectomy for cancer   Continue home levothyroxine.  Thyroid Stimulating Hormone, Serum: 4.06 uIU/mL (03.17.22 @ 18:46)      -GI/DVT Prophylaxis per protocol.    --------------------------------------------  Case discussed with pt, family at bedside, PA...   Education given on findings and plan of care    ___________________________  H. TAZ Edwards.  Pager: [phone]

## 2022-03-22 NOTE — PROGRESS NOTE ADULT - ASSESSMENT
56y male with recent dx of jammie positive with polyclonal gammopathy and nephrolithiasis having  gross hematuria and recent episode of urinary retention. Cho catheter removed initially and patient was voiding well with low PVRs. Urine and blood cultures negative.   3/20: overnight retaining >400 cc after 1 straight catheterization, cho replaced. Draining well, 40cc clot irrigated and clear without CBI. HCT dropped to 18.6 from 23.6.   3/22: 6-eye, minimal clot > 22Fr 3-way placed (3rd port capped, NO CBI).     -- now with 22Fr cho, monitor color, hand irrigate prn  -- please obtain outside urologist records (Dr. Godinez) for more complete hx as pt is unsure of full hx. Pt says has had cystoscopy before  -- trend CBC, transfuse prn. +Hematochezia/fobt, could explain H/H drop in addition to hematuria  -- Consider GI consult  -- continue flomax  -- continue to monitor outputs  -- monitor urine color and HCT  -- hand irrigate cho via drainage tubing (not side port) with catheter tipped syringe 60 cc x 2 q 8 hours   -- discussed with patient possible etiology of hematuria including nephrolithiasis and BPH in setting of coagulopathy, however patient will follow-up with primary urologist, Dr. Godinez for complete hematuria workup  -- current hematuria controlled, would not suspect accounting for significant anemia but will continue to monitor   -- f/u ID and hematology

## 2022-03-22 NOTE — CHART NOTE - NSCHARTNOTEFT_GEN_A_CORE
Evangelina Cortes | Reference #: [id number]    Others' Prescriptions  Patient Name: Red Bo                                        YOB: 1965  Address: [address] [place] [address] [place] [address]     Sex: Male  Rx Written	Rx Dispensed	Drug	                                    Quantity   Days Supply	Prescriber Name	Prescriber Yeni #	Payment Method	Dispenser  01/28/2022	01/28/2022	oxycodone hcl (ir) 5 mg tablet	12	3	Raul Casas	[id number]	Insurance	[id number]  01/25/2022	01/25/2022	alprazolam 0.25 mg tablet	30	15	Felipe Aguirre DO	[id number]	Insurance	[id number]

## 2022-03-22 NOTE — PROGRESS NOTE ADULT - SUBJECTIVE AND OBJECTIVE BOX
INTERVAL HPI/OVERNIGHT EVENTS:    (+) brown stools per patient   no abdominal pain or n/v; tolerating diet  still w/ana Hematuria; cho causing him discomfort     MEDICATIONS  (STANDING):  allopurinol 300 milliGRAM(s) Oral daily  atovaquone  Suspension 750 milliGRAM(s) Oral two times a day  azithromycin  IVPB      azithromycin  IVPB 500 milliGRAM(s) IV Intermittent every 24 hours  bictegravir 50 mG/emtricitabine 200 mG/tenofovir alafenamide 25 mG (BIKTARVY) 1 Tablet(s) Oral daily  chlorhexidine 2% Cloths 1 Application(s) Topical daily  folic acid 1 milliGRAM(s) Oral daily  influenza   Vaccine 0.5 milliLiter(s) IntraMuscular once  levothyroxine 112 MICROGram(s) Oral daily  mupirocin 2% Ointment 1 Application(s) Both Nostrils two times a day  oxybutynin 5 milliGRAM(s) Oral two times a day  pantoprazole  Injectable 40 milliGRAM(s) IV Push two times a day  senna 2 Tablet(s) Oral at bedtime  sodium bicarbonate 650 milliGRAM(s) Oral three times a day  sodium chloride 0.9%. 1000 milliLiter(s) (75 mL/Hr) IV Continuous <Continuous>    MEDICATIONS  (PRN):  acetaminophen     Tablet .. 650 milliGRAM(s) Oral every 6 hours PRN Temp greater or equal to 38C (100.4F), Mild Pain (1 - 3)  aluminum hydroxide/magnesium hydroxide/simethicone Suspension 30 milliLiter(s) Oral every 4 hours PRN Dyspepsia  bisacodyl Suppository 10 milliGRAM(s) Rectal once PRN Constipation  guaiFENesin Oral Liquid (Sugar-Free) 100 milliGRAM(s) Oral every 6 hours PRN Cough  melatonin 3 milliGRAM(s) Oral at bedtime PRN Insomnia  ondansetron Injectable 4 milliGRAM(s) IV Push every 8 hours PRN Nausea and/or Vomiting  sodium chloride 0.65% Nasal 1 Spray(s) Both Nostrils four times a day PRN Nasal Congestion      Allergies    No Known Allergies    Intolerances        Review of Systems:    General:  No wt loss, fevers, chills, night sweats, fatigue   Eyes:  Good vision, no reported pain  ENT:  No sore throat, pain, runny nose, dysphagia  CV:  No pain, palpitations, hypo/hypertension  Resp:  No dyspnea, cough, tachypnea, wheezing  GI:  No pain, No nausea, No vomiting, No diarrhea, No constipation, No weight loss, No fever, No pruritis, No rectal bleeding, No melena, No dysphagia  :  No pain, bleeding, incontinence, nocturia  Muscle:  No pain, weakness  Neuro:  No weakness, tingling, memory problems  Psych:  No fatigue, insomnia, mood problems, depression  Endocrine:  No polyuria, polydypsia, cold/heat intolerance  Heme:  No petechiae, ecchymosis, easy bruisability  Skin:  No rash, tattoos, scars, edema      Vital Signs Last 24 Hrs  T(C): 37.8 (22 Mar 2022 06:18), Max: 37.8 (22 Mar 2022 06:18)  T(F): 100 (22 Mar 2022 06:18), Max: 100 (22 Mar 2022 06:18)  HR: 104 (22 Mar 2022 06:18) (95 - 104)  BP: 106/74 (22 Mar 2022 06:18) (106/74 - 122/68)  BP(mean): --  RR: 18 (22 Mar 2022 06:18) (18 - 18)  SpO2: 95% (22 Mar 2022 06:18) (95% - 95%)    PHYSICAL EXAM:    Constitutional: NAD  HEENT: EOMI, throat clear  Neck: No LAD, supple  Respiratory: CTA and P  Cardiovascular: S1 and S2, RRR, no M  Gastrointestinal: BS+, soft, NT/ND, neg HSM,  Extremities: No peripheral edema, neg clubbing, cyanosis  Vascular: 2+ peripheral pulses  Neurological: A/O x 3, no focal deficits  Psychiatric: Normal mood, normal affect  Skin: No rashes      LABS:                        6.8    3.24  )-----------( 193      ( 22 Mar 2022 07:01 )             20.3     03-22    128<L>  |  99  |  31<H>  ----------------------------<  99  3.8   |  18<L>  |  1.73<H>    Ca    8.0<L>      22 Mar 2022 07:01  Phos  3.8     03-21  Mg     1.70     03-21    TPro  7.1  /  Alb  2.6<L>  /  TBili  0.3  /  DBili  x   /  AST  56<H>  /  ALT  16  /  AlkPhos  63        Urinalysis Basic - ( 20 Mar 2022 21:57 )    Color: Red / Appearance: Slightly Turbid / S.008 / pH: x  Gluc: x / Ketone: Negative  / Bili: Negative / Urobili: <2 mg/dL   Blood: x / Protein: 100 mg/dL / Nitrite: Negative   Leuk Esterase: Moderate / RBC: >50 /HPF / WBC 4 /HPF   Sq Epi: x / Non Sq Epi: 1 /HPF / Bacteria: Few        RADIOLOGY & ADDITIONAL TESTS:

## 2022-03-22 NOTE — PROGRESS NOTE ADULT - SUBJECTIVE AND OBJECTIVE BOX
Subjective    Pt seen and examined. Persistent hematuria,  called for small clots noted in tubing.   Existing catheter removed, 6-eye irrigation performed (minimal clot) to crystal clear. 22Fr 3-way (3rd port capped) exchanged.     Objective  Vital Signs Last 24 Hrs  T(C): 36.8 (22 Mar 2022 10:12), Max: 37.8 (22 Mar 2022 06:18)  T(F): 98.3 (22 Mar 2022 10:12), Max: 100 (22 Mar 2022 06:18)  HR: 108 (22 Mar 2022 10:12) (95 - 108)  BP: 105/64 (22 Mar 2022 10:12) (105/64 - 122/68)  BP(mean): --  RR: 17 (22 Mar 2022 10:12) (17 - 18)  SpO2: 95% (22 Mar 2022 10:12) (95% - 95%)    03-21-22 @ 07:01  -  03-22-22 @ 07:00  --------------------------------------------------------  IN: 300 mL / OUT: 2975 mL / NET: -2675 mL    03-22-22 @ 07:01  -  03-22-22 @ 11:55  --------------------------------------------------------  IN: 0 mL / OUT: 500 mL / NET: -500 mL        General: NAD  Abdomen: soft/non-tender/non-distended  : 14f cho in place draining clear light watermelon/pink urine    Labs  CBC (03-22 @ 07:01)                              6.8<LL>                         3.24<L>  )----------------(  193        66.3  % Neutrophils, 24.1  % Lymphocytes, ANC: 2.15                                20.3<LL>              CBC (03-21 @ 17:20)                              7.9<L>                         3.44<L>  )----------------(  202        75.3  % Neutrophils, 16.3  % Lymphocytes, ANC: 2.59                                23.1<L>                BMP (03-22 @ 07:01)             128<L>  |  99      |  31<H> 		Ca++ --      Ca 8.0<L>             ---------------------------------( 99    		Mg --                 3.8     |  18<L>   |  1.73<H>			Ph --      BMP (03-21 @ 17:20)             135     |  104     |  33<H> 		Ca++ --      Ca 8.7                ---------------------------------( 90    		Mg 1.70               4.4     |  20<L>   |  1.46<H>			Ph 3.8       LFTs (03-22 @ 07:01)      TPro 7.1 / Alb 2.6<L> / TBili 0.3 / DBili -- / AST 56<H> / ALT 16 / AlkPhos 63  LFTs (03-21 @ 17:20)      TPro 7.5 / Alb 2.8<L> / TBili 0.4 / DBili -- / AST 74<H> / ALT 20 / AlkPhos 71          -> .Sputum Sputum Culture (03-21 @ 20:54)       Few polymorphonuclear leukocytes per low power field  Rare Squamous epithelial cells per low power field  Few Gram Negative Rods per oil power field  Few Gram positive cocci in pairs per oil power field  Few Yeast like cells per oil power field  Few Gram Positive Rods per oil power field    NG  NG    -> .Blood Blood-Venous Culture (03-17 @ 22:54)     NG    NG    No growth to date.    -> Clean Catch Clean Catch (Midstream) Culture (03-17 @ 18:50)     NG    NG    No growth

## 2022-03-22 NOTE — PROGRESS NOTE ADULT - ASSESSMENT
56 m with Papillary thyroid cancer s/p total thyroidectomy on synthroid 1/27/22, Gout on Allopurinol, presents to the ED with hematuria for the past 5 days  recent FTT to weight loss  Prior chest Ct with ground glass opacities  Now with anemia/ leukopenia/JULIO   Febrile    1) Newly Diagnosed HIV  Viralload over 6 million  CD4 of 18 (3%)     Genotype sent  Check baseline serologies    Will start biktarvy    Mepron for PCP prophylaxis- check beta d glucan    Can hold JUDI prophylaxis at this time.    2) Anemia/ leukopenia  ? sequalae of HIV  But some concern with EBV on prior lymph node biopsy for lymphoproliferative d/o  await bone marrow results    3) JULIO with hematuria  In AIDS pt, can be seen with CMV or IGA nephropathy  Check CMV viremia- but can have end organ disease without viremia  Ultimately may need bladder/ renal biopsy    D/w patient   D/w acp

## 2022-03-23 LAB
ALBUMIN SERPL ELPH-MCNC: 2.6 G/DL — LOW (ref 3.3–5)
ALP SERPL-CCNC: 62 U/L — SIGNIFICANT CHANGE UP (ref 40–120)
ALT FLD-CCNC: 15 U/L — SIGNIFICANT CHANGE UP (ref 4–41)
ANION GAP SERPL CALC-SCNC: 9 MMOL/L — SIGNIFICANT CHANGE UP (ref 7–14)
APTT BLD: 30.7 SEC — SIGNIFICANT CHANGE UP (ref 27–36.3)
AST SERPL-CCNC: 46 U/L — HIGH (ref 4–40)
BASOPHILS # BLD AUTO: 0.01 K/UL — SIGNIFICANT CHANGE UP (ref 0–0.2)
BASOPHILS NFR BLD AUTO: 0.4 % — SIGNIFICANT CHANGE UP (ref 0–2)
BILIRUB SERPL-MCNC: 0.4 MG/DL — SIGNIFICANT CHANGE UP (ref 0.2–1.2)
BLD GP AB SCN SERPL QL: NEGATIVE — SIGNIFICANT CHANGE UP
BUN SERPL-MCNC: 36 MG/DL — HIGH (ref 7–23)
CALCIUM SERPL-MCNC: 8.1 MG/DL — LOW (ref 8.4–10.5)
CHLORIDE SERPL-SCNC: 99 MMOL/L — SIGNIFICANT CHANGE UP (ref 98–107)
CMV IGG FLD QL: >10 U/ML — HIGH
CMV IGG SERPL-IMP: POSITIVE
CO2 SERPL-SCNC: 21 MMOL/L — LOW (ref 22–31)
CREAT SERPL-MCNC: 2.29 MG/DL — HIGH (ref 0.5–1.3)
CRYOGLOB SERPL-MCNC: NEGATIVE — SIGNIFICANT CHANGE UP
CULTURE RESULTS: SIGNIFICANT CHANGE UP
D DIMER BLD IA.RAPID-MCNC: 958 NG/ML DDU — HIGH
EGFR: 32 ML/MIN/1.73M2 — LOW
EOSINOPHIL # BLD AUTO: 0.06 K/UL — SIGNIFICANT CHANGE UP (ref 0–0.5)
EOSINOPHIL NFR BLD AUTO: 2.2 % — SIGNIFICANT CHANGE UP (ref 0–6)
GLUCOSE SERPL-MCNC: 95 MG/DL — SIGNIFICANT CHANGE UP (ref 70–99)
HAV IGG SER QL IA: SIGNIFICANT CHANGE UP
HCT VFR BLD CALC: 19.6 % — CRITICAL LOW (ref 39–50)
HCT VFR BLD CALC: 20.4 % — CRITICAL LOW (ref 39–50)
HCT VFR BLD CALC: 25.5 % — LOW (ref 39–50)
HGB BLD-MCNC: 6.5 G/DL — CRITICAL LOW (ref 13–17)
HGB BLD-MCNC: 6.9 G/DL — CRITICAL LOW (ref 13–17)
HGB BLD-MCNC: 8.6 G/DL — LOW (ref 13–17)
IANC: 1.94 K/UL — SIGNIFICANT CHANGE UP (ref 1.5–8.5)
IMM GRANULOCYTES NFR BLD AUTO: 0.7 % — SIGNIFICANT CHANGE UP (ref 0–1.5)
INR BLD: 1.19 RATIO — HIGH (ref 0.88–1.16)
LYMPHOCYTES # BLD AUTO: 0.53 K/UL — LOW (ref 1–3.3)
LYMPHOCYTES # BLD AUTO: 19.2 % — SIGNIFICANT CHANGE UP (ref 13–44)
MAGNESIUM SERPL-MCNC: 1.7 MG/DL — SIGNIFICANT CHANGE UP (ref 1.6–2.6)
MCHC RBC-ENTMCNC: 27.8 PG — SIGNIFICANT CHANGE UP (ref 27–34)
MCHC RBC-ENTMCNC: 28.4 PG — SIGNIFICANT CHANGE UP (ref 27–34)
MCHC RBC-ENTMCNC: 28.5 PG — SIGNIFICANT CHANGE UP (ref 27–34)
MCHC RBC-ENTMCNC: 33.2 GM/DL — SIGNIFICANT CHANGE UP (ref 32–36)
MCHC RBC-ENTMCNC: 33.7 GM/DL — SIGNIFICANT CHANGE UP (ref 32–36)
MCHC RBC-ENTMCNC: 33.8 GM/DL — SIGNIFICANT CHANGE UP (ref 32–36)
MCV RBC AUTO: 83.8 FL — SIGNIFICANT CHANGE UP (ref 80–100)
MCV RBC AUTO: 84 FL — SIGNIFICANT CHANGE UP (ref 80–100)
MCV RBC AUTO: 84.4 FL — SIGNIFICANT CHANGE UP (ref 80–100)
MONOCYTES # BLD AUTO: 0.2 K/UL — SIGNIFICANT CHANGE UP (ref 0–0.9)
MONOCYTES NFR BLD AUTO: 7.2 % — SIGNIFICANT CHANGE UP (ref 2–14)
NEUTROPHILS # BLD AUTO: 1.94 K/UL — SIGNIFICANT CHANGE UP (ref 1.8–7.4)
NEUTROPHILS NFR BLD AUTO: 70.3 % — SIGNIFICANT CHANGE UP (ref 43–77)
NRBC # BLD: 0 /100 WBCS — SIGNIFICANT CHANGE UP
NRBC # FLD: 0 K/UL — SIGNIFICANT CHANGE UP
PHOSPHATE SERPL-MCNC: 4.4 MG/DL — SIGNIFICANT CHANGE UP (ref 2.5–4.5)
PLATELET # BLD AUTO: 170 K/UL — SIGNIFICANT CHANGE UP (ref 150–400)
PLATELET # BLD AUTO: 178 K/UL — SIGNIFICANT CHANGE UP (ref 150–400)
PLATELET # BLD AUTO: 225 K/UL — SIGNIFICANT CHANGE UP (ref 150–400)
POTASSIUM SERPL-MCNC: 4.7 MMOL/L — SIGNIFICANT CHANGE UP (ref 3.5–5.3)
POTASSIUM SERPL-SCNC: 4.7 MMOL/L — SIGNIFICANT CHANGE UP (ref 3.5–5.3)
PROT SERPL-MCNC: 6.9 G/DL — SIGNIFICANT CHANGE UP (ref 6–8.3)
PROTHROM AB SERPL-ACNC: 13.8 SEC — HIGH (ref 10.5–13.4)
RBC # BLD: 2.34 M/UL — LOW (ref 4.2–5.8)
RBC # BLD: 2.43 M/UL — LOW (ref 4.2–5.8)
RBC # BLD: 3.02 M/UL — LOW (ref 4.2–5.8)
RBC # FLD: 16.1 % — HIGH (ref 10.3–14.5)
RBC # FLD: 16.2 % — HIGH (ref 10.3–14.5)
RBC # FLD: 17.4 % — HIGH (ref 10.3–14.5)
RH IG SCN BLD-IMP: POSITIVE — SIGNIFICANT CHANGE UP
SARS-COV-2 RNA SPEC QL NAA+PROBE: SIGNIFICANT CHANGE UP
SODIUM SERPL-SCNC: 129 MMOL/L — LOW (ref 135–145)
SPECIMEN SOURCE: SIGNIFICANT CHANGE UP
T GONDII IGG SER QL: <3 IU/ML — SIGNIFICANT CHANGE UP
T GONDII IGG SER QL: NEGATIVE — SIGNIFICANT CHANGE UP
TM INTERPRETATION: SIGNIFICANT CHANGE UP
WBC # BLD: 2.76 K/UL — LOW (ref 3.8–10.5)
WBC # BLD: 3.46 K/UL — LOW (ref 3.8–10.5)
WBC # BLD: 5.4 K/UL — SIGNIFICANT CHANGE UP (ref 3.8–10.5)
WBC # FLD AUTO: 2.76 K/UL — LOW (ref 3.8–10.5)
WBC # FLD AUTO: 3.46 K/UL — LOW (ref 3.8–10.5)
WBC # FLD AUTO: 5.4 K/UL — SIGNIFICANT CHANGE UP (ref 3.8–10.5)

## 2022-03-23 PROCEDURE — 71045 X-RAY EXAM CHEST 1 VIEW: CPT | Mod: 26

## 2022-03-23 PROCEDURE — 99233 SBSQ HOSP IP/OBS HIGH 50: CPT

## 2022-03-23 PROCEDURE — 78582 LUNG VENTILAT&PERFUS IMAGING: CPT | Mod: 26,GC

## 2022-03-23 RX ORDER — ALLOPURINOL 300 MG
100 TABLET ORAL DAILY
Refills: 0 | Status: DISCONTINUED | OUTPATIENT
Start: 2022-03-23 | End: 2022-03-30

## 2022-03-23 RX ORDER — SODIUM CHLORIDE 9 MG/ML
1000 INJECTION INTRAMUSCULAR; INTRAVENOUS; SUBCUTANEOUS
Refills: 0 | Status: DISCONTINUED | OUTPATIENT
Start: 2022-03-23 | End: 2022-03-25

## 2022-03-23 RX ADMIN — Medication 1 MILLIGRAM(S): at 13:21

## 2022-03-23 RX ADMIN — SODIUM CHLORIDE 1 GRAM(S): 9 INJECTION INTRAMUSCULAR; INTRAVENOUS; SUBCUTANEOUS at 22:00

## 2022-03-23 RX ADMIN — CHLORHEXIDINE GLUCONATE 1 APPLICATION(S): 213 SOLUTION TOPICAL at 13:24

## 2022-03-23 RX ADMIN — Medication 650 MILLIGRAM(S): at 13:22

## 2022-03-23 RX ADMIN — OXYCODONE HYDROCHLORIDE 5 MILLIGRAM(S): 5 TABLET ORAL at 18:28

## 2022-03-23 RX ADMIN — ATOVAQUONE 750 MILLIGRAM(S): 750 SUSPENSION ORAL at 05:07

## 2022-03-23 RX ADMIN — Medication 650 MILLIGRAM(S): at 13:29

## 2022-03-23 RX ADMIN — SODIUM CHLORIDE 1 GRAM(S): 9 INJECTION INTRAMUSCULAR; INTRAVENOUS; SUBCUTANEOUS at 05:06

## 2022-03-23 RX ADMIN — Medication 650 MILLIGRAM(S): at 05:06

## 2022-03-23 RX ADMIN — OXYCODONE HYDROCHLORIDE 5 MILLIGRAM(S): 5 TABLET ORAL at 04:15

## 2022-03-23 RX ADMIN — Medication 650 MILLIGRAM(S): at 14:19

## 2022-03-23 RX ADMIN — Medication 5 MILLIGRAM(S): at 17:25

## 2022-03-23 RX ADMIN — PANTOPRAZOLE SODIUM 40 MILLIGRAM(S): 20 TABLET, DELAYED RELEASE ORAL at 21:56

## 2022-03-23 RX ADMIN — ATOVAQUONE 750 MILLIGRAM(S): 750 SUSPENSION ORAL at 17:33

## 2022-03-23 RX ADMIN — Medication 100 MILLIGRAM(S): at 21:56

## 2022-03-23 RX ADMIN — Medication 300 MILLIGRAM(S): at 13:23

## 2022-03-23 RX ADMIN — Medication 5 MILLIGRAM(S): at 05:06

## 2022-03-23 RX ADMIN — SODIUM CHLORIDE 100 MILLILITER(S): 9 INJECTION INTRAMUSCULAR; INTRAVENOUS; SUBCUTANEOUS at 21:56

## 2022-03-23 RX ADMIN — OXYCODONE HYDROCHLORIDE 5 MILLIGRAM(S): 5 TABLET ORAL at 17:28

## 2022-03-23 RX ADMIN — MUPIROCIN 1 APPLICATION(S): 20 OINTMENT TOPICAL at 05:05

## 2022-03-23 RX ADMIN — BICTEGRAVIR SODIUM, EMTRICITABINE, AND TENOFOVIR ALAFENAMIDE FUMARATE 1 TABLET(S): 30; 120; 15 TABLET ORAL at 13:22

## 2022-03-23 RX ADMIN — SODIUM CHLORIDE 1 GRAM(S): 9 INJECTION INTRAMUSCULAR; INTRAVENOUS; SUBCUTANEOUS at 13:22

## 2022-03-23 RX ADMIN — Medication 100 MILLIGRAM(S): at 22:00

## 2022-03-23 RX ADMIN — Medication 112 MICROGRAM(S): at 05:07

## 2022-03-23 RX ADMIN — PANTOPRAZOLE SODIUM 40 MILLIGRAM(S): 20 TABLET, DELAYED RELEASE ORAL at 08:58

## 2022-03-23 RX ADMIN — MUPIROCIN 1 APPLICATION(S): 20 OINTMENT TOPICAL at 17:24

## 2022-03-23 NOTE — PROGRESS NOTE ADULT - SUBJECTIVE AND OBJECTIVE BOX
Subjective    Pt seen and examined, overall feels well wants to go home.   +1u prbc this AM for anemia.   Noted hematuria, irrigated small clots to clear again.     Objective  Vital Signs Last 24 Hrs  T(C): 37 (23 Mar 2022 05:07), Max: 38 (22 Mar 2022 17:28)  T(F): 98.6 (23 Mar 2022 05:07), Max: 100.4 (22 Mar 2022 17:28)  HR: 93 (23 Mar 2022 05:07) (92 - 108)  BP: 109/65 (23 Mar 2022 05:07) (93/59 - 112/71)  BP(mean): --  RR: 18 (23 Mar 2022 05:07) (17 - 18)  SpO2: 93% (23 Mar 2022 05:07) (93% - 98%)    03-22-22 @ 07:01  -  03-23-22 @ 07:00  --------------------------------------------------------  IN: 300 mL / OUT: 1200 mL / NET: -900 mL    03-23-22 @ 07:01  -  03-23-22 @ 11:15  --------------------------------------------------------  IN: 0 mL / OUT: 800 mL / NET: -800 mL            General: NAD  Abdomen: soft/non-tender/non-distended  : 14f cho in place draining clear watermelon urine    Labs  CBC (03-23 @ 03:22)                              6.5<LL>                         2.76<L>  )----------------(  178        70.3  % Neutrophils, 19.2  % Lymphocytes, ANC: 1.94                                19.6<LL>              CBC (03-22 @ 18:55)                              7.1<L>                         3.81    )----------------(  189        --    % Neutrophils, --    % Lymphocytes, ANC: --                                  21.1<L>                BMP (03-23 @ 03:22)             129<L>  |  99      |  36<H> 		Ca++ --      Ca 8.1<L>             ---------------------------------( 95    		Mg 1.70               4.7     |  21<L>   |  2.29<H>			Ph 4.4     BMP (03-22 @ 07:01)             128<L>  |  99      |  31<H> 		Ca++ --      Ca 8.0<L>             ---------------------------------( 99    		Mg --                 3.8     |  18<L>   |  1.73<H>			Ph --        LFTs (03-23 @ 03:22)      TPro 6.9 / Alb 2.6<L> / TBili 0.4 / DBili -- / AST 46<H> / ALT 15 / AlkPhos 62  LFTs (03-22 @ 07:01)      TPro 7.1 / Alb 2.6<L> / TBili 0.3 / DBili -- / AST 56<H> / ALT 16 / AlkPhos 63    Coags (03-23 @ 03:22)  aPTT 30.7 / INR 1.19<H> / PT 13.8<H>        -> .Sputum Sputum Culture (03-21 @ 20:54)       Few polymorphonuclear leukocytes per low power field  Rare Squamous epithelial cells per low power field  Few Gram Negative Rods per oil power field  Few Gram positive cocci in pairs per oil power field  Few Yeast like cells per oil power field  Few Gram Positive Rods per oil power field    NG    Normal Respiratory Jana present    -> Clean Catch Clean Catch (Midstream) Culture (03-17 @ 18:50)     NG    NG    No growth    -> .Blood Blood-Peripheral Culture (03-17 @ 18:15)     NG    NG    No Growth Final    -> .Blood Blood-Venous Culture (03-17 @ 17:55)     NG    NG    No Growth Final

## 2022-03-23 NOTE — CHART NOTE - NSCHARTNOTEFT_GEN_A_CORE
Patient with elevated D-dimer on admission, 504, with repeat of 958. Patient is not hypoxic and denies shortness of breath, however, noted to be tachycardic. Tachycardia likely reactive in setting of pneumonia, however, concern for PE in setting of elevated D-dimer. Due to elevated creatinine, patient unable to have CTA. VQ scan ordered per discussion with medicine attending.

## 2022-03-23 NOTE — PROGRESS NOTE ADULT - ASSESSMENT
56 m with Papillary thyroid cancer s/p total thyroidectomy on synthroid 1/27/22, Gout on Allopurinol, presents to the ED with hematuria for the past 5 days  recent FTT to weight loss  Prior chest Ct with ground glass opacities  Now with anemia/ leukopenia/JULIO   Febrile    1) Newly Diagnosed HIV  Viralload over 6 million  CD4 of 18 (3%)     Genotype sent  Check baseline serologies    Will start biktarvy    Mepron for PCP prophylaxis- check beta d glucan    Can hold JUDI prophylaxis at this time.    2) Anemia/ leukopenia  ? sequalae of HIV  But some concern with EBV on prior lymph node biopsy for lymphoproliferative d/o  await bone marrow results- prelim without malignancy per oncology    3) JULIO with hematuria  In AIDS pt, can be seen with CMV or IGA nephropathy  Check CMV viremia- but can have end organ disease without viremia  Ultimately may need bladder/ renal biopsy    D/w patient   D/w acp

## 2022-03-23 NOTE — PROGRESS NOTE ADULT - ASSESSMENT
_________________________________________________________________________________________  ========>>  M E D I C A L   A T T E N D I N G    F O L L O W  U P  N O T E  <<=========  -----------------------------------------------------------------------------------------------------    - Patient seen and examined by me earlier today.   - In summary,  KATHRYN MALIN is a 56y year old man admitted with hematuria..   - Patient today overall doing fairly, comfortable, eating OK.  calmer and comfortable      many questions from pt and wife answered        ==================>> REVIEW OF SYSTEM <<=================    GEN: no fever, no chills, as above   RESP: no SOB at rest, + occasional cough, mostly clear sputum ( only one time reported with streaks of blood)   CVS: no chest pain, no palpitations, no edema  GI: no abdominal pain, no nausea, normal brown stool today no more hematochezia reported   : ++ hematuria in foely bag,  otherwise ok   Neuro: no headache, no dizziness  Derm : no itching, no rash    ==================>> PHYSICAL EXAM <<=================    GEN: A&O X 3 , NAD , comfortable, pleasant, less anxious , but asking to go home soon   HEENT: NCAT, PERRL, MMM, hearing intact  Neck: supple , no JVD appreciated  CVS: S1S2 , regular , no murmur appreciated   PULM: CTA B/L,  limited exam as not taking deep breaths   ABD.: soft. non tender, non distended,  bowel sounds present  Extrem: intact pulses , no edema      + cho with red tinged urine   PSYCH : flat affect, somewhat anxious                             ( Note written / Date of service 03-23-22 )    ==================>> MEDICATIONS <<====================    allopurinol 100 milliGRAM(s) Oral daily  atovaquone  Suspension 750 milliGRAM(s) Oral two times a day  bictegravir 50 mG/emtricitabine 200 mG/tenofovir alafenamide 25 mG (BIKTARVY) 1 Tablet(s) Oral daily  chlorhexidine 2% Cloths 1 Application(s) Topical daily  folic acid 1 milliGRAM(s) Oral daily  influenza   Vaccine 0.5 milliLiter(s) IntraMuscular once  levothyroxine 112 MICROGram(s) Oral daily  mupirocin 2% Ointment 1 Application(s) Both Nostrils two times a day  oxybutynin 5 milliGRAM(s) Oral two times a day  pantoprazole  Injectable 40 milliGRAM(s) IV Push two times a day  senna 2 Tablet(s) Oral at bedtime  sodium chloride 1 Gram(s) Oral three times a day  sodium chloride 0.9%. 1000 milliLiter(s) IV Continuous <Continuous>    MEDICATIONS  (PRN):  acetaminophen     Tablet .. 650 milliGRAM(s) Oral every 6 hours PRN Temp greater or equal to 38C (100.4F), Mild Pain (1 - 3)  aluminum hydroxide/magnesium hydroxide/simethicone Suspension 30 milliLiter(s) Oral every 4 hours PRN Dyspepsia  bisacodyl Suppository 10 milliGRAM(s) Rectal once PRN Constipation  guaiFENesin Oral Liquid (Sugar-Free) 100 milliGRAM(s) Oral every 6 hours PRN Cough  melatonin 3 milliGRAM(s) Oral at bedtime PRN Insomnia  ondansetron Injectable 4 milliGRAM(s) IV Push every 8 hours PRN Nausea and/or Vomiting  oxyCODONE    IR 5 milliGRAM(s) Oral every 6 hours PRN moderate pain 4-6, severe pain 7-10  sodium chloride 0.65% Nasal 1 Spray(s) Both Nostrils four times a day PRN Nasal Congestion    ___________  Active diet:  Diet, NPO after Midnight:      NPO Start Date: 23-Mar-2022,   NPO Start Time: 23:59  Diet, Regular:   1200mL Fluid Restriction ([id number])  ___________________    ==================>> VITAL SIGNS <<==================    Vital Signs Last 24 HrsT(C): 36.6 (03-23-22 @ 21:03)  T(F): 97.8 (03-23-22 @ 21:03), Max: 100.4 (03-23-22 @ 13:05)  HR: 99 (03-23-22 @ 21:03) (93 - 101)  BP: 114/81 (03-23-22 @ 21:03)  RR: 18 (03-23-22 @ 21:03) (16 - 18)  SpO2: 100% (03-23-22 @ 21:03) (93% - 100%)       ==================>> LAB AND IMAGING <<==================                        6.9    3.46  )-----------( 170      ( 23 Mar 2022 14:56 )             20.4        03-23    129<L>  |  99  |  36<H>  ----------------------------<  95  4.7   |  21<L>  |  2.29<H>    Ca    8.1<L>      23 Mar 2022 03:22  Phos  4.4     03-23  Mg     1.70     03-23    TPro  6.9  /  Alb  2.6<L>  /  TBili  0.4  /  DBili  x   /  AST  46<H>  /  ALT  15  /  AlkPhos  62  03-23    WBC count:   3.46 <<== ,  2.76 <<== ,  3.81 <<== ,  3.24 <<== ,  3.44 <<== ,  1.90 <<==   Hemoglobin:   6.9 <<==,  6.5 <<==,  7.1 <<==,  6.8 <<==,  7.9 <<==,  6.8 <<==  platelets:  170 <==, 178 <==, 189 <==, 193 <==, 202 <==, 159 <==, 173 <==    Creatinine:  2.29  <<==, 1.73  <<==, 1.46  <<==, 1.73  <<==, 1.71  <<==, 1.61  <<==  Sodium:   129  <==, 128  <==, 135  <==, 132  <==, 127  <==, 126  <==, 127  <==       AST:          46 <== , 56 <== , 74 <== , 65 <== , 67 <==      ALT:        15  <== , 16  <== , 20  <== , 18  <== , 16  <==      AP:        62  <=, 63  <=, 71  <=, 60  <=, 64  <=     Bili:        0.4  <=, 0.3  <=, 0.4  <=, 0.3  <=, 0.3  <=    _______________________  C U L T U R E S :    Culture - Sputum (collected 21 Mar 2022 17:41)  Source: .Sputum Sputum  Gram Stain (21 Mar 2022 23:27):    Few polymorphonuclear leukocytes per low power field    Rare Squamous epithelial cells per low power field    Few Gram Negative Rods per oil power field    Few Gram positive cocci in pairs per oil power field    Few Yeast like cells per oil power field    Few Gram Positive Rods per oil power field  Final Report (23 Mar 2022 17:10):    Normal Respiratory Jana present    Culture - Urine (collected 17 Mar 2022 18:50)  Source: Clean Catch Clean Catch (Midstream)  Final Report (19 Mar 2022 07:35):    No growth    Culture - Blood (collected 17 Mar 2022 18:15)  Source: .Blood Blood-Peripheral  Final Report (22 Mar 2022 23:00):    No Growth Final    Culture - Blood (collected 17 Mar 2022 17:55)  Source: .Blood Blood-Venous  Final Report (22 Mar 2022 23:00):    No Growth Final      HIV Result: HIV-1 Pos (03.19.22 @ 12:54) ( confirmatory test )  HIV-1 RNA Quantitative, Viral Load: 6,038,892 (03.20.22 @ 11:59)  ABS CD4: 18 /uL (03.22.22 @ 09:13)    other available results noted including EBV, ANCA...     PENDNING BONE marrow Bx results     prelim negative per Onc     < from: CT Abdomen and Pelvis No Cont (03.20.22 @ 15:11) >  IMPRESSION:  New groundglass opacities are seen within the left upper lobe, right   middle lobe and left lower lobe likely representing infection.    Mild left hydronephrosis without evidence of obstructing calculi.   Nonobstructing calculi are seen bilaterally within the kidneys.    Heterogeneous hyperdense material within the bladder and left renal   collecting system, which may represent hemorrhagic products.  < end of copied text >    ___________________________________________________________________________________  ===============>>  A S S E S S M E N T   A N D   P L A N <<===============  ------------------------------------------------------------------------------------------    · Assessment	  56 yr old male presenting with urinary retention found to have sepsis 2/2 pyelonephritis     Problem/Plan - 1:  ·  Problem: sepsis, in setting of acute HIV / possible AIDS with suspect opportunistic infections / PCP / EBV..       concern for hemorrhagic cystitis / pyelonephritis /infected / impacted stone    workup in process   off Abx for now : monitoring   ID appreciated   started on HAART /  Mepron for PCP prophylaxis  finish Zithromax as ordered   concern for CMV infection > workup per ID , follow one marrow results as well    ** appreciated  follow up    plan for Cystoscopy tomorrow    pt getting 2 Units PRBC today     otherwise medically stable for procedure      Problem/Plan - 2:  ·  Problem: pt with past + GIANNA, + jammie + ANCA      pt also with h/o sinusitis, and now [facility]a      rule out autoimmune disorders ./ Trever's etc..      rheumatology following          work up for vasculitis in process   most likely all related to HIV / AIDS   Bone marrow Bx done >follow final results     Problem/Plan - 3:  ·  Problem: Anemia. multifactorial   BM biopsy > follow final results   Hematology on board , appreciated   montior  transfuse further as needed   occult blood  + and + hematochezia before > stopped    GI consulted / appreciated      eventual scope before DC vs OP     Problem/Plan - 4:  ·  Problem: urinary retention and obstructive Uropathy with JULIO   monitor BMP, I/O     crea improved with IVH > continue    following    **Hyponatremia     repeat labs ordered     pt with flank pain > suspect SIADH      for fluid restriction and increase PO diet discussed       nephrology on board, following     Problem/Plan - 5:  ·  Problem: Hypothyroidism post total thyroidectomy for cancer   Continue home levothyroxine.  Thyroid Stimulating Hormone, Serum: 4.06 uIU/mL (03.17.22 @ 18:46)      -GI/DVT Prophylaxis per protocol.    --------------------------------------------  Case discussed with pt, family at bedside, PA...   Education given on findings and plan of care    ___________________________  HMick Edwards D.O.  Pager: [phone]

## 2022-03-23 NOTE — PROGRESS NOTE ADULT - ASSESSMENT
56 yr old male with a pmh of Thyroid Ca (s/p surgery on synthroid), Gout on Allopurinol, presents to the ED with hematuria for the past 5 days - went to [facility] with negative imaging and was sent home with abx- today developed left flank pain and urinary rentention.   Patient has had fatigue and unintentional weight loss over the past few months. Per ED note "seen by Dr. 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 Spoke with Dr. Muhammad, states that pt had cervical LAD not long ago with negative biopsies. Dr. Muhammad sent pt to hospital to get bone marrow bx   poke with Dr. Felix with IR, states he can get bm bx on 3/18 and to admit pt to Dr. Edwards)"  Denies  headache, dizziness, chest pain, palpitations, SOB, joint pain, diarrhea/constipation  Vitals in the ED: T 101, , /85, RR 23 satting 97% RA (17 Mar 2022 23:17)      ACUTE RENAL FAILURE:   Serum creatinine is improving   There is no progression . No uremic symptoms  No evidence of anemia .  Fluid status stable.  Will continue to avoid nephrotoxic drugs.  Patient remains asymptomatic.   Continue current therapy.  hold  diuretic.  hold   ACE inhibitor.  hold   ARB.  Additional evaluation:   ECG,    echocardiogram,     CXR,  will obtained recent   renal ultrasound to evalaute kidney size and possible stones , if cr is not improving     hyponatremia will check ua , urine osmolality , urine sodium , urine uric acid , serum sodium , serum osmolality , serum uric acid , f/u with hyponatremia work up , f/u with bmp , monitor i and o

## 2022-03-23 NOTE — PROGRESS NOTE ADULT - SUBJECTIVE AND OBJECTIVE BOX
KATHRYN MALIN  MRN-[id number]    Patient is a 56y old  Male who presents with a chief complaint of urinary retention (22 Mar 2022 16:59)      Review of System  REVIEW OF SYSTEMS      General:	Denies fatigue, fevers, chills, sweats, decreased appetite.    Skin/Breast: denies pruritis, rash  	  Ophthalmologic: no change in vision or blurring  	  HEENT	Denies dry mouth, oral sores, dysphagia,  change in hearing.    Respiratory and Thorax:  cough, sob, wheeze, hemoptysis  	  Cardiovascular:	no cp , palp, orthopnea    Gastrointestinal:	no n/v/d constipation    Genitourinary:	no dysuria of frequency, no hematuria, no flank pain    Musculoskeletal:	no bone or joint pain. no muscle aches.     Neurological:	no change in sensory or motor function. no headache. no weakness.     Psychiatric:	no depression, no anxiety, insomnia.     Hematology/Lymphatics:	no bleeding or bruising        Current Meds  MEDICATIONS  (STANDING):  allopurinol 300 milliGRAM(s) Oral daily  atovaquone  Suspension 750 milliGRAM(s) Oral two times a day  azithromycin  IVPB      azithromycin  IVPB 500 milliGRAM(s) IV Intermittent every 24 hours  bictegravir 50 mG/emtricitabine 200 mG/tenofovir alafenamide 25 mG (BIKTARVY) 1 Tablet(s) Oral daily  chlorhexidine 2% Cloths 1 Application(s) Topical daily  folic acid 1 milliGRAM(s) Oral daily  influenza   Vaccine 0.5 milliLiter(s) IntraMuscular once  levothyroxine 112 MICROGram(s) Oral daily  mupirocin 2% Ointment 1 Application(s) Both Nostrils two times a day  oxybutynin 5 milliGRAM(s) Oral two times a day  pantoprazole  Injectable 40 milliGRAM(s) IV Push two times a day  senna 2 Tablet(s) Oral at bedtime  sodium bicarbonate 650 milliGRAM(s) Oral three times a day  sodium chloride 2 Gram(s) Oral once  sodium chloride 1 Gram(s) Oral three times a day  sodium chloride 0.9%. 1000 milliLiter(s) (75 mL/Hr) IV Continuous <Continuous>    MEDICATIONS  (PRN):  acetaminophen     Tablet .. 650 milliGRAM(s) Oral every 6 hours PRN Temp greater or equal to 38C (100.4F), Mild Pain (1 - 3)  aluminum hydroxide/magnesium hydroxide/simethicone Suspension 30 milliLiter(s) Oral every 4 hours PRN Dyspepsia  bisacodyl Suppository 10 milliGRAM(s) Rectal once PRN Constipation  guaiFENesin Oral Liquid (Sugar-Free) 100 milliGRAM(s) Oral every 6 hours PRN Cough  melatonin 3 milliGRAM(s) Oral at bedtime PRN Insomnia  ondansetron Injectable 4 milliGRAM(s) IV Push every 8 hours PRN Nausea and/or Vomiting  oxyCODONE    IR 5 milliGRAM(s) Oral every 6 hours PRN moderate pain 4-6, severe pain 7-10  sodium chloride 0.65% Nasal 1 Spray(s) Both Nostrils four times a day PRN Nasal Congestion      Vitals  Vital Signs Last 24 Hrs  T(C): 37 (23 Mar 2022 05:07), Max: 38 (22 Mar 2022 17:28)  T(F): 98.6 (23 Mar 2022 05:07), Max: 100.4 (22 Mar 2022 17:28)  HR: 93 (23 Mar 2022 05:07) (92 - 108)  BP: 109/65 (23 Mar 2022 05:07) (93/59 - 112/71)  BP(mean): --  RR: 18 (23 Mar 2022 05:07) (17 - 18)  SpO2: 93% (23 Mar 2022 05:07) (93% - 98%)    Physical Exam  PHYSICAL EXAM:      Constitutional: NAD    Eyes: PERRLA EOMI, anicteric sclera    Heent :No oral sores, no pharyngeal injection. moist mucosa.    Neck: supple, no jvd, no LAD    Respiratory: CTA b/l     Cardiovascular: s1s2, no m/g/r    Gastrointestinal: soft, nt, nd, + BS    Extremities: no c/c/e    Neurological:A&O x 3 moves all ext.    Skin: no rash on exposed skin    Lymph Nodes: no lymphadenopathy.              Lab  CBC Full  -  ( 23 Mar 2022 03:22 )  WBC Count : 2.76 K/uL  RBC Count : 2.34 M/uL  Hemoglobin : 6.5 g/dL  Hematocrit : 19.6 %  Platelet Count - Automated : 178 K/uL  Mean Cell Volume : 83.8 fL  Mean Cell Hemoglobin : 27.8 pg  Mean Cell Hemoglobin Concentration : 33.2 gm/dL  Auto Neutrophil # : 1.94 K/uL  Auto Lymphocyte # : 0.53 K/uL  Auto Monocyte # : 0.20 K/uL  Auto Eosinophil # : 0.06 K/uL  Auto Basophil # : 0.01 K/uL  Auto Neutrophil % : 70.3 %  Auto Lymphocyte % : 19.2 %  Auto Monocyte % : 7.2 %  Auto Eosinophil % : 2.2 %  Auto Basophil % : 0.4 %    03-23    129<L>  |  99  |  36<H>  ----------------------------<  95  4.7   |  21<L>  |  2.29<H>    Ca    8.1<L>      23 Mar 2022 03:22  Phos  4.4     03-23  Mg     1.70     03-23    TPro  6.9  /  Alb  2.6<L>  /  TBili  0.4  /  DBili  x   /  AST  46<H>  /  ALT  15  /  AlkPhos  62  03-23    PT/INR - ( 23 Mar 2022 03:22 )   PT: 13.8 sec;   INR: 1.19 ratio         PTT - ( 23 Mar 2022 03:22 )  PTT:30.7 sec    Rad:    Assessment/Plan   KATHRYN MALIN  MRN-[id number]    Patient is a 56y old  Male who presents with a chief complaint of urinary retention (22 Mar 2022 16:59)      Review of System    Feels well.  Transfusion in progress    General:	Denies fatigue, fevers, chills, sweats, decreased appetite.    Skin/Breast: denies pruritis, rash  	  Ophthalmologic: no change in vision or blurring  	  HEENT	Denies dry mouth, oral sores, dysphagia,  change in hearing.    Respiratory and Thorax: no cough, sob, wheeze, hemoptysis  	  Cardiovascular:	no cp , palp, orthopnea    Gastrointestinal:	no n/v/d constipation    Genitourinary:	no dysuria of frequency, no hematuria, no flank pain    Musculoskeletal:	no bone or joint pain. no muscle aches.     Neurological:	no change in sensory or motor function. no headache. no weakness.     Psychiatric:	no depression, no anxiety, insomnia.     Hematology/Lymphatics:	no bleeding or bruising    Current Meds  MEDICATIONS  (STANDING):  allopurinol 300 milliGRAM(s) Oral daily  atovaquone  Suspension 750 milliGRAM(s) Oral two times a day  azithromycin  IVPB      azithromycin  IVPB 500 milliGRAM(s) IV Intermittent every 24 hours  bictegravir 50 mG/emtricitabine 200 mG/tenofovir alafenamide 25 mG (BIKTARVY) 1 Tablet(s) Oral daily  chlorhexidine 2% Cloths 1 Application(s) Topical daily  folic acid 1 milliGRAM(s) Oral daily  influenza   Vaccine 0.5 milliLiter(s) IntraMuscular once  levothyroxine 112 MICROGram(s) Oral daily  mupirocin 2% Ointment 1 Application(s) Both Nostrils two times a day  oxybutynin 5 milliGRAM(s) Oral two times a day  pantoprazole  Injectable 40 milliGRAM(s) IV Push two times a day  senna 2 Tablet(s) Oral at bedtime  sodium bicarbonate 650 milliGRAM(s) Oral three times a day  sodium chloride 2 Gram(s) Oral once  sodium chloride 1 Gram(s) Oral three times a day  sodium chloride 0.9%. 1000 milliLiter(s) (75 mL/Hr) IV Continuous <Continuous>    MEDICATIONS  (PRN):  acetaminophen     Tablet .. 650 milliGRAM(s) Oral every 6 hours PRN Temp greater or equal to 38C (100.4F), Mild Pain (1 - 3)  aluminum hydroxide/magnesium hydroxide/simethicone Suspension 30 milliLiter(s) Oral every 4 hours PRN Dyspepsia  bisacodyl Suppository 10 milliGRAM(s) Rectal once PRN Constipation  guaiFENesin Oral Liquid (Sugar-Free) 100 milliGRAM(s) Oral every 6 hours PRN Cough  melatonin 3 milliGRAM(s) Oral at bedtime PRN Insomnia  ondansetron Injectable 4 milliGRAM(s) IV Push every 8 hours PRN Nausea and/or Vomiting  oxyCODONE    IR 5 milliGRAM(s) Oral every 6 hours PRN moderate pain 4-6, severe pain 7-10  sodium chloride 0.65% Nasal 1 Spray(s) Both Nostrils four times a day PRN Nasal Congestion      Vital Signs Last 24 Hrs  T(C): 37 (23 Mar 2022 05:07), Max: 38 (22 Mar 2022 17:28)  T(F): 98.6 (23 Mar 2022 05:07), Max: 100.4 (22 Mar 2022 17:28)  HR: 93 (23 Mar 2022 05:07) (92 - 108)  BP: 109/65 (23 Mar 2022 05:07) (93/59 - 112/71)  BP(mean): --  RR: 18 (23 Mar 2022 05:07) (17 - 18)  SpO2: 93% (23 Mar 2022 05:07) (93% - 98%)    PHYSICAL EXAM:    Constitutional: NAD    Eyes: PERRLA EOMI, anicteric sclera    Heent :No oral sores, no pharyngeal injection. moist mucosa.    Neck: supple, no jvd, no LAD    Respiratory: CTA b/l     Cardiovascular: s1s2, no m/g/r    Gastrointestinal: soft, nt, nd, + BS    Extremities: no c/c/e    Neurological:A&O x 3 moves all ext.    Skin: no rash on exposed skin    Lymph Nodes: no lymphadenopathy.      Lab  CBC Full  -  ( 23 Mar 2022 03:22 )  WBC Count : 2.76 K/uL  RBC Count : 2.34 M/uL  Hemoglobin : 6.5 g/dL  Hematocrit : 19.6 %  Platelet Count - Automated : 178 K/uL  Mean Cell Volume : 83.8 fL  Mean Cell Hemoglobin : 27.8 pg  Mean Cell Hemoglobin Concentration : 33.2 gm/dL  Auto Neutrophil # : 1.94 K/uL  Auto Lymphocyte # : 0.53 K/uL  Auto Monocyte # : 0.20 K/uL  Auto Eosinophil # : 0.06 K/uL  Auto Basophil # : 0.01 K/uL  Auto Neutrophil % : 70.3 %  Auto Lymphocyte % : 19.2 %  Auto Monocyte % : 7.2 %  Auto Eosinophil % : 2.2 %  Auto Basophil % : 0.4 %    03-23    129<L>  |  99  |  36<H>  ----------------------------<  95  4.7   |  21<L>  |  2.29<H>    Ca    8.1<L>      23 Mar 2022 03:22  Phos  4.4     03-23  Mg     1.70     03-23    TPro  6.9  /  Alb  2.6<L>  /  TBili  0.4  /  DBili  x   /  AST  46<H>  /  ALT  15  /  AlkPhos  62  03-23    PT/INR - ( 23 Mar 2022 03:22 )   PT: 13.8 sec;   INR: 1.19 ratio         PTT - ( 23 Mar 2022 03:22 )  PTT:30.7 sec    Rad:    Assessment/Plan

## 2022-03-23 NOTE — PROGRESS NOTE ADULT - SUBJECTIVE AND OBJECTIVE BOX
INTERVAL HPI/OVERNIGHT EVENTS:    No BM today; last bm was yesterday morning, small and Brown in color   +hematuria/clots       MEDICATIONS  (STANDING):  allopurinol 300 milliGRAM(s) Oral daily  atovaquone  Suspension 750 milliGRAM(s) Oral two times a day  bictegravir 50 mG/emtricitabine 200 mG/tenofovir alafenamide 25 mG (BIKTARVY) 1 Tablet(s) Oral daily  chlorhexidine 2% Cloths 1 Application(s) Topical daily  folic acid 1 milliGRAM(s) Oral daily  influenza   Vaccine 0.5 milliLiter(s) IntraMuscular once  levothyroxine 112 MICROGram(s) Oral daily  mupirocin 2% Ointment 1 Application(s) Both Nostrils two times a day  oxybutynin 5 milliGRAM(s) Oral two times a day  pantoprazole  Injectable 40 milliGRAM(s) IV Push two times a day  senna 2 Tablet(s) Oral at bedtime  sodium chloride 2 Gram(s) Oral once  sodium chloride 1 Gram(s) Oral three times a day  sodium chloride 0.9%. 1000 milliLiter(s) (75 mL/Hr) IV Continuous <Continuous>    MEDICATIONS  (PRN):  acetaminophen     Tablet .. 650 milliGRAM(s) Oral every 6 hours PRN Temp greater or equal to 38C (100.4F), Mild Pain (1 - 3)  aluminum hydroxide/magnesium hydroxide/simethicone Suspension 30 milliLiter(s) Oral every 4 hours PRN Dyspepsia  bisacodyl Suppository 10 milliGRAM(s) Rectal once PRN Constipation  guaiFENesin Oral Liquid (Sugar-Free) 100 milliGRAM(s) Oral every 6 hours PRN Cough  melatonin 3 milliGRAM(s) Oral at bedtime PRN Insomnia  ondansetron Injectable 4 milliGRAM(s) IV Push every 8 hours PRN Nausea and/or Vomiting  oxyCODONE    IR 5 milliGRAM(s) Oral every 6 hours PRN moderate pain 4-6, severe pain 7-10  sodium chloride 0.65% Nasal 1 Spray(s) Both Nostrils four times a day PRN Nasal Congestion      Allergies    No Known Allergies    Intolerances        Review of Systems:    General:  No wt loss, fevers, chills, night sweats, fatigue   Eyes:  Good vision, no reported pain  ENT:  No sore throat, pain, runny nose, dysphagia  CV:  No pain, palpitations, hypo/hypertension  Resp:  No dyspnea, cough, tachypnea, wheezing  GI:  No pain, No nausea, No vomiting, No diarrhea, No constipation, No weight loss, No fever, No pruritis, No rectal bleeding, No melena, No dysphagia  :  No pain, bleeding, incontinence, nocturia  Muscle:  No pain, weakness  Neuro:  No weakness, tingling, memory problems  Psych:  No fatigue, insomnia, mood problems, depression  Endocrine:  No polyuria, polydypsia, cold/heat intolerance  Heme:  No petechiae, ecchymosis, easy bruisability  Skin:  No rash, tattoos, scars, edema      Vital Signs Last 24 Hrs  T(C): 38 (23 Mar 2022 13:05), Max: 38 (22 Mar 2022 17:28)  T(F): 100.4 (23 Mar 2022 13:05), Max: 100.4 (22 Mar 2022 17:28)  HR: 101 (23 Mar 2022 13:05) (93 - 105)  BP: 111/70 (23 Mar 2022 13:05) (93/59 - 112/71)  BP(mean): --  RR: 16 (23 Mar 2022 13:05) (16 - 18)  SpO2: 98% (23 Mar 2022 13:05) (93% - 98%)    PHYSICAL EXAM:    Constitutional: NAD  HEENT: EOMI, throat clear  Neck: No LAD, supple  Respiratory: CTA and P  Cardiovascular: S1 and S2, RRR, no M  Gastrointestinal: BS+, soft, NT/ND, neg HSM,  Extremities: No peripheral edema, neg clubbing, cyanosis  Vascular: 2+ peripheral pulses  Neurological: A/O x 3, no focal deficits  Psychiatric: Normal mood, normal affect  Skin: No rashes      LABS:                        6.5    2.76  )-----------( 178      ( 23 Mar 2022 03:22 )             19.6     03-23    129<L>  |  99  |  36<H>  ----------------------------<  95  4.7   |  21<L>  |  2.29<H>    Ca    8.1<L>      23 Mar 2022 03:22  Phos  4.4     03-23  Mg     1.70     03-23    TPro  6.9  /  Alb  2.6<L>  /  TBili  0.4  /  DBili  x   /  AST  46<H>  /  ALT  15  /  AlkPhos  62  03-23    PT/INR - ( 23 Mar 2022 03:22 )   PT: 13.8 sec;   INR: 1.19 ratio         PTT - ( 23 Mar 2022 03:22 )  PTT:30.7 sec      RADIOLOGY & ADDITIONAL TESTS:

## 2022-03-23 NOTE — PROGRESS NOTE ADULT - ASSESSMENT
56 yr old male with a pmh of Thyroid Ca (s/p surgery on synthroid), Gout on Allopurinol, presents to the ED with hematuria for the past 5 days    Rectal Bleed (Resolved)  favor contaminant from hematuria/clots  last Colonoscopy 2019 w/diverticulosis  CT without acute gi pathology  cont to monitor cbc and transfuse to keep Hgb 7.5 or greater   check NM Bleeding Scan only IF further GI bleeding   Hematology care appreciated   Hematuria likely source of anemia as rectal bleeding resolved 2 days ago (only 1 episode at that time)    Gross Hematuria   ana blood w/clots noted in cho bag; this is likely source of Anemia as rectal bleeding resolved 2 days ago (present for 1d only)  CT noted   OR tomorrow w/Urology; care appreciated     HIV   further w/u per medicine and ID appreciated     I reviewed the overnight course of events on the unit, re-confirming the patient history. I discussed the care with the patient and their family. The plan of care was discussed with the physician assistant and modifications were made to the notation where appropriate. Differential diagnosis and plan of care discussed with patient after the evaluation. Advanced care planning was discussed with patient and family.  Advanced care planning forms were reviewed and discussed.  Risks, benefits and alternatives of gastroenterologic procedures were discussed in detail and all questions were answered. 35 minutes spent on total encounter of which more than fifty percent of the encounter was spent counseling and/or coordinating care by the attending physician.

## 2022-03-23 NOTE — PROGRESS NOTE ADULT - ASSESSMENT
56y male with recent dx of jammie positive with polyclonal gammopathy and nephrolithiasis having  gross hematuria and recent episode of urinary retention. Cho catheter removed initially and patient was voiding well with low PVRs. Urine and blood cultures negative.   3/20: overnight retaining >400 cc after 1 straight catheterization, cho replaced. Draining well, 40cc clot irrigated and clear without CBI. HCT dropped to 18.6 from 23.6.   3/22: 6-eye, minimal clot > 22Fr 3-way placed  3/23: +1u prbc    -- Persistent hematuria, will tentatively plan for OR tomorrow (3/24/22) - Cystoscopy, fulguration/clot-evacuation, cystogram, bladder biopsy, bilateral retrograde pyelogram, possible formalin instillation.   -- Please make NPOpMN, repeat COVID, AM labs/T&S.   -- Keep cho catheter, monitor color. If not draining, hand-irrigate with 60cc piston syringe (place 30cc sterile saline through cho catheter (disconnect bag tubing) and hand-aspirate 30cc back).   -- current hematuria controlled, would not suspect accounting for significant anemia but will continue to monitor   -- f/u ID and hematology recommendations  -- Trend H&H, transfuse prn  -- Obtain outside Urologist records (Dr. Gdoinez), pt says has had past cystoscopy (unsure indication).     d/w Dr. Torres

## 2022-03-23 NOTE — PROGRESS NOTE ADULT - SUBJECTIVE AND OBJECTIVE BOX
T max 100.4  No oew complaints    Wayne with heamturia    1004 111/70  A and o times three  NCAt  S1 S2  CTa bilaterally  Soft + BS   Wayne in place  No rash  no focla neuro deficit    WBC 2.76

## 2022-03-23 NOTE — PROGRESS NOTE ADULT - SUBJECTIVE AND OBJECTIVE BOX
Patient is a 56y Male whom presented to the hospital with ckd and todd , hyponatremia    PAST MEDICAL & SURGICAL HISTORY:  Deviated septum    Gout    Malignant neoplasm of thyroid gland    S/P ORIF (open reduction internal fixation) fracture  right femur     S/P ACL surgery  Right; 2016    S/P hardware removal  right femur     H/O umbilical hernia repair    History of nasal septoplasty  2020    History of tonsillectomy  as a child        MEDICATIONS  (STANDING):  allopurinol 300 milliGRAM(s) Oral daily  azithromycin  IVPB      chlorhexidine 2% Cloths 1 Application(s) Topical daily  folic acid 1 milliGRAM(s) Oral daily  ibuprofen  Tablet. 400 milliGRAM(s) Oral once  influenza   Vaccine 0.5 milliLiter(s) IntraMuscular once  levothyroxine 112 MICROGram(s) Oral daily  mupirocin 2% Ointment 1 Application(s) Both Nostrils two times a day  senna 2 Tablet(s) Oral at bedtime  sodium chloride 1 Gram(s) Oral three times a day  sodium chloride 0.9%. 1000 milliLiter(s) (75 mL/Hr) IV Continuous <Continuous>      Allergies    No Known Allergies    Intolerances        SOCIAL HISTORY:  Denies ETOh,Smoking,     FAMILY HISTORY:  No pertinent family history in first degree relatives        REVIEW OF SYSTEMS:    CONSTITUTIONAL: No weakness, fevers or chills  RESPIRATORY: No cough, wheezing, hemoptysis; No shortness of breath  CARDIOVASCULAR: No chest pain or palpitations  GASTROINTESTINAL: No abdominal or epigastric pain. No nausea, vomiting,     No diarrhea or constipation. No melena                                  6.9    3.46  )-----------( 170      ( 23 Mar 2022 14:56 )             20.4       CBC Full  -  ( 23 Mar 2022 14:56 )  WBC Count : 3.46 K/uL  RBC Count : 2.43 M/uL  Hemoglobin : 6.9 g/dL  Hematocrit : 20.4 %  Platelet Count - Automated : 170 K/uL  Mean Cell Volume : 84.0 fL  Mean Cell Hemoglobin : 28.4 pg  Mean Cell Hemoglobin Concentration : 33.8 gm/dL  Auto Neutrophil # : x  Auto Lymphocyte # : x  Auto Monocyte # : x  Auto Eosinophil # : x  Auto Basophil # : x  Auto Neutrophil % : x  Auto Lymphocyte % : x  Auto Monocyte % : x  Auto Eosinophil % : x  Auto Basophil % : x      03-23    129<L>  |  99  |  36<H>  ----------------------------<  95  4.7   |  21<L>  |  2.29<H>    Ca    8.1<L>      23 Mar 2022 03:22  Phos  4.4       Mg     1.70         TPro  6.9  /  Alb  2.6<L>  /  TBili  0.4  /  DBili  x   /  AST  46<H>  /  ALT  15  /  AlkPhos  62        CAPILLARY BLOOD GLUCOSE          Vital Signs Last 24 Hrs  T(C): 38 (23 Mar 2022 13:05), Max: 38 (23 Mar 2022 13:05)  T(F): 100.4 (23 Mar 2022 13:05), Max: 100.4 (23 Mar 2022 13:05)  HR: 101 (23 Mar 2022 13:05) (93 - 101)  BP: 111/70 (23 Mar 2022 13:05) (93/59 - 111/70)  BP(mean): --  RR: 16 (23 Mar 2022 13:05) (16 - 18)  SpO2: 98% (23 Mar 2022 13:05) (93% - 98%)        PT/INR - ( 23 Mar 2022 03:22 )   PT: 13.8 sec;   INR: 1.19 ratio         PTT - ( 23 Mar 2022 03:22 )  PTT:30.7 sec                                7.1    3.81  )-----------( 189      ( 22 Mar 2022 18:55 )             21.1       CBC Full  -  ( 22 Mar 2022 18:55 )  WBC Count : 3.81 K/uL  RBC Count : 2.54 M/uL  Hemoglobin : 7.1 g/dL  Hematocrit : 21.1 %  Platelet Count - Automated : 189 K/uL  Mean Cell Volume : 83.1 fL  Mean Cell Hemoglobin : 28.0 pg  Mean Cell Hemoglobin Concentration : 33.6 gm/dL  Auto Neutrophil # : x  Auto Lymphocyte # : x  Auto Monocyte # : x  Auto Eosinophil # : x  Auto Basophil # : x  Auto Neutrophil % : x  Auto Lymphocyte % : x  Auto Monocyte % : x  Auto Eosinophil % : x  Auto Basophil % : x          128<L>  |  99  |  31<H>  ----------------------------<  99  3.8   |  18<L>  |  1.73<H>    Ca    8.0<L>      22 Mar 2022 07:01  Phos  3.8     -  Mg     1.70         TPro  7.1  /  Alb  2.6<L>  /  TBili  0.3  /  DBili  x   /  AST  56<H>  /  ALT  16  /  AlkPhos  63        CAPILLARY BLOOD GLUCOSE          Vital Signs Last 24 Hrs  T(C): 38 (22 Mar 2022 17:28), Max: 38 (22 Mar 2022 17:28)  T(F): 100.4 (22 Mar 2022 17:28), Max: 100.4 (22 Mar 2022 17:)  HR: 105 (22 Mar 2022 16:22) (92 - 108)  BP: 112/71 (22 Mar 2022 16:22) (99/72 - 112/71)  BP(mean): --  RR: 17 (22 Mar 2022 16:22) (17 - 18)  SpO2: 95% (22 Mar 2022 16:22) (94% - 98%)    Urinalysis Basic - ( 20 Mar 2022 21:57 )    Color: Red / Appearance: Slightly Turbid / S.008 / pH: x  Gluc: x / Ketone: Negative  / Bili: Negative / Urobili: <2 mg/dL   Blood: x / Protein: 100 mg/dL / Nitrite: Negative   Leuk Esterase: Moderate / RBC: >50 /HPF / WBC 4 /HPF   Sq Epi: x / Non Sq Epi: 1 /HPF / Bacteria: Few            PHYSICAL EXAM:    Constitutional: NAD  HEENT: conjunctive   clear   Neck:  No JVD  Respiratory: CTAB  Cardiovascular: S1 and S2  Gastrointestinal: BS+, soft,   Extremities: No peripheral edema  Neurological: A/O x 3, no focal deficits

## 2022-03-23 NOTE — PROGRESS NOTE ADULT - ASSESSMENT
55 y/o man with weight loss and fatigue in recent months.  Lymphadenopathy, fairly small in size and not particularly FDG avid on a prior pet scan.  Patient with prior LN needle bx and excisional bx, neither of which showed a Lymphoproliferative disorder.  Findings on entirety of work up thus far have been more consistent with a polyconal/inflammatory process.    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    Patient had a + jammie test in my office, but blood work does not suggest an active hemolytic process     following, having issues with hematuria, which could be contributing to his anemia    1) Anemia and leukopenia- multifactorial in nature.  Patient with HIV, pyelonephritis.  Also with JULIO, AOCD, hematuria and guaiac + stool,  all likely plating a role. No evidence of active hemolysis  Preliminary bone marrow results are negative, stains are pending.  Await final results  Transfusional support as needed.   - f/u on rheum eval.    mgmt as per med, gu, GI, ID, renal.  No active heme mgmt indicated at his juncture other than supportive care. Will continue to follow up on work up.  55 y/o man with weight loss and fatigue in recent months.  Lymphadenopathy, fairly small in size and not particularly FDG avid on a prior pet scan.  Patient with prior LN needle bx and excisional bx, neither of which showed a Lymphoproliferative disorder.  Findings on entirety of work up thus far have been more consistent with a polyconal/inflammatory process.    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    Patient had a + jammie test in my office, but blood work does not suggest an active hemolytic process     following, having issues with hematuria, which could be contributing to his anemia    1) Anemia and leukopenia- multifactorial in nature.  Patient with HIV, pyelonephritis.  Also with JULIO, AOCD, hematuria and guaiac + stool,  all likely plating a role. No evidence of active hemolysis  Preliminary bone marrow results are negative, stains are pending.  Await final results. Case d/w pathology.  Transfusional support as needed.   - f/u on rheum eval.    mgmt as per med, gu, GI, ID, renal.  No active heme mgmt indicated at his juncture other than supportive care. Will continue to follow up on work up.

## 2022-03-24 LAB
ALBUMIN SERPL ELPH-MCNC: 2.4 G/DL — LOW (ref 3.3–5)
ALP SERPL-CCNC: 59 U/L — SIGNIFICANT CHANGE UP (ref 40–120)
ALT FLD-CCNC: 12 U/L — SIGNIFICANT CHANGE UP (ref 4–41)
ANION GAP SERPL CALC-SCNC: 10 MMOL/L — SIGNIFICANT CHANGE UP (ref 7–14)
ANION GAP SERPL CALC-SCNC: 9 MMOL/L — SIGNIFICANT CHANGE UP (ref 7–14)
AST SERPL-CCNC: 43 U/L — HIGH (ref 4–40)
BASOPHILS # BLD AUTO: 0.01 K/UL — SIGNIFICANT CHANGE UP (ref 0–0.2)
BASOPHILS NFR BLD AUTO: 0.3 % — SIGNIFICANT CHANGE UP (ref 0–2)
BILIRUB SERPL-MCNC: 0.4 MG/DL — SIGNIFICANT CHANGE UP (ref 0.2–1.2)
BLD GP AB SCN SERPL QL: NEGATIVE — SIGNIFICANT CHANGE UP
BUN SERPL-MCNC: 33 MG/DL — HIGH (ref 7–23)
BUN SERPL-MCNC: 36 MG/DL — HIGH (ref 7–23)
CALCIUM SERPL-MCNC: 7.5 MG/DL — LOW (ref 8.4–10.5)
CALCIUM SERPL-MCNC: 7.9 MG/DL — LOW (ref 8.4–10.5)
CHLORIDE SERPL-SCNC: 101 MMOL/L — SIGNIFICANT CHANGE UP (ref 98–107)
CHLORIDE SERPL-SCNC: 96 MMOL/L — LOW (ref 98–107)
CHROM ANALY OVERALL INTERP SPEC-IMP: SIGNIFICANT CHANGE UP
CO2 SERPL-SCNC: 16 MMOL/L — LOW (ref 22–31)
CO2 SERPL-SCNC: 17 MMOL/L — LOW (ref 22–31)
CREAT SERPL-MCNC: 2.3 MG/DL — HIGH (ref 0.5–1.3)
CREAT SERPL-MCNC: 2.44 MG/DL — HIGH (ref 0.5–1.3)
EBV DNA SERPL NAA+PROBE-ACNC: SIGNIFICANT CHANGE UP IU/ML
EGFR: 30 ML/MIN/1.73M2 — LOW
EGFR: 32 ML/MIN/1.73M2 — LOW
EOSINOPHIL # BLD AUTO: 0.03 K/UL — SIGNIFICANT CHANGE UP (ref 0–0.5)
EOSINOPHIL NFR BLD AUTO: 0.9 % — SIGNIFICANT CHANGE UP (ref 0–6)
FUNGITELL: >500 PG/ML — HIGH
GLUCOSE SERPL-MCNC: 84 MG/DL — SIGNIFICANT CHANGE UP (ref 70–99)
GLUCOSE SERPL-MCNC: 91 MG/DL — SIGNIFICANT CHANGE UP (ref 70–99)
HCT VFR BLD CALC: 21.5 % — LOW (ref 39–50)
HGB BLD-MCNC: 7.3 G/DL — LOW (ref 13–17)
IANC: 2.45 K/UL — SIGNIFICANT CHANGE UP (ref 1.8–7.4)
IGA FLD-MCNC: 292 MG/DL — SIGNIFICANT CHANGE UP (ref 84–499)
IGG FLD-MCNC: 2110 MG/DL — HIGH (ref 610–1660)
IGM SERPL-MCNC: 166 MG/DL — SIGNIFICANT CHANGE UP (ref 35–242)
IMM GRANULOCYTES NFR BLD AUTO: 1.2 % — SIGNIFICANT CHANGE UP (ref 0–1.5)
KAPPA LC SER QL IFE: 42.82 MG/DL — HIGH (ref 0.33–1.94)
KAPPA/LAMBDA FREE LIGHT CHAIN RATIO, SERUM: 1.23 RATIO — SIGNIFICANT CHANGE UP (ref 0.26–1.65)
LAMBDA LC SER QL IFE: 34.76 MG/DL — HIGH (ref 0.57–2.63)
LYMPHOCYTES # BLD AUTO: 0.48 K/UL — LOW (ref 1–3.3)
LYMPHOCYTES # BLD AUTO: 14.7 % — SIGNIFICANT CHANGE UP (ref 13–44)
MAGNESIUM SERPL-MCNC: 1.6 MG/DL — SIGNIFICANT CHANGE UP (ref 1.6–2.6)
MCHC RBC-ENTMCNC: 28.5 PG — SIGNIFICANT CHANGE UP (ref 27–34)
MCHC RBC-ENTMCNC: 34 GM/DL — SIGNIFICANT CHANGE UP (ref 32–36)
MCV RBC AUTO: 84 FL — SIGNIFICANT CHANGE UP (ref 80–100)
MONOCYTES # BLD AUTO: 0.25 K/UL — SIGNIFICANT CHANGE UP (ref 0–0.9)
MONOCYTES NFR BLD AUTO: 7.7 % — SIGNIFICANT CHANGE UP (ref 2–14)
NEUTROPHILS # BLD AUTO: 2.45 K/UL — SIGNIFICANT CHANGE UP (ref 1.8–7.4)
NEUTROPHILS NFR BLD AUTO: 75.2 % — SIGNIFICANT CHANGE UP (ref 43–77)
NRBC # BLD: 0 /100 WBCS — SIGNIFICANT CHANGE UP
NRBC # FLD: 0 K/UL — SIGNIFICANT CHANGE UP
PHOSPHATE SERPL-MCNC: 3.5 MG/DL — SIGNIFICANT CHANGE UP (ref 2.5–4.5)
PLATELET # BLD AUTO: 160 K/UL — SIGNIFICANT CHANGE UP (ref 150–400)
POTASSIUM SERPL-MCNC: 4.1 MMOL/L — SIGNIFICANT CHANGE UP (ref 3.5–5.3)
POTASSIUM SERPL-MCNC: 4.2 MMOL/L — SIGNIFICANT CHANGE UP (ref 3.5–5.3)
POTASSIUM SERPL-SCNC: 4.1 MMOL/L — SIGNIFICANT CHANGE UP (ref 3.5–5.3)
POTASSIUM SERPL-SCNC: 4.2 MMOL/L — SIGNIFICANT CHANGE UP (ref 3.5–5.3)
PROT SERPL-MCNC: 6.4 G/DL — SIGNIFICANT CHANGE UP (ref 6–8.3)
RBC # BLD: 2.56 M/UL — LOW (ref 4.2–5.8)
RBC # FLD: 16.2 % — HIGH (ref 10.3–14.5)
RH IG SCN BLD-IMP: POSITIVE — SIGNIFICANT CHANGE UP
RPR SER-TITR: (no result)
RPR SERPL-ACNC: REACTIVE
SODIUM SERPL-SCNC: 122 MMOL/L — LOW (ref 135–145)
SODIUM SERPL-SCNC: 127 MMOL/L — LOW (ref 135–145)
T PALLIDUM AB TITR SER: POSITIVE
WBC # BLD: 3.26 K/UL — LOW (ref 3.8–10.5)
WBC # FLD AUTO: 3.26 K/UL — LOW (ref 3.8–10.5)

## 2022-03-24 PROCEDURE — 99233 SBSQ HOSP IP/OBS HIGH 50: CPT

## 2022-03-24 RX ORDER — OXYCODONE HYDROCHLORIDE 5 MG/1
5 TABLET ORAL EVERY 6 HOURS
Refills: 0 | Status: DISCONTINUED | OUTPATIENT
Start: 2022-03-24 | End: 2022-03-30

## 2022-03-24 RX ORDER — POLYETHYLENE GLYCOL 3350 17 G/17G
17 POWDER, FOR SOLUTION ORAL DAILY
Refills: 0 | Status: DISCONTINUED | OUTPATIENT
Start: 2022-03-24 | End: 2022-03-30

## 2022-03-24 RX ORDER — TOLVAPTAN 15 MG/1
15 TABLET ORAL ONCE
Refills: 0 | Status: COMPLETED | OUTPATIENT
Start: 2022-03-24 | End: 2022-03-24

## 2022-03-24 RX ADMIN — ATOVAQUONE 750 MILLIGRAM(S): 750 SUSPENSION ORAL at 06:49

## 2022-03-24 RX ADMIN — SENNA PLUS 2 TABLET(S): 8.6 TABLET ORAL at 21:54

## 2022-03-24 RX ADMIN — Medication 112 MICROGRAM(S): at 06:37

## 2022-03-24 RX ADMIN — SODIUM CHLORIDE 1 GRAM(S): 9 INJECTION INTRAMUSCULAR; INTRAVENOUS; SUBCUTANEOUS at 06:37

## 2022-03-24 RX ADMIN — Medication 650 MILLIGRAM(S): at 03:12

## 2022-03-24 RX ADMIN — BICTEGRAVIR SODIUM, EMTRICITABINE, AND TENOFOVIR ALAFENAMIDE FUMARATE 1 TABLET(S): 30; 120; 15 TABLET ORAL at 13:10

## 2022-03-24 RX ADMIN — OXYCODONE HYDROCHLORIDE 5 MILLIGRAM(S): 5 TABLET ORAL at 12:47

## 2022-03-24 RX ADMIN — CHLORHEXIDINE GLUCONATE 1 APPLICATION(S): 213 SOLUTION TOPICAL at 13:11

## 2022-03-24 RX ADMIN — Medication 100 MILLIGRAM(S): at 13:27

## 2022-03-24 RX ADMIN — SODIUM CHLORIDE 100 MILLILITER(S): 9 INJECTION INTRAMUSCULAR; INTRAVENOUS; SUBCUTANEOUS at 19:07

## 2022-03-24 RX ADMIN — TOLVAPTAN 15 MILLIGRAM(S): 15 TABLET ORAL at 11:43

## 2022-03-24 RX ADMIN — PANTOPRAZOLE SODIUM 40 MILLIGRAM(S): 20 TABLET, DELAYED RELEASE ORAL at 06:37

## 2022-03-24 RX ADMIN — MUPIROCIN 1 APPLICATION(S): 20 OINTMENT TOPICAL at 06:38

## 2022-03-24 RX ADMIN — Medication 5 MILLIGRAM(S): at 17:49

## 2022-03-24 RX ADMIN — SODIUM CHLORIDE 1 GRAM(S): 9 INJECTION INTRAMUSCULAR; INTRAVENOUS; SUBCUTANEOUS at 13:10

## 2022-03-24 RX ADMIN — PANTOPRAZOLE SODIUM 40 MILLIGRAM(S): 20 TABLET, DELAYED RELEASE ORAL at 17:46

## 2022-03-24 RX ADMIN — Medication 1 MILLIGRAM(S): at 13:10

## 2022-03-24 RX ADMIN — MUPIROCIN 1 APPLICATION(S): 20 OINTMENT TOPICAL at 17:44

## 2022-03-24 RX ADMIN — OXYCODONE HYDROCHLORIDE 5 MILLIGRAM(S): 5 TABLET ORAL at 13:47

## 2022-03-24 RX ADMIN — Medication 5 MILLIGRAM(S): at 06:37

## 2022-03-24 RX ADMIN — Medication 650 MILLIGRAM(S): at 02:12

## 2022-03-24 RX ADMIN — SODIUM CHLORIDE 1 GRAM(S): 9 INJECTION INTRAMUSCULAR; INTRAVENOUS; SUBCUTANEOUS at 21:54

## 2022-03-24 RX ADMIN — ATOVAQUONE 750 MILLIGRAM(S): 750 SUSPENSION ORAL at 17:44

## 2022-03-24 NOTE — PROGRESS NOTE ADULT - ASSESSMENT
_________________________________________________________________________________________  ========>>  M E D I C A L   A T T E N D I N G    F O L L O W  U P  N O T E  <<=========  -----------------------------------------------------------------------------------------------------    - Patient seen and examined by me earlier today.   - In summary,  KATHRYN MALIN is a 56y year old man admitted with hematuria..   - Patient today overall doing fairly, comfortable, eating OK.  and comfortable, asking to go home       many questions from pt and wife answered , also discussed case with specialists involved..        ==================>> REVIEW OF SYSTEM <<=================    GEN: no fever, no chills, as above   RESP: no SOB at rest, occasional cough, not much sputum   CVS: no chest pain, no palpitations, no edema  GI: no abdominal pain, no nausea, normal brown stool today no more hematochezia reported   :  less hematuria in foely bag,  otherwise ok   Neuro: no headache, no dizziness  Derm : no itching, no rash    ==================>> PHYSICAL EXAM <<=================    GEN: A&O X 3 , NAD , comfortable, pleasant, not anxious   HEENT: NCAT, PERRL, MMM, hearing intact  Neck: supple , no JVD appreciated  CVS: S1S2 , regular , no murmur appreciated   PULM: CTA B/L,  limited exam as not taking deep breaths   ABD.: soft. non tender, non distended,  bowel sounds present  Extrem: intact pulses , no edema      + cho with red tinged urine   PSYCH : flat affect, somewhat anxious                             ( Note written / Date of service 03-24-22 )    ==================>> MEDICATIONS <<====================    allopurinol 100 milliGRAM(s) Oral daily  atovaquone  Suspension 750 milliGRAM(s) Oral two times a day  bictegravir 50 mG/emtricitabine 200 mG/tenofovir alafenamide 25 mG (BIKTARVY) 1 Tablet(s) Oral daily  chlorhexidine 2% Cloths 1 Application(s) Topical daily  folic acid 1 milliGRAM(s) Oral daily  influenza   Vaccine 0.5 milliLiter(s) IntraMuscular once  levothyroxine 112 MICROGram(s) Oral daily  mupirocin 2% Ointment 1 Application(s) Both Nostrils two times a day  oxybutynin 5 milliGRAM(s) Oral two times a day  pantoprazole  Injectable 40 milliGRAM(s) IV Push two times a day  polyethylene glycol 3350 17 Gram(s) Oral daily  senna 2 Tablet(s) Oral at bedtime  sodium chloride 1 Gram(s) Oral three times a day  sodium chloride 0.9%. 1000 milliLiter(s) IV Continuous <Continuous>    MEDICATIONS  (PRN):  acetaminophen     Tablet .. 650 milliGRAM(s) Oral every 6 hours PRN Temp greater or equal to 38C (100.4F), Mild Pain (1 - 3)  aluminum hydroxide/magnesium hydroxide/simethicone Suspension 30 milliLiter(s) Oral every 4 hours PRN Dyspepsia  bisacodyl Suppository 10 milliGRAM(s) Rectal once PRN Constipation  guaiFENesin Oral Liquid (Sugar-Free) 100 milliGRAM(s) Oral every 6 hours PRN Cough  hydrocodone/homatropine Syrup 5 milliLiter(s) Oral every 6 hours PRN Cough  melatonin 3 milliGRAM(s) Oral at bedtime PRN Insomnia  ondansetron Injectable 4 milliGRAM(s) IV Push every 8 hours PRN Nausea and/or Vomiting  oxyCODONE    IR 5 milliGRAM(s) Oral every 6 hours PRN moderate pain 4-6, severe pain 7-10  sodium chloride 0.65% Nasal 1 Spray(s) Both Nostrils four times a day PRN Nasal Congestion    ___________  Active diet:  Diet, NPO after Midnight:      NPO Start Date: 23-Mar-2022,   NPO Start Time: 23:59  Diet, Regular:   1200mL Fluid Restriction ([id number])  ___________________    ==================>> VITAL SIGNS <<==================    Vital Signs Last 24 HrsT(C): 37.2 (03-24-22 @ 12:15)  T(F): 98.9 (03-24-22 @ 12:15), Max: 101.5 (03-24-22 @ 02:03)  HR: 101 (03-24-22 @ 12:15) (98 - 110)  BP: 114/82 (03-24-22 @ 12:15)  RR: 16 (03-24-22 @ 12:15) (16 - 18)  SpO2: 98% (03-24-22 @ 12:15) (95% - 100%)       ==================>> LAB AND IMAGING <<==================                        7.3    3.26  )-----------( 160      ( 24 Mar 2022 04:42 )             21.5        03-24    127<L>  |  101  |  33<H>  ----------------------------<  84  4.1   |  16<L>  |  2.30<H>    Ca    7.9<L>      24 Mar 2022 13:17  Phos  3.5     03-24  Mg     1.60     03-24    TPro  6.4  /  Alb  2.4<L>  /  TBili  0.4  /  DBili  x   /  AST  43<H>  /  ALT  12  /  AlkPhos  59  03-24    WBC count:   3.26 <<== ,  5.40 <<== ,  3.46 <<== ,  2.76 <<== ,  3.81 <<== ,  3.24 <<==   Hemoglobin:   7.3 <<==,  8.6 <<==,  6.9 <<==,  6.5 <<==,  7.1 <<==,  6.8 <<==  platelets:  160 <==, 225 <==, 170 <==, 178 <==, 189 <==, 193 <==, 202 <==    Creatinine:  2.30  <<==, 2.44  <<==, 2.29  <<==, 1.73  <<==, 1.46  <<==, 1.73  <<==  Sodium:   127  <==, 122  <==, 129  <==, 128  <==, 135  <==, 132  <==, 127  <==       AST:          43 <== , 46 <== , 56 <== , 74 <== , 65 <==      ALT:        12  <== , 15  <== , 16  <== , 20  <== , 18  <==      AP:        59  <=, 62  <=, 63  <=, 71  <=, 60  <=     Bili:        0.4  <=, 0.4  <=, 0.3  <=, 0.4  <=, 0.3  <=    _______________________  C U L T U R E S :    Culture - Sputum (collected 21 Mar 2022 17:41)  Source: .Sputum Sputum  Gram Stain (21 Mar 2022 23:27):    Few polymorphonuclear leukocytes per low power field    Rare Squamous epithelial cells per low power field    Few Gram Negative Rods per oil power field    Few Gram positive cocci in pairs per oil power field    Few Yeast like cells per oil power field    Few Gram Positive Rods per oil power field  Final Report (23 Mar 2022 17:10):    Normal Respiratory Jana present    Culture - Urine (collected 17 Mar 2022 18:50)  Source: Clean Catch Clean Catch (Midstream)  Final Report (19 Mar 2022 07:35):    No growth    Culture - Blood (collected 17 Mar 2022 18:15)  Source: .Blood Blood-Peripheral  Final Report (22 Mar 2022 23:00):    No Growth Final    Culture - Blood (collected 17 Mar 2022 17:55)  Source: .Blood Blood-Venous  Final Report (22 Mar 2022 23:00):    No Growth Final    COVID-19 PCR: NotDetec (03-23-22 @ 13:51)  SARS-CoV-2: NotDetec (03-18-22 @ 19:47)       HIV Result: HIV-1 Pos (03.19.22 @ 12:54) ( confirmatory test )  HIV-1 RNA Quantitative, Viral Load: 6,038,892 (03.20.22 @ 11:59)  ABS CD4: 18 /uL (03.22.22 @ 09:13)    other available results noted including EBV, ANCA...     Hematopathology Report:   ACCESSION No:  [id number]  Patient:   KATHRYN MALIN  Accession:                             [id number]  Collected Date/Time:                   3/18/2022 17:30 EDT  Received Date/Time:                    3/19/2022 11:22 EDT  Hematopathology Report - Auth (Verified)  Specimen(s) Submitted  1- Right iliac bone  2- Bone marrow aspirate    Final Diagnosis  1, 2, 3. Bone marrow biopsy, bone marrow clot, and bone marrow aspirate  -Normocellular bone marrow with trilineage hematopoiesis with  maturation and a proportion of smaller megakaryocytes.  - Iron stores are present.  See note and description.    Diagnostic note:  Per chart review, patient has a recent history of pancytopenia.  The current bone marrow biopsy shows nodefinitive evidence of a  lymphoproliferative disorder. Flow cytometry shows a reversed CD4 to CD8  ratio of the T-cells.    Correlation with patient's recent clinical and laboratory findings and  ancillary testing (including cytogenetics/karyotype and additional  surgical pathology reports (39-J-, 71-QX-) are recommended.  Comprehensive report with results of pending ancillary studies to follow.    Dr. Muhammad was notified of the diagnosis on 03/23/2022.    Ancillary studies  Special stains:   Bone marrow aspirate iron stain:   No spicules are  present  to evaluate for iron stores and there are insufficient nRBC to evaluate  for ring sideroblasts.    Flow cytometry:  The myeloid immunophenotypic findings show normal myeloid  granularity, mild monocytosis with normal immunophenotype, no increase  in myeloid immaturity, and normal myeloid antigen maturation pattern.  The  lymphoid immunophenotypic   findings show T-cells with reversed CD4 to CD8  ratio and polytypic B-cells with kappa predominance   _  Immunohistochemical stains:    (block 1A: CD34, , myeloperoxidase,  CD71, E-cadherin, factor VIII, CD15, CD61, CD3 and CD20) show no  significant increase in CD34 positive blasts (less than 3%), with normal  proportionof myeloids (positive with myeloperoxidase and CD15) to  erythroid elements (positive with CD71) including slightly increased  pronormoblasts (positive with E-cadherin, ), and  megakaryocytes  are overall, normal in number with a proportion thatare smaller in size  (positive with factor VIII, CD61). CD3 shows normal proportion of T-cells  with rare B-cells (positive with CD20).  In-situ hybridization   (kappa, lambda) is pending and will be reported in  an addendum.  Cytogenetics/karyotype:    pending  Comment  Iron stain (examined toevaluate for iron stores; see microscopic  description) and Giemsa stain (shows appropriate staining pattern) are  performed and evaluated on block(s): 1A, 2A.  Verified by: Janelle Gruber MD  (Electronic Signature)  Reported on: 03/23/22 10:10 EDT, [facility], [address] [place] [address]  Phone: [phone]   Fax: [phone]  _________________________________________________________________      < from: CT Abdomen and Pelvis No Cont (03.20.22 @ 15:11) >  IMPRESSION:  New groundglass opacities are seen within the left upper lobe, right   middle lobe and left lower lobe likely representing infection.    Mild left hydronephrosis without evidence of obstructing calculi.   Non obstructing calculi are seen bilaterally within the kidneys.    Heterogeneous hyperdense material within the bladder and left renal   collecting system, which may represent hemorrhagic products.  < end of copied text >    ___________________________________________________________________________________  ===============>>  A S S E S S M E N T   A N D   P L A N <<===============  ------------------------------------------------------------------------------------------    · Assessment	  56 yr old male presenting with urinary retention found to have sepsis 2/2 pyelonephritis     Problem/Plan - 1:  ·  Problem: sepsis, in setting of acute HIV / possible AIDS with suspect opportunistic infections / PCP / EBV..       concern for hemorrhagic cystitis / pyelonephritis /infected / impacted stone    workup in process   off Abx for now : monitoring   ID appreciated   started on HAART /  Mepron for PCP prophylaxis  finish Zithromax as ordered   concern for CMV infection > workup per ID , follow one marrow results as well    ** appreciated  follow up    plan for Cystoscopy today    pt post PRBC transfusions to optimize      otherwise medically stable for procedure despite chronic hyponatremia, anemia..     Problem/Plan - 2:  ·  Problem: pt with past + GIANNA, + jammie + ANCA      pt also with h/o sinusitis, and now Centaurea      rule out autoimmune disorders ./ Trever's / good pastures.. etc..      rheumatology following          work up for vasculitis in process   most likely all related to HIV / AIDS   Bone marrow Bx as above with no significant abnormalities     Problem/Plan - 3:  ·  Problem: Anemia. multifactorial   BM biopsy > follow final results   Hematology on board , appreciated   montior  transfuse further as needed   occult blood  + and + hematochezia before > stopped    GI consulted / appreciated      eventual scope before DC vs OP     Problem/Plan - 4:  ·  Problem: urinary retention and obstructive Uropathy with JULIO   monitor BMP, I/O     crea improved with IVH > continue    following    **Hyponatremia     repeat labs ordered     pt with flank pain > likely SIADH      nephrology on board, following        Tolvaptan as ordered     Problem/Plan - 5:  ·  Problem: Hypothyroidism post total thyroidectomy for cancer   Continue home levothyroxine.  Thyroid Stimulating Hormone, Serum: 4.06 uIU/mL (03.17.22 @ 18:46)    OOB, ambulate as able   nutrition   --------------------------------------------  Case discussed with pt, wife at bedside, PA...   Education given on findings and plan of care    ___________________________  H. TAZ Edwards.  Pager: [phone]       _________________________________________________________________________________________  ========>>  M E D I C A L   A T T E N D I N G    F O L L O W  U P  N O T E  <<=========  -----------------------------------------------------------------------------------------------------    - Patient seen and examined by me earlier today.   - In summary,  KATHRYN MALIN is a 56y year old man admitted with hematuria..   - Patient today overall doing fairly, comfortable, eating OK.  and comfortable, asking to go home       many questions from pt and wife answered , also discussed case with specialists involved..        ==================>> REVIEW OF SYSTEM <<=================    GEN: no fever, no chills, as above   RESP: no SOB at rest, occasional cough, not much sputum   CVS: no chest pain, no palpitations, no edema  GI: no abdominal pain, no nausea, normal brown stool today no more hematochezia reported   :  less hematuria in foely bag,  otherwise ok   Neuro: no headache, no dizziness  Derm : no itching, no rash    ==================>> PHYSICAL EXAM <<=================    GEN: A&O X 3 , NAD , comfortable, pleasant, not anxious   HEENT: NCAT, PERRL, MMM, hearing intact  Neck: supple , no JVD appreciated  CVS: S1S2 , regular , no murmur appreciated   PULM: CTA B/L,  limited exam as not taking deep breaths   ABD.: soft. non tender, non distended,  bowel sounds present  Extrem: intact pulses , no edema      + cho with red tinged urine   PSYCH : flat affect, somewhat anxious                             ( Note written / Date of service 03-24-22 )    ==================>> MEDICATIONS <<====================    allopurinol 100 milliGRAM(s) Oral daily  atovaquone  Suspension 750 milliGRAM(s) Oral two times a day  bictegravir 50 mG/emtricitabine 200 mG/tenofovir alafenamide 25 mG (BIKTARVY) 1 Tablet(s) Oral daily  chlorhexidine 2% Cloths 1 Application(s) Topical daily  folic acid 1 milliGRAM(s) Oral daily  influenza   Vaccine 0.5 milliLiter(s) IntraMuscular once  levothyroxine 112 MICROGram(s) Oral daily  mupirocin 2% Ointment 1 Application(s) Both Nostrils two times a day  oxybutynin 5 milliGRAM(s) Oral two times a day  pantoprazole  Injectable 40 milliGRAM(s) IV Push two times a day  polyethylene glycol 3350 17 Gram(s) Oral daily  senna 2 Tablet(s) Oral at bedtime  sodium chloride 1 Gram(s) Oral three times a day  sodium chloride 0.9%. 1000 milliLiter(s) IV Continuous <Continuous>    MEDICATIONS  (PRN):  acetaminophen     Tablet .. 650 milliGRAM(s) Oral every 6 hours PRN Temp greater or equal to 38C (100.4F), Mild Pain (1 - 3)  aluminum hydroxide/magnesium hydroxide/simethicone Suspension 30 milliLiter(s) Oral every 4 hours PRN Dyspepsia  bisacodyl Suppository 10 milliGRAM(s) Rectal once PRN Constipation  guaiFENesin Oral Liquid (Sugar-Free) 100 milliGRAM(s) Oral every 6 hours PRN Cough  hydrocodone/homatropine Syrup 5 milliLiter(s) Oral every 6 hours PRN Cough  melatonin 3 milliGRAM(s) Oral at bedtime PRN Insomnia  ondansetron Injectable 4 milliGRAM(s) IV Push every 8 hours PRN Nausea and/or Vomiting  oxyCODONE    IR 5 milliGRAM(s) Oral every 6 hours PRN moderate pain 4-6, severe pain 7-10  sodium chloride 0.65% Nasal 1 Spray(s) Both Nostrils four times a day PRN Nasal Congestion    ___________  Active diet:  Diet, NPO after Midnight:      NPO Start Date: 23-Mar-2022,   NPO Start Time: 23:59  Diet, Regular:   1200mL Fluid Restriction ([id number])  ___________________    ==================>> VITAL SIGNS <<==================    Vital Signs Last 24 HrsT(C): 37.2 (03-24-22 @ 12:15)  T(F): 98.9 (03-24-22 @ 12:15), Max: 101.5 (03-24-22 @ 02:03)  HR: 101 (03-24-22 @ 12:15) (98 - 110)  BP: 114/82 (03-24-22 @ 12:15)  RR: 16 (03-24-22 @ 12:15) (16 - 18)  SpO2: 98% (03-24-22 @ 12:15) (95% - 100%)       ==================>> LAB AND IMAGING <<==================                        7.3    3.26  )-----------( 160      ( 24 Mar 2022 04:42 )             21.5        03-24    127<L>  |  101  |  33<H>  ----------------------------<  84  4.1   |  16<L>  |  2.30<H>    Ca    7.9<L>      24 Mar 2022 13:17  Phos  3.5     03-24  Mg     1.60     03-24    TPro  6.4  /  Alb  2.4<L>  /  TBili  0.4  /  DBili  x   /  AST  43<H>  /  ALT  12  /  AlkPhos  59  03-24    WBC count:   3.26 <<== ,  5.40 <<== ,  3.46 <<== ,  2.76 <<== ,  3.81 <<== ,  3.24 <<==   Hemoglobin:   7.3 <<==,  8.6 <<==,  6.9 <<==,  6.5 <<==,  7.1 <<==,  6.8 <<==  platelets:  160 <==, 225 <==, 170 <==, 178 <==, 189 <==, 193 <==, 202 <==    Creatinine:  2.30  <<==, 2.44  <<==, 2.29  <<==, 1.73  <<==, 1.46  <<==, 1.73  <<==  Sodium:   127  <==, 122  <==, 129  <==, 128  <==, 135  <==, 132  <==, 127  <==       AST:          43 <== , 46 <== , 56 <== , 74 <== , 65 <==      ALT:        12  <== , 15  <== , 16  <== , 20  <== , 18  <==      AP:        59  <=, 62  <=, 63  <=, 71  <=, 60  <=     Bili:        0.4  <=, 0.4  <=, 0.3  <=, 0.4  <=, 0.3  <=    _______________________  C U L T U R E S :    Culture - Sputum (collected 21 Mar 2022 17:41)  Source: .Sputum Sputum  Gram Stain (21 Mar 2022 23:27):    Few polymorphonuclear leukocytes per low power field    Rare Squamous epithelial cells per low power field    Few Gram Negative Rods per oil power field    Few Gram positive cocci in pairs per oil power field    Few Yeast like cells per oil power field    Few Gram Positive Rods per oil power field  Final Report (23 Mar 2022 17:10):    Normal Respiratory Jana present    Culture - Urine (collected 17 Mar 2022 18:50)  Source: Clean Catch Clean Catch (Midstream)  Final Report (19 Mar 2022 07:35):    No growth    Culture - Blood (collected 17 Mar 2022 18:15)  Source: .Blood Blood-Peripheral  Final Report (22 Mar 2022 23:00):    No Growth Final    Culture - Blood (collected 17 Mar 2022 17:55)  Source: .Blood Blood-Venous  Final Report (22 Mar 2022 23:00):    No Growth Final    COVID-19 PCR: NotDetec (03-23-22 @ 13:51)  SARS-CoV-2: NotDetec (03-18-22 @ 19:47)       HIV Result: HIV-1 Pos (03.19.22 @ 12:54) ( confirmatory test )  HIV-1 RNA Quantitative, Viral Load: 6,038,892 (03.20.22 @ 11:59)  ABS CD4: 18 /uL (03.22.22 @ 09:13)    other available results noted including EBV, ANCA...     Hematopathology Report:   ACCESSION No:  [id number]  Patient:   KATHRYN MALIN  Accession:                             [id number]  Collected Date/Time:                   3/18/2022 17:30 EDT  Received Date/Time:                    3/19/2022 11:22 EDT  Hematopathology Report - Auth (Verified)  Specimen(s) Submitted  1- Right iliac bone  2- Bone marrow aspirate    Final Diagnosis  1, 2, 3. Bone marrow biopsy, bone marrow clot, and bone marrow aspirate  -Normocellular bone marrow with trilineage hematopoiesis with  maturation and a proportion of smaller megakaryocytes.  - Iron stores are present.  See note and description.    Diagnostic note:  Per chart review, patient has a recent history of pancytopenia.  The current bone marrow biopsy shows nodefinitive evidence of a  lymphoproliferative disorder. Flow cytometry shows a reversed CD4 to CD8  ratio of the T-cells.    Correlation with patient's recent clinical and laboratory findings and  ancillary testing (including cytogenetics/karyotype and additional  surgical pathology reports (72-U-, 32-WT-) are recommended.  Comprehensive report with results of pending ancillary studies to follow.    Dr. Muhammad was notified of the diagnosis on 03/23/2022.    Ancillary studies  Special stains:   Bone marrow aspirate iron stain:   No spicules are  present  to evaluate for iron stores and there are insufficient nRBC to evaluate  for ring sideroblasts.    Flow cytometry:  The myeloid immunophenotypic findings show normal myeloid  granularity, mild monocytosis with normal immunophenotype, no increase  in myeloid immaturity, and normal myeloid antigen maturation pattern.  The  lymphoid immunophenotypic   findings show T-cells with reversed CD4 to CD8  ratio and polytypic B-cells with kappa predominance   _  Immunohistochemical stains:    (block 1A: CD34, , myeloperoxidase,  CD71, E-cadherin, factor VIII, CD15, CD61, CD3 and CD20) show no  significant increase in CD34 positive blasts (less than 3%), with normal  proportionof myeloids (positive with myeloperoxidase and CD15) to  erythroid elements (positive with CD71) including slightly increased  pronormoblasts (positive with E-cadherin, ), and  megakaryocytes  are overall, normal in number with a proportion thatare smaller in size  (positive with factor VIII, CD61). CD3 shows normal proportion of T-cells  with rare B-cells (positive with CD20).  In-situ hybridization   (kappa, lambda) is pending and will be reported in  an addendum.  Cytogenetics/karyotype:    pending  Comment  Iron stain (examined toevaluate for iron stores; see microscopic  description) and Giemsa stain (shows appropriate staining pattern) are  performed and evaluated on block(s): 1A, 2A.  Verified by: Janelle Gruber MD  (Electronic Signature)  Reported on: 03/23/22 10:10 EDT, [facility], [address] [place] [address]  Phone: [phone]   Fax: [phone]  _________________________________________________________________      < from: CT Abdomen and Pelvis No Cont (03.20.22 @ 15:11) >  IMPRESSION:  New groundglass opacities are seen within the left upper lobe, right   middle lobe and left lower lobe likely representing infection.    Mild left hydronephrosis without evidence of obstructing calculi.   Non obstructing calculi are seen bilaterally within the kidneys.    Heterogeneous hyperdense material within the bladder and left renal   collecting system, which may represent hemorrhagic products.  < end of copied text >    ___________________________________________________________________________________  ===============>>  A S S E S S M E N T   A N D   P L A N <<===============  ------------------------------------------------------------------------------------------    · Assessment	  56 yr old male presenting with urinary retention found to have sepsis 2/2 pyelonephritis     Problem/Plan - 1:  ·  Problem: sepsis, in setting of acute HIV / possible AIDS with suspect opportunistic infections / PCP / EBV..       concern for hemorrhagic cystitis / pyelonephritis /infected / impacted stone    workup in process   off Abx for now : monitoring   ID appreciated   started on HAART /  Mepron for PCP prophylaxis  finish Zithromax as ordered   concern for CMV infection > workup per ID , follow one marrow results as well    ** appreciated  follow up    plan for Cystoscopy today    pt post PRBC transfusions to optimize      otherwise medically stable for procedure despite chronic hyponatremia, anemia..     Problem/Plan - 2:  ·  Problem: pt with past + GIANNA, + jammie + ANCA      pt also with h/o sinusitis, and now Centaurea      rule out autoimmune disorders ./ Trever's / good pastures.. etc..      rheumatology following          work up for vasculitis in process   most likely all related to HIV / AIDS   Bone marrow Bx as above with no significant abnormalities     Problem/Plan - 3:  ·  Problem: Anemia. multifactorial   BM biopsy > follow final results   Hematology on board , appreciated   montior  transfuse further as needed   occult blood  + and + hematochezia before > stopped    GI consulted / appreciated      eventual scope before DC vs OP     Problem/Plan - 4:  ·  Problem: urinary retention and obstructive Uropathy with JULIO   monitor BMP, I/O     crea improved with IVH > continue    following    **Hyponatremia     repeat labs ordered     pt with flank pain > likely SIADH      nephrology on board, following        Tolvaptan as ordered     Problem/Plan - 5:  ·  Problem: Hypothyroidism post total thyroidectomy for cancer   Continue home levothyroxine.  Thyroid Stimulating Hormone, Serum: 4.06 uIU/mL (03.17.22 @ 18:46)    OOB, ambulate as able   nutrition   --------------------------------------------  Case discussed with pt, wife at bedside, NP...   Education given on findings and plan of care    ___________________________  H. TAZ Edwards.  Pager: [phone]

## 2022-03-24 NOTE — PROGRESS NOTE ADULT - SUBJECTIVE AND OBJECTIVE BOX
Patient is a 56y old  Male who presents with a chief complaint of urinary retention (24 Mar 2022 16:04)    says that he is feeling a lot better. Still with hematuria and remains on CBO. has a cough. No CP or SOB. No N/V/D. No HA    Medication:   acetaminophen     Tablet .. 650 milliGRAM(s) Oral every 6 hours PRN  allopurinol 100 milliGRAM(s) Oral daily  aluminum hydroxide/magnesium hydroxide/simethicone Suspension 30 milliLiter(s) Oral every 4 hours PRN  atovaquone  Suspension 750 milliGRAM(s) Oral two times a day  bictegravir 50 mG/emtricitabine 200 mG/tenofovir alafenamide 25 mG (BIKTARVY) 1 Tablet(s) Oral daily  bisacodyl Suppository 10 milliGRAM(s) Rectal once PRN  chlorhexidine 2% Cloths 1 Application(s) Topical daily  folic acid 1 milliGRAM(s) Oral daily  guaiFENesin Oral Liquid (Sugar-Free) 100 milliGRAM(s) Oral every 6 hours PRN  hydrocodone/homatropine Syrup 5 milliLiter(s) Oral every 6 hours PRN  influenza   Vaccine 0.5 milliLiter(s) IntraMuscular once  levothyroxine 112 MICROGram(s) Oral daily  melatonin 3 milliGRAM(s) Oral at bedtime PRN  mupirocin 2% Ointment 1 Application(s) Both Nostrils two times a day  ondansetron Injectable 4 milliGRAM(s) IV Push every 8 hours PRN  oxybutynin 5 milliGRAM(s) Oral two times a day  oxyCODONE    IR 5 milliGRAM(s) Oral every 6 hours PRN  pantoprazole  Injectable 40 milliGRAM(s) IV Push two times a day  polyethylene glycol 3350 17 Gram(s) Oral daily  senna 2 Tablet(s) Oral at bedtime  sodium chloride 1 Gram(s) Oral three times a day  sodium chloride 0.65% Nasal 1 Spray(s) Both Nostrils four times a day PRN  sodium chloride 0.9%. 1000 milliLiter(s) IV Continuous <Continuous>      Physical exam    T(C): 37.2 (03-24-22 @ 12:15), Max: 38.6 (03-24-22 @ 02:03)  HR: 101 (03-24-22 @ 12:15) (98 - 110)  BP: 114/82 (03-24-22 @ 12:15) (114/81 - 117/76)  RR: 16 (03-24-22 @ 12:15) (16 - 18)  SpO2: 98% (03-24-22 @ 12:15) (95% - 100%)  Wt(kg): --    alert NAD  EOMI anicteric sclera  Cv s1 S2 RRR  Lungs clear B/L  abd soft NT ND +BS  No LE edema or tenderness    Labs                        7.3    3.26  )-----------( 160      ( 24 Mar 2022 04:42 )             21.5       03-24    127<L>  |  101  |  33<H>  ----------------------------<  84  4.1   |  16<L>  |  2.30<H>    Ca    7.9<L>      24 Mar 2022 13:17  Phos  3.5     03-24  Mg     1.60     03-24    TPro  6.4  /  Alb  2.4<L>  /  TBili  0.4  /  DBili  x   /  AST  43<H>  /  ALT  12  /  AlkPhos  59  03-24      LIVER FUNCTIONS - ( 24 Mar 2022 04:42 )  Alb: 2.4 g/dL / Pro: 6.4 g/dL / ALK PHOS: 59 U/L / ALT: 12 U/L / AST: 43 U/L / GGT: x             [id number]

## 2022-03-24 NOTE — PROGRESS NOTE ADULT - SUBJECTIVE AND OBJECTIVE BOX
Patient is a 56y Male whom presented to the hospital with ckd and todd , hyponatremia    PAST MEDICAL & SURGICAL HISTORY:  Deviated septum    Gout    Malignant neoplasm of thyroid gland    S/P ORIF (open reduction internal fixation) fracture  right femur     S/P ACL surgery  Right; 2016    S/P hardware removal  right femur     H/O umbilical hernia repair    History of nasal septoplasty  2020    History of tonsillectomy  as a child        MEDICATIONS  (STANDING):  allopurinol 300 milliGRAM(s) Oral daily  azithromycin  IVPB      chlorhexidine 2% Cloths 1 Application(s) Topical daily  folic acid 1 milliGRAM(s) Oral daily  ibuprofen  Tablet. 400 milliGRAM(s) Oral once  influenza   Vaccine 0.5 milliLiter(s) IntraMuscular once  levothyroxine 112 MICROGram(s) Oral daily  mupirocin 2% Ointment 1 Application(s) Both Nostrils two times a day  senna 2 Tablet(s) Oral at bedtime  sodium chloride 1 Gram(s) Oral three times a day  sodium chloride 0.9%. 1000 milliLiter(s) (75 mL/Hr) IV Continuous <Continuous>      Allergies    No Known Allergies    Intolerances        SOCIAL HISTORY:  Denies ETOh,Smoking,     FAMILY HISTORY:  No pertinent family history in first degree relatives        REVIEW OF SYSTEMS:    CONSTITUTIONAL: No weakness, fevers or chills  RESPIRATORY: No cough, wheezing, hemoptysis; No shortness of breath  CARDIOVASCULAR: No chest pain or palpitations  GASTROINTESTINAL: No abdominal or epigastric pain. No nausea, vomiting,     No diarrhea or constipation. No melena                                  Home Medications:  allopurinol 300 mg oral tablet: 1 tab(s) orally once a day (17 Mar 2022 23:05)    Color: Red / Appearance: Slightly Turbid / S.008 / pH: x  Gluc: x / Ketone: Negative  / Bili: Negative / Urobili: <2 mg/dL   Blood: x / Protein: 100 mg/dL / Nitrite: Negative   Leuk Esterase: Moderate / RBC: >50 /HPF / WBC 4 /HPF   Sq Epi: x / Non Sq Epi: 1 /HPF / Bacteria: Few            PHYSICAL EXAM:    Constitutional: NAD  HEENT: conjunctive   clear   Neck:  No JVD  Respiratory: CTAB  Cardiovascular: S1 and S2  Gastrointestinal: BS+, soft,   Extremities: No peripheral edema  Neurological: A/O x 3, no focal deficits

## 2022-03-24 NOTE — CHART NOTE - NSCHARTNOTEFT_GEN_A_CORE
Writer spoke with Dr. Calixto (Nephrology). Pt scheduled for Cystoscopy today with urology. Pt's Na 122. He recommends   giving pt Tolvaptan 15 mg's X's 1 and repeating BMP in 4 hrs.

## 2022-03-24 NOTE — CHART NOTE - NSCHARTNOTEFT_GEN_A_CORE
[facility] MEDICINE NIGHT COVERAGE.    AM labs reviewed, noticeable drop in Hgb from 8.6 (s/p 2uPRBC) to now 7.3. 1uPRBC x 1 ordered to be given to optimize prior to procedure today. Urology paged and discussed above who is in agreement. Per urology, procedure to occur later in the day. RN called and made aware of plan. Will follow up with repeat CBC post transfusion. Will continue to monitor overnigh.ki Yu PA  Medicine [id number]

## 2022-03-24 NOTE — PROGRESS NOTE ADULT - ASSESSMENT
56 yr old male with a pmh of Thyroid Ca (s/p surgery on synthroid), Gout on Allopurinol, presents to the ED with hematuria for the past 5 days - went to [facility] with negative imaging and was sent home with abx- today developed left flank pain and urinary rentention.   Patient has had fatigue and unintentional weight loss over the past few months. Per ED note "seen by Dr. 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 Spoke with Dr. Muhammad, states that pt had cervical LAD not long ago with negative biopsies. Dr. Muhammad sent pt to hospital to get bone marrow bx   poke with Dr. Felix with IR, states he can get bm bx on 3/18 and to admit pt to Dr. Edwarsd)"  Denies  headache, dizziness, chest pain, palpitations, SOB, joint pain, diarrhea/constipation  Vitals in the ED: T 101, , /85, RR 23 satting 97% RA (17 Mar 2022 23:17)      ACUTE RENAL FAILURE:   Serum creatinine is increasing    There is no progression . No uremic symptoms  No evidence of anemia .  Fluid status stable.  Will continue to avoid nephrotoxic drugs.  Patient remains asymptomatic.   Continue current therapy.  hold  diuretic.  hold   ACE inhibitor.  hold   ARB.  Additional evaluation:   ECG,    echocardiogram,     CXR,  will obtained recent   renal ultrasound to evalaute kidney size and possible stones , if cr is not improving     hyponatremia most likely due to siadh s/p tolvaptan   will check ua , urine osmolality , urine sodium , urine uric acid , serum sodium , serum osmolality , serum uric acid , f/u with hyponatremia work up , f/u with bmp , monitor i and o

## 2022-03-24 NOTE — CHART NOTE - NSCHARTNOTEFT_GEN_A_CORE
Updated pt's urologist Dr. Godinez as per pt and wife request. Out pt Cystoscopy results in front of chart urology made aware.

## 2022-03-24 NOTE — CHART NOTE - NSCHARTNOTEFT_GEN_A_CORE
Writer Spoke with Urology case cancelled by anesthesia as Na 127. Would like Na to be 130 and above.  Called Dr. Haque and recommend pt get another dose of Tolvaptan 15 mg tomorrow. Will continue to trend BMP.

## 2022-03-24 NOTE — PROGRESS NOTE ADULT - ASSESSMENT
56 m with Papillary thyroid cancer s/p total thyroidectomy on synthroid 1/27/22, Gout on Allopurinol, presents to the ED with hematuria for the past 5 days  recent FTT to weight loss  Prior chest Ct with ground glass opacities  Now with anemia/ leukopenia/JULIO   Febrile    1) Newly Diagnosed HIV  Viralload over 6 million  CD4 of 18 (3%)     Genotype sent  Check baseline serologies    Continue biktarvy    Mepron for PCP prophylaxis- check beta d glucan    Can hold JUDI prophylaxis at this time.    2) Anemia/ leukopenia  ? sequalae of HIV   bone marrow results- prelim without malignancy     3) JULIO with hematuria  In AIDS pt, can be seen with CMV or IgA nephropathy  Check CMV viremia- but can have end organ disease without viremia  Ultimately may need bladder/ renal biopsy    D/w patient   D/w acp

## 2022-03-24 NOTE — PROGRESS NOTE ADULT - ASSESSMENT
patient with multiple medical issues but from hematology standpoint he is anemic and that is due to blood loss and AOCD with kidney disease and infection. Bone marrow biopsy done. No obvious heme malignancy. Also with leucopenia and that is due to HIV. WBC/ANC is adequate. Anemia should be managed with transfusional support as needed.

## 2022-03-24 NOTE — PROGRESS NOTE ADULT - ASSESSMENT
56 yr old male with a pmh of Thyroid Ca (s/p surgery on synthroid), Gout on Allopurinol, presents to the ED with hematuria for the past 5 days    Rectal Bleed (Resolved)  favor occult (+) is contaminant from hematuria/clots  last Colonoscopy 2019 w/diverticulosis  CT without acute gi pathology  cont to monitor cbc and transfuse to keep Hgb 7.5 or greater   check NM Bleeding Scan only IF further GI bleeding   Hematology care appreciated   Hematuria likely source of anemia as ?rectal bleeding resolved days ago     Gross Hematuria   OR for cystoscopy, 3/24  Urology care appreciated     HIV   management per medicine and ID appreciated     I reviewed the overnight course of events on the unit, re-confirming the patient history. I discussed the care with the patient and their family. The plan of care was discussed with the physician assistant and modifications were made to the notation where appropriate. Differential diagnosis and plan of care discussed with patient after the evaluation. Advanced care planning was discussed with patient and family.  Advanced care planning forms were reviewed and discussed.  Risks, benefits and alternatives of gastroenterologic procedures were discussed in detail and all questions were answered. 35 minutes spent on total encounter of which more than fifty percent of the encounter was spent counseling and/or coordinating care by the attending physician.

## 2022-03-24 NOTE — PROGRESS NOTE ADULT - SUBJECTIVE AND OBJECTIVE BOX
INTERVAL HPI/OVERNIGHT EVENTS:    denied rectal bleeding   no c/o abd pain    MEDICATIONS  (STANDING):  allopurinol 100 milliGRAM(s) Oral daily  atovaquone  Suspension 750 milliGRAM(s) Oral two times a day  bictegravir 50 mG/emtricitabine 200 mG/tenofovir alafenamide 25 mG (BIKTARVY) 1 Tablet(s) Oral daily  chlorhexidine 2% Cloths 1 Application(s) Topical daily  folic acid 1 milliGRAM(s) Oral daily  influenza   Vaccine 0.5 milliLiter(s) IntraMuscular once  levothyroxine 112 MICROGram(s) Oral daily  mupirocin 2% Ointment 1 Application(s) Both Nostrils two times a day  oxybutynin 5 milliGRAM(s) Oral two times a day  pantoprazole  Injectable 40 milliGRAM(s) IV Push two times a day  polyethylene glycol 3350 17 Gram(s) Oral daily  senna 2 Tablet(s) Oral at bedtime  sodium chloride 1 Gram(s) Oral three times a day  sodium chloride 0.9%. 1000 milliLiter(s) (100 mL/Hr) IV Continuous <Continuous>    MEDICATIONS  (PRN):  acetaminophen     Tablet .. 650 milliGRAM(s) Oral every 6 hours PRN Temp greater or equal to 38C (100.4F), Mild Pain (1 - 3)  aluminum hydroxide/magnesium hydroxide/simethicone Suspension 30 milliLiter(s) Oral every 4 hours PRN Dyspepsia  bisacodyl Suppository 10 milliGRAM(s) Rectal once PRN Constipation  guaiFENesin Oral Liquid (Sugar-Free) 100 milliGRAM(s) Oral every 6 hours PRN Cough  hydrocodone/homatropine Syrup 5 milliLiter(s) Oral every 6 hours PRN Cough  melatonin 3 milliGRAM(s) Oral at bedtime PRN Insomnia  ondansetron Injectable 4 milliGRAM(s) IV Push every 8 hours PRN Nausea and/or Vomiting  oxyCODONE    IR 5 milliGRAM(s) Oral every 6 hours PRN moderate pain 4-6, severe pain 7-10  sodium chloride 0.65% Nasal 1 Spray(s) Both Nostrils four times a day PRN Nasal Congestion      Allergies    No Known Allergies    Intolerances        Review of Systems:    General:  No wt loss, fevers, chills, night sweats, fatigue   Eyes:  Good vision, no reported pain  ENT:  No sore throat, pain, runny nose, dysphagia  CV:  No pain, palpitations, hypo/hypertension  Resp:  No dyspnea, cough, tachypnea, wheezing  GI:  No pain, No nausea, No vomiting, No diarrhea, No constipation, No weight loss, No fever, No pruritis, No rectal bleeding, No melena, No dysphagia  :  No pain, bleeding, incontinence, nocturia  Muscle:  No pain, weakness  Neuro:  No weakness, tingling, memory problems  Psych:  No fatigue, insomnia, mood problems, depression  Endocrine:  No polyuria, polydypsia, cold/heat intolerance  Heme:  No petechiae, ecchymosis, easy bruisability  Skin:  No rash, tattoos, scars, edema      Vital Signs Last 24 Hrs  T(C): 37.2 (24 Mar 2022 12:15), Max: 38.6 (24 Mar 2022 02:03)  T(F): 98.9 (24 Mar 2022 12:15), Max: 101.5 (24 Mar 2022 02:03)  HR: 101 (24 Mar 2022 12:15) (98 - 110)  BP: 114/82 (24 Mar 2022 12:15) (111/70 - 117/76)  BP(mean): --  RR: 16 (24 Mar 2022 12:15) (16 - 18)  SpO2: 98% (24 Mar 2022 12:15) (95% - 100%)    PHYSICAL EXAM:    Constitutional: NAD  HEENT: EOMI, throat clear  Neck: No LAD, supple  Respiratory: CTA and P  Cardiovascular: S1 and S2, RRR, no M  Gastrointestinal: BS+, soft, NT/ND, neg HSM,  Extremities: No peripheral edema, neg clubbing, cyanosis  Vascular: 2+ peripheral pulses  Neurological: A/O x 3, no focal deficits  Psychiatric: Normal mood, normal affect  Skin: No rashes      LABS:                        7.3    3.26  )-----------( 160      ( 24 Mar 2022 04:42 )             21.5     03-24    122<L>  |  96<L>  |  36<H>  ----------------------------<  91  4.2   |  17<L>  |  2.44<H>    Ca    7.5<L>      24 Mar 2022 04:42  Phos  3.5     03-24  Mg     1.60     03-24    TPro  6.4  /  Alb  2.4<L>  /  TBili  0.4  /  DBili  x   /  AST  43<H>  /  ALT  12  /  AlkPhos  59  03-24    PT/INR - ( 23 Mar 2022 03:22 )   PT: 13.8 sec;   INR: 1.19 ratio         PTT - ( 23 Mar 2022 03:22 )  PTT:30.7 sec      RADIOLOGY & ADDITIONAL TESTS:

## 2022-03-24 NOTE — CHART NOTE - NSCHARTNOTEFT_GEN_A_CORE
Pt with AM Na 122, given supplementation, repeat 127. Per anesthesia, given overall stable picture (despite persistent hematuria), case cancelled. They would like to see Na > 130 prior to clearing for surgical procedure.  attending and medicine ACP aware.   - Appreciate nephrology recommendations, likely SIADH +salt tabs; however, needs fluids.   - Admission Na 139, continue to trend BMP/Cr/BUN  - Keep cho catheter for now  - Will work on rescheduling procedure once hyponatremia improves.

## 2022-03-24 NOTE — PROGRESS NOTE ADULT - SUBJECTIVE AND OBJECTIVE BOX
Subjective    Pt seen at bedside, feels well, denies pain.   Hyponatremia noted (122), pending repeat BMP/clearance for OR today    Objective  Vital Signs Last 24 Hrs  T(C): 37.2 (24 Mar 2022 12:15), Max: 38.6 (24 Mar 2022 02:03)  T(F): 98.9 (24 Mar 2022 12:15), Max: 101.5 (24 Mar 2022 02:03)  HR: 101 (24 Mar 2022 12:15) (98 - 110)  BP: 114/82 (24 Mar 2022 12:15) (114/81 - 117/76)  BP(mean): --  RR: 16 (24 Mar 2022 12:15) (16 - 18)  SpO2: 98% (24 Mar 2022 12:15) (95% - 100%)    03-23-22 @ 07:01  -  03-24-22 @ 07:00  --------------------------------------------------------  IN: 0 mL / OUT: 800 mL / NET: -800 mL    03-24-22 @ 07:01  -  03-24-22 @ 14:13  --------------------------------------------------------  IN: 0 mL / OUT: 2000 mL / NET: -2000 mL          General: NAD  Abdomen: soft/non-tender/non-distended  : 14f cho in place draining clear watermelon urine    Labs  CBC (03-24 @ 04:42)                              7.3<L>                         3.26<L>  )----------------(  160        75.2  % Neutrophils, 14.7  % Lymphocytes, ANC: 2.45                                21.5<L>              CBC (03-23 @ 21:49)                              8.6<L>                         5.40    )----------------(  225        --    % Neutrophils, --    % Lymphocytes, ANC: --                                  25.5<L>                BMP (03-24 @ 13:17)             127<L>  |  101     |  33<H> 		Ca++ --      Ca 7.9<L>             ---------------------------------( 84    		Mg --                 4.1     |  16<L>   |  2.30<H>			Ph --      BMP (03-24 @ 04:42)             122<L>  |  96<L>   |  36<H> 		Ca++ --      Ca 7.5<L>             ---------------------------------( 91    		Mg 1.60               4.2     |  17<L>   |  2.44<H>			Ph 3.5       LFTs (03-24 @ 04:42)      TPro 6.4 / Alb 2.4<L> / TBili 0.4 / DBili -- / AST 43<H> / ALT 12 / AlkPhos 59  LFTs (03-23 @ 03:22)      TPro 6.9 / Alb 2.6<L> / TBili 0.4 / DBili -- / AST 46<H> / ALT 15 / AlkPhos 62    Coags (03-23 @ 03:22)  aPTT 30.7 / INR 1.19<H> / PT 13.8<H>        -> .Sputum Sputum Culture (03-21 @ 17:41)       Few polymorphonuclear leukocytes per low power field  Rare Squamous epithelial cells per low power field  Few Gram Negative Rods per oil power field  Few Gram positive cocci in pairs per oil power field  Few Yeast like cells per oil power field  Few Gram Positive Rods per oil power field    NG    Normal Respiratory Jana present    -> Clean Catch Clean Catch (Midstream) Culture (03-17 @ 18:50)     NG    NG    No growth    -> .Blood Blood-Peripheral Culture (03-17 @ 18:15)     NG    NG    No Growth Final    -> .Blood Blood-Venous Culture (03-17 @ 17:55)     NG    NG    No Growth Final

## 2022-03-24 NOTE — PROGRESS NOTE ADULT - SUBJECTIVE AND OBJECTIVE BOX
Follow Up:      Inverval History/ROS:Patient is a 56y old  Male who presents with a chief complaint of urinary retention (24 Mar 2022 14:12)    +  fever  Still having heamturia      Allergies    No Known Allergies    Intolerances        ANTIMICROBIALS:  atovaquone  Suspension 750 two times a day  bictegravir 50 mG/emtricitabine 200 mG/tenofovir alafenamide 25 mG (BIKTARVY) 1 daily      OTHER MEDS:  acetaminophen     Tablet .. 650 milliGRAM(s) Oral every 6 hours PRN  allopurinol 100 milliGRAM(s) Oral daily  aluminum hydroxide/magnesium hydroxide/simethicone Suspension 30 milliLiter(s) Oral every 4 hours PRN  bisacodyl Suppository 10 milliGRAM(s) Rectal once PRN  chlorhexidine 2% Cloths 1 Application(s) Topical daily  folic acid 1 milliGRAM(s) Oral daily  guaiFENesin Oral Liquid (Sugar-Free) 100 milliGRAM(s) Oral every 6 hours PRN  hydrocodone/homatropine Syrup 5 milliLiter(s) Oral every 6 hours PRN  influenza   Vaccine 0.5 milliLiter(s) IntraMuscular once  levothyroxine 112 MICROGram(s) Oral daily  melatonin 3 milliGRAM(s) Oral at bedtime PRN  mupirocin 2% Ointment 1 Application(s) Both Nostrils two times a day  ondansetron Injectable 4 milliGRAM(s) IV Push every 8 hours PRN  oxybutynin 5 milliGRAM(s) Oral two times a day  oxyCODONE    IR 5 milliGRAM(s) Oral every 6 hours PRN  pantoprazole  Injectable 40 milliGRAM(s) IV Push two times a day  polyethylene glycol 3350 17 Gram(s) Oral daily  senna 2 Tablet(s) Oral at bedtime  sodium chloride 1 Gram(s) Oral three times a day  sodium chloride 0.65% Nasal 1 Spray(s) Both Nostrils four times a day PRN  sodium chloride 0.9%. 1000 milliLiter(s) IV Continuous <Continuous>      Vital Signs Last 24 Hrs  T(C): 37.2 (24 Mar 2022 12:15), Max: 38.6 (24 Mar 2022 02:03)  T(F): 98.9 (24 Mar 2022 12:15), Max: 101.5 (24 Mar 2022 02:03)  HR: 101 (24 Mar 2022 12:15) (98 - 110)  BP: 114/82 (24 Mar 2022 12:15) (114/81 - 117/76)  BP(mean): --  RR: 16 (24 Mar 2022 12:15) (16 - 18)  SpO2: 98% (24 Mar 2022 12:15) (95% - 100%)    PHYSICAL EXAM:  General: [ x] non-toxic  HEAD/EYES: [ ] PERRL [x ] white sclera [ ] icterus  ENT:  [ ] normal [x] supple [ ] thrush [ ] pharyngeal exudate  Cardiovascular:   [ ] murmur x ] normal [ ] PPM/AICD  Respiratory:  [ x] clear to ausculation bilaterally  GI:  [x ] soft, non-tender, normal bowel sounds  :  [ ] cho [ ] no CVA tenderness   Musculoskeletal:  [ ] no synovitis  Neurologic:  [ ] non-focal exam   Skin:  x[ ] no rash  Lymph: [ x] no lymphadenopathy  Psychiatric:  [ ] appropriate affect [ ] alert & oriented  Lines:  [x ] no phlebitis [ ] central line                                7.3    3.26  )-----------( 160      ( 24 Mar 2022 04:42 )             21.5       03-24    127<L>  |  101  |  33<H>  ----------------------------<  84  4.1   |  16<L>  |  2.30<H>    Ca    7.9<L>      24 Mar 2022 13:17  Phos  3.5     03-24  Mg     1.60     03-24    TPro  6.4  /  Alb  2.4<L>  /  TBili  0.4  /  DBili  x   /  AST  43<H>  /  ALT  12  /  AlkPhos  59  03-24          MICROBIOLOGY:Culture Results:   Normal Respiratory Jana present (03-21-22 @ 17:41)  Culture Results:   No growth (03-17-22 @ 18:50)  Culture Results:   No Growth Final (03-17-22 @ 18:15)  Culture Results:   No Growth Final (03-17-22 @ 17:55)      RADIOLOGY:

## 2022-03-24 NOTE — PROGRESS NOTE ADULT - ASSESSMENT
56y male with recent dx of jammie positive with polyclonal gammopathy and nephrolithiasis having  gross hematuria and recent episode of urinary retention. Cho catheter removed initially and patient was voiding well with low PVRs. Urine and blood cultures negative.   3/20: overnight retaining >400 cc after 1 straight catheterization, cho replaced. Draining well, 40cc clot irrigated and clear without CBI. HCT dropped to 18.6 from 23.6.   3/22: 6-eye, minimal clot > 22Fr 3-way placed  3/23: +1u prbc    -- Persistent hematuria, will tentatively plan for OR tomorrow (3/24/22) - Cystoscopy, fulguration/clot-evacuation, cystogram, bladder biopsy, bilateral retrograde pyelogram, possible formalin instillation.   -- Pending repeat Na (122 this morning), will need clearance to proceed from Anesthesia, pending  -- NPO/IVF for OR  -- Keep coh catheter, monitor color. If not draining, hand-irrigate with 60cc piston syringe (place 30cc sterile saline through cho catheter (disconnect bag tubing) and hand-aspirate 30cc back).   -- current hematuria controlled, would not suspect accounting for significant anemia but will continue to monitor   -- f/u ID and hematology recommendations  -- Trend H&H, transfuse prn  -- Obtain outside Urologist records (Dr. Godinez), pt says has had past cystoscopy (unsure indication).     d/w Dr. Torres

## 2022-03-25 ENCOUNTER — TRANSCRIPTION ENCOUNTER (OUTPATIENT)
Age: 57
End: 2022-03-25

## 2022-03-25 LAB
ALBUMIN SERPL ELPH-MCNC: 2.2 G/DL — LOW (ref 3.3–5)
ALP SERPL-CCNC: 61 U/L — SIGNIFICANT CHANGE UP (ref 40–120)
ALT FLD-CCNC: 14 U/L — SIGNIFICANT CHANGE UP (ref 4–41)
ANION GAP SERPL CALC-SCNC: 11 MMOL/L — SIGNIFICANT CHANGE UP (ref 7–14)
AST SERPL-CCNC: 40 U/L — SIGNIFICANT CHANGE UP (ref 4–40)
B PERT DNA SPEC QL NAA+PROBE: SIGNIFICANT CHANGE UP
B PERT+PARAPERT DNA PNL SPEC NAA+PROBE: SIGNIFICANT CHANGE UP
B19V DNA FLD QL NAA+PROBE: SIGNIFICANT CHANGE UP IU/ML
BASOPHILS # BLD AUTO: 0.01 K/UL — SIGNIFICANT CHANGE UP (ref 0–0.2)
BASOPHILS NFR BLD AUTO: 0.3 % — SIGNIFICANT CHANGE UP (ref 0–2)
BILIRUB SERPL-MCNC: 0.4 MG/DL — SIGNIFICANT CHANGE UP (ref 0.2–1.2)
BKV DNA SPEC QL NAA+PROBE: SIGNIFICANT CHANGE UP
BORDETELLA PARAPERTUSSIS (RAPRVP): SIGNIFICANT CHANGE UP
BUN SERPL-MCNC: 30 MG/DL — HIGH (ref 7–23)
C PNEUM DNA SPEC QL NAA+PROBE: SIGNIFICANT CHANGE UP
CALCIUM SERPL-MCNC: 8 MG/DL — LOW (ref 8.4–10.5)
CHLORIDE SERPL-SCNC: 107 MMOL/L — SIGNIFICANT CHANGE UP (ref 98–107)
CO2 SERPL-SCNC: 17 MMOL/L — LOW (ref 22–31)
CREAT SERPL-MCNC: 2.38 MG/DL — HIGH (ref 0.5–1.3)
EGFR: 31 ML/MIN/1.73M2 — LOW
EOSINOPHIL # BLD AUTO: 0.05 K/UL — SIGNIFICANT CHANGE UP (ref 0–0.5)
EOSINOPHIL NFR BLD AUTO: 1.4 % — SIGNIFICANT CHANGE UP (ref 0–6)
FLUAV SUBTYP SPEC NAA+PROBE: SIGNIFICANT CHANGE UP
FLUBV RNA SPEC QL NAA+PROBE: SIGNIFICANT CHANGE UP
GLUCOSE SERPL-MCNC: 88 MG/DL — SIGNIFICANT CHANGE UP (ref 70–99)
HADV DNA SPEC QL NAA+PROBE: SIGNIFICANT CHANGE UP
HCOV 229E RNA SPEC QL NAA+PROBE: SIGNIFICANT CHANGE UP
HCOV HKU1 RNA SPEC QL NAA+PROBE: SIGNIFICANT CHANGE UP
HCOV NL63 RNA SPEC QL NAA+PROBE: SIGNIFICANT CHANGE UP
HCOV OC43 RNA SPEC QL NAA+PROBE: SIGNIFICANT CHANGE UP
HCT VFR BLD CALC: 24.4 % — LOW (ref 39–50)
HGB BLD-MCNC: 8.5 G/DL — LOW (ref 13–17)
HMPV RNA SPEC QL NAA+PROBE: SIGNIFICANT CHANGE UP
HPIV1 RNA SPEC QL NAA+PROBE: SIGNIFICANT CHANGE UP
HPIV2 RNA SPEC QL NAA+PROBE: SIGNIFICANT CHANGE UP
HPIV3 RNA SPEC QL NAA+PROBE: SIGNIFICANT CHANGE UP
HPIV4 RNA SPEC QL NAA+PROBE: SIGNIFICANT CHANGE UP
IANC: 2.35 K/UL — SIGNIFICANT CHANGE UP (ref 1.8–7.4)
IMM GRANULOCYTES NFR BLD AUTO: 1.7 % — HIGH (ref 0–1.5)
LYMPHOCYTES # BLD AUTO: 0.72 K/UL — LOW (ref 1–3.3)
LYMPHOCYTES # BLD AUTO: 20.6 % — SIGNIFICANT CHANGE UP (ref 13–44)
M PNEUMO DNA SPEC QL NAA+PROBE: SIGNIFICANT CHANGE UP
M PNEUMO IGM SER-ACNC: 0.24 INDEX — SIGNIFICANT CHANGE UP (ref 0–0.9)
MCHC RBC-ENTMCNC: 29.2 PG — SIGNIFICANT CHANGE UP (ref 27–34)
MCHC RBC-ENTMCNC: 34.8 GM/DL — SIGNIFICANT CHANGE UP (ref 32–36)
MCV RBC AUTO: 83.8 FL — SIGNIFICANT CHANGE UP (ref 80–100)
MONOCYTES # BLD AUTO: 0.3 K/UL — SIGNIFICANT CHANGE UP (ref 0–0.9)
MONOCYTES NFR BLD AUTO: 8.6 % — SIGNIFICANT CHANGE UP (ref 2–14)
MYCOPLASMA AG SPEC QL: NEGATIVE — SIGNIFICANT CHANGE UP
NEUTROPHILS # BLD AUTO: 2.35 K/UL — SIGNIFICANT CHANGE UP (ref 1.8–7.4)
NEUTROPHILS NFR BLD AUTO: 67.4 % — SIGNIFICANT CHANGE UP (ref 43–77)
NRBC # BLD: 0 /100 WBCS — SIGNIFICANT CHANGE UP
NRBC # FLD: 0 K/UL — SIGNIFICANT CHANGE UP
PLATELET # BLD AUTO: 205 K/UL — SIGNIFICANT CHANGE UP (ref 150–400)
POTASSIUM SERPL-MCNC: 4.2 MMOL/L — SIGNIFICANT CHANGE UP (ref 3.5–5.3)
POTASSIUM SERPL-SCNC: 4.2 MMOL/L — SIGNIFICANT CHANGE UP (ref 3.5–5.3)
PROT SERPL-MCNC: 6.6 G/DL — SIGNIFICANT CHANGE UP (ref 6–8.3)
RAPID RVP RESULT: SIGNIFICANT CHANGE UP
RBC # BLD: 2.91 M/UL — LOW (ref 4.2–5.8)
RBC # FLD: 16.2 % — HIGH (ref 10.3–14.5)
RSV RNA SPEC QL NAA+PROBE: SIGNIFICANT CHANGE UP
RV+EV RNA SPEC QL NAA+PROBE: SIGNIFICANT CHANGE UP
SARS-COV-2 RNA SPEC QL NAA+PROBE: SIGNIFICANT CHANGE UP
SODIUM SERPL-SCNC: 135 MMOL/L — SIGNIFICANT CHANGE UP (ref 135–145)
WBC # BLD: 3.49 K/UL — LOW (ref 3.8–10.5)
WBC # FLD AUTO: 3.49 K/UL — LOW (ref 3.8–10.5)

## 2022-03-25 PROCEDURE — 99232 SBSQ HOSP IP/OBS MODERATE 35: CPT

## 2022-03-25 RX ORDER — PENICILLIN G BENZATHINE 1200000 [IU]/2ML
2.4 INJECTION, SUSPENSION INTRAMUSCULAR
Refills: 0 | Status: DISCONTINUED | OUTPATIENT
Start: 2022-03-25 | End: 2022-03-30

## 2022-03-25 RX ORDER — SODIUM CHLORIDE 9 MG/ML
1000 INJECTION INTRAMUSCULAR; INTRAVENOUS; SUBCUTANEOUS
Refills: 0 | Status: DISCONTINUED | OUTPATIENT
Start: 2022-03-25 | End: 2022-03-26

## 2022-03-25 RX ADMIN — Medication 650 MILLIGRAM(S): at 14:00

## 2022-03-25 RX ADMIN — PENICILLIN G BENZATHINE 2.4 MILLION UNIT(S): 1200000 INJECTION, SUSPENSION INTRAMUSCULAR at 22:27

## 2022-03-25 RX ADMIN — Medication 1 MILLIGRAM(S): at 11:54

## 2022-03-25 RX ADMIN — Medication 650 MILLIGRAM(S): at 14:30

## 2022-03-25 RX ADMIN — SENNA PLUS 2 TABLET(S): 8.6 TABLET ORAL at 22:08

## 2022-03-25 RX ADMIN — CHLORHEXIDINE GLUCONATE 1 APPLICATION(S): 213 SOLUTION TOPICAL at 11:57

## 2022-03-25 RX ADMIN — Medication 100 MILLIGRAM(S): at 11:55

## 2022-03-25 RX ADMIN — Medication 5 MILLIGRAM(S): at 17:46

## 2022-03-25 RX ADMIN — OXYCODONE HYDROCHLORIDE 5 MILLIGRAM(S): 5 TABLET ORAL at 05:40

## 2022-03-25 RX ADMIN — SODIUM CHLORIDE 100 MILLILITER(S): 9 INJECTION INTRAMUSCULAR; INTRAVENOUS; SUBCUTANEOUS at 05:47

## 2022-03-25 RX ADMIN — Medication 112 MICROGRAM(S): at 05:41

## 2022-03-25 RX ADMIN — Medication 5 MILLIGRAM(S): at 05:42

## 2022-03-25 RX ADMIN — BICTEGRAVIR SODIUM, EMTRICITABINE, AND TENOFOVIR ALAFENAMIDE FUMARATE 1 TABLET(S): 30; 120; 15 TABLET ORAL at 11:54

## 2022-03-25 RX ADMIN — SODIUM CHLORIDE 1 GRAM(S): 9 INJECTION INTRAMUSCULAR; INTRAVENOUS; SUBCUTANEOUS at 13:09

## 2022-03-25 RX ADMIN — PANTOPRAZOLE SODIUM 40 MILLIGRAM(S): 20 TABLET, DELAYED RELEASE ORAL at 17:39

## 2022-03-25 RX ADMIN — ATOVAQUONE 750 MILLIGRAM(S): 750 SUSPENSION ORAL at 06:57

## 2022-03-25 RX ADMIN — SODIUM CHLORIDE 1 GRAM(S): 9 INJECTION INTRAMUSCULAR; INTRAVENOUS; SUBCUTANEOUS at 05:42

## 2022-03-25 RX ADMIN — Medication 100 MILLIGRAM(S): at 17:49

## 2022-03-25 RX ADMIN — PANTOPRAZOLE SODIUM 40 MILLIGRAM(S): 20 TABLET, DELAYED RELEASE ORAL at 05:47

## 2022-03-25 RX ADMIN — OXYCODONE HYDROCHLORIDE 5 MILLIGRAM(S): 5 TABLET ORAL at 06:10

## 2022-03-25 RX ADMIN — ATOVAQUONE 750 MILLIGRAM(S): 750 SUSPENSION ORAL at 17:46

## 2022-03-25 RX ADMIN — SODIUM CHLORIDE 1 GRAM(S): 9 INJECTION INTRAMUSCULAR; INTRAVENOUS; SUBCUTANEOUS at 22:08

## 2022-03-25 NOTE — PROGRESS NOTE ADULT - SUBJECTIVE AND OBJECTIVE BOX
Patient is a 56y old  Male who presents with a chief complaint of urinary retention (25 Mar 2022 13:29)    appetite fair and he is eating. No CP or SOB, but has a dry cough. NO fevers.     Medication:   acetaminophen     Tablet .. 650 milliGRAM(s) Oral every 6 hours PRN  allopurinol 100 milliGRAM(s) Oral daily  aluminum hydroxide/magnesium hydroxide/simethicone Suspension 30 milliLiter(s) Oral every 4 hours PRN  atovaquone  Suspension 750 milliGRAM(s) Oral two times a day  bictegravir 50 mG/emtricitabine 200 mG/tenofovir alafenamide 25 mG (BIKTARVY) 1 Tablet(s) Oral daily  bisacodyl Suppository 10 milliGRAM(s) Rectal once PRN  chlorhexidine 2% Cloths 1 Application(s) Topical daily  folic acid 1 milliGRAM(s) Oral daily  guaiFENesin Oral Liquid (Sugar-Free) 100 milliGRAM(s) Oral every 6 hours PRN  hydrocodone/homatropine Syrup 5 milliLiter(s) Oral every 6 hours PRN  influenza   Vaccine 0.5 milliLiter(s) IntraMuscular once  levothyroxine 112 MICROGram(s) Oral daily  melatonin 3 milliGRAM(s) Oral at bedtime PRN  mupirocin 2% Ointment 1 Application(s) Both Nostrils two times a day  ondansetron Injectable 4 milliGRAM(s) IV Push every 8 hours PRN  oxybutynin 5 milliGRAM(s) Oral two times a day  oxyCODONE    IR 5 milliGRAM(s) Oral every 6 hours PRN  pantoprazole  Injectable 40 milliGRAM(s) IV Push two times a day  penicillin   G benzathine Injectable 2.4 Million Unit(s) IntraMuscular every 7 days  polyethylene glycol 3350 17 Gram(s) Oral daily  senna 2 Tablet(s) Oral at bedtime  sodium chloride 1 Gram(s) Oral three times a day  sodium chloride 0.65% Nasal 1 Spray(s) Both Nostrils four times a day PRN  sodium chloride 0.9%. 1000 milliLiter(s) IV Continuous <Continuous>      Physical exam    T(C): 37.5 (03-25-22 @ 13:05), Max: 37.5 (03-24-22 @ 22:55)  HR: 98 (03-25-22 @ 13:05) (98 - 114)  BP: 121/74 (03-25-22 @ 13:05) (119/79 - 121/74)  RR: 16 (03-25-22 @ 13:05) (16 - 18)  SpO2: 99% (03-25-22 @ 13:05) (96% - 99%)  Wt(kg): --    alert NAD  EOMI anicteric sclera  Cv s1 S2 RRR  Lungs clear B/L anteriorly  cho with hematuria in bag  No LE edema    Labs                        8.5    3.49  )-----------( 205      ( 25 Mar 2022 06:39 )             24.4       03-25    135  |  107  |  30<H>  ----------------------------<  88  4.2   |  17<L>  |  2.38<H>    Ca    8.0<L>      25 Mar 2022 06:39  Phos  3.5     03-24  Mg     1.60     03-24    TPro  6.6  /  Alb  2.2<L>  /  TBili  0.4  /  DBili  x   /  AST  40  /  ALT  14  /  AlkPhos  61  03-25      LIVER FUNCTIONS - ( 25 Mar 2022 06:39 )  Alb: 2.2 g/dL / Pro: 6.6 g/dL / ALK PHOS: 61 U/L / ALT: 14 U/L / AST: 40 U/L / GGT: x             [id number]

## 2022-03-25 NOTE — PROGRESS NOTE ADULT - SUBJECTIVE AND OBJECTIVE BOX
Follow Up:      Inverval History/ROS:Patient is a 56y old  Male who presents with a chief complaint of urinary retention (25 Mar 2022 11:34)    No events  +hematuria    Allergies    No Known Allergies    Intolerances        ANTIMICROBIALS:  atovaquone  Suspension 750 two times a day  bictegravir 50 mG/emtricitabine 200 mG/tenofovir alafenamide 25 mG (BIKTARVY) 1 daily  penicillin   G benzathine Injectable 2.4 every 7 days      OTHER MEDS:  acetaminophen     Tablet .. 650 milliGRAM(s) Oral every 6 hours PRN  allopurinol 100 milliGRAM(s) Oral daily  aluminum hydroxide/magnesium hydroxide/simethicone Suspension 30 milliLiter(s) Oral every 4 hours PRN  bisacodyl Suppository 10 milliGRAM(s) Rectal once PRN  chlorhexidine 2% Cloths 1 Application(s) Topical daily  folic acid 1 milliGRAM(s) Oral daily  guaiFENesin Oral Liquid (Sugar-Free) 100 milliGRAM(s) Oral every 6 hours PRN  hydrocodone/homatropine Syrup 5 milliLiter(s) Oral every 6 hours PRN  influenza   Vaccine 0.5 milliLiter(s) IntraMuscular once  levothyroxine 112 MICROGram(s) Oral daily  melatonin 3 milliGRAM(s) Oral at bedtime PRN  mupirocin 2% Ointment 1 Application(s) Both Nostrils two times a day  ondansetron Injectable 4 milliGRAM(s) IV Push every 8 hours PRN  oxybutynin 5 milliGRAM(s) Oral two times a day  oxyCODONE    IR 5 milliGRAM(s) Oral every 6 hours PRN  pantoprazole  Injectable 40 milliGRAM(s) IV Push two times a day  polyethylene glycol 3350 17 Gram(s) Oral daily  senna 2 Tablet(s) Oral at bedtime  sodium chloride 1 Gram(s) Oral three times a day  sodium chloride 0.65% Nasal 1 Spray(s) Both Nostrils four times a day PRN  sodium chloride 0.9%. 1000 milliLiter(s) IV Continuous <Continuous>      Vital Signs Last 24 Hrs  T(C): 37.5 (25 Mar 2022 13:05), Max: 37.5 (24 Mar 2022 22:55)  T(F): 99.5 (25 Mar 2022 13:05), Max: 99.5 (24 Mar 2022 22:55)  HR: 98 (25 Mar 2022 13:05) (98 - 114)  BP: 121/74 (25 Mar 2022 13:05) (119/79 - 121/74)  BP(mean): --  RR: 16 (25 Mar 2022 13:05) (16 - 18)  SpO2: 99% (25 Mar 2022 13:05) (96% - 99%)    PHYSICAL EXAM:  General: [x ] non-toxic  HEAD/EYES: [ ] PERRL [x ] white sclera [ ] icterus  ENT:  [ ] normal [ x] supple [ ] thrush [ ] pharyngeal exudate  Cardiovascular:   [ ] murmur [ x] normal [ ] PPM/AICD  Respiratory:  [x ] clear to ausculation bilaterally  GI:  [x ] soft, non-tender, normal bowel sounds  :  [ ] cho [ x] no CVA tenderness   Musculoskeletal:  [ ] no synovitis  Neurologic:  [ ] non-focal exam   Skin:  [ x] no rash  Lymph: [x ] no lymphadenopathy  Psychiatric:  [ ] appropriate affect [ x] alert & oriented  Lines:  [ x] no phlebitis [ ] central line                                8.5    3.49  )-----------( 205      ( 25 Mar 2022 06:39 )             24.4       03-25    135  |  107  |  30<H>  ----------------------------<  88  4.2   |  17<L>  |  2.38<H>    Ca    8.0<L>      25 Mar 2022 06:39  Phos  3.5     03-24  Mg     1.60     03-24    TPro  6.6  /  Alb  2.2<L>  /  TBili  0.4  /  DBili  x   /  AST  40  /  ALT  14  /  AlkPhos  61  03-25          MICROBIOLOGY:Culture Results:   No growth to date. (03-24-22 @ 10:18)  Culture Results:   No growth to date. (03-24-22 @ 10:18)  Culture Results:   Normal Respiratory Jana present (03-21-22 @ 17:41)      RADIOLOGY:

## 2022-03-25 NOTE — PROGRESS NOTE ADULT - SUBJECTIVE AND OBJECTIVE BOX
Patient is a 56y Male whom presented to the hospital with ckd and todd , hyponatremia    PAST MEDICAL & SURGICAL HISTORY:  Deviated septum    Gout    Malignant neoplasm of thyroid gland    S/P ORIF (open reduction internal fixation) fracture  right femur 1992    S/P ACL surgery  Right; 2016    S/P hardware removal  right femur 1994    H/O umbilical hernia repair    History of nasal septoplasty  02/2020    History of tonsillectomy  as a child        MEDICATIONS  (STANDING):  allopurinol 300 milliGRAM(s) Oral daily  azithromycin  IVPB      chlorhexidine 2% Cloths 1 Application(s) Topical daily  folic acid 1 milliGRAM(s) Oral daily  ibuprofen  Tablet. 400 milliGRAM(s) Oral once  influenza   Vaccine 0.5 milliLiter(s) IntraMuscular once  levothyroxine 112 MICROGram(s) Oral daily  mupirocin 2% Ointment 1 Application(s) Both Nostrils two times a day  senna 2 Tablet(s) Oral at bedtime  sodium chloride 1 Gram(s) Oral three times a day  sodium chloride 0.9%. 1000 milliLiter(s) (75 mL/Hr) IV Continuous <Continuous>      Allergies    No Known Allergies    Intolerances        SOCIAL HISTORY:  Denies ETOh,Smoking,     FAMILY HISTORY:  No pertinent family history in first degree relatives        REVIEW OF SYSTEMS:    CONSTITUTIONAL: No weakness, fevers or chills  RESPIRATORY: No cough, wheezing, hemoptysis; No shortness of breath  CARDIOVASCULAR: No chest pain or palpitations  GASTROINTESTINAL: No abdominal or epigastric pain. No nausea, vomiting,     No diarrhea or constipation. No melena                                                       8.5    3.49  )-----------( 205      ( 25 Mar 2022 06:39 )             24.4       CBC Full  -  ( 25 Mar 2022 06:39 )  WBC Count : 3.49 K/uL  RBC Count : 2.91 M/uL  Hemoglobin : 8.5 g/dL  Hematocrit : 24.4 %  Platelet Count - Automated : 205 K/uL  Mean Cell Volume : 83.8 fL  Mean Cell Hemoglobin : 29.2 pg  Mean Cell Hemoglobin Concentration : 34.8 gm/dL  Auto Neutrophil # : 2.35 K/uL  Auto Lymphocyte # : 0.72 K/uL  Auto Monocyte # : 0.30 K/uL  Auto Eosinophil # : 0.05 K/uL  Auto Basophil # : 0.01 K/uL  Auto Neutrophil % : 67.4 %  Auto Lymphocyte % : 20.6 %  Auto Monocyte % : 8.6 %  Auto Eosinophil % : 1.4 %  Auto Basophil % : 0.3 %      03-25    135  |  107  |  30<H>  ----------------------------<  88  4.2   |  17<L>  |  2.38<H>    Ca    8.0<L>      25 Mar 2022 06:39  Phos  3.5     03-24  Mg     1.60     03-24    TPro  6.6  /  Alb  2.2<L>  /  TBili  0.4  /  DBili  x   /  AST  40  /  ALT  14  /  AlkPhos  61  03-25      CAPILLARY BLOOD GLUCOSE          Vital Signs Last 24 Hrs  T(C): 37.5 (25 Mar 2022 13:05), Max: 37.5 (24 Mar 2022 22:55)  T(F): 99.5 (25 Mar 2022 13:05), Max: 99.5 (24 Mar 2022 22:55)  HR: 98 (25 Mar 2022 13:05) (98 - 114)  BP: 121/74 (25 Mar 2022 13:05) (119/79 - 121/74)  BP(mean): --  RR: 16 (25 Mar 2022 13:05) (16 - 18)  SpO2: 99% (25 Mar 2022 13:05) (96% - 99%)                    PHYSICAL EXAM:    Constitutional: NAD  HEENT: conjunctive   clear   Neck:  No JVD  Respiratory: CTAB  Cardiovascular: S1 and S2  Gastrointestinal: BS+, soft,   Extremities: No peripheral edema  Neurological: A/O x 3, no focal deficits

## 2022-03-25 NOTE — DIETITIAN INITIAL EVALUATION ADULT. - PERTINENT LABORATORY DATA
03-25 Na135 mmol/L Glu 88 mg/dL K+ 4.2 mmol/L Cr  2.38 mg/dL<H> BUN 30 mg/dL<H> 03-24 Phos 3.5 mg/dL 03-25 Alb 2.2 g/dL<L> 03-21 Chol --    LDL --    HDL --    Trig 161 mg/dL<H>

## 2022-03-25 NOTE — PROGRESS NOTE ADULT - ASSESSMENT
56 m with Papillary thyroid cancer s/p total thyroidectomy on synthroid 1/27/22, Gout on Allopurinol, presents to the ED with hematuria for the past 5 days  recent FTT to weight loss  Prior chest Ct with ground glass opacities  Now with anemia/ leukopenia/JULIO   Febrile    1) Newly Diagnosed HIV  Viralload over 6 million  CD4 of 18 (3%)     Genotype sent  Check baseline serologies    Continue biktarvy    Continue Mepron- has elevated fungitell- ? due to PCP in pt with ground glass opacities    Can hold JUDI prophylaxis at this time.    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 sequalae of HIV with some marrow suppression   bone marrow results- prelim without malignancy   ? Loss from GI tract vs   GI and urology following    4) JULIO with hematuria  In AIDS pt, can be seen with CMV or IgA nephropathy  Check CMV viremia- but can have end organ disease without viremia  Check BK virus  Check adenovirus  Ultimately may need bladder/ renal biopsy    D/w patient   D/w acp

## 2022-03-25 NOTE — PROGRESS NOTE ADULT - SUBJECTIVE AND OBJECTIVE BOX
Subjective  Pt seen and examined, overall comfortable, catheter draining.   OR cancelled yesterday 2/2 hyponatremia.     Objective  Vital Signs Last 24 Hrs  T(C): 37.3 (25 Mar 2022 06:00), Max: 37.5 (24 Mar 2022 22:55)  T(F): 99.1 (25 Mar 2022 06:00), Max: 99.5 (24 Mar 2022 22:55)  HR: 101 (25 Mar 2022 06:00) (100 - 114)  BP: 119/79 (25 Mar 2022 06:00) (114/82 - 120/76)  BP(mean): --  RR: 18 (25 Mar 2022 06:00) (16 - 18)  SpO2: 96% (25 Mar 2022 06:00) (96% - 98%)    03-24-22 @ 07:01  -  03-25-22 @ 07:00  --------------------------------------------------------  IN: 640 mL / OUT: 6600 mL / NET: -5960 mL      General: NAD  Abdomen: soft/non-tender/non-distended  : 22Fr 3-way cho (3rd port capped), urine transluscent watermelon, no clots    Labs  CBC (03-25 @ 06:39)                              8.5<L>                         3.49<L>  )----------------(  205        67.4  % Neutrophils, 20.6  % Lymphocytes, ANC: 2.35                                24.4<L>              CBC (03-24 @ 04:42)                              7.3<L>                         3.26<L>  )----------------(  160        75.2  % Neutrophils, 14.7  % Lymphocytes, ANC: 2.45                                21.5<L>                BMP (03-25 @ 06:39)             135     |  107     |  30<H> 		Ca++ --      Ca 8.0<L>             ---------------------------------( 88    		Mg --                 4.2     |  17<L>   |  2.38<H>			Ph --      BMP (03-24 @ 13:17)             127<L>  |  101     |  33<H> 		Ca++ --      Ca 7.9<L>             ---------------------------------( 84    		Mg --                 4.1     |  16<L>   |  2.30<H>			Ph --        LFTs (03-25 @ 06:39)      TPro 6.6 / Alb 2.2<L> / TBili 0.4 / DBili -- / AST 40 / ALT 14 / AlkPhos 61  LFTs (03-24 @ 04:42)      TPro 6.4 / Alb 2.4<L> / TBili 0.4 / DBili -- / AST 43<H> / ALT 12 / AlkPhos 59          -> .Sputum Sputum Culture (03-21 @ 17:41)       Few polymorphonuclear leukocytes per low power field  Rare Squamous epithelial cells per low power field  Few Gram Negative Rods per oil power field  Few Gram positive cocci in pairs per oil power field  Few Yeast like cells per oil power field  Few Gram Positive Rods per oil power field    NG    Normal Respiratory Jana present    -> Clean Catch Clean Catch (Midstream) Culture (03-17 @ 18:50)     NG    NG    No growth    -> .Blood Blood-Peripheral Culture (03-17 @ 18:15)     NG    NG    No Growth Final    -> .Blood Blood-Venous Culture (03-17 @ 17:55)     NG    NG    No Growth Final

## 2022-03-25 NOTE — ASU PREOP CHECKLIST - RESPIRATORY RATE (BREATHS/MIN)
Pt called left VM on TST line requesting assistance with scheduling ED f/up appts. Please call pt. [phone]

## 2022-03-25 NOTE — PROGRESS NOTE ADULT - ASSESSMENT
56y male with recent dx of jammie positive with polyclonal gammopathy and nephrolithiasis having  gross hematuria and recent episode of urinary retention. Cho catheter removed initially and patient was voiding well with low PVRs. Urine and blood cultures negative.   3/20: overnight retaining >400 cc after 1 straight catheterization, cho replaced. Draining well, 40cc clot irrigated and clear without CBI. HCT dropped to 18.6 from 23.6.   3/22: 6-eye, minimal clot > 22Fr 3-way placed  3/23: +1u prbc    -- OR cancelled yesterday for hyponatremia (improved 122 > 127 > 135 today). Pending OR availability, possible addon over the weekend, TBD  -- Hyponatremia improved; appreciate Nephrology recommendations (?SIADH)  -- Keep cho catheter, monitor color. If not draining, hand-irrigate with 60cc piston syringe (place 30cc sterile saline through cho catheter (disconnect bag tubing) and hand-aspirate 30cc back).   -- current hematuria controlled, would not suspect accounting for significant anemia but will continue to monitor   -- f/u ID and hematology recommendations  -- Trend H&H, transfuse prn  -- Obtain outside Urologist records (Dr. Godinez), pt says has had past cystoscopy (unsure indication).     d/w Dr. Torres     56y male with recent dx of jammie positive with polyclonal gammopathy and nephrolithiasis having  gross hematuria and recent episode of urinary retention. Cho catheter removed initially and patient was voiding well with low PVRs. Urine and blood cultures negative.   3/20: overnight retaining >400 cc after 1 straight catheterization, cho replaced. Draining well, 40cc clot irrigated and clear without CBI. HCT dropped to 18.6 from 23.6.   3/22: 6-eye, minimal clot > 22Fr 3-way placed  3/23: +1u prbc    -- OR cancelled yesterday for hyponatremia (improved 122 > 127 > 135 today). Plan to be discussed with attending on call.   -- Hyponatremia improved; appreciate Nephrology recommendations (?SIADH)  -- Keep cho catheter, monitor color. If not draining, hand-irrigate with 60cc piston syringe (place 30cc sterile saline through cho catheter (disconnect bag tubing) and hand-aspirate 30cc back).   -- current hematuria controlled, would not suspect accounting for significant anemia but will continue to monitor   -- f/u ID and hematology recommendations  -- Trend H&H, transfuse prn  -- Obtain outside Urologist records (Dr. Godinez), pt says has had past cystoscopy (unsure indication).     d/w Dr. Torres     56y male with recent dx of jammie positive with polyclonal gammopathy and nephrolithiasis having  gross hematuria and recent episode of urinary retention. Cho catheter removed initially and patient was voiding well with low PVRs. Urine and blood cultures negative.   3/20: overnight retaining >400 cc after 1 straight catheterization, cho replaced. Draining well, 40cc clot irrigated and clear without CBI. HCT dropped to 18.6 from 23.6.   3/22: 6-eye, minimal clot > 22Fr 3-way placed  3/23: +1u prbc    -- OR cancelled yesterday for hyponatremia (improved 122 > 127 > 135 today). Given patient has not required flushes/irrigation with acceptable urine color off CBI, favor conservative management at this time. Plan to check urine color and possibile TOV if remains clear in AM.   -- ID c/s consider CMV cystitis, possible renal/bladder bx. Consider urine CMV/BK/viral PCR test?  -- No operative plan at this point, if has persistent clot retention, transfusion requirements, unstable, will reassess.   -- Hyponatremia improved; appreciate Nephrology recommendations (?SIADH)  -- Keep cho catheter, monitor color. If not draining, hand-irrigate with 60cc piston syringe (place 30cc sterile saline through cho catheter (disconnect bag tubing) and hand-aspirate 30cc back).   -- current hematuria controlled, would not suspect accounting for significant anemia but will continue to monitor   -- f/u ID and hematology recommendations  -- Trend H&H, transfuse prn  -- Obtain outside Urologist records (Dr. Godinez), pt says has had past cystoscopy (unsure indication).     d/w Dr. Winchester

## 2022-03-25 NOTE — PROGRESS NOTE ADULT - ASSESSMENT
56 yr old male with a pmh of Thyroid Ca (s/p surgery on synthroid), Gout on Allopurinol, presents to the ED with hematuria for the past 5 days    Rectal Bleed (Resolved)  favor occult (+) is contaminant from hematuria/clots  last Colonoscopy 2019 w/diverticulosis  CT without acute GI pathology  cont to monitor cbc and transfuse to keep Hgb 7.5 or greater   check NM Bleeding Scan only IF further GI bleeding   Hematology care appreciated     Gross Hematuria   OR for cystoscopy cancelled d/t hyponatremia and improving H/H  Urology care appreciated; cont to follow their recs    HIV   management per medicine and ID appreciated     I reviewed the overnight course of events on the unit, re-confirming the patient history. I discussed the care with the patient and their family. The plan of care was discussed with the physician assistant and modifications were made to the notation where appropriate. Differential diagnosis and plan of care discussed with patient after the evaluation. Advanced care planning was discussed with patient and family.  Advanced care planning forms were reviewed and discussed.  Risks, benefits and alternatives of gastroenterologic procedures were discussed in detail and all questions were answered. 35 minutes spent on total encounter of which more than fifty percent of the encounter was spent counseling and/or coordinating care by the attending physician.

## 2022-03-25 NOTE — PROGRESS NOTE ADULT - ASSESSMENT
_________________________________________________________________________________________  ========>>  M E D I C A L   A T T E N D I N G    F O L L O W  U P  N O T E  <<=========  -----------------------------------------------------------------------------------------------------    - Patient seen and examined by me earlier today.   - In summary,  KATHRYN MALIN is a 56y year old man admitted with hematuria..   - Patient today overall doing fairly, comfortable, eating OK.  and comfortable, asking to go home       many questions from pt and wife answered , also discussed case with specialists involved..      encouraged pt to increase ambulation,. OOB, fluid intake..     [cystoscopy was cancelled altogether per  service as urine seems improved)  >> plan for TOV soon     ==================>> REVIEW OF SYSTEM <<=================    GEN: no fever, no chills, as above   RESP: no SOB at rest, occasional cough, not much sputum   CVS: no chest pain, no palpitations, no edema  GI: no abdominal pain, no nausea, normal brown stool today no more hematochezia reported   :  less hematuria in foely bag,  otherwise ok   Neuro: no headache, no dizziness  Derm : no itching, no rash    ==================>> PHYSICAL EXAM <<=================    GEN: A&O X 3 , NAD , comfortable, pleasant, not anxious   HEENT: NCAT, PERRL, MMM, hearing intact  Neck: supple , no JVD appreciated  CVS: S1S2 , regular , no murmur appreciated   PULM: CTA B/L,  limited exam as not taking deep breaths   ABD.: soft. non tender, non distended,  bowel sounds present  Extrem: intact pulses , no edema      + cho with red tinged urine   PSYCH : flat affect, somewhat anxious                              ( Note written / Date of service 03-25-22 )    ==================>> MEDICATIONS <<====================    allopurinol 100 milliGRAM(s) Oral daily  atovaquone  Suspension 750 milliGRAM(s) Oral two times a day  bictegravir 50 mG/emtricitabine 200 mG/tenofovir alafenamide 25 mG (BIKTARVY) 1 Tablet(s) Oral daily  chlorhexidine 2% Cloths 1 Application(s) Topical daily  folic acid 1 milliGRAM(s) Oral daily  influenza   Vaccine 0.5 milliLiter(s) IntraMuscular once  levothyroxine 112 MICROGram(s) Oral daily  mupirocin 2% Ointment 1 Application(s) Both Nostrils two times a day  oxybutynin 5 milliGRAM(s) Oral two times a day  pantoprazole  Injectable 40 milliGRAM(s) IV Push two times a day  penicillin   G benzathine Injectable 2.4 Million Unit(s) IntraMuscular every 7 days  polyethylene glycol 3350 17 Gram(s) Oral daily  senna 2 Tablet(s) Oral at bedtime  sodium chloride 1 Gram(s) Oral three times a day  sodium chloride 0.9%. 1000 milliLiter(s) IV Continuous <Continuous>    MEDICATIONS  (PRN):  acetaminophen     Tablet .. 650 milliGRAM(s) Oral every 6 hours PRN Temp greater or equal to 38C (100.4F), Mild Pain (1 - 3)  aluminum hydroxide/magnesium hydroxide/simethicone Suspension 30 milliLiter(s) Oral every 4 hours PRN Dyspepsia  bisacodyl Suppository 10 milliGRAM(s) Rectal once PRN Constipation  guaiFENesin Oral Liquid (Sugar-Free) 100 milliGRAM(s) Oral every 6 hours PRN Cough  hydrocodone/homatropine Syrup 5 milliLiter(s) Oral every 6 hours PRN Cough  melatonin 3 milliGRAM(s) Oral at bedtime PRN Insomnia  ondansetron Injectable 4 milliGRAM(s) IV Push every 8 hours PRN Nausea and/or Vomiting  oxyCODONE    IR 5 milliGRAM(s) Oral every 6 hours PRN moderate pain 4-6, severe pain 7-10  sodium chloride 0.65% Nasal 1 Spray(s) Both Nostrils four times a day PRN Nasal Congestion    ___________  Active diet:  Diet, Regular:   1200mL Fluid Restriction ([id number])  Supplement Feeding Modality:  Oral  Ensure Enlive Cans or Servings Per Day:  1       Frequency:  Daily  ___________________    ==================>> VITAL SIGNS <<==================    Vital Signs Last 24 HrsT(C): 37.5 (03-25-22 @ 13:05)  T(F): 99.5 (03-25-22 @ 13:05), Max: 99.5 (03-24-22 @ 22:55)  HR: 98 (03-25-22 @ 13:05) (98 - 114)  BP: 121/74 (03-25-22 @ 13:05)  RR: 16 (03-25-22 @ 13:05) (16 - 18)  SpO2: 99% (03-25-22 @ 13:05) (96% - 99%)       ==================>> LAB AND IMAGING <<==================                        8.5    3.49  )-----------( 205      ( 25 Mar 2022 06:39 )             24.4        03-25    135  |  107  |  30<H>  ----------------------------<  88  4.2   |  17<L>  |  2.38<H>    Ca    8.0<L>      25 Mar 2022 06:39  Phos  3.5     03-24  Mg     1.60     03-24    TPro  6.6  /  Alb  2.2<L>  /  TBili  0.4  /  DBili  x   /  AST  40  /  ALT  14  /  AlkPhos  61  03-25    WBC count:   3.49 <<== ,  3.26 <<== ,  5.40 <<== ,  3.46 <<== ,  2.76 <<== ,  3.81 <<==   Hemoglobin:   8.5 <<==,  7.3 <<==,  8.6 <<==,  6.9 <<==,  6.5 <<==,  7.1 <<==  platelets:  205 <==, 160 <==, 225 <==, 170 <==, 178 <==, 189 <==, 193 <==    Creatinine:  2.38  <<==, 2.30  <<==, 2.44  <<==, 2.29  <<==, 1.73  <<==, 1.46  <<==  Sodium:   135  <==, 127  <==, 122  <==, 129  <==, 128  <==, 135  <==, 132  <==       AST:          40 <== , 43 <== , 46 <== , 56 <== , 74 <==      ALT:        14  <== , 12  <== , 15  <== , 16  <== , 20  <==      AP:        61  <=, 59  <=, 62  <=, 63  <=, 71  <=     Bili:        0.4  <=, 0.4  <=, 0.4  <=, 0.3  <=, 0.4  <=    ____________________________    M I C R O B I O L O G Y :    Culture - Blood (collected 24 Mar 2022 10:18)  Source: .Blood Blood-Peripheral  Preliminary Report (25 Mar 2022 11:02):    No growth to date.    Culture - Blood (collected 24 Mar 2022 10:18)  Source: .Blood Blood-Venous  Preliminary Report (25 Mar 2022 11:02):    No growth to date.    Culture - Sputum (collected 21 Mar 2022 17:41)  Source: .Sputum Sputum  Gram Stain (21 Mar 2022 23:27):    Few polymorphonuclear leukocytes per low power field    Rare Squamous epithelial cells per low power field    Few Gram Negative Rods per oil power field    Few Gram positive cocci in pairs per oil power field    Few Yeast like cells per oil power field    Few Gram Positive Rods per oil power field  Final Report (23 Mar 2022 17:10):    Normal Respiratory Jana present    Culture - Urine (collected 17 Mar 2022 18:50)  Source: Clean Catch Clean Catch (Midstream)  Final Report (19 Mar 2022 07:35):    No growth    Culture - Blood (collected 17 Mar 2022 18:15)  Source: .Blood Blood-Peripheral  Final Report (22 Mar 2022 23:00):    No Growth Final    Culture - Blood (collected 17 Mar 2022 17:55)  Source: .Blood Blood-Venous  Final Report (22 Mar 2022 23:00):    No Growth Final    noted Syphilis and CMV results      HIV Result: HIV-1 Pos (03.19.22 @ 12:54) ( confirmatory test )  HIV-1 RNA Quantitative, Viral Load: 6,038,892 (03.20.22 @ 11:59)  ABS CD4: 18 /uL (03.22.22 @ 09:13)    other available results noted including EBV, ANCA...     Hematopathology Report:   ACCESSION No:  [id number]  Patient:   KATHRYN MALIN  Accession:                             [id number]  Collected Date/Time:                   3/18/2022 17:30 EDT  Received Date/Time:                    3/19/2022 11:22 EDT  Hematopathology Report - Auth (Verified)  Specimen(s) Submitted  1- Right iliac bone  2- Bone marrow aspirate    Final Diagnosis  1, 2, 3. Bone marrow biopsy, bone marrow clot, and bone marrow aspirate  -Normocellular bone marrow with trilineage hematopoiesis with  maturation and a proportion of smaller megakaryocytes.  - Iron stores are present.  See note and description.    Diagnostic note:  Per chart review, patient has a recent history of pancytopenia.  The current bone marrow biopsy shows nodefinitive evidence of a  lymphoproliferative disorder. Flow cytometry shows a reversed CD4 to CD8  ratio of the T-cells.    Correlation with patient's recent clinical and laboratory findings and  ancillary testing (including cytogenetics/karyotype and additional  surgical pathology reports (36-L-, 19-BG-) are recommended.  Comprehensive report with results of pending ancillary studies to follow.    Dr. Muhammad was notified of the diagnosis on 03/23/2022.    Ancillary studies  Special stains:   Bone marrow aspirate iron stain:   No spicules are  present  to evaluate for iron stores and there are insufficient nRBC to evaluate  for ring sideroblasts.    Flow cytometry:  The myeloid immunophenotypic findings show normal myeloid  granularity, mild monocytosis with normal immunophenotype, no increase  in myeloid immaturity, and normal myeloid antigen maturation pattern.  The  lymphoid immunophenotypic   findings show T-cells with reversed CD4 to CD8  ratio and polytypic B-cells with kappa predominance   _  Immunohistochemical stains:    (block 1A: CD34, , myeloperoxidase,  CD71, E-cadherin, factor VIII, CD15, CD61, CD3 and CD20) show no  significant increase in CD34 positive blasts (less than 3%), with normal  proportionof myeloids (positive with myeloperoxidase and CD15) to  erythroid elements (positive with CD71) including slightly increased  pronormoblasts (positive with E-cadherin, ), and  megakaryocytes  are overall, normal in number with a proportion thatare smaller in size  (positive with factor VIII, CD61). CD3 shows normal proportion of T-cells  with rare B-cells (positive with CD20).  In-situ hybridization   (kappa, lambda) is pending and will be reported in  an addendum.  Cytogenetics/karyotype:    pending  Comment  Iron stain (examined toevaluate for iron stores; see microscopic  description) and Giemsa stain (shows appropriate staining pattern) are  performed and evaluated on block(s): 1A, 2A.  Verified by: Janelle Gruber MD  (Electronic Signature)  Reported on: 03/23/22 10:10 EDT, [facility], [address] [place] [address]  Phone: [phone]   Fax: [phone]  _________________________________________________________________      < from: CT Abdomen and Pelvis No Cont (03.20.22 @ 15:11) >  IMPRESSION:  New groundglass opacities are seen within the left upper lobe, right   middle lobe and left lower lobe likely representing infection.    Mild left hydronephrosis without evidence of obstructing calculi.   Non obstructing calculi are seen bilaterally within the kidneys.    Heterogeneous hyperdense material within the bladder and left renal   collecting system, which may represent hemorrhagic products.  < end of copied text >    ___________________________________________________________________________________  ===============>>  A S S E S S M E N T   A N D   P L A N <<===============  ------------------------------------------------------------------------------------------    · Assessment	  56 yr old male presenting with urinary retention found to have sepsis 2/2 pyelonephritis     Problem/Plan - 1:  ·  Problem: sepsis, in setting of acute HIV / AIDS with suspect opportunistic infections / PCP / EBV / syphilis / CMV ..       concern for hemorrhagic cystitis / pyelonephritis /infected / impacted stone    workup as noted   off Abx for now : monitoring   all specialty follow up  appreciated and discussed   started on HAART /  Mepron for PCP prophylaxis    started on treatment for syphilis as well     Problem/Plan - 2:  ·  Problem: pt with past + GIANNA, + jammie + ANCA      rule out autoimmune disorders ./ Trever's / good pastures.. etc..      rheumatology not seeing pt as noted / signed off!!!   possible need for kidney bx ?  renal f/u   most likely all related to HIV / AIDS   Bone marrow Bx as above with no significant abnormalities     Problem/Plan - 3:  ·  Problem: Anemia. multifactorial   BM biopsy > follow final results   Hematology on board , appreciated   montior  transfuse further as needed   occult blood  + and + hematochezia before > stopped    GI consulted / appreciated      eventual scope before DC vs OP     Problem/Plan - 4:  ·  Problem: urinary retention and obstructive Uropathy with JULIO   monitor BMP, I/O     crea improved with IVH > continue    following  TOV per  likely in AM    **Hyponatremia     repeat labs ordered     pt with flank pain > likely SIADH      nephrology on board, following        Tolvaptan as ordered     Problem/Plan - 5:  ·  Problem: Hypothyroidism post total thyroidectomy for cancer   Continue home levothyroxine.  Thyroid Stimulating Hormone, Serum: 4.06 uIU/mL (03.17.22 @ 18:46)    OOB, ambulate as able   nutrition   --------------------------------------------  Case discussed with pt, wife already updated, NP... specialists   Education given on findings and plan of care    Today I had a prolonged conversation with the patient, KATHRYN MALIN, re diagnosis, findings, and plan of care, options, alternatives, prognosis... . Patient verbalized clear understanding, all questions answered.    patient seen according to [facility] cost code ( 95901 )     Additional time spent  35 min.  ___________________________  H. TAZ Edwards.  Pager: [phone]

## 2022-03-25 NOTE — PROGRESS NOTE ADULT - ASSESSMENT
56 yr old male with a pmh of Thyroid Ca (s/p surgery on synthroid), Gout on Allopurinol, presents to the ED with hematuria for the past 5 days - went to [facility] with negative imaging and was sent home with abx- today developed left flank pain and urinary rentention.   Patient has had fatigue and unintentional weight loss over the past few months. Per ED note "seen by Dr. 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 Spoke with Dr. Muhammad, states that pt had cervical LAD not long ago with negative biopsies. Dr. Muhammad sent pt to hospital to get bone marrow bx   poke with Dr. Felix with IR, states he can get bm bx on 3/18 and to admit pt to Dr. Edwards)"  Denies  headache, dizziness, chest pain, palpitations, SOB, joint pain, diarrhea/constipation  Vitals in the ED: T 101, , /85, RR 23 satting 97% RA (17 Mar 2022 23:17)      ACUTE RENAL FAILURE:   Serum creatinine is increasing    There is no progression . No uremic symptoms  No evidence of anemia .  Fluid status stable.  Will continue to avoid nephrotoxic drugs.  Patient remains asymptomatic.   Continue current therapy.  hold  diuretic.  hold   ACE inhibitor.  hold   ARB.  Additional evaluation:   ECG,    echocardiogram,     CXR,  will obtained recent   renal ultrasound to evalaute kidney size and possible stones , if cr is not improving     hyponatremia most likely due to siadh s/p tolvaptan   will check ua , urine osmolality , urine sodium , urine uric acid , serum sodium , serum osmolality , serum uric acid , f/u with hyponatremia work up , f/u with bmp , monitor i and o

## 2022-03-25 NOTE — PROGRESS NOTE ADULT - PROBLEM SELECTOR PROBLEM 1
Gross hematuria

## 2022-03-25 NOTE — DIETITIAN INITIAL EVALUATION ADULT. - ORAL INTAKE PTA/DIET HISTORY
Patient reports good appetite and PO intake PTA, however, son at bedside states patient have not been completing all of his meals and noticed reduced intake. Per chart, patient w/ unintentional weight loss, but patient states he have gained weight back. Poor dietary choices, many enjoys snacking on chocolate, drink soda and diet w/ limited sources of vegetable per diet recall.

## 2022-03-25 NOTE — DIETITIAN INITIAL EVALUATION ADULT. - CHIEF COMPLAINT
55 y/o Male with medical history of  thyroid cancer s/p surgery presents with urinary retention found to have sepsis 2/2 pyelonephritis per chart review.

## 2022-03-25 NOTE — PROGRESS NOTE ADULT - ASSESSMENT
Adenopathy and polyclonal gammopathy appears to be reactive due to HIV. ID following. He is anemic and that is due to infection, bleeding and kidney disease. Prior anemia labs c/w an AOCD process. Monitor the Hgb and transfusional support as needed. Bone marrow biopsy no malignancy. Leucopenia but not neutropenic. Lymphopenia due to HIV. No need for G-CSF at this time.

## 2022-03-25 NOTE — DIETITIAN INITIAL EVALUATION ADULT. - OTHER INFO
Met wit patient and son at bedside. Reports eating well at this time. Voices no concern. Patient denies any nausea/vomiting/diarrhea/constipation or difficulty chewing and swallowing. Importance of having well balanced nutrient and a protein dense food discussed. High calorie and High protein nutrition therapy provided.

## 2022-03-25 NOTE — PROGRESS NOTE ADULT - SUBJECTIVE AND OBJECTIVE BOX
INTERVAL HPI/OVERNIGHT EVENTS:    denies n/v/d/c, abdominal pain, melena or brbpr   hematuria    MEDICATIONS  (STANDING):  allopurinol 100 milliGRAM(s) Oral daily  atovaquone  Suspension 750 milliGRAM(s) Oral two times a day  bictegravir 50 mG/emtricitabine 200 mG/tenofovir alafenamide 25 mG (BIKTARVY) 1 Tablet(s) Oral daily  chlorhexidine 2% Cloths 1 Application(s) Topical daily  folic acid 1 milliGRAM(s) Oral daily  influenza   Vaccine 0.5 milliLiter(s) IntraMuscular once  levothyroxine 112 MICROGram(s) Oral daily  mupirocin 2% Ointment 1 Application(s) Both Nostrils two times a day  oxybutynin 5 milliGRAM(s) Oral two times a day  pantoprazole  Injectable 40 milliGRAM(s) IV Push two times a day  polyethylene glycol 3350 17 Gram(s) Oral daily  senna 2 Tablet(s) Oral at bedtime  sodium chloride 1 Gram(s) Oral three times a day  sodium chloride 0.9%. 1000 milliLiter(s) (100 mL/Hr) IV Continuous <Continuous>    MEDICATIONS  (PRN):  acetaminophen     Tablet .. 650 milliGRAM(s) Oral every 6 hours PRN Temp greater or equal to 38C (100.4F), Mild Pain (1 - 3)  aluminum hydroxide/magnesium hydroxide/simethicone Suspension 30 milliLiter(s) Oral every 4 hours PRN Dyspepsia  bisacodyl Suppository 10 milliGRAM(s) Rectal once PRN Constipation  guaiFENesin Oral Liquid (Sugar-Free) 100 milliGRAM(s) Oral every 6 hours PRN Cough  hydrocodone/homatropine Syrup 5 milliLiter(s) Oral every 6 hours PRN Cough  melatonin 3 milliGRAM(s) Oral at bedtime PRN Insomnia  ondansetron Injectable 4 milliGRAM(s) IV Push every 8 hours PRN Nausea and/or Vomiting  oxyCODONE    IR 5 milliGRAM(s) Oral every 6 hours PRN moderate pain 4-6, severe pain 7-10  sodium chloride 0.65% Nasal 1 Spray(s) Both Nostrils four times a day PRN Nasal Congestion      Allergies    No Known Allergies    Intolerances        Review of Systems:    General:  No wt loss, fevers, chills, night sweats, fatigue   Eyes:  Good vision, no reported pain  ENT:  No sore throat, pain, runny nose, dysphagia  CV:  No pain, palpitations, hypo/hypertension  Resp:  No dyspnea, cough, tachypnea, wheezing  GI:  No pain, No nausea, No vomiting, No diarrhea, No constipation, No weight loss, No fever, No pruritis, No rectal bleeding, No melena, No dysphagia  :  No pain, bleeding, incontinence, nocturia  Muscle:  No pain, weakness  Neuro:  No weakness, tingling, memory problems  Psych:  No fatigue, insomnia, mood problems, depression  Endocrine:  No polyuria, polydypsia, cold/heat intolerance  Heme:  No petechiae, ecchymosis, easy bruisability  Skin:  No rash, tattoos, scars, edema      Vital Signs Last 24 Hrs  T(C): 37.3 (25 Mar 2022 06:00), Max: 37.5 (24 Mar 2022 22:55)  T(F): 99.1 (25 Mar 2022 06:00), Max: 99.5 (24 Mar 2022 22:55)  HR: 101 (25 Mar 2022 06:00) (101 - 114)  BP: 119/79 (25 Mar 2022 06:00) (114/82 - 120/76)  BP(mean): --  RR: 18 (25 Mar 2022 06:00) (16 - 18)  SpO2: 96% (25 Mar 2022 06:00) (96% - 98%)    PHYSICAL EXAM:    Constitutional: NAD  HEENT: EOMI, throat clear  Neck: No LAD, supple  Respiratory: CTA and P  Cardiovascular: S1 and S2, RRR, no M  Gastrointestinal: BS+, soft, NT/ND, neg HSM,  Extremities: No peripheral edema, neg clubbing, cyanosis  Vascular: 2+ peripheral pulses  Neurological: A/O x 3, no focal deficits  Psychiatric: Normal mood, normal affect  Skin: No rashes      LABS:                        8.5    3.49  )-----------( 205      ( 25 Mar 2022 06:39 )             24.4     03-25    135  |  107  |  30<H>  ----------------------------<  88  4.2   |  17<L>  |  2.38<H>    Ca    8.0<L>      25 Mar 2022 06:39  Phos  3.5     03-24  Mg     1.60     03-24    TPro  6.6  /  Alb  2.2<L>  /  TBili  0.4  /  DBili  x   /  AST  40  /  ALT  14  /  AlkPhos  61  03-25          RADIOLOGY & ADDITIONAL TESTS:

## 2022-03-26 ENCOUNTER — RESULT REVIEW (OUTPATIENT)
Age: 57
End: 2022-03-26

## 2022-03-26 LAB
ALBUMIN SERPL ELPH-MCNC: 2.5 G/DL — LOW (ref 3.3–5)
ALP SERPL-CCNC: 65 U/L — SIGNIFICANT CHANGE UP (ref 40–120)
ALT FLD-CCNC: 14 U/L — SIGNIFICANT CHANGE UP (ref 4–41)
ANION GAP SERPL CALC-SCNC: 9 MMOL/L — SIGNIFICANT CHANGE UP (ref 7–14)
APPEARANCE UR: ABNORMAL
AST SERPL-CCNC: 38 U/L — SIGNIFICANT CHANGE UP (ref 4–40)
BACTERIA # UR AUTO: ABNORMAL
BASOPHILS # BLD AUTO: 0.02 K/UL — SIGNIFICANT CHANGE UP (ref 0–0.2)
BASOPHILS NFR BLD AUTO: 0.4 % — SIGNIFICANT CHANGE UP (ref 0–2)
BILIRUB SERPL-MCNC: 0.4 MG/DL — SIGNIFICANT CHANGE UP (ref 0.2–1.2)
BILIRUB UR-MCNC: NEGATIVE — SIGNIFICANT CHANGE UP
BUN SERPL-MCNC: 27 MG/DL — HIGH (ref 7–23)
CALCIUM SERPL-MCNC: 7.8 MG/DL — LOW (ref 8.4–10.5)
CHLORIDE SERPL-SCNC: 107 MMOL/L — SIGNIFICANT CHANGE UP (ref 98–107)
CMV DNA CSF QL NAA+PROBE: SIGNIFICANT CHANGE UP
CO2 SERPL-SCNC: 19 MMOL/L — LOW (ref 22–31)
COLOR SPEC: ABNORMAL
CREAT SERPL-MCNC: 2.1 MG/DL — HIGH (ref 0.5–1.3)
CRYPTOC AG FLD QL: NEGATIVE — SIGNIFICANT CHANGE UP
DIFF PNL FLD: ABNORMAL
EGFR: 36 ML/MIN/1.73M2 — LOW
EOSINOPHIL # BLD AUTO: 0.15 K/UL — SIGNIFICANT CHANGE UP (ref 0–0.5)
EOSINOPHIL NFR BLD AUTO: 2.7 % — SIGNIFICANT CHANGE UP (ref 0–6)
GLUCOSE SERPL-MCNC: 98 MG/DL — SIGNIFICANT CHANGE UP (ref 70–99)
GLUCOSE UR QL: NEGATIVE — SIGNIFICANT CHANGE UP
HCT VFR BLD CALC: 25 % — LOW (ref 39–50)
HGB BLD-MCNC: 8.1 G/DL — LOW (ref 13–17)
IANC: 3.78 K/UL — SIGNIFICANT CHANGE UP (ref 1.8–7.4)
IMM GRANULOCYTES NFR BLD AUTO: 0.9 % — SIGNIFICANT CHANGE UP (ref 0–1.5)
KETONES UR-MCNC: NEGATIVE — SIGNIFICANT CHANGE UP
LEUKOCYTE ESTERASE UR-ACNC: ABNORMAL
LYMPHOCYTES # BLD AUTO: 1.09 K/UL — SIGNIFICANT CHANGE UP (ref 1–3.3)
LYMPHOCYTES # BLD AUTO: 20 % — SIGNIFICANT CHANGE UP (ref 13–44)
MCHC RBC-ENTMCNC: 28.2 PG — SIGNIFICANT CHANGE UP (ref 27–34)
MCHC RBC-ENTMCNC: 32.4 GM/DL — SIGNIFICANT CHANGE UP (ref 32–36)
MCV RBC AUTO: 87.1 FL — SIGNIFICANT CHANGE UP (ref 80–100)
MONOCYTES # BLD AUTO: 0.37 K/UL — SIGNIFICANT CHANGE UP (ref 0–0.9)
MONOCYTES NFR BLD AUTO: 6.8 % — SIGNIFICANT CHANGE UP (ref 2–14)
NEUTROPHILS # BLD AUTO: 3.78 K/UL — SIGNIFICANT CHANGE UP (ref 1.8–7.4)
NEUTROPHILS NFR BLD AUTO: 69.2 % — SIGNIFICANT CHANGE UP (ref 43–77)
NITRITE UR-MCNC: NEGATIVE — SIGNIFICANT CHANGE UP
NRBC # BLD: 0 /100 WBCS — SIGNIFICANT CHANGE UP
NRBC # FLD: 0 K/UL — SIGNIFICANT CHANGE UP
PH UR: 6.5 — SIGNIFICANT CHANGE UP (ref 5–8)
PLATELET # BLD AUTO: 261 K/UL — SIGNIFICANT CHANGE UP (ref 150–400)
POTASSIUM SERPL-MCNC: 4.1 MMOL/L — SIGNIFICANT CHANGE UP (ref 3.5–5.3)
POTASSIUM SERPL-SCNC: 4.1 MMOL/L — SIGNIFICANT CHANGE UP (ref 3.5–5.3)
PROT SERPL-MCNC: 6.8 G/DL — SIGNIFICANT CHANGE UP (ref 6–8.3)
PROT UR-MCNC: ABNORMAL
RBC # BLD: 2.87 M/UL — LOW (ref 4.2–5.8)
RBC # FLD: 16.9 % — HIGH (ref 10.3–14.5)
RBC CASTS # UR COMP ASSIST: SIGNIFICANT CHANGE UP /HPF (ref 0–4)
SODIUM SERPL-SCNC: 135 MMOL/L — SIGNIFICANT CHANGE UP (ref 135–145)
SP GR SPEC: 1.01 — SIGNIFICANT CHANGE UP (ref 1–1.05)
UROBILINOGEN FLD QL: SIGNIFICANT CHANGE UP
WBC # BLD: 5.46 K/UL — SIGNIFICANT CHANGE UP (ref 3.8–10.5)
WBC # FLD AUTO: 5.46 K/UL — SIGNIFICANT CHANGE UP (ref 3.8–10.5)
WBC UR QL: SIGNIFICANT CHANGE UP /HPF (ref 0–5)

## 2022-03-26 PROCEDURE — 88341 IMHCHEM/IMCYTCHM EA ADD ANTB: CPT | Mod: 26

## 2022-03-26 PROCEDURE — 88305 TISSUE EXAM BY PATHOLOGIST: CPT | Mod: 26

## 2022-03-26 PROCEDURE — 52224 CYSTOSCOPY AND TREATMENT: CPT

## 2022-03-26 PROCEDURE — 52354 CYSTOURETERO W/BIOPSY: CPT | Mod: LT

## 2022-03-26 PROCEDURE — 88112 CYTOPATH CELL ENHANCE TECH: CPT | Mod: 26

## 2022-03-26 PROCEDURE — 99232 SBSQ HOSP IP/OBS MODERATE 35: CPT

## 2022-03-26 PROCEDURE — 88342 IMHCHEM/IMCYTCHM 1ST ANTB: CPT | Mod: 26

## 2022-03-26 DEVICE — IMPLANTABLE DEVICE: Type: IMPLANTABLE DEVICE | Status: FUNCTIONAL

## 2022-03-26 DEVICE — URETERAL CATH OPEN END 5FR 70CM: Type: IMPLANTABLE DEVICE | Status: FUNCTIONAL

## 2022-03-26 DEVICE — KIT URET PERFLX PLUS 7FRX26CM: Type: IMPLANTABLE DEVICE | Status: FUNCTIONAL

## 2022-03-26 DEVICE — GUIDEWIRE SENSOR DUAL-FLEX NITINOL STRAIGHT .038" X 150CM: Type: IMPLANTABLE DEVICE | Status: FUNCTIONAL

## 2022-03-26 RX ORDER — ONDANSETRON 8 MG/1
4 TABLET, FILM COATED ORAL ONCE
Refills: 0 | Status: DISCONTINUED | OUTPATIENT
Start: 2022-03-26 | End: 2022-03-26

## 2022-03-26 RX ORDER — HYDROMORPHONE HYDROCHLORIDE 2 MG/ML
0.5 INJECTION INTRAMUSCULAR; INTRAVENOUS; SUBCUTANEOUS
Refills: 0 | Status: DISCONTINUED | OUTPATIENT
Start: 2022-03-26 | End: 2022-03-26

## 2022-03-26 RX ORDER — TOLVAPTAN 15 MG/1
15 TABLET ORAL ONCE
Refills: 0 | Status: COMPLETED | OUTPATIENT
Start: 2022-03-26 | End: 2022-03-26

## 2022-03-26 RX ORDER — SODIUM CHLORIDE 9 MG/ML
1000 INJECTION INTRAMUSCULAR; INTRAVENOUS; SUBCUTANEOUS
Refills: 0 | Status: DISCONTINUED | OUTPATIENT
Start: 2022-03-26 | End: 2022-03-30

## 2022-03-26 RX ORDER — OXYCODONE HYDROCHLORIDE 5 MG/1
5 TABLET ORAL ONCE
Refills: 0 | Status: DISCONTINUED | OUTPATIENT
Start: 2022-03-26 | End: 2022-03-26

## 2022-03-26 RX ORDER — SODIUM BICARBONATE 1 MEQ/ML
650 SYRINGE (ML) INTRAVENOUS THREE TIMES A DAY
Refills: 0 | Status: DISCONTINUED | OUTPATIENT
Start: 2022-03-26 | End: 2022-03-30

## 2022-03-26 RX ORDER — SODIUM CHLORIDE 9 MG/ML
1000 INJECTION, SOLUTION INTRAVENOUS
Refills: 0 | Status: DISCONTINUED | OUTPATIENT
Start: 2022-03-26 | End: 2022-03-26

## 2022-03-26 RX ADMIN — PANTOPRAZOLE SODIUM 40 MILLIGRAM(S): 20 TABLET, DELAYED RELEASE ORAL at 17:53

## 2022-03-26 RX ADMIN — SODIUM CHLORIDE 1 GRAM(S): 9 INJECTION INTRAMUSCULAR; INTRAVENOUS; SUBCUTANEOUS at 15:22

## 2022-03-26 RX ADMIN — TOLVAPTAN 15 MILLIGRAM(S): 15 TABLET ORAL at 23:56

## 2022-03-26 RX ADMIN — BICTEGRAVIR SODIUM, EMTRICITABINE, AND TENOFOVIR ALAFENAMIDE FUMARATE 1 TABLET(S): 30; 120; 15 TABLET ORAL at 15:22

## 2022-03-26 RX ADMIN — Medication 5 MILLIGRAM(S): at 17:53

## 2022-03-26 RX ADMIN — PENICILLIN G BENZATHINE 2.4 MILLION UNIT(S): 1200000 INJECTION, SUSPENSION INTRAMUSCULAR at 15:21

## 2022-03-26 RX ADMIN — ATOVAQUONE 750 MILLIGRAM(S): 750 SUSPENSION ORAL at 17:52

## 2022-03-26 RX ADMIN — Medication 100 MILLIGRAM(S): at 15:22

## 2022-03-26 RX ADMIN — PANTOPRAZOLE SODIUM 40 MILLIGRAM(S): 20 TABLET, DELAYED RELEASE ORAL at 06:47

## 2022-03-26 RX ADMIN — Medication 5 MILLIGRAM(S): at 06:48

## 2022-03-26 RX ADMIN — SODIUM CHLORIDE 125 MILLILITER(S): 9 INJECTION INTRAMUSCULAR; INTRAVENOUS; SUBCUTANEOUS at 22:48

## 2022-03-26 RX ADMIN — SODIUM CHLORIDE 1 GRAM(S): 9 INJECTION INTRAMUSCULAR; INTRAVENOUS; SUBCUTANEOUS at 21:14

## 2022-03-26 RX ADMIN — Medication 1 MILLIGRAM(S): at 15:22

## 2022-03-26 RX ADMIN — Medication 3 MILLIGRAM(S): at 21:15

## 2022-03-26 RX ADMIN — Medication 112 MICROGRAM(S): at 06:48

## 2022-03-26 RX ADMIN — Medication 1 SPRAY(S): at 23:57

## 2022-03-26 RX ADMIN — HYDROMORPHONE HYDROCHLORIDE 0.5 MILLIGRAM(S): 2 INJECTION INTRAMUSCULAR; INTRAVENOUS; SUBCUTANEOUS at 15:29

## 2022-03-26 RX ADMIN — Medication 650 MILLIGRAM(S): at 22:42

## 2022-03-26 RX ADMIN — SODIUM CHLORIDE 1 GRAM(S): 9 INJECTION INTRAMUSCULAR; INTRAVENOUS; SUBCUTANEOUS at 06:48

## 2022-03-26 NOTE — PROGRESS NOTE ADULT - ASSESSMENT
56 m with Papillary thyroid cancer s/p total thyroidectomy on synthroid 1/27/22, Gout on Allopurinol, presents to the ED with hematuria for the past 5 days  recent FTT to weight loss  Prior chest Ct with ground glass opacities  Now with anemia/ leukopenia/JULIO   Febrile    1) Newly Diagnosed HIV  Viralload over 6 million  CD4 of 18 (3%)     Genotype sent  Check baseline serologies    Continue biktarvy    Continue Mepron- has elevated fungitell- ? due to PCP in pt with ground glass opacities    Can hold JUDI prophylaxis at this time.    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 sequalae of HIV with some marrow suppression   bone marrow results- prelim without malignancy   ? Loss from GI tract vs   GI and urology following    4) JULIO with hematuria  In AIDS pt, can be seen with CMV or IgA nephropathy  CMV viremia-low level , not suggestive of end organ disease- repeat   BK virus -negative  Check adenovirus- urine/blood testing; RVP negative for adenovirus  Follow up bx from cystoscopy

## 2022-03-26 NOTE — PROGRESS NOTE ADULT - ASSESSMENT
56 yr old male presenting with urinary retention found to have sepsis 2/2 pyelonephritis      Problem/Plan - 1:  ·  Problem: sepsis, in setting of acute HIV / AIDS with suspect opportunistic infections / PCP / EBV / syphilis / CMV ..       concern for hemorrhagic cystitis / pyelonephritis /infected / impacted stone    workup as noted   off Abx for now : monitoring   all specialty follow up  appreciated and discussed   started on HAART /  Mepron for PCP prophylaxis    started on treatment for syphilis as well      Problem/Plan - 2:  ·  Problem: pt with past + GIANNA, + jammie + ANCA      rule out autoimmune disorders ./ Trever's / good pastures.. etc..      rheumatology not seeing pt as noted / signed off!!!   possible need for kidney bx ?  renal f/u   most likely all related to HIV / AIDS   Bone marrow Bx as above with no significant abnormalities      Problem/Plan - 3:  ·  Problem: Anemia. multifactorial   BM biopsy > follow final results   Hematology on board , appreciated   montior  transfuse further as needed   occult blood  + and + hematochezia before > stopped    GI consulted / appreciated      eventual scope before DC vs OP      Problem/Plan - 4:  ·  Problem: urinary retention and obstructive Uropathy with JULIO   monitor BMP, I/O     crea improved with IVH > continue    following  S/P cysto, bladder biopsy, left URS, RGP, ureteral stent insertion  bladder irritation noted.  hematuria effluxing from left ureteral orifice with clot.  renal collecting system friable tissue, no distinct area of active bleed.    **Hyponatremia     repeat labs ordered     pt with flank pain > likely SIADH      nephrology on board, following        Tolvaptan as ordered      Problem/Plan - 5:  ·  Problem: Hypothyroidism post total thyroidectomy for cancer   Continue home levothyroxine.  Thyroid Stimulating Hormone, Serum: 4.06 uIU/mL (03.17.22 @ 18:46)

## 2022-03-26 NOTE — PROGRESS NOTE ADULT - ASSESSMENT
56 yr old male with a pmh of Thyroid Ca (s/p surgery on synthroid), Gout on Allopurinol, presents to the ED with hematuria for the past 5 days - went to [facility] with negative imaging and was sent home with abx- today developed left flank pain and urinary rentention.   Patient has had fatigue and unintentional weight loss over the past few months. Per ED note "seen by Dr. 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 Spoke with Dr. Muhammad, states that pt had cervical LAD not long ago with negative biopsies. Dr. Muhammad sent pt to hospital to get bone marrow bx   poke with Dr. Felix with IR, states he can get bm bx on 3/18 and to admit pt to Dr. Edwards)"  Denies  headache, dizziness, chest pain, palpitations, SOB, joint pain, diarrhea/constipation  Vitals in the ED: T 101, , /85, RR 23 satting 97% RA (17 Mar 2022 23:17)      ACUTE RENAL FAILURE:    Serum creatinine is increasing  , will consider kidney biopsy   There is no progression . No uremic symptoms  pos  evidence of anemia .  Fluid status stable.  Will continue to avoid nephrotoxic drugs.  Patient remains asymptomatic.   Continue current therapy.  hold  diuretic.  hold   ACE inhibitor.  hold   ARB.  Additional evaluation:   ECG,    echocardiogram,     CXR,  will obtained recent   renal ultrasound to evalaute kidney size and possible stones , if cr is not improving     hyponatremia most likely due to siadh s/p tolvaptan   will check ua , urine osmolality , urine sodium , urine uric acid , serum sodium , serum osmolality , serum uric acid , f/u with hyponatremia work up , f/u with bmp , monitor i and o

## 2022-03-26 NOTE — PROGRESS NOTE ADULT - SUBJECTIVE AND OBJECTIVE BOX
INTERVAL HPI/OVERNIGHT EVENTS:    denies n/v/d/c, abdominal pain, melena or brbpr   hematuria    MEDICATIONS  (STANDING):  allopurinol 100 milliGRAM(s) Oral daily  atovaquone  Suspension 750 milliGRAM(s) Oral two times a day  bictegravir 50 mG/emtricitabine 200 mG/tenofovir alafenamide 25 mG (BIKTARVY) 1 Tablet(s) Oral daily  chlorhexidine 2% Cloths 1 Application(s) Topical daily  folic acid 1 milliGRAM(s) Oral daily  influenza   Vaccine 0.5 milliLiter(s) IntraMuscular once  levothyroxine 112 MICROGram(s) Oral daily  mupirocin 2% Ointment 1 Application(s) Both Nostrils two times a day  oxybutynin 5 milliGRAM(s) Oral two times a day  pantoprazole  Injectable 40 milliGRAM(s) IV Push two times a day  polyethylene glycol 3350 17 Gram(s) Oral daily  senna 2 Tablet(s) Oral at bedtime  sodium chloride 1 Gram(s) Oral three times a day  sodium chloride 0.9%. 1000 milliLiter(s) (100 mL/Hr) IV Continuous <Continuous>    MEDICATIONS  (PRN):  acetaminophen     Tablet .. 650 milliGRAM(s) Oral every 6 hours PRN Temp greater or equal to 38C (100.4F), Mild Pain (1 - 3)  aluminum hydroxide/magnesium hydroxide/simethicone Suspension 30 milliLiter(s) Oral every 4 hours PRN Dyspepsia  bisacodyl Suppository 10 milliGRAM(s) Rectal once PRN Constipation  guaiFENesin Oral Liquid (Sugar-Free) 100 milliGRAM(s) Oral every 6 hours PRN Cough  hydrocodone/homatropine Syrup 5 milliLiter(s) Oral every 6 hours PRN Cough  melatonin 3 milliGRAM(s) Oral at bedtime PRN Insomnia  ondansetron Injectable 4 milliGRAM(s) IV Push every 8 hours PRN Nausea and/or Vomiting  oxyCODONE    IR 5 milliGRAM(s) Oral every 6 hours PRN moderate pain 4-6, severe pain 7-10  sodium chloride 0.65% Nasal 1 Spray(s) Both Nostrils four times a day PRN Nasal Congestion      Allergies    No Known Allergies    Intolerances        Review of Systems:    General:  No wt loss, fevers, chills, night sweats, fatigue   Eyes:  Good vision, no reported pain  ENT:  No sore throat, pain, runny nose, dysphagia  CV:  No pain, palpitations, hypo/hypertension  Resp:  No dyspnea, cough, tachypnea, wheezing  GI:  No pain, No nausea, No vomiting, No diarrhea, No constipation, No weight loss, No fever, No pruritis, No rectal bleeding, No melena, No dysphagia  :  No pain, bleeding, incontinence, nocturia  Muscle:  No pain, weakness  Neuro:  No weakness, tingling, memory problems  Psych:  No fatigue, insomnia, mood problems, depression  Endocrine:  No polyuria, polydypsia, cold/heat intolerance  Heme:  No petechiae, ecchymosis, easy bruisability  Skin:  No rash, tattoos, scars, edema      Vital Signs Last 24 Hrs  T(C): 37.3 (25 Mar 2022 06:00), Max: 37.5 (24 Mar 2022 22:55)  T(F): 99.1 (25 Mar 2022 06:00), Max: 99.5 (24 Mar 2022 22:55)  HR: 101 (25 Mar 2022 06:00) (101 - 114)  BP: 119/79 (25 Mar 2022 06:00) (114/82 - 120/76)  BP(mean): --  RR: 18 (25 Mar 2022 06:00) (16 - 18)  SpO2: 96% (25 Mar 2022 06:00) (96% - 98%)    PHYSICAL EXAM:    Constitutional: NAD  HEENT: EOMI, throat clear  Neck: No LAD, supple  Respiratory: CTA and P  Cardiovascular: S1 and S2, RRR, no M  Gastrointestinal: BS+, soft, NT/ND, neg HSM,  Extremities: No peripheral edema, neg clubbing, cyanosis  Vascular: 2+ peripheral pulses  Neurological: A/O x 3, no focal deficits  Psychiatric: Normal mood, normal affect  Skin: No rashes      LABS:                        8.5    3.49  )-----------( 205      ( 25 Mar 2022 06:39 )             24.4     03-25    135  |  107  |  30<H>  ----------------------------<  88  4.2   |  17<L>  |  2.38<H>    Ca    8.0<L>      25 Mar 2022 06:39  Phos  3.5     03-24  Mg     1.60     03-24    TPro  6.6  /  Alb  2.2<L>  /  TBili  0.4  /  DBili  x   /  AST  40  /  ALT  14  /  AlkPhos  61  03-25          RADIOLOGY & ADDITIONAL TESTS:   Breath sounds clear and equal bilaterally.

## 2022-03-26 NOTE — PROGRESS NOTE ADULT - SUBJECTIVE AND OBJECTIVE BOX
S/p cystoscopy  Bleeding from left ureter    VSS Af     NCAT  S1S2  CTa bilaterally  Soft + BS  cho in place  No rash  No synovitis    WBC 5.4  Cr 2.1

## 2022-03-26 NOTE — PROGRESS NOTE ADULT - ASSESSMENT
56 yr old male with a pmh of Thyroid Ca (s/p surgery on synthroid), Gout on Allopurinol, presents to the ED with hematuria for the past 5 days    Rectal Bleed (Resolved)  favor occult (+) is contaminant from hematuria/clots  last Colonoscopy 2019 w/diverticulosis  CT without acute GI pathology  cont to monitor cbc and transfuse to keep Hgb 7.5 or greater   check NM Bleeding Scan only IF further GI bleeding   Hematology care appreciated     Gross Hematuria     Urology care appreciated; cont to follow their recs    HIV   management per medicine and ID appreciated     I reviewed the overnight course of events on the unit, re-confirming the patient history. I discussed the care with the patient and their family. The plan of care was discussed with the physician assistant and modifications were made to the notation where appropriate. Differential diagnosis and plan of care discussed with patient after the evaluation. Advanced care planning was discussed with patient and family.  Advanced care planning forms were reviewed and discussed.  Risks, benefits and alternatives of gastroenterologic procedures were discussed in detail and all questions were answered. 35 minutes spent on total encounter of which more than fifty percent of the encounter was spent counseling and/or coordinating care by the attending physician.

## 2022-03-26 NOTE — PROGRESS NOTE ADULT - ASSESSMENT
56y male with recent dx of jammie positive with polyclonal gammopathy and nephrolithiasis having  gross hematuria and recent episode of urinary retention. Cho catheter removed initially and patient was voiding well with low PVRs. Urine and blood cultures negative.   3/20: overnight retaining >400 cc after 1 straight catheterization, cho replaced. Draining well, 40cc clot irrigated and clear without CBI. HCT dropped to 18.6 from 23.6.   3/22: 6-eye, minimal clot > 22Fr 3-way placed  3/23: +1u prbc    -- Plan for OR for cystoscopy, bladder biopsy, fulguration, bilateral retrograde pyelogram  -- ID c/s consider CMV cystitis, possible renal/bladder bx. Consider urine CMV/BK/viral PCR test?  -- Hyponatremia improved; appreciate Nephrology recommendations (?SIADH)  -- Keep cho catheter, monitor color. If not draining, hand-irrigate with 60cc piston syringe (place 30cc sterile saline through cho catheter (disconnect bag tubing) and hand-aspirate 30cc back).   -- current hematuria controlled, would not suspect accounting for significant anemia but will continue to monitor   -- f/u ID and hematology recommendations  -- Trend H&H, transfuse prn  -- Obtain outside Urologist records (Dr. Godinez), pt says has had past cystoscopy (unsure indication).

## 2022-03-26 NOTE — BRIEF OPERATIVE NOTE - NSICDXBRIEFPROCEDURE_GEN_ALL_CORE_FT
PROCEDURES:  Cystoureteroscopy, with biopsy 26-Mar-2022 13:05:48  Alonzo Tucker  Biopsy, bladder 26-Mar-2022 13:05:56  Alonzo Tucker

## 2022-03-26 NOTE — BRIEF OPERATIVE NOTE - OPERATION/FINDINGS
cysto, bladder biopsy, left URS, RGP, ureteral stent insertion    bladder irritation noted.  hematuria effluxing from left ureteral orifice with clot.  renal collecting system friable tissue, no distinct area of active bleed.

## 2022-03-26 NOTE — PROGRESS NOTE ADULT - SUBJECTIVE AND OBJECTIVE BOX
Patient is a 56y Male whom presented to the hospital with ckd and todd , hyponatremia    PAST MEDICAL & SURGICAL HISTORY:  Deviated septum    Gout    Malignant neoplasm of thyroid gland    S/P ORIF (open reduction internal fixation) fracture  right femur     S/P ACL surgery  Right; 2016    S/P hardware removal  right femur     H/O umbilical hernia repair    History of nasal septoplasty  2020    History of tonsillectomy  as a child        MEDICATIONS  (STANDING):  allopurinol 300 milliGRAM(s) Oral daily  azithromycin  IVPB      chlorhexidine 2% Cloths 1 Application(s) Topical daily  folic acid 1 milliGRAM(s) Oral daily  ibuprofen  Tablet. 400 milliGRAM(s) Oral once  influenza   Vaccine 0.5 milliLiter(s) IntraMuscular once  levothyroxine 112 MICROGram(s) Oral daily  mupirocin 2% Ointment 1 Application(s) Both Nostrils two times a day  senna 2 Tablet(s) Oral at bedtime  sodium chloride 1 Gram(s) Oral three times a day  sodium chloride 0.9%. 1000 milliLiter(s) (75 mL/Hr) IV Continuous <Continuous>      Allergies    No Known Allergies    Intolerances        SOCIAL HISTORY:  Denies ETOh,Smoking,     FAMILY HISTORY:  No pertinent family history in first degree relatives        REVIEW OF SYSTEMS:    CONSTITUTIONAL: No weakness, fevers or chills  RESPIRATORY: No cough, wheezing, hemoptysis; No shortness of breath  CARDIOVASCULAR: No chest pain or palpitations  GASTROINTESTINAL: No abdominal or epigastric pain. No nausea, vomiting,     No diarrhea or constipation. No melena                                                                                                 8.1    5.46  )-----------( 261      ( 26 Mar 2022 07:14 )             25.0       CBC Full  -  ( 26 Mar 2022 07:14 )  WBC Count : 5.46 K/uL  RBC Count : 2.87 M/uL  Hemoglobin : 8.1 g/dL  Hematocrit : 25.0 %  Platelet Count - Automated : 261 K/uL  Mean Cell Volume : 87.1 fL  Mean Cell Hemoglobin : 28.2 pg  Mean Cell Hemoglobin Concentration : 32.4 gm/dL  Auto Neutrophil # : 3.78 K/uL  Auto Lymphocyte # : 1.09 K/uL  Auto Monocyte # : 0.37 K/uL  Auto Eosinophil # : 0.15 K/uL  Auto Basophil # : 0.02 K/uL  Auto Neutrophil % : 69.2 %  Auto Lymphocyte % : 20.0 %  Auto Monocyte % : 6.8 %  Auto Eosinophil % : 2.7 %  Auto Basophil % : 0.4 %          135  |  107  |  27<H>  ----------------------------<  98  4.1   |  19<L>  |  2.10<H>    Ca    7.8<L>      26 Mar 2022 07:14    TPro  6.8  /  Alb  2.5<L>  /  TBili  0.4  /  DBili  x   /  AST  38  /  ALT  14  /  AlkPhos  65        CAPILLARY BLOOD GLUCOSE          Vital Signs Last 24 Hrs  T(C): 36.4 (26 Mar 2022 18:42), Max: 37.4 (26 Mar 2022 07:04)  T(F): 97.6 (26 Mar 2022 18:42), Max: 99.4 (26 Mar 2022 07:04)  HR: 86 (26 Mar 2022 18:42) (74 - 101)  BP: 111/66 (26 Mar 2022 18:42) (111/66 - 131/78)  BP(mean): 82 (26 Mar 2022 13:45) (82 - 91)  RR: 18 (26 Mar 2022 18:42) (13 - 21)  SpO2: 97% (26 Mar 2022 18:42) (93% - 99%)    Urinalysis Basic - ( 26 Mar 2022 08:59 )    Color: Red / Appearance: Slightly Turbid / S.012 / pH: x  Gluc: x / Ketone: Negative  / Bili: Negative / Urobili: <2 mg/dL   Blood: x / Protein: 100 mg/dL / Nitrite: Negative   Leuk Esterase: Moderate / RBC: loaded /HPF / WBC 10-15 /HPF   Sq Epi: x / Non Sq Epi: x / Bacteria: Few                      PHYSICAL EXAM:    Constitutional: NAD  HEENT: conjunctive   clear   Neck:  No JVD  Respiratory: CTAB  Cardiovascular: S1 and S2  Gastrointestinal: BS+, soft,   Extremities: No peripheral edema

## 2022-03-26 NOTE — PROGRESS NOTE ADULT - SUBJECTIVE AND OBJECTIVE BOX
Date of Service  : 22 @ 16:44    INTERVAL HPI/OVERNIGHT EVENTS: S/P urology  Procedure . Having hematuria so family by bedside had questions about procedure. .   Vital Signs Last 24 Hrs  T(C): 36.6 (26 Mar 2022 14:54), Max: 37.4 (26 Mar 2022 07:04)  T(F): 97.8 (26 Mar 2022 14:54), Max: 99.4 (26 Mar 2022 07:04)  HR: 89 (26 Mar 2022 14:54) (74 - 101)  BP: 117/76 (26 Mar 2022 14:54) (111/74 - 131/78)  BP(mean): 82 (26 Mar 2022 13:45) (82 - 91)  RR: 18 (26 Mar 2022 14:54) (13 - 21)  SpO2: 97% (26 Mar 2022 14:54) (93% - 99%)  I&O's Summary    25 Mar 2022 07:01  -  26 Mar 2022 07:00  --------------------------------------------------------  IN: 0 mL / OUT: 4150 mL / NET: -4150 mL      MEDICATIONS  (STANDING):  allopurinol 100 milliGRAM(s) Oral daily  atovaquone  Suspension 750 milliGRAM(s) Oral two times a day  bictegravir 50 mG/emtricitabine 200 mG/tenofovir alafenamide 25 mG (BIKTARVY) 1 Tablet(s) Oral daily  chlorhexidine 2% Cloths 1 Application(s) Topical daily  folic acid 1 milliGRAM(s) Oral daily  influenza   Vaccine 0.5 milliLiter(s) IntraMuscular once  levothyroxine 112 MICROGram(s) Oral daily  mupirocin 2% Ointment 1 Application(s) Both Nostrils two times a day  oxybutynin 5 milliGRAM(s) Oral two times a day  pantoprazole  Injectable 40 milliGRAM(s) IV Push two times a day  penicillin   G benzathine Injectable 2.4 Million Unit(s) IntraMuscular every 7 days  polyethylene glycol 3350 17 Gram(s) Oral daily  senna 2 Tablet(s) Oral at bedtime  sodium chloride 1 Gram(s) Oral three times a day  sodium chloride 0.9%. 1000 milliLiter(s) (100 mL/Hr) IV Continuous <Continuous>    MEDICATIONS  (PRN):  acetaminophen     Tablet .. 650 milliGRAM(s) Oral every 6 hours PRN Temp greater or equal to 38C (100.4F), Mild Pain (1 - 3)  aluminum hydroxide/magnesium hydroxide/simethicone Suspension 30 milliLiter(s) Oral every 4 hours PRN Dyspepsia  bisacodyl Suppository 10 milliGRAM(s) Rectal once PRN Constipation  guaiFENesin Oral Liquid (Sugar-Free) 100 milliGRAM(s) Oral every 6 hours PRN Cough  hydrocodone/homatropine Syrup 5 milliLiter(s) Oral every 6 hours PRN Cough  HYDROmorphone  Injectable 0.5 milliGRAM(s) IV Push every 10 minutes PRN Severe Pain (7 - 10)  melatonin 3 milliGRAM(s) Oral at bedtime PRN Insomnia  ondansetron Injectable 4 milliGRAM(s) IV Push every 8 hours PRN Nausea and/or Vomiting  ondansetron Injectable 4 milliGRAM(s) IV Push once PRN Nausea and/or Vomiting  oxyCODONE    IR 5 milliGRAM(s) Oral every 6 hours PRN moderate pain 4-6, severe pain 7-10  oxyCODONE    IR 5 milliGRAM(s) Oral once PRN Moderate Pain (4 - 6)  sodium chloride 0.65% Nasal 1 Spray(s) Both Nostrils four times a day PRN Nasal Congestion    LABS:                        8.1    5.46  )-----------( 261      ( 26 Mar 2022 07:14 )             25.0     03-26    135  |  107  |  27<H>  ----------------------------<  98  4.1   |  19<L>  |  2.10<H>    Ca    7.8<L>      26 Mar 2022 07:14    TPro  6.8  /  Alb  2.5<L>  /  TBili  0.4  /  DBili  x   /  AST  38  /  ALT  14  /  AlkPhos  65  03-      Urinalysis Basic - ( 26 Mar 2022 08:59 )    Color: Red / Appearance: Slightly Turbid / S.012 / pH: x  Gluc: x / Ketone: Negative  / Bili: Negative / Urobili: <2 mg/dL   Blood: x / Protein: 100 mg/dL / Nitrite: Negative   Leuk Esterase: Moderate / RBC: loaded /HPF / WBC 10-15 /HPF   Sq Epi: x / Non Sq Epi: x / Bacteria: Few      CAPILLARY BLOOD GLUCOSE            Urinalysis Basic - ( 26 Mar 2022 08:59 )    Color: Red / Appearance: Slightly Turbid / S.012 / pH: x  Gluc: x / Ketone: Negative  / Bili: Negative / Urobili: <2 mg/dL   Blood: x / Protein: 100 mg/dL / Nitrite: Negative   Leuk Esterase: Moderate / RBC: loaded /HPF / WBC 10-15 /HPF   Sq Epi: x / Non Sq Epi: x / Bacteria: Few      REVIEW OF SYSTEMS:  CONSTITUTIONAL: No fever, weight loss, or fatigue  EYES: No eye pain, visual disturbances, or discharge  ENMT:  No difficulty hearing, tinnitus, vertigo; No sinus or throat pain  NECK: No pain or stiffness  RESPIRATORY: No cough, wheezing, chills or hemoptysis; No shortness of breath  CARDIOVASCULAR: No chest pain, palpitations, dizziness, or leg swelling  GASTROINTESTINAL: No abdominal or epigastric pain. No nausea, vomiting, or hematemesis; No diarrhea or constipation. No melena or hematochezia.  GENITOURINARY: No dysuria, frequency, or incontinence  NEUROLOGICAL: No headaches, memory loss, loss of strength, numbness, or tremors      Consultant(s) Notes Reviewed:  [x ] YES  [ ] NO    PHYSICAL EXAM:  GENERAL: NAD, well-groomed, well-developed,not in any distress ,  HEAD:  Atraumatic, Normocephalic  NECK: Supple, No JVD, Normal thyroid  NERVOUS SYSTEM:  Alert & Oriented X3, No focal deficit   CHEST/LUNG: Good air entry bilateral with no  rales, rhonchi, wheezing, or rubs  HEART: Regular rate and rhythm; No murmurs, rubs, or gallops  ABDOMEN: Soft, Nontender, Nondistended; Bowel sounds present  EXTREMITIES:  2+ Peripheral Pulses, No clubbing, cyanosis, or edema  [place] with Hematuria     Care Discussed with Consultants/Other Providers [ x] YES  [ ] NO

## 2022-03-26 NOTE — PRE-OP CHECKLIST - HAIR REMOVAL
"Chief Complaint  Hypertension (6 month f/u.), Hyperlipidemia, Insomnia, and Hypothyroidism    Subjective          Ayana Reyna presents to [facility] for   Hypertension  This is a chronic problem. The current episode started more than 1 year ago. The problem is unchanged. The problem is controlled.   Hyperlipidemia  This is a chronic problem. The current episode started more than 1 year ago. The problem is controlled. Recent lipid tests were reviewed and are normal. Exacerbating diseases include hypothyroidism.   Insomnia  This is a recurrent problem. The current episode started more than 1 year ago. The problem occurs intermittently.   Hypothyroidism  This is a chronic problem. The current episode started more than 1 year ago. The problem occurs constantly. The problem has been unchanged.       Review of Systems   Psychiatric/Behavioral: The patient has insomnia.         Objective   Vital Signs:   /96   Pulse 74   Temp 97.8 °F (36.6 °C)   Resp 17   Ht 154.9 cm (61\")   Wt 80.3 kg (177 lb)   SpO2 100%   BMI 33.44 kg/m²     Physical Exam  Vitals and nursing note reviewed.   Constitutional:       General: She is not in acute distress.     Appearance: She is well-developed.   Cardiovascular:      Rate and Rhythm: Normal rate and regular rhythm.      Heart sounds: Normal heart sounds.   Pulmonary:      Effort: No respiratory distress.      Breath sounds: No wheezing or rales.   Chest:      Chest wall: No tenderness.   Psychiatric:         Behavior: Behavior normal.        Result Review :     CMP    CMP 1/22/21 3/26/21 4/8/21   Glucose  107 (A) 98   BUN  17 17   Creatinine 0.90 0.96 0.82   eGFR Non  Am  56 (A) 67   eGFR  Am   81   Sodium  142 142   Potassium  4.7 5.2   Chloride  107 108 (A)   Calcium  9.7 9.8   Total Protein   6.1   Albumin   4.20   Globulin   1.9   Total Bilirubin   1.3 (A)   Alkaline Phosphatase   95   AST (SGOT)   18   ALT (SGPT)   15   (A) " Abnormal value       Comments are available for some flowsheets but are not being displayed.           CBC    CBC 3/26/21   WBC 7.51   RBC 3.82   Hemoglobin 12.4   Hematocrit 37.1   MCV 97.1 (A)   MCH 32.5   MCHC 33.4   RDW 12.0 (A)   Platelets 203   (A) Abnormal value            Lipid Panel    Lipid Panel 4/8/21   Total Cholesterol 99   Triglycerides 69   HDL Cholesterol 63 (A)   VLDL Cholesterol 15   LDL Cholesterol  21   (A) Abnormal value       Comments are available for some flowsheets but are not being displayed.           TSH    TSH 4/8/21   TSH 4.100           Most Recent A1C    HGBA1C Most Recent 4/8/21   Hemoglobin A1C 5.40      Comments are available for some flowsheets but are not being displayed.                     Assessment and Plan    Problem List Items Addressed This Visit        Unprioritized    Hypertension    Current Assessment & Plan     Hypertension is worsening.  Continue current treatment regimen.  Dietary sodium restriction.  Weight loss.  Regular aerobic exercise.  Continue current medications.  Blood pressure will be reassessed at the next regular appointment.    High today, but normal at home (& she had insomnia last night). Need bp chk several x this weekend & send message to me next wk.         Relevant Medications    spironolactone (ALDACTONE) 25 MG tablet    metoprolol succinate XL (TOPROL-XL) 100 MG 24 hr tablet    Other Relevant Orders    CBC & Differential    Comprehensive Metabolic Panel    Lipid Panel    Insomnia    Relevant Medications    traZODone (DESYREL) 50 MG tablet    Hyperlipemia    Current Assessment & Plan     Lipid abnormalities are unchanged.  Nutritional counseling was provided.  Lipids will be reassessed in 6 months.         Acquired hypothyroidism - Primary    Relevant Medications    metoprolol succinate XL (TOPROL-XL) 100 MG 24 hr tablet    levothyroxine (Euthyrox) 50 MCG tablet    Other Relevant Orders    TSH Rfx On Abnormal To Free T4      Other Visit Diagnoses      Essential hypertension        Relevant Medications    spironolactone (ALDACTONE) 25 MG tablet    metoprolol succinate XL (TOPROL-XL) 100 MG 24 hr tablet    High blood sugar        Relevant Orders    Hemoglobin A1c          Follow Up   Return in about 6 months (around 6/10/2022).  Patient was given instructions and counseling regarding her condition or for health maintenance advice. Please see specific information pulled into the AVS if appropriate.        hair removal not indicated

## 2022-03-27 LAB
ALBUMIN SERPL ELPH-MCNC: 2.2 G/DL — LOW (ref 3.3–5)
ALP SERPL-CCNC: 56 U/L — SIGNIFICANT CHANGE UP (ref 40–120)
ALT FLD-CCNC: 9 U/L — SIGNIFICANT CHANGE UP (ref 4–41)
ANION GAP SERPL CALC-SCNC: 10 MMOL/L — SIGNIFICANT CHANGE UP (ref 7–14)
ANION GAP SERPL CALC-SCNC: 8 MMOL/L — SIGNIFICANT CHANGE UP (ref 7–14)
APPEARANCE UR: ABNORMAL
AST SERPL-CCNC: 24 U/L — SIGNIFICANT CHANGE UP (ref 4–40)
BACTERIA # UR AUTO: ABNORMAL
BASOPHILS # BLD AUTO: 0.01 K/UL — SIGNIFICANT CHANGE UP (ref 0–0.2)
BASOPHILS NFR BLD AUTO: 0.3 % — SIGNIFICANT CHANGE UP (ref 0–2)
BILIRUB SERPL-MCNC: 0.3 MG/DL — SIGNIFICANT CHANGE UP (ref 0.2–1.2)
BILIRUB UR-MCNC: NEGATIVE — SIGNIFICANT CHANGE UP
BLD GP AB SCN SERPL QL: NEGATIVE — SIGNIFICANT CHANGE UP
BUN SERPL-MCNC: 32 MG/DL — HIGH (ref 7–23)
BUN SERPL-MCNC: 34 MG/DL — HIGH (ref 7–23)
CALCIUM SERPL-MCNC: 7.5 MG/DL — LOW (ref 8.4–10.5)
CALCIUM SERPL-MCNC: 7.5 MG/DL — LOW (ref 8.4–10.5)
CHLORIDE SERPL-SCNC: 107 MMOL/L — SIGNIFICANT CHANGE UP (ref 98–107)
CHLORIDE SERPL-SCNC: 108 MMOL/L — HIGH (ref 98–107)
CO2 SERPL-SCNC: 19 MMOL/L — LOW (ref 22–31)
CO2 SERPL-SCNC: 19 MMOL/L — LOW (ref 22–31)
COLOR SPEC: ABNORMAL
CREAT SERPL-MCNC: 1.82 MG/DL — HIGH (ref 0.5–1.3)
CREAT SERPL-MCNC: 1.95 MG/DL — HIGH (ref 0.5–1.3)
DIFF PNL FLD: ABNORMAL
EGFR: 39 ML/MIN/1.73M2 — LOW
EGFR: 43 ML/MIN/1.73M2 — LOW
EOSINOPHIL # BLD AUTO: 0 K/UL — SIGNIFICANT CHANGE UP (ref 0–0.5)
EOSINOPHIL NFR BLD AUTO: 0 % — SIGNIFICANT CHANGE UP (ref 0–6)
EPI CELLS # UR: 3 /HPF — SIGNIFICANT CHANGE UP (ref 0–5)
GLUCOSE SERPL-MCNC: 109 MG/DL — HIGH (ref 70–99)
GLUCOSE SERPL-MCNC: 124 MG/DL — HIGH (ref 70–99)
GLUCOSE UR QL: NEGATIVE — SIGNIFICANT CHANGE UP
HCT VFR BLD CALC: 21.1 % — LOW (ref 39–50)
HCT VFR BLD CALC: 22.3 % — LOW (ref 39–50)
HGB BLD-MCNC: 7.1 G/DL — LOW (ref 13–17)
HGB BLD-MCNC: 7.3 G/DL — LOW (ref 13–17)
IANC: 2.46 K/UL — SIGNIFICANT CHANGE UP (ref 1.8–7.4)
IMM GRANULOCYTES NFR BLD AUTO: 1.3 % — SIGNIFICANT CHANGE UP (ref 0–1.5)
KETONES UR-MCNC: NEGATIVE — SIGNIFICANT CHANGE UP
LEUKOCYTE ESTERASE UR-ACNC: ABNORMAL
LYMPHOCYTES # BLD AUTO: 0.46 K/UL — LOW (ref 1–3.3)
LYMPHOCYTES # BLD AUTO: 14.5 % — SIGNIFICANT CHANGE UP (ref 13–44)
M PNEUMO IGG SER IA-ACNC: 0.95 INDEX — HIGH (ref 0–0.9)
M PNEUMO IGG SER IA-ACNC: ABNORMAL
MAGNESIUM SERPL-MCNC: 1.6 MG/DL — SIGNIFICANT CHANGE UP (ref 1.6–2.6)
MCHC RBC-ENTMCNC: 28.7 PG — SIGNIFICANT CHANGE UP (ref 27–34)
MCHC RBC-ENTMCNC: 29.1 PG — SIGNIFICANT CHANGE UP (ref 27–34)
MCHC RBC-ENTMCNC: 32.7 GM/DL — SIGNIFICANT CHANGE UP (ref 32–36)
MCHC RBC-ENTMCNC: 33.6 GM/DL — SIGNIFICANT CHANGE UP (ref 32–36)
MCV RBC AUTO: 86.5 FL — SIGNIFICANT CHANGE UP (ref 80–100)
MCV RBC AUTO: 87.8 FL — SIGNIFICANT CHANGE UP (ref 80–100)
MONOCYTES # BLD AUTO: 0.21 K/UL — SIGNIFICANT CHANGE UP (ref 0–0.9)
MONOCYTES NFR BLD AUTO: 6.6 % — SIGNIFICANT CHANGE UP (ref 2–14)
NEUTROPHILS # BLD AUTO: 2.46 K/UL — SIGNIFICANT CHANGE UP (ref 1.8–7.4)
NEUTROPHILS NFR BLD AUTO: 77.3 % — HIGH (ref 43–77)
NITRITE UR-MCNC: NEGATIVE — SIGNIFICANT CHANGE UP
NRBC # BLD: 0 /100 WBCS — SIGNIFICANT CHANGE UP
NRBC # BLD: 0 /100 WBCS — SIGNIFICANT CHANGE UP
NRBC # FLD: 0 K/UL — SIGNIFICANT CHANGE UP
NRBC # FLD: 0 K/UL — SIGNIFICANT CHANGE UP
OSMOLALITY SERPL: 298 MOSM/KG — HIGH (ref 275–295)
OSMOLALITY UR: 210 MOSM/KG — SIGNIFICANT CHANGE UP (ref 50–1200)
PH UR: 6 — SIGNIFICANT CHANGE UP (ref 5–8)
PHOSPHATE SERPL-MCNC: 3.1 MG/DL — SIGNIFICANT CHANGE UP (ref 2.5–4.5)
PLATELET # BLD AUTO: 191 K/UL — SIGNIFICANT CHANGE UP (ref 150–400)
PLATELET # BLD AUTO: 228 K/UL — SIGNIFICANT CHANGE UP (ref 150–400)
POTASSIUM SERPL-MCNC: 4 MMOL/L — SIGNIFICANT CHANGE UP (ref 3.5–5.3)
POTASSIUM SERPL-MCNC: 4.3 MMOL/L — SIGNIFICANT CHANGE UP (ref 3.5–5.3)
POTASSIUM SERPL-SCNC: 4 MMOL/L — SIGNIFICANT CHANGE UP (ref 3.5–5.3)
POTASSIUM SERPL-SCNC: 4.3 MMOL/L — SIGNIFICANT CHANGE UP (ref 3.5–5.3)
PROT SERPL-MCNC: 6.2 G/DL — SIGNIFICANT CHANGE UP (ref 6–8.3)
PROT UR-MCNC: ABNORMAL
RBC # BLD: 2.44 M/UL — LOW (ref 4.2–5.8)
RBC # BLD: 2.54 M/UL — LOW (ref 4.2–5.8)
RBC # FLD: 16.7 % — HIGH (ref 10.3–14.5)
RBC # FLD: 16.8 % — HIGH (ref 10.3–14.5)
RBC CASTS # UR COMP ASSIST: >50 /HPF — HIGH (ref 0–4)
RH IG SCN BLD-IMP: POSITIVE — SIGNIFICANT CHANGE UP
SODIUM SERPL-SCNC: 134 MMOL/L — LOW (ref 135–145)
SODIUM SERPL-SCNC: 137 MMOL/L — SIGNIFICANT CHANGE UP (ref 135–145)
SODIUM UR-SCNC: 48 MMOL/L — SIGNIFICANT CHANGE UP
SP GR SPEC: 1 — SIGNIFICANT CHANGE UP (ref 1–1.05)
URATE SERPL-MCNC: 5.1 MG/DL — SIGNIFICANT CHANGE UP (ref 3.4–8.8)
URATE UR-MCNC: 6.7 MG/DL — SIGNIFICANT CHANGE UP
UROBILINOGEN FLD QL: SIGNIFICANT CHANGE UP
WBC # BLD: 3.18 K/UL — LOW (ref 3.8–10.5)
WBC # BLD: 5.44 K/UL — SIGNIFICANT CHANGE UP (ref 3.8–10.5)
WBC # FLD AUTO: 3.18 K/UL — LOW (ref 3.8–10.5)
WBC # FLD AUTO: 5.44 K/UL — SIGNIFICANT CHANGE UP (ref 3.8–10.5)
WBC UR QL: 15 /HPF — HIGH (ref 0–5)

## 2022-03-27 PROCEDURE — 99232 SBSQ HOSP IP/OBS MODERATE 35: CPT

## 2022-03-27 RX ORDER — TOLVAPTAN 15 MG/1
30 TABLET ORAL ONCE
Refills: 0 | Status: COMPLETED | OUTPATIENT
Start: 2022-03-27 | End: 2022-03-28

## 2022-03-27 RX ADMIN — Medication 3 MILLIGRAM(S): at 21:14

## 2022-03-27 RX ADMIN — PANTOPRAZOLE SODIUM 40 MILLIGRAM(S): 20 TABLET, DELAYED RELEASE ORAL at 17:28

## 2022-03-27 RX ADMIN — SODIUM CHLORIDE 1 GRAM(S): 9 INJECTION INTRAMUSCULAR; INTRAVENOUS; SUBCUTANEOUS at 21:12

## 2022-03-27 RX ADMIN — Medication 100 MILLIGRAM(S): at 12:30

## 2022-03-27 RX ADMIN — CHLORHEXIDINE GLUCONATE 1 APPLICATION(S): 213 SOLUTION TOPICAL at 12:54

## 2022-03-27 RX ADMIN — Medication 650 MILLIGRAM(S): at 05:08

## 2022-03-27 RX ADMIN — BICTEGRAVIR SODIUM, EMTRICITABINE, AND TENOFOVIR ALAFENAMIDE FUMARATE 1 TABLET(S): 30; 120; 15 TABLET ORAL at 12:29

## 2022-03-27 RX ADMIN — Medication 650 MILLIGRAM(S): at 12:29

## 2022-03-27 RX ADMIN — SODIUM CHLORIDE 125 MILLILITER(S): 9 INJECTION INTRAMUSCULAR; INTRAVENOUS; SUBCUTANEOUS at 15:27

## 2022-03-27 RX ADMIN — OXYCODONE HYDROCHLORIDE 5 MILLIGRAM(S): 5 TABLET ORAL at 16:20

## 2022-03-27 RX ADMIN — PANTOPRAZOLE SODIUM 40 MILLIGRAM(S): 20 TABLET, DELAYED RELEASE ORAL at 05:08

## 2022-03-27 RX ADMIN — Medication 5 MILLIGRAM(S): at 05:08

## 2022-03-27 RX ADMIN — Medication 1 MILLIGRAM(S): at 12:28

## 2022-03-27 RX ADMIN — Medication 5 MILLIGRAM(S): at 17:28

## 2022-03-27 RX ADMIN — ATOVAQUONE 750 MILLIGRAM(S): 750 SUSPENSION ORAL at 17:29

## 2022-03-27 RX ADMIN — SODIUM CHLORIDE 1 GRAM(S): 9 INJECTION INTRAMUSCULAR; INTRAVENOUS; SUBCUTANEOUS at 15:27

## 2022-03-27 RX ADMIN — ATOVAQUONE 750 MILLIGRAM(S): 750 SUSPENSION ORAL at 05:08

## 2022-03-27 RX ADMIN — SODIUM CHLORIDE 1 GRAM(S): 9 INJECTION INTRAMUSCULAR; INTRAVENOUS; SUBCUTANEOUS at 05:08

## 2022-03-27 RX ADMIN — Medication 650 MILLIGRAM(S): at 21:11

## 2022-03-27 RX ADMIN — Medication 112 MICROGRAM(S): at 05:08

## 2022-03-27 NOTE — CHART NOTE - NSCHARTNOTEFT_GEN_A_CORE
55 y/o M admitted with hematuria and urinary retention found to have sepsis 2/2 pyelonephritis.  Patient followed by ID, found this admission to have atypical PNA, new diagnosis HIV, +RPR suggestive of latent syphillis, ordered for IM PCN once every 7 days x 3 doses by ID on 3/25.    Upon [facility] chart review this morning, found that patient was given IM Penicillin G benzathine 2.4 Million Unit(s) on 3/25 @ 2227.  Additional dose of IM Penicillin G benzathine 2.4 Million Unit(s) on 3/26 @ 1527 also recorded.    Discussed with medicine, Dr. Edwards, recommends follow up with ID and Pharmacy for further recommendations.  Discussed with pharmacy who reports no concern for toxicity.  Discussed with ID, Dr. Man, who reports no concern for toxicity or additional monitoring, recommends patient should still receive 2 more doses (4/2, 4/9)    Will continue to monitor      Elyse Lu NP-BC  Department of Medicine  In House Pager #[phone]

## 2022-03-27 NOTE — CHART NOTE - NSCHARTNOTEFT_GEN_A_CORE
55 y/o M admitted with hematuria and urinary retention found to have sepsis 2/2 pyelonephritiis, followed by urology, s/p Cystoureteroscopy yesterday.  Patient has received 5PRBC this admission, last on 3/24.    Hgb 8.1 yesterday  Hgb 7.3 on AM labs today  Hgb 7.1 on PM labs today    Vital Signs Last 24 Hrs  T(C): 36.8 (27 Mar 2022 11:34), Max: 36.8 (27 Mar 2022 05:22)  T(F): 98.3 (27 Mar 2022 11:34), Max: 98.3 (27 Mar 2022 11:34)  HR: 97 (27 Mar 2022 11:34) (86 - 97)  BP: 100/67 (27 Mar 2022 11:34) (100/67 - 111/66)  BP(mean): 77 (27 Mar 2022 11:34) (77 - 77)  RR: 18 (27 Mar 2022 11:34) (18 - 18)  SpO2: 97% (27 Mar 2022 11:34) (97% - 97%)    Discussed the above results w/ Dr. Edwards, recs as follows:  - No transfusion at this time  - Repeat CBC in AM    Will continue to monitor      Elyse Lu NP-BC  Department of Medicine  In House Pager #[phone]

## 2022-03-27 NOTE — PROGRESS NOTE ADULT - ATTENDING COMMENTS
Seen and examined  Agree with above assessment and plan
Pt seen and examined with the resident. Pt more comfortable with catheter out. Has hematuria. Follows as an outpatient with . Encourage fluid intake and avoid strain. Call with  concerns

## 2022-03-27 NOTE — PROGRESS NOTE ADULT - ASSESSMENT
_________________________________________________________________________________________  ========>>  M E D I C A L   A T T E N D I N G    F O L L O W  U P  N O T E  <<=========  -----------------------------------------------------------------------------------------------------    - Patient seen and examined by me earlier today.   - In summary,  KATHRYN MALIN is a 56y year old man admitted with hematuria..   - Patient today overall doing fairly, comfortable, eating OK.  and comfortable, asking to go home       many questions from pt and parents at bedside answered      encouraged pt to increase ambulation,. OOB, fluid intake..     noted events yesterday: pt taken to OR by  and cystoscopy done, with bladder biopsy and a left uretra stent         hematuria better today > plan for possible TOV today       ==================>> REVIEW OF SYSTEM <<=================    GEN: no fever, no chills, as above   RESP: no SOB at rest, occasional cough, not much sputum   CVS: no chest pain, no palpitations, no edema  GI: no abdominal pain, no nausea, normal brown stool today no more hematochezia reported   :  less hematuria in foely bag,  otherwise ok   Neuro: no headache, no dizziness  Derm : no itching, no rash    ==================>> PHYSICAL EXAM <<=================    GEN: A&O X 3 , NAD , comfortable, pleasant, not anxious   HEENT: NCAT, PERRL, MMM, hearing intact  Neck: supple , no JVD appreciated  CVS: S1S2 , regular , no murmur appreciated   PULM: CTA B/L,  limited exam as not taking deep breaths   ABD.: soft. non tender, non distended,  bowel sounds present  Extrem: intact pulses , no edema      + cho with red tinged urine   PSYCH : flat affect, somewhat anxious                                ( Note written / Date of service 03-27-22 )    ==================>> MEDICATIONS <<====================    allopurinol 100 milliGRAM(s) Oral daily  atovaquone  Suspension 750 milliGRAM(s) Oral two times a day  bictegravir 50 mG/emtricitabine 200 mG/tenofovir alafenamide 25 mG (BIKTARVY) 1 Tablet(s) Oral daily  chlorhexidine 2% Cloths 1 Application(s) Topical daily  folic acid 1 milliGRAM(s) Oral daily  influenza   Vaccine 0.5 milliLiter(s) IntraMuscular once  levothyroxine 112 MICROGram(s) Oral daily  mupirocin 2% Ointment 1 Application(s) Both Nostrils two times a day  oxybutynin 5 milliGRAM(s) Oral two times a day  pantoprazole  Injectable 40 milliGRAM(s) IV Push two times a day  penicillin   G benzathine Injectable 2.4 Million Unit(s) IntraMuscular every 7 days  polyethylene glycol 3350 17 Gram(s) Oral daily  senna 2 Tablet(s) Oral at bedtime  sodium bicarbonate 650 milliGRAM(s) Oral three times a day  sodium chloride 1 Gram(s) Oral three times a day  sodium chloride 0.9%. 1000 milliLiter(s) IV Continuous <Continuous>  tolvaptan 30 milliGRAM(s) Oral once    MEDICATIONS  (PRN):  acetaminophen     Tablet .. 650 milliGRAM(s) Oral every 6 hours PRN Temp greater or equal to 38C (100.4F), Mild Pain (1 - 3)  aluminum hydroxide/magnesium hydroxide/simethicone Suspension 30 milliLiter(s) Oral every 4 hours PRN Dyspepsia  bisacodyl Suppository 10 milliGRAM(s) Rectal once PRN Constipation  guaiFENesin Oral Liquid (Sugar-Free) 100 milliGRAM(s) Oral every 6 hours PRN Cough  hydrocodone/homatropine Syrup 5 milliLiter(s) Oral every 6 hours PRN Cough  melatonin 3 milliGRAM(s) Oral at bedtime PRN Insomnia  ondansetron Injectable 4 milliGRAM(s) IV Push every 8 hours PRN Nausea and/or Vomiting  oxyCODONE    IR 5 milliGRAM(s) Oral every 6 hours PRN moderate pain 4-6, severe pain 7-10  sodium chloride 0.65% Nasal 1 Spray(s) Both Nostrils four times a day PRN Nasal Congestion    ___________  Active diet:  Diet, Regular  Diet, Regular:   1200mL Fluid Restriction ([id number])  Supplement Feeding Modality:  Oral  Ensure Enlive Cans or Servings Per Day:  1       Frequency:  Daily  ___________________    ==================>> VITAL SIGNS <<==================    Height (cm): 177.8  Weight (kg): 69.4  BMI (kg/m2): 22  Vital Signs Last 24 HrsT(C): 36.8 (03-27-22 @ 11:34)  T(F): 98.3 (03-27-22 @ 11:34), Max: 98.3 (03-27-22 @ 11:34)  HR: 97 (03-27-22 @ 11:34) (86 - 97)  BP: 100/67 (03-27-22 @ 11:34)  RR: 18 (03-27-22 @ 11:34) (18 - 18)  SpO2: 97% (03-27-22 @ 11:34) (97% - 97%)       ==================>> LAB AND IMAGING <<==================                        7.1    5.44  )-----------( 228      ( 27 Mar 2022 14:15 )             21.1        03-27    134<L>  |  107  |  32<H>  ----------------------------<  124<H>  4.3   |  19<L>  |  1.95<H>    Ca    7.5<L>      27 Mar 2022 06:25    TPro  6.2  /  Alb  2.2<L>  /  TBili  0.3  /  DBili  x   /  AST  24  /  ALT  9   /  AlkPhos  56  03-27    WBC count:   5.44 <<== ,  3.18 <<== ,  5.46 <<== ,  3.49 <<== ,  3.26 <<== ,  5.40 <<==   Hemoglobin:   7.1 <<==,  7.3 <<==,  8.1 <<==,  8.5 <<==,  7.3 <<==,  8.6 <<==  platelets:  228 <==, 191 <==, 261 <==, 205 <==, 160 <==, 225 <==, 170 <==    Creatinine:  1.95  <<==, 2.10  <<==, 2.38  <<==, 2.30  <<==, 2.44  <<==, 2.29  <<==  Sodium:   134  <==, 135  <==, 135  <==, 127  <==, 122  <==, 129  <==, 128  <==       AST:          24 <== , 38 <== , 40 <== , 43 <== , 46 <==      ALT:        9  <== , 14  <== , 14  <== , 12  <== , 15  <==      AP:        56  <=, 65  <=, 61  <=, 59  <=, 62  <=     Bili:        0.3  <=, 0.4  <=, 0.4  <=, 0.4  <=, 0.4  <=    ____________________________    M I C R O B I O L O G Y :    Culture - Blood (collected 24 Mar 2022 10:18)  Source: .Blood Blood-Peripheral  Preliminary Report (25 Mar 2022 11:02):    No growth to date.    Culture - Blood (collected 24 Mar 2022 10:18)  Source: .Blood Blood-Venous  Preliminary Report (25 Mar 2022 11:02):    No growth to date.    Culture - Sputum (collected 21 Mar 2022 17:41)  Source: .Sputum Sputum  Gram Stain (21 Mar 2022 23:27):    Few polymorphonuclear leukocytes per low power field    Rare Squamous epithelial cells per low power field    Few Gram Negative Rods per oil power field    Few Gram positive cocci in pairs per oil power field    Few Yeast like cells per oil power field    Few Gram Positive Rods per oil power field  Final Report (23 Mar 2022 17:10):    Normal Respiratory Jana present    Culture - Urine (collected 17 Mar 2022 18:50)  Source: Clean Catch Clean Catch (Midstream)  Final Report (19 Mar 2022 07:35):    No growth    Culture - Blood (collected 17 Mar 2022 18:15)  Source: .Blood Blood-Peripheral  Final Report (22 Mar 2022 23:00):    No Growth Final    Culture - Blood (collected 17 Mar 2022 17:55)  Source: .Blood Blood-Venous  Final Report (22 Mar 2022 23:00):    No Growth Final      SARS-CoV-2: NotDetec (03-25-22 @ 18:56)  COVID-19 PCR: NotDetec (03-23-22 @ 13:51)  SARS-CoV-2: NotDetec (03-18-22 @ 19:47)    noted Syphilis and CMV results      HIV Result: HIV-1 Pos (03.19.22 @ 12:54) ( confirmatory test )  HIV-1 RNA Quantitative, Viral Load: 6,038,892 (03.20.22 @ 11:59)  ABS CD4: 18 /uL (03.22.22 @ 09:13)    other available results noted including EBV, ANCA...     Hematopathology Report:   ACCESSION No:  80 [id number]  Patient:   KATHRYN MALIN  Accession:                             [id number]  Collected Date/Time:                   3/18/2022 17:30 EDT  Received Date/Time:                    3/19/2022 11:22 EDT  Hematopathology Report - Auth (Verified)  Specimen(s) Submitted  1- Right iliac bone  2- Bone marrow aspirate    Final Diagnosis  1, 2, 3. Bone marrow biopsy, bone marrow clot, and bone marrow aspirate  -Normocellular bone marrow with trilineage hematopoiesis with  maturation and a proportion of smaller megakaryocytes.  - Iron stores are present.  See note and description.    Diagnostic note:  Per chart review, patient has a recent history of pancytopenia.  The current bone marrow biopsy shows nodefinitive evidence of a  lymphoproliferative disorder. Flow cytometry shows a reversed CD4 to CD8  ratio of the T-cells.    Correlation with patient's recent clinical and laboratory findings and  ancillary testing (including cytogenetics/karyotype and additional  surgical pathology reports (10-G-, 72-AE-) are recommended.  Comprehensive report with results of pending ancillary studies to follow.    Dr. Muhammad was notified of the diagnosis on 03/23/2022.    Ancillary studies  Special stains:   Bone marrow aspirate iron stain:   No spicules are  present  to evaluate for iron stores and there are insufficient nRBC to evaluate  for ring sideroblasts.    Flow cytometry:  The myeloid immunophenotypic findings show normal myeloid  granularity, mild monocytosis with normal immunophenotype, no increase  in myeloid immaturity, and normal myeloid antigen maturation pattern.  The  lymphoid immunophenotypic   findings show T-cells with reversed CD4 to CD8  ratio and polytypic B-cells with kappa predominance   _  Immunohistochemical stains:    (block 1A: CD34, , myeloperoxidase,  CD71, E-cadherin, factor VIII, CD15, CD61, CD3 and CD20) show no  significant increase in CD34 positive blasts (less than 3%), with normal  proportionof myeloids (positive with myeloperoxidase and CD15) to  erythroid elements (positive with CD71) including slightly increased  pronormoblasts (positive with E-cadherin, ), and  megakaryocytes  are overall, normal in number with a proportion thatare smaller in size  (positive with factor VIII, CD61). CD3 shows normal proportion of T-cells  with rare B-cells (positive with CD20).  In-situ hybridization   (kappa, lambda) is pending and will be reported in  an addendum.  Cytogenetics/karyotype:    pending  Comment  Iron stain (examined toevaluate for iron stores; see microscopic  description) and Giemsa stain (shows appropriate staining pattern) are  performed and evaluated on block(s): 1A, 2A.  Verified by: Janelle Gruber MD  (Electronic Signature)  Reported on: 03/23/22 10:10 EDT, [facility], [address] [facility] [place] [address]  Phone: [phone]   Fax: [phone]  _________________________________________________________________      < from: CT Abdomen and Pelvis No Cont (03.20.22 @ 15:11) >  IMPRESSION:  New groundglass opacities are seen within the left upper lobe, right   middle lobe and left lower lobe likely representing infection.    Mild left hydronephrosis without evidence of obstructing calculi.   Non obstructing calculi are seen bilaterally within the kidneys.    Heterogeneous hyperdense material within the bladder and left renal   collecting system, which may represent hemorrhagic products.  < end of copied text >    ___________________________________________________________________________________  ===============>>  A S S E S S M E N T   A N D   P L A N <<===============  ------------------------------------------------------------------------------------------    · Assessment	  56 yr old male presenting with urinary retention found to have sepsis 2/2 pyelonephritis     Problem/Plan - 1:  ·  Problem: sepsis, in setting of acute HIV / AIDS with suspect opportunistic infections / PCP / EBV / syphilis / CMV ..       concern for hemorrhagic cystitis / pyelonephritis /infected / impacted stone    workup as noted   off Abx for now : monitoring   all specialty follow up  appreciated and discussed   started on HAART /  Mepron for PCP prophylaxis    started on treatment for syphilis as well     Problem/Plan - 2:  ·  Problem: pt with past + GIANNA, + jammie + ANCA      rule out autoimmune disorders ./ Trever's / good pastures.. etc..      rheumatology not seeing pt as noted / signed off!!!   possible need for kidney bx ?  renal f/u  >> for now crea improving ... ? monitor   most likely all related to HIV / AIDS   Bone marrow Bx as above with no significant abnormalities     Problem/Plan - 3:  ·  Problem: Anemia. multifactorial   BM biopsy > follow final results   Hematology on board , appreciated   montior  transfuse further as needed   occult blood  + and + hematochezia before > stopped    GI appreciated      eventual scope before DC vs OP     hematuria now improved as above    Problem/Plan - 4:  ·  Problem: urinary retention and obstructive Uropathy with JULIO   monitor BMP, I/O     crea improved with IVH > continue    following  TOV per  today ?     **Hyponatremia     repeat labs ordered     pt with flank pain > likely SIADH      nephrology on board, following        Tolvaptan as ordered     Problem/Plan - 5:  ·  Problem: Hypothyroidism post total thyroidectomy for cancer   Continue home levothyroxine.  Thyroid Stimulating Hormone, Serum: 4.06 uIU/mL (03.17.22 @ 18:46)    OOB, ambulate as able   nutrition   --------------------------------------------  Case discussed with pt, parents at bedside   Education given on findings and plan of care    ___________________________  H. TAZ Edwards.  Pager: [phone]

## 2022-03-27 NOTE — PROGRESS NOTE ADULT - SUBJECTIVE AND OBJECTIVE BOX
Patient is a 56y Male whom presented to the hospital with ckd and todd , hyponatremia    PAST MEDICAL & SURGICAL HISTORY:  Deviated septum    Gout    Malignant neoplasm of thyroid gland    S/P ORIF (open reduction internal fixation) fracture  right femur 1992    S/P ACL surgery  Right; 2016    S/P hardware removal  right femur 1994    H/O umbilical hernia repair    History of nasal septoplasty  02/2020    History of tonsillectomy  as a child        MEDICATIONS  (STANDING):  allopurinol 300 milliGRAM(s) Oral daily  azithromycin  IVPB      chlorhexidine 2% Cloths 1 Application(s) Topical daily  folic acid 1 milliGRAM(s) Oral daily  ibuprofen  Tablet. 400 milliGRAM(s) Oral once  influenza   Vaccine 0.5 milliLiter(s) IntraMuscular once  levothyroxine 112 MICROGram(s) Oral daily  mupirocin 2% Ointment 1 Application(s) Both Nostrils two times a day  senna 2 Tablet(s) Oral at bedtime  sodium chloride 1 Gram(s) Oral three times a day  sodium chloride 0.9%. 1000 milliLiter(s) (75 mL/Hr) IV Continuous <Continuous>      Allergies    No Known Allergies    Intolerances        SOCIAL HISTORY:  Denies ETOh,Smoking,     FAMILY HISTORY:  No pertinent family history in first degree relatives        REVIEW OF SYSTEMS:    CONSTITUTIONAL: No weakness, fevers or chills  RESPIRATORY: No cough, wheezing, hemoptysis; No shortness of breath  CARDIOVASCULAR: No chest pain or palpitations  GASTROINTESTINAL: No abdominal or epigastric pain. No nausea, vomiting,     No diarrhea or constipation. No melena                                                                                            PHYSICAL EXAM:    Constitutional: NAD  HEENT: conjunctive   clear   Neck:  No JVD  Respiratory: CTAB  Cardiovascular: S1 and S2  Gastrointestinal: BS+, soft,   Extremities: No peripheral edema

## 2022-03-27 NOTE — PROGRESS NOTE ADULT - ASSESSMENT
56 yr old male with a pmh of Thyroid Ca (s/p surgery on synthroid), Gout on Allopurinol, presents to the ED with hematuria for the past 5 days - went to [facility] with negative imaging and was sent home with abx- today developed left flank pain and urinary rentention.   Patient has had fatigue and unintentional weight loss over the past few months. Per ED note "seen by Dr. 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 Spoke with Dr. Muhammad, states that pt had cervical LAD not long ago with negative biopsies. Dr. Muhammad sent pt to hospital to get bone marrow bx   poke with Dr. Felix with IR, states he can get bm bx on 3/18 and to admit pt to Dr. Edwards)"  Denies  headache, dizziness, chest pain, palpitations, SOB, joint pain, diarrhea/constipation  Vitals in the ED: T 101, , /85, RR 23 satting 97% RA (17 Mar 2022 23:17)      ACUTE RENAL FAILURE:  continue with iv fluid   Serum creatinine is improving   , will consider kidney biopsy if creatinine get worse  There is no progression . No uremic symptoms  pos  evidence of anemia .  Fluid status stable.  Will continue to avoid nephrotoxic drugs.  Patient remains asymptomatic.   Continue current therapy.  hold  diuretic.  hold   ACE inhibitor.  hold   ARB.  Additional evaluation:   ECG,    echocardiogram,     CXR,  will obtained recent   renal ultrasound to evalaute kidney size and possible stones , if cr is not improving     hyponatremia most likely due to siadh s/p tolvaptan   will check ua , urine osmolality , urine sodium , urine uric acid , serum sodium , serum osmolality , serum uric acid , f/u with hyponatremia work up , f/u with bmp , monitor i and o

## 2022-03-27 NOTE — PROGRESS NOTE ADULT - SUBJECTIVE AND OBJECTIVE BOX
Follow Up:      Inverval History/ROS:Patient is a 56y old  Male who presents with a chief complaint of urinary retention (26 Mar 2022 22:15)    S/p cystoscopy yesterday  No fever    Allergies    No Known Allergies    Intolerances        ANTIMICROBIALS:  atovaquone  Suspension 750 two times a day  bictegravir 50 mG/emtricitabine 200 mG/tenofovir alafenamide 25 mG (BIKTARVY) 1 daily  penicillin   G benzathine Injectable 2.4 every 7 days      OTHER MEDS:  acetaminophen     Tablet .. 650 milliGRAM(s) Oral every 6 hours PRN  allopurinol 100 milliGRAM(s) Oral daily  aluminum hydroxide/magnesium hydroxide/simethicone Suspension 30 milliLiter(s) Oral every 4 hours PRN  bisacodyl Suppository 10 milliGRAM(s) Rectal once PRN  chlorhexidine 2% Cloths 1 Application(s) Topical daily  folic acid 1 milliGRAM(s) Oral daily  guaiFENesin Oral Liquid (Sugar-Free) 100 milliGRAM(s) Oral every 6 hours PRN  hydrocodone/homatropine Syrup 5 milliLiter(s) Oral every 6 hours PRN  influenza   Vaccine 0.5 milliLiter(s) IntraMuscular once  levothyroxine 112 MICROGram(s) Oral daily  melatonin 3 milliGRAM(s) Oral at bedtime PRN  mupirocin 2% Ointment 1 Application(s) Both Nostrils two times a day  ondansetron Injectable 4 milliGRAM(s) IV Push every 8 hours PRN  oxybutynin 5 milliGRAM(s) Oral two times a day  oxyCODONE    IR 5 milliGRAM(s) Oral every 6 hours PRN  pantoprazole  Injectable 40 milliGRAM(s) IV Push two times a day  polyethylene glycol 3350 17 Gram(s) Oral daily  senna 2 Tablet(s) Oral at bedtime  sodium bicarbonate 650 milliGRAM(s) Oral three times a day  sodium chloride 1 Gram(s) Oral three times a day  sodium chloride 0.65% Nasal 1 Spray(s) Both Nostrils four times a day PRN  sodium chloride 0.9%. 1000 milliLiter(s) IV Continuous <Continuous>      Vital Signs Last 24 Hrs  T(C): 36.8 (27 Mar 2022 05:22), Max: 36.8 (27 Mar 2022 05:22)  T(F): 98.2 (27 Mar 2022 05:22), Max: 98.2 (27 Mar 2022 05:22)  HR: 94 (27 Mar 2022 05:22) (74 - 94)  BP: 102/65 (27 Mar 2022 05:22) (102/65 - 131/78)  BP(mean): 82 (26 Mar 2022 13:45) (82 - 91)  RR: 18 (27 Mar 2022 05:22) (13 - 21)  SpO2: 97% (27 Mar 2022 05:22) (93% - 99%)    PHYSICAL EXAM:  General: x[ ] non-toxic  HEAD/EYES: [ ] PERRL [x ] white sclera [ ] icterus  ENT:  [ ] normal [ x] supple [ ] thrush [ ] pharyngeal exudate  Cardiovascular:   [ ] murmur [ x] normal [ ] PPM/AICD  Respiratory:  [ x] clear to ausculation bilaterally  GI:  [ x] soft, non-tender, normal bowel sounds  :  [x ] cho [x ] no CVA tenderness   Musculoskeletal:  [ ] no synovitis  Neurologic:  [ ] non-focal exam   Skin:  [ ] no rash  Lymph: [x ] no lymphadenopathy  Psychiatric:  x[ ] appropriate affect [ ] alert & oriented  Lines:  [x ] no phlebitis [ ] central line                                7.3    3.18  )-----------( 191      ( 27 Mar 2022 06:25 )             22.3       03-27    134<L>  |  107  |  32<H>  ----------------------------<  124<H>  4.3   |  19<L>  |  1.95<H>    Ca    7.5<L>      27 Mar 2022 06:25    TPro  6.2  /  Alb  2.2<L>  /  TBili  0.3  /  DBili  x   /  AST  24  /  ALT  9   /  AlkPhos  56        Urinalysis Basic - ( 26 Mar 2022 08:59 )    Color: Red / Appearance: Slightly Turbid / S.012 / pH: x  Gluc: x / Ketone: Negative  / Bili: Negative / Urobili: <2 mg/dL   Blood: x / Protein: 100 mg/dL / Nitrite: Negative   Leuk Esterase: Moderate / RBC: loaded /HPF / WBC 10-15 /HPF   Sq Epi: x / Non Sq Epi: x / Bacteria: Few        MICROBIOLOGY:Culture Results:   No growth to date. (22 @ 10:18)  Culture Results:   No growth to date. (22 @ 10:18)  Culture Results:   Normal Respiratory Jana present (22 @ 17:41)      RADIOLOGY:

## 2022-03-27 NOTE — PROGRESS NOTE ADULT - ASSESSMENT
56y male with recent dx of jammie positive with polyclonal gammopathy and nephrolithiasis having  gross hematuria and recent episode of urinary retention. Cho catheter removed initially and patient was voiding well with low PVRs. Urine and blood cultures negative.   3/20: overnight retaining >400 cc after 1 straight catheterization, cho replaced. Draining well, 40cc clot irrigated and clear without CBI. HCT dropped to 18.6 from 23.6.     Now s/p cysto, bladder biopsy, left URS, stent placement  Bleeding from left kidney without discrete source. No acute bladder bleeding    - CBI clamped; monitor urine color  - Possible cho removal later today if urine remains clear off CBI  - Monitor urine output  - Trend H&H and SCr  - Followup pathology, cytology, culture from OR  - Followup ID; ?role of percutaneous renal biopsy  - Care per primary

## 2022-03-27 NOTE — PROGRESS NOTE ADULT - SUBJECTIVE AND OBJECTIVE BOX
UROLOGY DAILY PROGRESS NOTE:     Subjective:    Patient POD1 s/p cysto, bladder biopsy, left URS, stent placement  Feeling well. Urine color clear.    Objective:    NAD, awake and alert  Respirations nonlabored  Abdomen soft, nontender, nondistended  Wayne urine clear on very slow drip CBI. CBI clamped.    MEDICATIONS  (STANDING):  allopurinol 100 milliGRAM(s) Oral daily  atovaquone  Suspension 750 milliGRAM(s) Oral two times a day  bictegravir 50 mG/emtricitabine 200 mG/tenofovir alafenamide 25 mG (BIKTARVY) 1 Tablet(s) Oral daily  chlorhexidine 2% Cloths 1 Application(s) Topical daily  folic acid 1 milliGRAM(s) Oral daily  influenza   Vaccine 0.5 milliLiter(s) IntraMuscular once  levothyroxine 112 MICROGram(s) Oral daily  mupirocin 2% Ointment 1 Application(s) Both Nostrils two times a day  oxybutynin 5 milliGRAM(s) Oral two times a day  pantoprazole  Injectable 40 milliGRAM(s) IV Push two times a day  penicillin   G benzathine Injectable 2.4 Million Unit(s) IntraMuscular every 7 days  polyethylene glycol 3350 17 Gram(s) Oral daily  senna 2 Tablet(s) Oral at bedtime  sodium bicarbonate 650 milliGRAM(s) Oral three times a day  sodium chloride 1 Gram(s) Oral three times a day  sodium chloride 0.9%. 1000 milliLiter(s) (125 mL/Hr) IV Continuous <Continuous>    MEDICATIONS  (PRN):  acetaminophen     Tablet .. 650 milliGRAM(s) Oral every 6 hours PRN Temp greater or equal to 38C (100.4F), Mild Pain (1 - 3)  aluminum hydroxide/magnesium hydroxide/simethicone Suspension 30 milliLiter(s) Oral every 4 hours PRN Dyspepsia  bisacodyl Suppository 10 milliGRAM(s) Rectal once PRN Constipation  guaiFENesin Oral Liquid (Sugar-Free) 100 milliGRAM(s) Oral every 6 hours PRN Cough  hydrocodone/homatropine Syrup 5 milliLiter(s) Oral every 6 hours PRN Cough  melatonin 3 milliGRAM(s) Oral at bedtime PRN Insomnia  ondansetron Injectable 4 milliGRAM(s) IV Push every 8 hours PRN Nausea and/or Vomiting  oxyCODONE    IR 5 milliGRAM(s) Oral every 6 hours PRN moderate pain 4-6, severe pain 7-10  sodium chloride 0.65% Nasal 1 Spray(s) Both Nostrils four times a day PRN Nasal Congestion      Vital Signs Last 24 Hrs  T(C): 36.8 (27 Mar 2022 05:22), Max: 36.8 (27 Mar 2022 05:22)  T(F): 98.2 (27 Mar 2022 05:22), Max: 98.2 (27 Mar 2022 05:22)  HR: 94 (27 Mar 2022 05:22) (74 - 94)  BP: 102/65 (27 Mar 2022 05:22) (102/65 - 131/78)  BP(mean): 82 (26 Mar 2022 13:45) (82 - 91)  RR: 18 (27 Mar 2022 05:22) (13 - 21)  SpO2: 97% (27 Mar 2022 05:22) (93% - 99%)    I&O's Detail    26 Mar 2022 07:01  -  27 Mar 2022 07:00  --------------------------------------------------------  IN:  Total IN: 0 mL    OUT:    Ureteral Catheter (mL): 19923 mL  Total OUT: 48278 mL    Total NET: -88958 mL          Daily     Daily     LABS:                        7.3    3.18  )-----------( 191      ( 27 Mar 2022 06:25 )             22.3     03-27    134<L>  |  107  |  32<H>  ----------------------------<  124<H>  4.3   |  19<L>  |  1.95<H>    Ca    7.5<L>      27 Mar 2022 06:25    TPro  6.2  /  Alb  2.2<L>  /  TBili  0.3  /  DBili  x   /  AST  24  /  ALT  9   /  AlkPhos  56  03-27      Urinalysis Basic - ( 26 Mar 2022 08:59 )    Color: Red / Appearance: Slightly Turbid / S.012 / pH: x  Gluc: x / Ketone: Negative  / Bili: Negative / Urobili: <2 mg/dL   Blood: x / Protein: 100 mg/dL / Nitrite: Negative   Leuk Esterase: Moderate / RBC: loaded /HPF / WBC 10-15 /HPF   Sq Epi: x / Non Sq Epi: x / Bacteria: Few

## 2022-03-27 NOTE — CHART NOTE - NSCHARTNOTEFT_GEN_A_CORE
Per urology note, CBI clamped 3/27 today and possible cho removal later today if urine remains clear off CBI.  Urology called (pager [phone]) for updated plan of care, urology to call back with further recs.      Elyse Lu NP-BC  Department of Medicine  In House Pager #[phone] Per urology note, CBI clamped 3/27 today and possible cho removal later today if urine remains clear off CBI.  Urology called (pager [phone]) for updated plan of care, recs as follows:  - Keep CBI clamped overnight  - Do not removal cho tonight  - Urology rounds tomorrow AM to assess for cho removal      Elyse Lu NP-BC  Department of Medicine  In House Pager #[phone]

## 2022-03-27 NOTE — PROGRESS NOTE ADULT - ASSESSMENT
56 m with Papillary thyroid cancer s/p total thyroidectomy on synthroid 1/27/22, Gout on Allopurinol, presents to the ED with hematuria for the past 5 days  recent FTT to weight loss  Prior chest Ct with ground glass opacities  Now with anemia/ leukopenia/JULIO   Febrile    1) Newly Diagnosed HIV  Viralload over 6 million  CD4 of 18 (3%)     Genotype sent  Check baseline serologies    Continue biktarvy    Continue Mepron- has elevated fungitell- ? due to PCP in pt with ground glass opacities    Can hold JUDI prophylaxis at this time.    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 sequalae of HIV with some marrow suppression   bone marrow results- prelim without malignancy   ? Loss from GI tract vs   GI and urology following    4) JULIO with hematuria  In AIDS pt, can be seen with CMV or IgA nephropathy  CMV viremia-low level , not suggestive of end organ disease- repeat   BK virus -negative  Check adenovirus- urine/blood testing; RVP negative for adenovirus  Follow up bx from cystoscopy  May need to consider renal biopsy

## 2022-03-28 ENCOUNTER — TRANSCRIPTION ENCOUNTER (OUTPATIENT)
Age: 57
End: 2022-03-28

## 2022-03-28 LAB
ALBUMIN SERPL ELPH-MCNC: 2.4 G/DL — LOW (ref 3.3–5)
ALP SERPL-CCNC: 61 U/L — SIGNIFICANT CHANGE UP (ref 40–120)
ALT FLD-CCNC: 9 U/L — SIGNIFICANT CHANGE UP (ref 4–41)
ANION GAP SERPL CALC-SCNC: 8 MMOL/L — SIGNIFICANT CHANGE UP (ref 7–14)
AST SERPL-CCNC: 24 U/L — SIGNIFICANT CHANGE UP (ref 4–40)
BASOPHILS # BLD AUTO: 0.01 K/UL — SIGNIFICANT CHANGE UP (ref 0–0.2)
BASOPHILS NFR BLD AUTO: 0.3 % — SIGNIFICANT CHANGE UP (ref 0–2)
BILIRUB SERPL-MCNC: 0.4 MG/DL — SIGNIFICANT CHANGE UP (ref 0.2–1.2)
BUN SERPL-MCNC: 27 MG/DL — HIGH (ref 7–23)
CALCIUM SERPL-MCNC: 7.3 MG/DL — LOW (ref 8.4–10.5)
CHLORIDE SERPL-SCNC: 113 MMOL/L — HIGH (ref 98–107)
CO2 SERPL-SCNC: 20 MMOL/L — LOW (ref 22–31)
CREAT SERPL-MCNC: 1.7 MG/DL — HIGH (ref 0.5–1.3)
CULTURE RESULTS: SIGNIFICANT CHANGE UP
EGFR: 46 ML/MIN/1.73M2 — LOW
EOSINOPHIL # BLD AUTO: 0.07 K/UL — SIGNIFICANT CHANGE UP (ref 0–0.5)
EOSINOPHIL NFR BLD AUTO: 2.2 % — SIGNIFICANT CHANGE UP (ref 0–6)
GAMMA INTERFERON BACKGROUND BLD IA-ACNC: 0.06 IU/ML — SIGNIFICANT CHANGE UP
GLUCOSE SERPL-MCNC: 93 MG/DL — SIGNIFICANT CHANGE UP (ref 70–99)
HCT VFR BLD CALC: 23.7 % — LOW (ref 39–50)
HGB BLD-MCNC: 7.5 G/DL — LOW (ref 13–17)
IANC: 2.36 K/UL — SIGNIFICANT CHANGE UP (ref 1.8–7.4)
IMM GRANULOCYTES NFR BLD AUTO: 2.6 % — HIGH (ref 0–1.5)
LYMPHOCYTES # BLD AUTO: 0.36 K/UL — LOW (ref 1–3.3)
LYMPHOCYTES # BLD AUTO: 11.5 % — LOW (ref 13–44)
M TB IFN-G BLD-IMP: ABNORMAL
M TB IFN-G CD4+ BCKGRND COR BLD-ACNC: -0.01 IU/ML — SIGNIFICANT CHANGE UP
M TB IFN-G CD4+CD8+ BCKGRND COR BLD-ACNC: 0 IU/ML — SIGNIFICANT CHANGE UP
MCHC RBC-ENTMCNC: 28.3 PG — SIGNIFICANT CHANGE UP (ref 27–34)
MCHC RBC-ENTMCNC: 31.6 GM/DL — LOW (ref 32–36)
MCV RBC AUTO: 89.4 FL — SIGNIFICANT CHANGE UP (ref 80–100)
MONOCYTES # BLD AUTO: 0.24 K/UL — SIGNIFICANT CHANGE UP (ref 0–0.9)
MONOCYTES NFR BLD AUTO: 7.7 % — SIGNIFICANT CHANGE UP (ref 2–14)
NEUTROPHILS # BLD AUTO: 2.36 K/UL — SIGNIFICANT CHANGE UP (ref 1.8–7.4)
NEUTROPHILS NFR BLD AUTO: 75.7 % — SIGNIFICANT CHANGE UP (ref 43–77)
NRBC # BLD: 0 /100 WBCS — SIGNIFICANT CHANGE UP
NRBC # FLD: 0 K/UL — SIGNIFICANT CHANGE UP
PLATELET # BLD AUTO: 225 K/UL — SIGNIFICANT CHANGE UP (ref 150–400)
POTASSIUM SERPL-MCNC: 3.8 MMOL/L — SIGNIFICANT CHANGE UP (ref 3.5–5.3)
POTASSIUM SERPL-SCNC: 3.8 MMOL/L — SIGNIFICANT CHANGE UP (ref 3.5–5.3)
PROT SERPL-MCNC: 6.5 G/DL — SIGNIFICANT CHANGE UP (ref 6–8.3)
QUANT TB PLUS MITOGEN MINUS NIL: 0.02 IU/ML — SIGNIFICANT CHANGE UP
RBC # BLD: 2.65 M/UL — LOW (ref 4.2–5.8)
RBC # FLD: 16.8 % — HIGH (ref 10.3–14.5)
SODIUM SERPL-SCNC: 141 MMOL/L — SIGNIFICANT CHANGE UP (ref 135–145)
SPECIMEN SOURCE: SIGNIFICANT CHANGE UP
WBC # BLD: 3.12 K/UL — LOW (ref 3.8–10.5)
WBC # FLD AUTO: 3.12 K/UL — LOW (ref 3.8–10.5)

## 2022-03-28 PROCEDURE — 99232 SBSQ HOSP IP/OBS MODERATE 35: CPT

## 2022-03-28 RX ORDER — OXYCODONE HYDROCHLORIDE 5 MG/1
1 TABLET ORAL
Qty: 20 | Refills: 0
Start: 2022-03-28 | End: 2022-04-01

## 2022-03-28 RX ORDER — TAMSULOSIN HYDROCHLORIDE 0.4 MG/1
0.4 CAPSULE ORAL AT BEDTIME
Refills: 0 | Status: DISCONTINUED | OUTPATIENT
Start: 2022-03-28 | End: 2022-03-30

## 2022-03-28 RX ORDER — BICTEGRAVIR SODIUM, EMTRICITABINE, AND TENOFOVIR ALAFENAMIDE FUMARATE 30; 120; 15 MG/1; MG/1; MG/1
1 TABLET ORAL
Qty: 30 | Refills: 0
Start: 2022-03-28 | End: 2022-04-26

## 2022-03-28 RX ORDER — ATOVAQUONE 750 MG/5ML
5 SUSPENSION ORAL
Qty: 300 | Refills: 0
Start: 2022-03-28 | End: 2022-04-26

## 2022-03-28 RX ADMIN — ATOVAQUONE 750 MILLIGRAM(S): 750 SUSPENSION ORAL at 04:58

## 2022-03-28 RX ADMIN — Medication 5 MILLIGRAM(S): at 18:07

## 2022-03-28 RX ADMIN — OXYCODONE HYDROCHLORIDE 5 MILLIGRAM(S): 5 TABLET ORAL at 17:30

## 2022-03-28 RX ADMIN — Medication 650 MILLIGRAM(S): at 13:02

## 2022-03-28 RX ADMIN — SODIUM CHLORIDE 1 GRAM(S): 9 INJECTION INTRAMUSCULAR; INTRAVENOUS; SUBCUTANEOUS at 13:01

## 2022-03-28 RX ADMIN — SODIUM CHLORIDE 1 GRAM(S): 9 INJECTION INTRAMUSCULAR; INTRAVENOUS; SUBCUTANEOUS at 21:58

## 2022-03-28 RX ADMIN — Medication 1 MILLIGRAM(S): at 11:44

## 2022-03-28 RX ADMIN — BICTEGRAVIR SODIUM, EMTRICITABINE, AND TENOFOVIR ALAFENAMIDE FUMARATE 1 TABLET(S): 30; 120; 15 TABLET ORAL at 11:44

## 2022-03-28 RX ADMIN — Medication 100 MILLIGRAM(S): at 11:45

## 2022-03-28 RX ADMIN — SODIUM CHLORIDE 125 MILLILITER(S): 9 INJECTION INTRAMUSCULAR; INTRAVENOUS; SUBCUTANEOUS at 14:28

## 2022-03-28 RX ADMIN — PANTOPRAZOLE SODIUM 40 MILLIGRAM(S): 20 TABLET, DELAYED RELEASE ORAL at 04:58

## 2022-03-28 RX ADMIN — Medication 650 MILLIGRAM(S): at 21:59

## 2022-03-28 RX ADMIN — ATOVAQUONE 750 MILLIGRAM(S): 750 SUSPENSION ORAL at 18:08

## 2022-03-28 RX ADMIN — PANTOPRAZOLE SODIUM 40 MILLIGRAM(S): 20 TABLET, DELAYED RELEASE ORAL at 18:06

## 2022-03-28 RX ADMIN — Medication 5 MILLIGRAM(S): at 04:58

## 2022-03-28 RX ADMIN — TOLVAPTAN 30 MILLIGRAM(S): 15 TABLET ORAL at 04:56

## 2022-03-28 RX ADMIN — OXYCODONE HYDROCHLORIDE 5 MILLIGRAM(S): 5 TABLET ORAL at 16:39

## 2022-03-28 RX ADMIN — SODIUM CHLORIDE 1 GRAM(S): 9 INJECTION INTRAMUSCULAR; INTRAVENOUS; SUBCUTANEOUS at 04:58

## 2022-03-28 RX ADMIN — CHLORHEXIDINE GLUCONATE 1 APPLICATION(S): 213 SOLUTION TOPICAL at 11:44

## 2022-03-28 RX ADMIN — Medication 112 MICROGRAM(S): at 04:58

## 2022-03-28 RX ADMIN — Medication 650 MILLIGRAM(S): at 04:57

## 2022-03-28 NOTE — DISCHARGE NOTE PROVIDER - HOSPITAL COURSE
55 y/o M w/ a PMH of thyroid cancer (s/p surgery on synthroid), gout on Allopurinol, presents with hematuria and urinary retention found to have sepsis 2/2 pyelonephritis.      Sepsis  - likely acute hemorrhagic cystitis with pyelonephritis with urinary retention and obstructive uropathy with JULIO   - possible/rule out infected stone  - pt w/ history of nephrolithiasis, had urinary retention, w/ cho in outside hospital > AMA > here > cho changed in ED  - blood cx x2 negative  - urine cx negative  - hepatitis panel negative, GC/chlamydia negative, parvovirus negative  - s/p IV CTX   - ID,  following  - CT A/P: mild left hydronephrosis without evidence of obstructing calculi; non-obstructing calculi are seen bilaterally within the kidneys; heterogeneous hyperdense material within the bladder and left renal collecting system, which may represent hemorrhagic products        atypical PNA  - CT A/P: new ground-glass opacities are seen within the left upper lobe, right middle lobe and left lower lobe likely representing infection  - s/p Azithromycin  - QuantiFeron indeterminate  - RVP negative  - sputum culture - few GNR, few Gram + cocci, few yeast-like cells, few Gram + rods  - HIV +, likely PCP  - per ID, c/w Mepron for tx of possible PCP      HIV  - HIV-1 +  - CD4 count 18  - fatigue/weight loss  - viral load - 6,038,892  - started Biktarvy 3/22  - c/w empiric Mepron for PCP  - ID following    + RPR  - no known history of prior diagnosis/tx syphilis  Treat for latent syphillis- pcn im weekly times 3      pt with past + GIANNA, + jammie +   - pt also with h/o sinusitis, and now Centaurea   - rule out autoimmune disorders/Trever's etc  - rheumatology following  - pANCA positive 1:320  - s/p bone marrow bx by IR 3/18, prelim negative, f/u final results ___    JULIO with hematuria  - in AIDS pt, can be seen with CMV or IGA nephropathy  - check CMV viremia - but can have end organ disease without viremia  - ultimately may need bladder/renal biopsy  - tentative plan for cystoscopy and biopsy with urology 3/24  - 3/26 s/p cysto, bladder biopsy, left URS, RGP, ureteral stent insertion       anemia  - s/p BM biopsy 3/18, prelim negative, f/u final results ___  - hematology following   - occult blood positive  - likely 2/2 hematuria  - s/p multiple units PRBC    Rectal bleeding  - resolved  - occult + on 3/20  - GI following  - possibly resolved diverticular bleed but favor contaminant from hematuria/clots  - last colonoscopy 2019 w/diverticulosis  - CT without acute GI pathology;  following for bladder pathology w/ hemorrhagic products present   - check NM Bleeding Scan if further GI bleeding     Hypothyroidism  - post total thyroidectomy for cancer   - c/w home levothyroxine  - Thyroid Stimulating Hormone: 4.06    Elevated D-Dimer (elevated d-dimer: 504 -> 958)  - reported hypoxemia > now seems to be doing ok on room air as documented  - tachycardia from sepsis  - pt was also very anxious  - creatinine elevated so will hold off of CTA  - LE doppler negative for DVT  -3/23 VQ scan--> VERY Low probability of PE    Hyponatremia  - nephro following  - c/w salt tabs  - urine studies c/w SIADH  - c/w fluid restriction    On_________, discussed with __________, patient is medically cleared and optimized for discharge today. All medications were reviewed with attending, and sent to mutually agreed upon pharmacy. 57 y/o M w/ a PMH of thyroid cancer (s/p surgery on synthroid), gout on Allopurinol, presents with hematuria and urinary retention found to have sepsis 2/2 pyelonephritis.      Sepsis  - likely acute hemorrhagic cystitis with pyelonephritis with urinary retention and obstructive uropathy with JULIO   - possible/rule out infected stone  - pt w/ history of nephrolithiasis, had urinary retention, w/ cho in outside hospital > AMA > here > cho changed in ED  - blood cx x2 negative  - urine cx negative  - hepatitis panel negative, GC/chlamydia negative, parvovirus negative  - s/p IV CTX   - ID,  following  - CT A/P: mild left hydronephrosis without evidence of obstructing calculi; non-obstructing calculi are seen bilaterally within the kidneys; heterogeneous hyperdense material within the bladder and left renal collecting system, which may represent hemorrhagic products        atypical PNA  - CT A/P: new ground-glass opacities are seen within the left upper lobe, right middle lobe and left lower lobe likely representing infection  - s/p Azithromycin  - QuantiFeron indeterminate  - RVP negative  - sputum culture - few GNR, few Gram + cocci, few yeast-like cells, few Gram + rods  - HIV +, likely PCP  - per ID, c/w Mepron for tx of possible PCP      HIV  - HIV-1 +  - CD4 count 18  - fatigue/weight loss  - viral load - 6,038,892  - started Biktarvy 3/22  - c/w empiric Mepron for PCP  - ID following    + RPR  - no known history of prior diagnosis/tx syphilis  Treat for latent syphillis- PCN IM weekly x3      pt with past + GIANNA, + jammie +   - pt also with h/o sinusitis, and now Centaurea   - rule out autoimmune disorders/Trever's etc  - rheumatology following  - pANCA positive 1:320  - s/p bone marrow bx by IR 3/18 final: Normocellular bone marrow with trilineage hematopoiesis with maturation and a proportion of smaller megakaryocytes. Iron stores are present.    JULIO with hematuria  - in AIDS pt, can be seen with CMV or IGA nephropathy  - check CMV viremia - but can have end organ disease without viremia  - 3/26 s/p cysto, bladder biopsy, left URS, RGP, ureteral stent insertion   - kidney biopsy: NEGATIVE FOR HIGH GRADE UROTHELIAL CARCINOMA    Anemia  - multifactorial in nature.  - Patient with HIV, pyelonephritis.  Also with JULIO, AOCD, hematuria and guaiac + stool,  all likely plating a role. No evidence of active hemolysis  - Transfusional support as needed.   - s/p multiple units PRBC    Rectal bleeding  - resolved  - occult + on 3/20  - GI following  - possibly resolved diverticular bleed but favor contaminant from hematuria/clots  - last colonoscopy 2019 w/diverticulosis  - CT without acute GI pathology;  following for bladder pathology w/ hemorrhagic products present   - check NM Bleeding Scan if further GI bleeding     Hypothyroidism  - post total thyroidectomy for cancer   - c/w home levothyroxine  - Thyroid Stimulating Hormone: 4.06    Elevated D-Dimer (elevated d-dimer: 504 -> 958)  - reported hypoxemia however, doing ok on room air  - tachycardia from sepsis  - pt was also very anxious  - creatinine elevated so held off on CTA  - LE doppler negative for DVT  -3/23 VQ scan--> VERY Low probability of PE    Hyponatremia  - nephro following  - c/w salt tabs  - urine studies c/w SIADH  - c/w fluid restriction      On_________, discussed with __________, patient is medically cleared and optimized for discharge today. All medications were reviewed with attending, and sent to mutually agreed upon pharmacy. 57 y/o M w/ a PMH of thyroid cancer (s/p surgery on synthroid), gout on Allopurinol, presents with hematuria and urinary retention found to have sepsis 2/2 pyelonephritis.      Sepsis  - likely acute hemorrhagic cystitis with pyelonephritis with urinary retention and obstructive uropathy with JULIO in setting of acute HIV / AIDS with suspect opportunistic infections / PCP / EBV / syphilis / CM  - pt w/ history of nephrolithiasis, had urinary retention, w/ cho in outside hospital > AMA > here > cho changed in ED  - blood cx x2 negative  - urine cx negative  - hepatitis panel negative, GC/chlamydia negative, parvovirus negative  - s/p IV CTX   - ID,  following  - CT A/P: mild left hydronephrosis without evidence of obstructing calculi; non-obstructing calculi are seen bilaterally within the kidneys; heterogeneous hyperdense material within the bladder and left renal collecting system, which may represent hemorrhagic products        atypical PNA  - CT A/P: new ground-glass opacities are seen within the left upper lobe, right middle lobe and left lower lobe likely representing infection  - s/p Azithromycin  - QuantiFeron indeterminate  - RVP negative  - sputum culture - few GNR, few Gram + cocci, few yeast-like cells, few Gram + rods  - HIV +, likely PCP  - per ID, started on Mepron for tx of possible PCP      HIV  - HIV-1 +  - CD4 count 18  - fatigue/weight loss  - viral load - 6,038,892  - started Biktarvy 3/22  - c/w empiric Mepron for PCP    + RPR  - no known history of prior diagnosis/tx syphilis  Treat for latent syphillis- PCN IM weekly x3 (2 doses remaining on 4/4 and 4/9)      pt with past + GIANNA, + jammie +   - pt also with h/o sinusitis, and now Centaurea   - rheumatology consulted, recs appreciated  - pANCA positive 1:320  - s/p bone marrow bx by IR 3/18 final: Normocellular bone marrow with trilineage hematopoiesis with maturation and a proportion of smaller megakaryocytes. Iron stores are present.  - rpt GIANNA negative, outpt follow up with hematology ; assess for need of outpt rheumatology workup      JULIO with hematuria  - in AIDS pt, can be seen with CMV or IGA nephropathy  - CMV viremia-low level , not suggestive of end organ disease  - 3/26 s/p cysto, bladder biopsy, left URS, RGP, ureteral stent insertion - f/u outpt for further management with Dr. Godinez  - kidney biopsy: NEGATIVE FOR HIGH GRADE UROTHELIAL CARCINOMA    Anemia  - multifactorial in nature.  - Patient with HIV, pyelonephritis.  Also with JULIO, AOCD, hematuria and guaiac + stool,  all likely plating a role. No evidence of active hemolysis  - Transfusional support as needed.   - s/p multiple units PRBC  - hematuria resolved, close outpt follow up with hematology, GI, and     Rectal bleeding  - resolved  - occult + on 3/20  - GI consulted appreciated: possibly resolved diverticular bleed but favor contaminant from hematuria/clots  - last colonoscopy 2019 w/diverticulosis  - CT without acute GI pathology;  following for bladder pathology w/ hemorrhagic products present   - deferred inpatient scoping, f/u with GI outpt     Hypothyroidism  - post total thyroidectomy for cancer   - c/w home levothyroxine  - Thyroid Stimulating Hormone: 4.06    Elevated D-Dimer (elevated d-dimer: 504 -> 958)  - reported hypoxemia however, doing ok on room air  - tachycardia from sepsis  - pt was also very anxious  - creatinine elevated so held off on CTA  - LE doppler negative for DVT  -3/23 VQ scan--> VERY Low probability of PE    Hyponatremia  - nephro consulted  - c/w salt tabs  - urine studies c/w SIADH  - c/w fluid restriction  - sodium improved 136 on 3/30    HSV  ID c/s started on Valcyclovir    On 3/30/2022, discussed with Dr. Edwards, patient is medically cleared and optimized for discharge today. All medications were reviewed with attending, and sent to mutually agreed upon pharmacy.

## 2022-03-28 NOTE — PROGRESS NOTE ADULT - SUBJECTIVE AND OBJECTIVE BOX
Follow Up:      Inverval History/ROS:Patient is a 56y old  Male who presents with a chief complaint of urinary retention (28 Mar 2022 15:37)    No fever  Cho removed    Allergies    No Known Allergies    Intolerances        ANTIMICROBIALS:  atovaquone  Suspension 750 two times a day  bictegravir 50 mG/emtricitabine 200 mG/tenofovir alafenamide 25 mG (BIKTARVY) 1 daily  penicillin   G benzathine Injectable 2.4 every 7 days      OTHER MEDS:  acetaminophen     Tablet .. 650 milliGRAM(s) Oral every 6 hours PRN  allopurinol 100 milliGRAM(s) Oral daily  aluminum hydroxide/magnesium hydroxide/simethicone Suspension 30 milliLiter(s) Oral every 4 hours PRN  bisacodyl Suppository 10 milliGRAM(s) Rectal once PRN  chlorhexidine 2% Cloths 1 Application(s) Topical daily  folic acid 1 milliGRAM(s) Oral daily  guaiFENesin Oral Liquid (Sugar-Free) 100 milliGRAM(s) Oral every 6 hours PRN  hydrocodone/homatropine Syrup 5 milliLiter(s) Oral every 6 hours PRN  influenza   Vaccine 0.5 milliLiter(s) IntraMuscular once  levothyroxine 112 MICROGram(s) Oral daily  melatonin 3 milliGRAM(s) Oral at bedtime PRN  mupirocin 2% Ointment 1 Application(s) Both Nostrils two times a day  ondansetron Injectable 4 milliGRAM(s) IV Push every 8 hours PRN  oxybutynin 5 milliGRAM(s) Oral two times a day  oxyCODONE    IR 5 milliGRAM(s) Oral every 6 hours PRN  pantoprazole  Injectable 40 milliGRAM(s) IV Push two times a day  polyethylene glycol 3350 17 Gram(s) Oral daily  senna 2 Tablet(s) Oral at bedtime  sodium bicarbonate 650 milliGRAM(s) Oral three times a day  sodium chloride 1 Gram(s) Oral three times a day  sodium chloride 0.65% Nasal 1 Spray(s) Both Nostrils four times a day PRN  sodium chloride 0.9%. 1000 milliLiter(s) IV Continuous <Continuous>  tamsulosin 0.4 milliGRAM(s) Oral at bedtime      Vital Signs Last 24 Hrs  T(C): 37.3 (28 Mar 2022 14:00), Max: 37.3 (28 Mar 2022 05:19)  T(F): 99.1 (28 Mar 2022 14:00), Max: 99.2 (28 Mar 2022 05:19)  HR: 83 (28 Mar 2022 14:00) (81 - 89)  BP: 118/77 (28 Mar 2022 14:00) (110/64 - 127/84)  BP(mean): 74 (27 Mar 2022 20:09) (74 - 82)  RR: 18 (28 Mar 2022 14:00) (18 - 18)  SpO2: 99% (28 Mar 2022 14:00) (94% - 100%)    PHYSICAL EXAM:  General: [x ] non-toxic  HEAD/EYES: [ ] PERRL [ x] white sclera [ ] icterus  ENT:  [ ] normal [x ] supple [ ] thrush [ ] pharyngeal exudate  Cardiovascular:   [ ] murmur [ x] normal [ ] PPM/AICD  Respiratory:  [x ] clear to ausculation bilaterally  GI:  [ x] soft, non-tender, normal bowel sounds  :  [ ] cho [x ] no CVA tenderness   Musculoskeletal:  [ ] no synovitis  Neurologic:  [ ] non-focal exam   Skin:  [x ] no rash  Lymph: [ x] no lymphadenopathy  Psychiatric:  [ ] appropriate affect [x ] alert & oriented  Lines:  [ x no phlebitis [ ] central line                                7.5    3.12  )-----------( 225      ( 28 Mar 2022 06:45 )             23.7       03-28    141  |  113<H>  |  27<H>  ----------------------------<  93  3.8   |  20<L>  |  1.70<H>    Ca    7.3<L>      28 Mar 2022 06:45  Phos  3.1     03-27  Mg     1.60     03-27    TPro  6.5  /  Alb  2.4<L>  /  TBili  0.4  /  DBili  x   /  AST  24  /  ALT  9   /  AlkPhos  61  -28      Urinalysis Basic - ( 27 Mar 2022 15:40 )    Color: Dark Brown / Appearance: Slightly Turbid / S.005 / pH: x  Gluc: x / Ketone: Negative  / Bili: Negative / Urobili: <2 mg/dL   Blood: x / Protein: 100 mg/dL / Nitrite: Negative   Leuk Esterase: Large / RBC: >50 /HPF / WBC 15 /HPF   Sq Epi: x / Non Sq Epi: 3 /HPF / Bacteria: Few        MICROBIOLOGY:Culture Results:   No growth to date. (22 @ 16:16)  Culture Results:   Testing in progress (22 @ 16:16)  Culture Results:   No growth to date. (22 @ 10:18)  Culture Results:   No growth to date. (22 @ 10:18)  Culture Results:   Normal Respiratory Jana present (22 @ 17:41)      RADIOLOGY:

## 2022-03-28 NOTE — DISCHARGE NOTE PROVIDER - CARE PROVIDER_API CALL
Seth Muhammad  HEMATOLOGY  [address] [facility], [address]  [place] [address]  Phone: [phone]  Fax: [phone]  Follow Up Time:     Demetrius Man)  Infectious Disease  [address] [place] [address]  Phone: [phone]  Fax: [phone]  Follow Up Time:     John Johnson  Gastroenterology  [address] [place] [address]  Phone: [phone]  Fax: [phone]  Follow Up Time:     Estrada Godinez  UROLOGY  [address] [place] [address]  Phone: [phone]  Fax: [phone]  Follow Up Time:    Seth Muhammad  HEMATOLOGY  [address] [facility], [address]  [place] [address]  Phone: [phone]  Fax: [phone]  Follow Up Time: 1 week    Demetrius Man)  Infectious Disease  [address] [place] [address]  Phone: [phone]  Fax: [phone]  Scheduled Appointment: 04/04/2022 01:30 PM    John Johnson  Gastroenterology  [address] [place] [address]  Phone: [phone]  Fax: [phone]  Follow Up Time: 1 week    Estrada Godinez  UROLOGY  [address] [place] [address]  Phone: [phone]  Fax: [phone]  Follow Up Time: 1 week   Seth Muhammad  HEMATOLOGY  [address] [facility], [address]  [place] [address]  Phone: [phone]  Fax: [phone]  Scheduled Appointment: 04/06/2022 11:15 AM    Demetrius Man)  Infectious Disease  [address] [place] [address]  Phone: [phone]  Fax: [phone]  Scheduled Appointment: 04/04/2022 01:30 PM    Estrada Godinez  UROLOGY  [address] [place] [address]  Phone: [phone]  Fax: [phone]  Scheduled Appointment: 04/11/2022 08:45 AM    David Yuan)  Gastroenterology  [address] [place] [address]  Phone: [phone]  Fax: [phone]  Scheduled Appointment: 04/25/2022 03:30 PM

## 2022-03-28 NOTE — PROGRESS NOTE ADULT - ASSESSMENT
56 yr old male with a pmh of Thyroid Ca (s/p surgery on synthroid), Gout on Allopurinol, presents to the ED with hematuria for the past 5 days - went to [facility] with negative imaging and was sent home with abx- today developed left flank pain and urinary rentention.   Patient has had fatigue and unintentional weight loss over the past few months. Per ED note "seen by Dr. 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 Spoke with Dr. Muhammad, states that pt had cervical LAD not long ago with negative biopsies. Dr. Muhammad sent pt to hospital to get bone marrow bx   poke with Dr. Felix with IR, states he can get bm bx on 3/18 and to admit pt to Dr. Edwards)"  Denies  headache, dizziness, chest pain, palpitations, SOB, joint pain, diarrhea/constipation  Vitals in the ED: T 101, , /85, RR 23 satting 97% RA (17 Mar 2022 23:17)      ACUTE RENAL FAILURE:  continue with iv fluid   Serum creatinine is improving   , will consider kidney biopsy if creatinine get worse  There is no progression . No uremic symptoms  pos  evidence of anemia .  Fluid status stable.  Will continue to avoid nephrotoxic drugs.  Patient remains asymptomatic.   Continue current therapy.  hold  diuretic.  hold   ACE inhibitor.  hold   ARB.  Additional evaluation:   ECG,    echocardiogram,     CXR,  will obtained recent   renal ultrasound to evalaute kidney size and possible stones , if cr is not improving     hyponatremia most likely due to siadh s/p tolvaptan   will check ua , urine osmolality , urine sodium , urine uric acid , serum sodium , serum osmolality , serum uric acid , f/u with hyponatremia work up , f/u with bmp , monitor i and o

## 2022-03-28 NOTE — PROGRESS NOTE ADULT - ASSESSMENT
56 m with Papillary thyroid cancer s/p total thyroidectomy on synthroid 1/27/22, Gout on Allopurinol, presents to the ED with hematuria for the past 5 days  recent FTT to weight loss  Prior chest Ct with ground glass opacities  Now with anemia/ leukopenia/JULIO   Febrile    1) Newly Diagnosed HIV  Viralload over 6 million  CD4 of 18 (3%)     Genotype sent  Check baseline serologies    Continue biktarvy    Continue Mepron- has elevated fungitell- ? due to PCP in pt with ground glass opacities    Can hold JUDI prophylaxis at this time.    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 sequalae of HIV with some marrow suppression   bone marrow results- prelim without malignancy   ? Loss from GI tract vs   GI and urology following    4) JULIO with hematuria  In AIDS pt, can be seen with CMV or IgA nephropathy  CMV viremia-low level , not suggestive of end organ disease- repeat   BK virus -negative  Check adenovirus- urine/blood testing; RVP negative for adenovirus  Follow up bx from cystoscopy      Follow up at [address]  [address]  [place], [place] [id number] [phone]    Appt on 4/4 at 1: 30 PM

## 2022-03-28 NOTE — PROGRESS NOTE ADULT - ASSESSMENT
56 yr old male with a pmh of Thyroid Ca (s/p surgery on synthroid), Gout on Allopurinol, presents to the ED with hematuria for the past 5 days    Rectal Bleed (Resolved)  favor occult (+) is contaminant from hematuria/clots  last Colonoscopy 2019 w/diverticulosis  CT without acute GI pathology  cont to monitor cbc and transfuse to keep Hgb 7.5 or greater   Hematology care appreciated   d/w pt for outpatient f/u in our office for non-urgent endoscopic eval; [phone]    Gross Hematuria   Urology care appreciated; cont to follow their recs    HIV   management per medicine and ID appreciated     I reviewed the overnight course of events on the unit, re-confirming the patient history. I discussed the care with the patient and their family. The plan of care was discussed with the physician assistant and modifications were made to the notation where appropriate. Differential diagnosis and plan of care discussed with patient after the evaluation. Advanced care planning was discussed with patient and family.  Advanced care planning forms were reviewed and discussed.  Risks, benefits and alternatives of gastroenterologic procedures were discussed in detail and all questions were answered. 35 minutes spent on total encounter of which more than fifty percent of the encounter was spent counseling and/or coordinating care by the attending physician.

## 2022-03-28 NOTE — DISCHARGE NOTE PROVIDER - NSDCCAREPROVSEEN_GEN_ALL_CORE_FT
[facility] Medicine Hoorbod Delshadfar  Hoorbod Delshadfar  Hoorbod Delshadfar  Seth Hector  User ADM  Ordering Physician  Sebastián Bravo  Team [facility] Medicine ACP  Team [facility] Interventional Radiology

## 2022-03-28 NOTE — PROGRESS NOTE ADULT - SUBJECTIVE AND OBJECTIVE BOX
INTERVAL HPI/OVERNIGHT EVENTS:    brown stools   no c/o abd pain   denying rectal bleeding   hopes for discharge soon    MEDICATIONS  (STANDING):  allopurinol 100 milliGRAM(s) Oral daily  atovaquone  Suspension 750 milliGRAM(s) Oral two times a day  bictegravir 50 mG/emtricitabine 200 mG/tenofovir alafenamide 25 mG (BIKTARVY) 1 Tablet(s) Oral daily  chlorhexidine 2% Cloths 1 Application(s) Topical daily  folic acid 1 milliGRAM(s) Oral daily  influenza   Vaccine 0.5 milliLiter(s) IntraMuscular once  levothyroxine 112 MICROGram(s) Oral daily  mupirocin 2% Ointment 1 Application(s) Both Nostrils two times a day  oxybutynin 5 milliGRAM(s) Oral two times a day  pantoprazole  Injectable 40 milliGRAM(s) IV Push two times a day  penicillin   G benzathine Injectable 2.4 Million Unit(s) IntraMuscular every 7 days  polyethylene glycol 3350 17 Gram(s) Oral daily  senna 2 Tablet(s) Oral at bedtime  sodium bicarbonate 650 milliGRAM(s) Oral three times a day  sodium chloride 1 Gram(s) Oral three times a day  sodium chloride 0.9%. 1000 milliLiter(s) (125 mL/Hr) IV Continuous <Continuous>    MEDICATIONS  (PRN):  acetaminophen     Tablet .. 650 milliGRAM(s) Oral every 6 hours PRN Temp greater or equal to 38C (100.4F), Mild Pain (1 - 3)  aluminum hydroxide/magnesium hydroxide/simethicone Suspension 30 milliLiter(s) Oral every 4 hours PRN Dyspepsia  bisacodyl Suppository 10 milliGRAM(s) Rectal once PRN Constipation  guaiFENesin Oral Liquid (Sugar-Free) 100 milliGRAM(s) Oral every 6 hours PRN Cough  hydrocodone/homatropine Syrup 5 milliLiter(s) Oral every 6 hours PRN Cough  melatonin 3 milliGRAM(s) Oral at bedtime PRN Insomnia  ondansetron Injectable 4 milliGRAM(s) IV Push every 8 hours PRN Nausea and/or Vomiting  oxyCODONE    IR 5 milliGRAM(s) Oral every 6 hours PRN moderate pain 4-6, severe pain 7-10  sodium chloride 0.65% Nasal 1 Spray(s) Both Nostrils four times a day PRN Nasal Congestion      Allergies    No Known Allergies    Intolerances        Review of Systems:    General:  No wt loss, fevers, chills, night sweats, fatigue   Eyes:  Good vision, no reported pain  ENT:  No sore throat, pain, runny nose, dysphagia  CV:  No pain, palpitations, hypo/hypertension  Resp:  No dyspnea, cough, tachypnea, wheezing  GI:  No pain, No nausea, No vomiting, No diarrhea, No constipation, No weight loss, No fever, No pruritis, No rectal bleeding, No melena, No dysphagia  :  No pain, bleeding, incontinence, nocturia  Muscle:  No pain, weakness  Neuro:  No weakness, tingling, memory problems  Psych:  No fatigue, insomnia, mood problems, depression  Endocrine:  No polyuria, polydypsia, cold/heat intolerance  Heme:  No petechiae, ecchymosis, easy bruisability  Skin:  No rash, tattoos, scars, edema      Vital Signs Last 24 Hrs  T(C): 37.3 (28 Mar 2022 05:19), Max: 37.3 (28 Mar 2022 05:19)  T(F): 99.2 (28 Mar 2022 05:19), Max: 99.2 (28 Mar 2022 05:19)  HR: 81 (28 Mar 2022 05:19) (81 - 89)  BP: 127/84 (28 Mar 2022 05:19) (110/64 - 127/84)  BP(mean): 74 (27 Mar 2022 20:09) (74 - 82)  RR: 18 (28 Mar 2022 05:19) (18 - 18)  SpO2: 96% (28 Mar 2022 05:19) (94% - 100%)    PHYSICAL EXAM:    Constitutional: NAD  HEENT: EOMI, throat clear  Neck: No LAD, supple  Respiratory: CTA and P  Cardiovascular: S1 and S2, RRR, no M  Gastrointestinal: BS+, soft, NT/ND, neg HSM,  Extremities: No peripheral edema, neg clubbing, cyanosis  Vascular: 2+ peripheral pulses  Neurological: A/O x 3, no focal deficits  Psychiatric: Normal mood, normal affect  Skin: No rashes      LABS:                        7.5    3.12  )-----------( 225      ( 28 Mar 2022 06:45 )             23.7     03-    141  |  113<H>  |  27<H>  ----------------------------<  93  3.8   |  20<L>  |  1.70<H>    Ca    7.3<L>      28 Mar 2022 06:45  Phos  3.1       Mg     1.60         TPro  6.5  /  Alb  2.4<L>  /  TBili  0.4  /  DBili  x   /  AST  24  /  ALT  9   /  AlkPhos  61        Urinalysis Basic - ( 27 Mar 2022 15:40 )    Color: Dark Brown / Appearance: Slightly Turbid / S.005 / pH: x  Gluc: x / Ketone: Negative  / Bili: Negative / Urobili: <2 mg/dL   Blood: x / Protein: 100 mg/dL / Nitrite: Negative   Leuk Esterase: Large / RBC: >50 /HPF / WBC 15 /HPF   Sq Epi: x / Non Sq Epi: 3 /HPF / Bacteria: Few        RADIOLOGY & ADDITIONAL TESTS:

## 2022-03-28 NOTE — DISCHARGE NOTE PROVIDER - NSDCMRMEDTOKEN_GEN_ALL_CORE_FT
allopurinol 300 mg oral tablet: 1 tab(s) orally once a day  atovaquone 750 mg/5 mL oral suspension: 5 milliliter(s) orally 2 times a day    Please page [phone] for price check  bictegravir/emtricitabine/tenofovir 50 mg-200 mg-25 mg oral tablet: 1 tab(s) orally once a day    Please page [phone] for price check  levothyroxine 112 mcg (0.112 mg) oral tablet: 1 tab(s) orally once a day at least 30 minutes before breakfast  oxyCODONE 5 mg oral tablet: 1 tab(s) orally every 6 hours, As needed, moderate pain 4-6, severe pain 7-10    Please page [phone] for price check MDD:4 tabs per day   acetaminophen 325 mg oral tablet: 2 tab(s) orally every 6 hours, As needed, Temp greater or equal to 38C (100.4F), Mild Pain (1 - 3)  allopurinol 100 mg oral tablet: 1 tab(s) orally once a day  aluminum hydroxide-magnesium hydroxide 200 mg-200 mg/5 mL oral suspension: 30 milliliter(s) orally every 4 hours, As needed, Dyspepsia  atovaquone 750 mg/5 mL oral suspension: 5 milliliter(s) orally 2 times a day    Please page [phone] for price check  bictegravir/emtricitabine/tenofovir 50 mg-200 mg-25 mg oral tablet: 1 tab(s) orally once a day    Please page [phone] for price check  folic acid 1 mg oral tablet: 1 tab(s) orally once a day  guaiFENesin 100 mg/5 mL oral liquid: 5 milliliter(s) orally every 6 hours, As needed, Cough  levothyroxine 112 mcg (0.112 mg) oral tablet: 1 tab(s) orally once a day at least 30 minutes before breakfast  melatonin 3 mg oral tablet: 1 tab(s) orally once a day (at bedtime), As needed, Insomnia  oxybutynin 5 mg oral tablet: 1 tab(s) orally 2 times a day  oxyCODONE 5 mg oral tablet: 1 tab(s) orally every 6 hours, As needed, moderate pain 4-6, severe pain 7-10    Please page [phone] for price check MDD:4 tabs per day  pantoprazole 40 mg oral delayed release tablet: 1 tab(s) orally 2 times a day   polyethylene glycol 3350 oral powder for reconstitution: 17 gram(s) orally once a day  senna oral tablet: 2 tab(s) orally once a day (at bedtime)  sodium bicarbonate 650 mg oral tablet: 1 tab(s) orally 2 times a day   tamsulosin 0.4 mg oral capsule: 1 cap(s) orally once a day (at bedtime)  valACYclovir 1 g oral tablet: 1 tab(s) orally 2 times a day, Please stop after 10 days

## 2022-03-28 NOTE — DISCHARGE NOTE PROVIDER - CARE PROVIDERS DIRECT ADDRESSES
,DirectAddress_Unknown,[email or web address] ,[email or web address],[email or web address]

## 2022-03-28 NOTE — PROGRESS NOTE ADULT - ASSESSMENT
A/P: 56y male with recent dx of jammie positive with polyclonal gammopathy and nephrolithiasis having  gross hematuria and recent episode of urinary retention. Cho catheter removed initially and patient was voiding well with low PVRs. Urine and blood cultures negative.   3/20: overnight retaining >400 cc after 1 straight catheterization, cho replaced.     Now s/p cysto, bladder biopsy, left URS, stent placement. Bleeding from left kidney without discrete source. No acute bladder bleeding.   - Cho clear, removed.   Voiding trial: Optimize with all of the following:  - Encourage Ambulation / Out of Bed  - Initiate / Continue Bowel Regimen  - Minimize Narcotics / Benzodiazepines  - Minimize anticholinergics / antihistamines  - Tamsulosin 0.4mg qhs  - Trend H&H and SCr  - Followup pathology, cytology, culture from OR  - Followup ID; ?role of percutaneous renal biopsy  - Care per primary  - No contraindication to discharge if patient medical stable/clear, voiding. Pt needs outpatient f/u for next step management and post-discharge check.     [facility]  [address], [address]  [place], [address] [phone] A/P: 56y male with recent dx of jammie positive with polyclonal gammopathy and nephrolithiasis having  gross hematuria and recent episode of urinary retention. Cho catheter removed initially and patient was voiding well with low PVRs. Urine and blood cultures negative.   3/20: overnight retaining >400 cc after 1 straight catheterization, cho replaced.     Now s/p cysto, bladder biopsy, left URS, stent placement. Bleeding from left kidney without discrete source. No acute bladder bleeding.   - Cho clear, removed. Please document accurate outputs and obtain post-void residual (document in flowsheets).   Voiding trial: Optimize with all of the following:  - Encourage Ambulation / Out of Bed  - Initiate / Continue Bowel Regimen  - Minimize Narcotics / Benzodiazepines  - Minimize anticholinergics / antihistamines  - Tamsulosin 0.4mg qhs  - Trend H&H and SCr, can restart SQH from  standpoint  - Followup pathology, cytology, culture from OR  - Followup ID; ?role of percutaneous renal biopsy  - Care per primary  - No contraindication to discharge if patient medical stable/clear, voiding. Pt needs outpatient f/u for next step management and post-discharge check.     [facility]  [address] [place] [address], [address]  [place], [address] [phone]

## 2022-03-28 NOTE — DISCHARGE NOTE PROVIDER - NSDCACTIVITY_GEN_ALL_CORE
oob with assistance oob with assistance/Do not drive or operate machinery/No heavy lifting/straining

## 2022-03-28 NOTE — DISCHARGE NOTE PROVIDER - NSDCFUSCHEDAPPT_GEN_ALL_CORE_FT
KATHRYN MALIN ; 04/04/2022 ; [facility] Med  Comm KATHRYN Bhagat ; 04/04/2022 ; [facility] Med  Comm  KATHRYN MALIN ; 05/02/2022 ; [facility] Med  Comm

## 2022-03-28 NOTE — PROGRESS NOTE ADULT - SUBJECTIVE AND OBJECTIVE BOX
Patient is a 56y Male whom presented to the hospital with ckd and todd , hyponatremia    PAST MEDICAL & SURGICAL HISTORY:  Deviated septum    Gout    Malignant neoplasm of thyroid gland    S/P ORIF (open reduction internal fixation) fracture  right femur     S/P ACL surgery  Right; 2016    S/P hardware removal  right femur     H/O umbilical hernia repair    History of nasal septoplasty  2020    History of tonsillectomy  as a child        MEDICATIONS  (STANDING):  allopurinol 300 milliGRAM(s) Oral daily  azithromycin  IVPB      chlorhexidine 2% Cloths 1 Application(s) Topical daily  folic acid 1 milliGRAM(s) Oral daily  ibuprofen  Tablet. 400 milliGRAM(s) Oral once  influenza   Vaccine 0.5 milliLiter(s) IntraMuscular once  levothyroxine 112 MICROGram(s) Oral daily  mupirocin 2% Ointment 1 Application(s) Both Nostrils two times a day  senna 2 Tablet(s) Oral at bedtime  sodium chloride 1 Gram(s) Oral three times a day  sodium chloride 0.9%. 1000 milliLiter(s) (75 mL/Hr) IV Continuous <Continuous>      Allergies    No Known Allergies    Intolerances        SOCIAL HISTORY:  Denies ETOh,Smoking,     FAMILY HISTORY:  No pertinent family history in first degree relatives        REVIEW OF SYSTEMS:    CONSTITUTIONAL: No weakness, fevers or chills  RESPIRATORY: No cough, wheezing, hemoptysis; No shortness of breath  CARDIOVASCULAR: No chest pain or palpitations  GASTROINTESTINAL: No abdominal or epigastric pain. No nausea, vomiting,     No diarrhea or constipation. No melena                                                                                      7.5    3.12  )-----------( 225      ( 28 Mar 2022 06:45 )             23.7       CBC Full  -  ( 28 Mar 2022 06:45 )  WBC Count : 3.12 K/uL  RBC Count : 2.65 M/uL  Hemoglobin : 7.5 g/dL  Hematocrit : 23.7 %  Platelet Count - Automated : 225 K/uL  Mean Cell Volume : 89.4 fL  Mean Cell Hemoglobin : 28.3 pg  Mean Cell Hemoglobin Concentration : 31.6 gm/dL  Auto Neutrophil # : 2.36 K/uL  Auto Lymphocyte # : 0.36 K/uL  Auto Monocyte # : 0.24 K/uL  Auto Eosinophil # : 0.07 K/uL  Auto Basophil # : 0.01 K/uL  Auto Neutrophil % : 75.7 %  Auto Lymphocyte % : 11.5 %  Auto Monocyte % : 7.7 %  Auto Eosinophil % : 2.2 %  Auto Basophil % : 0.3 %          141  |  113<H>  |  27<H>  ----------------------------<  93  3.8   |  20<L>  |  1.70<H>    Ca    7.3<L>      28 Mar 2022 06:45  Phos  3.1       Mg     1.60         TPro  6.5  /  Alb  2.4<L>  /  TBili  0.4  /  DBili  x   /  AST  24  /  ALT  9   /  AlkPhos  61        CAPILLARY BLOOD GLUCOSE          Vital Signs Last 24 Hrs  T(C): 37.2 (28 Mar 2022 21:56), Max: 37.4 (28 Mar 2022 17:49)  T(F): 99 (28 Mar 2022 21:56), Max: 99.3 (28 Mar 2022 17:49)  HR: 78 (28 Mar 2022 21:56) (78 - 99)  BP: 126/89 (28 Mar 2022 21:56) (118/77 - 128/83)  BP(mean): --  RR: 18 (28 Mar 2022 21:56) (18 - 20)  SpO2: 100% (28 Mar 2022 21:56) (96% - 100%)    Urinalysis Basic - ( 27 Mar 2022 15:40 )    Color: Dark Brown / Appearance: Slightly Turbid / S.005 / pH: x  Gluc: x / Ketone: Negative  / Bili: Negative / Urobili: <2 mg/dL   Blood: x / Protein: 100 mg/dL / Nitrite: Negative   Leuk Esterase: Large / RBC: >50 /HPF / WBC 15 /HPF   Sq Epi: x / Non Sq Epi: 3 /HPF / Bacteria: Few                                    PHYSICAL EXAM:    Constitutional: NAD  HEENT: conjunctive   clear   Neck:  No JVD  Respiratory: CTAB  Cardiovascular: S1 and S2  Gastrointestinal: BS+, soft,   Extremities: No peripheral edema

## 2022-03-28 NOTE — PROGRESS NOTE ADULT - ASSESSMENT
_________________________________________________________________________________________  ========>>  M E D I C A L   A T T E N D I N G    F O L L O W  U P  N O T E  <<=========  -----------------------------------------------------------------------------------------------------    - Patient seen and examined by me earlier today.   - In summary,  KATHRYN MALIN is a 56y year old man admitted with hematuria..   - Patient today overall doing fairly, comfortable, eating OK.  and comfortable, asking to go home        pt urinating well post cho, no more bleeding noted/ reported     ==================>> REVIEW OF SYSTEM <<=================    GEN: no fever, no chills, as above   RESP: no SOB at rest, occasional cough, not much sputum   CVS: no chest pain, no palpitations, no edema  GI: no abdominal pain, no nausea, normal stool  :  less hematuria in cho bag,  otherwise ok   Neuro: no headache, no dizziness  Derm : no itching, no rash    ==================>> PHYSICAL EXAM <<=================    GEN: A&O X 3 , NAD , comfortable, pleasant, not anxious   HEENT: NCAT, PERRL, MMM, hearing intact  Neck: supple , no JVD appreciated  CVS: S1S2 , regular , no murmur appreciated   PULM: CTA B/L,  limited exam as not taking deep breaths   ABD.: soft. non tender, non distended,  bowel sounds present  Extrem: intact pulses , no edema   PSYCH : flat affect, somewhat anxious                             ( Note written / Date of service 03-28-22 )    ==================>> MEDICATIONS <<====================    allopurinol 100 milliGRAM(s) Oral daily  atovaquone  Suspension 750 milliGRAM(s) Oral two times a day  bictegravir 50 mG/emtricitabine 200 mG/tenofovir alafenamide 25 mG (BIKTARVY) 1 Tablet(s) Oral daily  chlorhexidine 2% Cloths 1 Application(s) Topical daily  folic acid 1 milliGRAM(s) Oral daily  influenza   Vaccine 0.5 milliLiter(s) IntraMuscular once  levothyroxine 112 MICROGram(s) Oral daily  mupirocin 2% Ointment 1 Application(s) Both Nostrils two times a day  oxybutynin 5 milliGRAM(s) Oral two times a day  pantoprazole  Injectable 40 milliGRAM(s) IV Push two times a day  penicillin   G benzathine Injectable 2.4 Million Unit(s) IntraMuscular every 7 days  polyethylene glycol 3350 17 Gram(s) Oral daily  senna 2 Tablet(s) Oral at bedtime  sodium bicarbonate 650 milliGRAM(s) Oral three times a day  sodium chloride 1 Gram(s) Oral three times a day  sodium chloride 0.9%. 1000 milliLiter(s) IV Continuous <Continuous>  tamsulosin 0.4 milliGRAM(s) Oral at bedtime    MEDICATIONS  (PRN):  acetaminophen     Tablet .. 650 milliGRAM(s) Oral every 6 hours PRN Temp greater or equal to 38C (100.4F), Mild Pain (1 - 3)  aluminum hydroxide/magnesium hydroxide/simethicone Suspension 30 milliLiter(s) Oral every 4 hours PRN Dyspepsia  bisacodyl Suppository 10 milliGRAM(s) Rectal once PRN Constipation  guaiFENesin Oral Liquid (Sugar-Free) 100 milliGRAM(s) Oral every 6 hours PRN Cough  hydrocodone/homatropine Syrup 5 milliLiter(s) Oral every 6 hours PRN Cough  melatonin 3 milliGRAM(s) Oral at bedtime PRN Insomnia  ondansetron Injectable 4 milliGRAM(s) IV Push every 8 hours PRN Nausea and/or Vomiting  oxyCODONE    IR 5 milliGRAM(s) Oral every 6 hours PRN moderate pain 4-6, severe pain 7-10  sodium chloride 0.65% Nasal 1 Spray(s) Both Nostrils four times a day PRN Nasal Congestion    ___________  Active diet:  Diet, Regular  Diet, Regular:   1200mL Fluid Restriction ([id number])  Supplement Feeding Modality:  Oral  Ensure Enlive Cans or Servings Per Day:  1       Frequency:  Daily  ___________________    ==================>> VITAL SIGNS <<==================    Height (cm): 177.8  Weight (kg): 69.4  BMI (kg/m2): 22  Vital Signs Last 24 HrsT(C): 37.4 (03-28-22 @ 17:49)  T(F): 99.3 (03-28-22 @ 17:49), Max: 99.3 (03-28-22 @ 17:49)  HR: 99 (03-28-22 @ 17:49) (81 - 99)  BP: 128/83 (03-28-22 @ 17:49)  RR: 19 (03-28-22 @ 17:49) (18 - 20)  SpO2: 99% (03-28-22 @ 17:49) (96% - 100%)       ==================>> LAB AND IMAGING <<==================                        7.5    3.12  )-----------( 225      ( 28 Mar 2022 06:45 )             23.7        03-28    141  |  113<H>  |  27<H>  ----------------------------<  93  3.8   |  20<L>  |  1.70<H>    Ca    7.3<L>      28 Mar 2022 06:45  Phos  3.1     03-27  Mg     1.60     03-27    TPro  6.5  /  Alb  2.4<L>  /  TBili  0.4  /  DBili  x   /  AST  24  /  ALT  9   /  AlkPhos  61  03-28    WBC count:   3.12 <<== ,  5.44 <<== ,  3.18 <<== ,  5.46 <<== ,  3.49 <<== ,  3.26 <<==   Hemoglobin:   7.5 <<==,  7.1 <<==,  7.3 <<==,  8.1 <<==,  8.5 <<==,  7.3 <<==  platelets:  225 <==, 228 <==, 191 <==, 261 <==, 205 <==, 160 <==, 225 <==    Creatinine:  1.70  <<==, 1.82  <<==, 1.95  <<==, 2.10  <<==, 2.38  <<==, 2.30  <<==  Sodium:   141  <==, 137  <==, 134  <==, 135  <==, 135  <==, 127  <==, 122  <==       AST:          24 <== , 24 <== , 38 <== , 40 <== , 43 <==      ALT:        9  <== , 9  <== , 14  <== , 14  <== , 12  <==      AP:        61  <=, 56  <=, 65  <=, 61  <=, 59  <=     Bili:        0.4  <=, 0.3  <=, 0.4  <=, 0.4  <=, 0.4  <=    ____________________________    M I C R O B I O L O G Y :    Culture - Fungal, Other (collected 26 Mar 2022 16:16)  Source: .Other LEFT KIDNEY URINE  Preliminary Report (28 Mar 2022 09:25):    Testing in progress    Culture - Urine (collected 26 Mar 2022 11:49)  Source: .Urine LEFT KIDNEY URINE  Final Report (28 Mar 2022 17:01):    No growth at 48 hours    Culture - Blood (collected 24 Mar 2022 10:18)  Source: .Blood Blood-Peripheral  Preliminary Report (25 Mar 2022 11:02):    No growth to date.    Culture - Blood (collected 24 Mar 2022 10:18)  Source: .Blood Blood-Venous  Preliminary Report (25 Mar 2022 11:02):    No growth to date.    Culture - Sputum (collected 21 Mar 2022 17:41)  Source: .Sputum Sputum  Gram Stain (21 Mar 2022 23:27):    Few polymorphonuclear leukocytes per low power field    Rare Squamous epithelial cells per low power field    Few Gram Negative Rods per oil power field    Few Gram positive cocci in pairs per oil power field    Few Yeast like cells per oil power field    Few Gram Positive Rods per oil power field  Final Report (23 Mar 2022 17:10):    Normal Respiratory Jana present      SARS-CoV-2: NotDetec (03-25-22 @ 18:56)  COVID-19 PCR: NotDetec (03-23-22 @ 13:51)  SARS-CoV-2: NotDetec (03-18-22 @ 19:47)       noted Syphilis and CMV results      HIV Result: HIV-1 Pos (03.19.22 @ 12:54) ( confirmatory test )  HIV-1 RNA Quantitative, Viral Load: 6,038,892 (03.20.22 @ 11:59)  ABS CD4: 18 /uL (03.22.22 @ 09:13)    other available results noted including EBV, ANCA...     Hematopathology Report:   ACCESSION No:  [id number]  Patient:   KATHRYN MALIN  Accession:                             [id number]  Collected Date/Time:                   3/18/2022 17:30 EDT  Received Date/Time:                    3/19/2022 11:22 EDT  Hematopathology Report - Auth (Verified)  Specimen(s) Submitted  1- Right iliac bone  2- Bone marrow aspirate    Final Diagnosis  1, 2, 3. Bone marrow biopsy, bone marrow clot, and bone marrow aspirate  -Normocellular bone marrow with trilineage hematopoiesis with  maturation and a proportion of smaller megakaryocytes.  - Iron stores are present.  See note and description.    Diagnostic note:  Per chart review, patient has a recent history of pancytopenia.  The current bone marrow biopsy shows nodefinitive evidence of a  lymphoproliferative disorder. Flow cytometry shows a reversed CD4 to CD8  ratio of the T-cells.    Correlation with patient's recent clinical and laboratory findings and  ancillary testing (including cytogenetics/karyotype and additional  surgical pathology reports (32-H-, 16-AE-) are recommended.  Comprehensive report with results of pending ancillary studies to follow.    Dr. Muhammad was notified of the diagnosis on 03/23/2022.    Ancillary studies  Special stains:   Bone marrow aspirate iron stain:   No spicules are  present  to evaluate for iron stores and there are insufficient nRBC to evaluate  for ring sideroblasts.    Flow cytometry:  The myeloid immunophenotypic findings show normal myeloid  granularity, mild monocytosis with normal immunophenotype, no increase  in myeloid immaturity, and normal myeloid antigen maturation pattern.  The  lymphoid immunophenotypic   findings show T-cells with reversed CD4 to CD8  ratio and polytypic B-cells with kappa predominance   _  Immunohistochemical stains:    (block 1A: CD34, , myeloperoxidase,  CD71, E-cadherin, factor VIII, CD15, CD61, CD3 and CD20) show no  significant increase in CD34 positive blasts (less than 3%), with normal  proportionof myeloids (positive with myeloperoxidase and CD15) to  erythroid elements (positive with CD71) including slightly increased  pronormoblasts (positive with E-cadherin, ), and  megakaryocytes  are overall, normal in number with a proportion thatare smaller in size  (positive with factor VIII, CD61). CD3 shows normal proportion of T-cells  with rare B-cells (positive with CD20).  In-situ hybridization   (kappa, lambda) is pending and will be reported in  an addendum.  Cytogenetics/karyotype:    pending  Comment  Iron stain (examined toevaluate for iron stores; see microscopic  description) and Giemsa stain (shows appropriate staining pattern) are  performed and evaluated on block(s): 1A, 2A.  Verified by: Janelle Gruber MD  (Electronic Signature)  Reported on: 03/23/22 10:10 EDT, [facility], [address] [place] [address]  Phone: [phone]   Fax: [phone]  _________________________________________________________________      < from: CT Abdomen and Pelvis No Cont (03.20.22 @ 15:11) >  IMPRESSION:  New groundglass opacities are seen within the left upper lobe, right   middle lobe and left lower lobe likely representing infection.    Mild left hydronephrosis without evidence of obstructing calculi.   Non obstructing calculi are seen bilaterally within the kidneys.    Heterogeneous hyperdense material within the bladder and left renal   collecting system, which may represent hemorrhagic products.  < end of copied text >    ___________________________________________________________________________________  ===============>>  A S S E S S M E N T   A N D   P L A N <<===============  ------------------------------------------------------------------------------------------    · Assessment	  56 yr old male presenting with urinary retention found to have sepsis 2/2 pyelonephritis     Problem/Plan - 1:  ·  Problem: sepsis, in setting of acute HIV / AIDS with suspect opportunistic infections / PCP / EBV / syphilis / CMV ..       concern for hemorrhagic cystitis / pyelonephritis /infected / impacted stone    workup as noted   off Abx for now : monitoring   all specialty follow up  appreciated and discussed   started on HAART /  Mepron for PCP prophylaxis >> to [facility] for insurance coverage for DC planing     started on treatment for syphilis as well     Problem/Plan - 2:  ·  Problem: pt with past + GIANNA, + jammie + ANCA      rule out autoimmune disorders ./ Trever's / good pastures.. etc..      rheumatology not seeing pt as noted / signed off!!!   possible need for kidney bx ?  renal f/u  >> for now crea improving ...  monitor   most likely all related to HIV / AIDS   Bone marrow Bx as above with no significant abnormalities     Problem/Plan - 3:  ·  Problem: Anemia. multifactorial   BM biopsy > follow final results   Hematology on board , appreciated   montior  transfuse further as needed   occult blood  + and + hematochezia before > stopped    GI appreciated      eventual scope before DC vs OP     hematuria resolved for now >> needs follow up with  re stent removal..     Problem/Plan - 4:  ·  Problem: urinary retention and obstructive Uropathy with JULIO   monitor BMP, I/O     crea improved with IVH > continue    following  pt urinating well, monitor    **Hyponatremia     repeat labs ordered     pt with flank pain > likely SIADH      nephrology on board, following        Tolvaptan as needed     Problem/Plan - 5:  ·  Problem: Hypothyroidism post total thyroidectomy for cancer   Continue home levothyroxine.  Thyroid Stimulating Hormone, Serum: 4.06 uIU/mL (03.17.22 @ 18:46)    OOB, ambulate as able   nutrition     DC planing home tomorrow if remains stable : pt in agreement : appreciated all specialty followup as well     --------------------------------------------  Case discussed with pt, NP..  Education given on findings and plan of care    ___________________________  H. TAZ Edwards.  Pager: [phone]

## 2022-03-28 NOTE — PROGRESS NOTE ADULT - SUBJECTIVE AND OBJECTIVE BOX
KATHRYN MALIN  MRN-[id number]    Patient is a 56y old  Male who presents with a chief complaint of urinary retention (27 Mar 2022 14:38)      Review of System  REVIEW OF SYSTEMS      General:	Denies fatigue, fevers, chills, sweats, decreased appetite.    Skin/Breast: denies pruritis, rash  	  Ophthalmologic: no change in vision or blurring  	  HEENT	Denies dry mouth, oral sores, dysphagia,  change in hearing.    Respiratory and Thorax:  cough, sob, wheeze, hemoptysis  	  Cardiovascular:	no cp , palp, orthopnea    Gastrointestinal:	no n/v/d constipation    Genitourinary:	no dysuria of frequency, no hematuria, no flank pain    Musculoskeletal:	no bone or joint pain. no muscle aches.     Neurological:	no change in sensory or motor function. no headache. no weakness.     Psychiatric:	no depression, no anxiety, insomnia.     Hematology/Lymphatics:	no bleeding or bruising        Current Meds  MEDICATIONS  (STANDING):  allopurinol 100 milliGRAM(s) Oral daily  atovaquone  Suspension 750 milliGRAM(s) Oral two times a day  bictegravir 50 mG/emtricitabine 200 mG/tenofovir alafenamide 25 mG (BIKTARVY) 1 Tablet(s) Oral daily  chlorhexidine 2% Cloths 1 Application(s) Topical daily  folic acid 1 milliGRAM(s) Oral daily  influenza   Vaccine 0.5 milliLiter(s) IntraMuscular once  levothyroxine 112 MICROGram(s) Oral daily  mupirocin 2% Ointment 1 Application(s) Both Nostrils two times a day  oxybutynin 5 milliGRAM(s) Oral two times a day  pantoprazole  Injectable 40 milliGRAM(s) IV Push two times a day  penicillin   G benzathine Injectable 2.4 Million Unit(s) IntraMuscular every 7 days  polyethylene glycol 3350 17 Gram(s) Oral daily  senna 2 Tablet(s) Oral at bedtime  sodium bicarbonate 650 milliGRAM(s) Oral three times a day  sodium chloride 1 Gram(s) Oral three times a day  sodium chloride 0.9%. 1000 milliLiter(s) (125 mL/Hr) IV Continuous <Continuous>    MEDICATIONS  (PRN):  acetaminophen     Tablet .. 650 milliGRAM(s) Oral every 6 hours PRN Temp greater or equal to 38C (100.4F), Mild Pain (1 - 3)  aluminum hydroxide/magnesium hydroxide/simethicone Suspension 30 milliLiter(s) Oral every 4 hours PRN Dyspepsia  bisacodyl Suppository 10 milliGRAM(s) Rectal once PRN Constipation  guaiFENesin Oral Liquid (Sugar-Free) 100 milliGRAM(s) Oral every 6 hours PRN Cough  hydrocodone/homatropine Syrup 5 milliLiter(s) Oral every 6 hours PRN Cough  melatonin 3 milliGRAM(s) Oral at bedtime PRN Insomnia  ondansetron Injectable 4 milliGRAM(s) IV Push every 8 hours PRN Nausea and/or Vomiting  oxyCODONE    IR 5 milliGRAM(s) Oral every 6 hours PRN moderate pain 4-6, severe pain 7-10  sodium chloride 0.65% Nasal 1 Spray(s) Both Nostrils four times a day PRN Nasal Congestion      Vitals  Vital Signs Last 24 Hrs  T(C): 37.3 (28 Mar 2022 05:19), Max: 37.3 (28 Mar 2022 05:19)  T(F): 99.2 (28 Mar 2022 05:19), Max: 99.2 (28 Mar 2022 05:19)  HR: 81 (28 Mar 2022 05:19) (81 - 97)  BP: 127/84 (28 Mar 2022 05:19) (100/67 - 127/84)  BP(mean): 74 (27 Mar 2022 20:09) (74 - 82)  RR: 18 (28 Mar 2022 05:19) (18 - 18)  SpO2: 96% (28 Mar 2022 05:19) (94% - 100%)    Physical Exam  PHYSICAL EXAM:      Constitutional: NAD    Eyes: PERRLA EOMI, anicteric sclera    Heent :No oral sores, no pharyngeal injection. moist mucosa.    Neck: supple, no jvd, no LAD    Respiratory: CTA b/l     Cardiovascular: s1s2, no m/g/r    Gastrointestinal: soft, nt, nd, + BS    Extremities: no c/c/e    Neurological:A&O x 3 moves all ext.    Skin: no rash on exposed skin    Lymph Nodes: no lymphadenopathy.              Lab  CBC Full  -  ( 28 Mar 2022 06:45 )  WBC Count : 3.12 K/uL  RBC Count : 2.65 M/uL  Hemoglobin : 7.5 g/dL  Hematocrit : 23.7 %  Platelet Count - Automated : 225 K/uL  Mean Cell Volume : 89.4 fL  Mean Cell Hemoglobin : 28.3 pg  Mean Cell Hemoglobin Concentration : 31.6 gm/dL  Auto Neutrophil # : x  Auto Lymphocyte # : x  Auto Monocyte # : x  Auto Eosinophil # : x  Auto Basophil # : x  Auto Neutrophil % : x  Auto Lymphocyte % : x  Auto Monocyte % : x  Auto Eosinophil % : x  Auto Basophil % : x    03-28    141  |  113<H>  |  27<H>  ----------------------------<  93  3.8   |  20<L>  |  1.70<H>    Ca    7.3<L>      28 Mar 2022 06:45  Phos  3.1     03-27  Mg     1.60     03-27    TPro  6.5  /  Alb  2.4<L>  /  TBili  0.4  /  DBili  x   /  AST  24  /  ALT  9   /  AlkPhos  61  03-28        Rad:    Assessment/Plan   KATHRYN MALIN  MRN-[id number]    Patient is a 56y old  Male who presents with a chief complaint of urinary retention (27 Mar 2022 14:38)      Review of System    Patient seen earlier today, eager to go home.  No f/c/s  no HA or dizziness   no cp or palp.  no cough or sob.  no n/v/d   cho was in place, urine looked more clear.    Current Meds  MEDICATIONS  (STANDING):  allopurinol 100 milliGRAM(s) Oral daily  atovaquone  Suspension 750 milliGRAM(s) Oral two times a day  bictegravir 50 mG/emtricitabine 200 mG/tenofovir alafenamide 25 mG (BIKTARVY) 1 Tablet(s) Oral daily  chlorhexidine 2% Cloths 1 Application(s) Topical daily  folic acid 1 milliGRAM(s) Oral daily  influenza   Vaccine 0.5 milliLiter(s) IntraMuscular once  levothyroxine 112 MICROGram(s) Oral daily  mupirocin 2% Ointment 1 Application(s) Both Nostrils two times a day  oxybutynin 5 milliGRAM(s) Oral two times a day  pantoprazole  Injectable 40 milliGRAM(s) IV Push two times a day  penicillin   G benzathine Injectable 2.4 Million Unit(s) IntraMuscular every 7 days  polyethylene glycol 3350 17 Gram(s) Oral daily  senna 2 Tablet(s) Oral at bedtime  sodium bicarbonate 650 milliGRAM(s) Oral three times a day  sodium chloride 1 Gram(s) Oral three times a day  sodium chloride 0.9%. 1000 milliLiter(s) (125 mL/Hr) IV Continuous <Continuous>    MEDICATIONS  (PRN):  acetaminophen     Tablet .. 650 milliGRAM(s) Oral every 6 hours PRN Temp greater or equal to 38C (100.4F), Mild Pain (1 - 3)  aluminum hydroxide/magnesium hydroxide/simethicone Suspension 30 milliLiter(s) Oral every 4 hours PRN Dyspepsia  bisacodyl Suppository 10 milliGRAM(s) Rectal once PRN Constipation  guaiFENesin Oral Liquid (Sugar-Free) 100 milliGRAM(s) Oral every 6 hours PRN Cough  hydrocodone/homatropine Syrup 5 milliLiter(s) Oral every 6 hours PRN Cough  melatonin 3 milliGRAM(s) Oral at bedtime PRN Insomnia  ondansetron Injectable 4 milliGRAM(s) IV Push every 8 hours PRN Nausea and/or Vomiting  oxyCODONE    IR 5 milliGRAM(s) Oral every 6 hours PRN moderate pain 4-6, severe pain 7-10  sodium chloride 0.65% Nasal 1 Spray(s) Both Nostrils four times a day PRN Nasal Congestion        Vital Signs Last 24 Hrs  T(C): 37.3 (28 Mar 2022 05:19), Max: 37.3 (28 Mar 2022 05:19)  T(F): 99.2 (28 Mar 2022 05:19), Max: 99.2 (28 Mar 2022 05:19)  HR: 81 (28 Mar 2022 05:19) (81 - 97)  BP: 127/84 (28 Mar 2022 05:19) (100/67 - 127/84)  BP(mean): 74 (27 Mar 2022 20:09) (74 - 82)  RR: 18 (28 Mar 2022 05:19) (18 - 18)  SpO2: 96% (28 Mar 2022 05:19) (94% - 100%)      PHYSICAL EXAM:    Constitutional: NAD    Eyes: PERRLA EOMI, anicteric sclera    Heent :No oral sores, no pharyngeal injection. moist mucosa.    Neck: supple, no jvd, no LAD    Respiratory: CTA b/l     Cardiovascular: s1s2, no m/g/r    Gastrointestinal: soft, nt, nd, + BS    Extremities: no c/c/e    Neurological:A&O x 3 moves all ext.    Skin: no rash on exposed skin    Lymph Nodes: no lymphadenopathy.      Lab  CBC Full  -  ( 28 Mar 2022 06:45 )  WBC Count : 3.12 K/uL  RBC Count : 2.65 M/uL  Hemoglobin : 7.5 g/dL  Hematocrit : 23.7 %  Platelet Count - Automated : 225 K/uL  Mean Cell Volume : 89.4 fL  Mean Cell Hemoglobin : 28.3 pg  Mean Cell Hemoglobin Concentration : 31.6 gm/dL  Auto Neutrophil # : x  Auto Lymphocyte # : x  Auto Monocyte # : x  Auto Eosinophil # : x  Auto Basophil # : x  Auto Neutrophil % : x  Auto Lymphocyte % : x  Auto Monocyte % : x  Auto Eosinophil % : x  Auto Basophil % : x    03-28    141  |  113<H>  |  27<H>  ----------------------------<  93  3.8   |  20<L>  |  1.70<H>    Ca    7.3<L>      28 Mar 2022 06:45  Phos  3.1     03-27  Mg     1.60     03-27    TPro  6.5  /  Alb  2.4<L>  /  TBili  0.4  /  DBili  x   /  AST  24  /  ALT  9   /  AlkPhos  61  03-28        Rad:    Assessment/Plan

## 2022-03-28 NOTE — DISCHARGE NOTE PROVIDER - PROVIDER TOKENS
PROVIDER:[TOKEN:[[id number]]],PROVIDER:[TOKEN:[[id number]]],PROVIDER:[TOKEN:[[id number]]],PROVIDER:[TOKEN:[[id number]]] PROVIDER:[TOKEN:[[id number]],FOLLOWUP:[1 week]],PROVIDER:[TOKEN:[[id number]],SCHEDULEDAPPT:[04/04/2022],SCHEDULEDAPPTTIME:[01:30 PM]],PROVIDER:[TOKEN:[[id number]],FOLLOWUP:[1 week]],PROVIDER:[TOKEN:[[id number]],FOLLOWUP:[1 week]] PROVIDER:[TOKEN:[[id number]],SCHEDULEDAPPT:[04/06/2022],SCHEDULEDAPPTTIME:[11:15 AM]],PROVIDER:[TOKEN:[[id number]],SCHEDULEDAPPT:[04/04/2022],SCHEDULEDAPPTTIME:[01:30 PM]],PROVIDER:[[id number]],SCHEDULEDAPPT:[04/11/2022],SCHEDULEDAPPTTIME:[08:45 AM]],PROVIDER:[TOKEN:[[id number]],SCHEDULEDAPPT:[04/25/2022],SCHEDULEDAPPTTIME:[03:30 PM]]

## 2022-03-28 NOTE — PROGRESS NOTE ADULT - ASSESSMENT
57 y/o man with weight loss and fatigue in recent months.  Lymphadenopathy, fairly small in size and not particularly FDG avid on a prior pet scan.  Patient with prior LN needle bx and excisional bx, neither of which showed a Lymphoproliferative disorder.  Findings on entirety of work up thus far have been more consistent with a polyconal/inflammatory process.    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    Patient had a + jammie test in my office, but blood work does not suggest an active hemolytic process     following, having issues with hematuria, which could be contributing to his anemia    1) Anemia and leukopenia- multifactorial in nature.  Patient with HIV, pyelonephritis.  Also with JULIO, AOCD, hematuria and guaiac + stool,  all likely plating a role. No evidence of active hemolysis  Transfusional support as needed.   - f/u on rheum eval.    mgmt as per med, gu, GI, ID, renal.  No active heme mgmt indicated at his juncture other than supportive care.

## 2022-03-28 NOTE — DISCHARGE NOTE PROVIDER - NPI NUMBER (FOR SYSADMIN USE ONLY) :
[[id number]],[[id number]] [[id number]]

## 2022-03-28 NOTE — DISCHARGE NOTE PROVIDER - NSDCCPCAREPLAN_GEN_ALL_CORE_FT
PRINCIPAL DISCHARGE DIAGNOSIS  Diagnosis: Severe sepsis  Assessment and Plan of Treatment: You have been seen by infectious disease  You've completed your course of antibiotics   Continue taking your Mepron as prescribed        SECONDARY DISCHARGE DIAGNOSES  Diagnosis: Urinary retention  Assessment and Plan of Treatment: Your Creatnine is improving  Your Wayne catheter was removed.  You've been started on Flomax  On 3/26 you are s/p cysto, bladder biopsy, left URS, stent placement.  The stent will need to be removed in 1 month time.   Follow up with Dr. Godinez in 2-3 weeks. Call for an appointment      Diagnosis: Hyponatremia  Assessment and Plan of Treatment: You were seen by Nephrology  Your Sodium is within normal limits.      Diagnosis: Hypothyroidism  Assessment and Plan of Treatment: Continue taking your Synthroid as prescribed    Diagnosis: Anemia  Assessment and Plan of Treatment: Follow up with Dr. Muhammad in 2 weeks. Call for an appointment  You will need to follow up with Gatroenterologist office for non-urgent endoscopic eval; [phone]      Diagnosis: Infection, HIV  Assessment and Plan of Treatment: Continue taking your biktarvy  You have an appointment with Dr. Man on 4/4/22 at 1:30 pm      Diagnosis: Syphilis  Assessment and Plan of Treatment: You were started on PCN G. You will need 2 more doses.  You have an appointment with Dr. Man on 4/4/22 at 1:30 pm       PRINCIPAL DISCHARGE DIAGNOSIS  Diagnosis: Severe sepsis  Assessment and Plan of Treatment: You have been seen by infectious disease  You've completed your course of antibiotics   Continue taking your Mepron as prescribed      SECONDARY DISCHARGE DIAGNOSES  Diagnosis: Urinary retention  Assessment and Plan of Treatment: Your Creatnine is improving  Your Wayne catheter was removed.  You've been started on Flomax  On 3/26 you are status post cystoscopy, bladder biopsy, left Ureteroscopy and a stent placement.  The stent will need to be removed in 1 month time.   Follow up with Dr. Godinez in 2-3 weeks. Call for an appointment.      Diagnosis: Hyponatremia  Assessment and Plan of Treatment: You were seen by Nephrology.  Your sodium was corrected and is within normal limits.    Diagnosis: Hypothyroidism  Assessment and Plan of Treatment: Continue taking your Synthroid as prescribed.    Diagnosis: Anemia  Assessment and Plan of Treatment: Follow up with Dr. Muhammad in 2 weeks. Call for an appointment  You will need to follow up with Gatroenterologist office for non-urgent endoscopic eval; [phone]      Diagnosis: Infection, HIV  Assessment and Plan of Treatment: Continue taking your biktarvy  You have an appointment with Dr. Man on 4/4/22 at 1:30 pm      Diagnosis: Syphilis  Assessment and Plan of Treatment: You were started on PCN G. You will need 2 more doses.  You have an appointment with Dr. Man on 4/4/22 at 1:30 pm      Diagnosis: Herpes simplex virus (HSV) infection  Assessment and Plan of Treatment: You were started on Valacyclovir.   You have an appointment with Dr. Man on 4/4/22 at 1:30 pm     PRINCIPAL DISCHARGE DIAGNOSIS  Diagnosis: Infection, HIV  Assessment and Plan of Treatment: You were seen by infectious disease. You were found to have HIV.   You were started on Biktarvy for the HIV.  You were also found to have pneumonia on CT which you were started on Mepron for PCP prophylaxis.  Please continue taking these medications and follow up with Dr. Man. You have an appointment with Dr. Man on 4/4/22 at 1:30 pm.        SECONDARY DISCHARGE DIAGNOSES  Diagnosis: Urinary retention  Assessment and Plan of Treatment: You had urinary retention and you creatinine was elevated. Your creatinine improved.   Your Wayne catheter was removed.  You've been started on Flomax and Oxybutynin.   On 3/26 you were status post cystoscopy, bladder biopsy, left Ureteroscopy and a stent placement.  The stent will need to be removed in 1 month time.   Follow up with Dr. Godinez, you have an appoitment on 04/11/2022 @8:45am. You may also follow up with [facility]. Please call to make an appointment. Call your provider right away if you have difficulty urinating or develop blood in your urine again.      Diagnosis: Hyponatremia  Assessment and Plan of Treatment: You were seen by Nephrology.  Your sodium was corrected and is within normal limits.  Continue fluid restriction, 1.2L per day.  Continue sodium bicarb tablets for 2 weeks- outpatient follow up with your pcp for further management    Diagnosis: Hypothyroidism  Assessment and Plan of Treatment: Continue taking your Synthroid as prescribed.    Diagnosis: Anemia  Assessment and Plan of Treatment: Please follow up with Dr. Muhammad (hematology) on 4/6/2022 @11:15am.   You will need to follow up with Gatroenterologist office for non-urgent endoscopic evaluation. You have an appointment with Dr. Yuan on 4/25 @3:30pm.   Monitor for signs of bleeding. If it reccurs, please contact your doctor or return to the emergnecy department.    Diagnosis: Gross hematuria  Assessment and Plan of Treatment: Plan as listed above- if symptoms recur, call doctor right away or return to ER for further eval    Diagnosis: Syphilis  Assessment and Plan of Treatment: You were found to have latent syphilis.   You were started on PCN G. You will need 2 more doses.  You have an appointment with Dr. Man on 4/4/22 at 1:30 pm where you are due for the second dose.      Diagnosis: Herpes simplex virus (HSV) infection  Assessment and Plan of Treatment: You were found to be HSV positive.  You were seen by Infectious disease and started on Valacyclovir for 10 days.   You have an appointment with Dr. Man on 4/4/22 at 1:30 pm     PRINCIPAL DISCHARGE DIAGNOSIS  Diagnosis: Infection, HIV  Assessment and Plan of Treatment: You were seen by infectious disease. You were found to have HIV.   You were started on Biktarvy for the HIV.  You were also found to have pneumonia on CT which you were started on Mepron for PCP prophylaxis.  Please continue taking these medications and follow up with Dr. Man. You have an appointment with Dr. Man on 4/4/22 at 1:30 pm.        SECONDARY DISCHARGE DIAGNOSES  Diagnosis: Urinary retention  Assessment and Plan of Treatment: You had urinary retention and you creatinine was elevated. Your creatinine improved.   Your Wayne catheter was removed.  You've been started on Flomax and Oxybutynin.   On 3/26 you were status post cystoscopy, bladder biopsy, left Ureteroscopy and a stent placement.  The stent will need to be removed in 1 month time.   Follow up with Dr. Godinez, you have an appoitment on 04/11/2022 @8:45am. You may also follow up with [facility]. Please call to make an appointment. Call your provider right away if you have difficulty urinating or develop blood in your urine again.      Diagnosis: Hyponatremia  Assessment and Plan of Treatment: You were seen by Nephrology.  Your sodium was corrected and is within normal limits.  Continue fluid restriction, 1.2L per day.  Continue sodium bicarb tablets for 2 weeks- outpatient follow up with your pcp for further management    Diagnosis: Hypothyroidism  Assessment and Plan of Treatment: Continue taking your Synthroid as prescribed.    Diagnosis: Anemia  Assessment and Plan of Treatment: Please follow up with Dr. Muhammad (hematology) on 4/6/2022 @11:15am.   You will need to follow up with Gatroenterologist office for non-urgent endoscopic evaluation. You have an appointment with Dr. Yuan on 4/25 @3:30pm.   Monitor for signs of bleeding. If it reccurs, please contact your doctor or return to the emergnecy department.    Diagnosis: Gross hematuria  Assessment and Plan of Treatment: Plan as listed above- if symptoms recur, call doctor right away or return to ER for further eval    Diagnosis: Syphilis  Assessment and Plan of Treatment: You were found to have latent syphilis.   You were started on PCN G. You will need 2 more doses.  You have an appointment with Dr. Man on 4/4/22 at 1:30 pm where you are due for the second dose.      Diagnosis: Herpes simplex virus (HSV) infection  Assessment and Plan of Treatment: You were found to be HSV positive.  You were seen by Infectious disease and started on Valacyclovir for 10 days.   You have an appointment with Dr. Man on 4/4/22 at 1:30 pm    Diagnosis: Gout  Assessment and Plan of Treatment: Your Allopurinol dose was decreased to 100mg daily.   Please follow up with your PCP for further management.

## 2022-03-28 NOTE — PROGRESS NOTE ADULT - SUBJECTIVE AND OBJECTIVE BOX
UROLOGY Progress Note  KATHRYN MALIN    S: Patient seen at bedside, overall feels well without complaint.   No issues overnight, urine remains clear.   Wayne removed at bedside.     O:  Vital Signs Last 24 Hrs  T(C): 37.3 (28 Mar 2022 05:19), Max: 37.3 (28 Mar 2022 05:19)  T(F): 99.2 (28 Mar 2022 05:19), Max: 99.2 (28 Mar 2022 05:19)  HR: 81 (28 Mar 2022 05:19) (81 - 97)  BP: 127/84 (28 Mar 2022 05:19) (100/67 - 127/84)  BP(mean): 74 (27 Mar 2022 20:09) (74 - 82)  RR: 18 (28 Mar 2022 05:19) (18 - 18)  SpO2: 96% (28 Mar 2022 05:19) (94% - 100%)    03-27-22 @ 07:01  -  03-28-22 @ 07:00  --------------------------------------------------------  IN: 850 mL / OUT: 5700 mL / NET: -4850 mL    03-28-22 @ 07:01  -  03-28-22 @ 09:17  --------------------------------------------------------  IN: 0 mL / OUT: 700 mL / NET: -700 mL      Physical Exam:  Gen: NAD  Neuro: Follows commands  Resp: No acute respiratory distress, normal effort  CV: Normal rate, regular rhythm  Abd: Soft, NT, ND  : Wayne clear, removed        Labs:  CBC (03-28 @ 06:45)                              7.5<L>                         3.12<L>  )----------------(  225        75.7  % Neutrophils, 11.5<L>% Lymphocytes, ANC: 2.36                                23.7<L>              CBC (03-27 @ 14:15)                              7.1<L>                         5.44    )----------------(  228        --    % Neutrophils, --    % Lymphocytes, ANC: --                                  21.1<L>                BMP (03-28 @ 06:45)             141     |  113<H>  |  27<H> 		Ca++ --      Ca 7.3<L>             ---------------------------------( 93    		Mg --                 3.8     |  20<L>   |  1.70<H>			Ph --      BMP (03-27 @ 14:15)             137     |  108<H>  |  34<H> 		Ca++ --      Ca 7.5<L>             ---------------------------------( 109<H>		Mg 1.60               4.0     |  19<L>   |  1.82<H>			Ph 3.1       LFTs (03-28 @ 06:45)      TPro 6.5 / Alb 2.4<L> / TBili 0.4 / DBili -- / AST 24 / ALT 9 / AlkPhos 61  LFTs (03-27 @ 06:25)      TPro 6.2 / Alb 2.2<L> / TBili 0.3 / DBili -- / AST 24 / ALT 9 / AlkPhos 56          -> .Urine LEFT KIDNEY URINE Culture (03-26 @ 16:16)     NG    NG    No growth to date.    -> .Blood Blood-Venous Culture (03-24 @ 10:18)     NG    NG    No growth to date.    -> .Sputum Sputum Culture (03-21 @ 17:41)       Few polymorphonuclear leukocytes per low power field  Rare Squamous epithelial cells per low power field  Few Gram Negative Rods per oil power field  Few Gram positive cocci in pairs per oil power field  Few Yeast like cells per oil power field  Few Gram Positive Rods per oil power field    NG    Normal Respiratory Jana present

## 2022-03-29 LAB
ALBUMIN SERPL ELPH-MCNC: 2.1 G/DL — LOW (ref 3.3–5)
ALP SERPL-CCNC: 60 U/L — SIGNIFICANT CHANGE UP (ref 40–120)
ALT FLD-CCNC: 9 U/L — SIGNIFICANT CHANGE UP (ref 4–41)
ANION GAP SERPL CALC-SCNC: 8 MMOL/L — SIGNIFICANT CHANGE UP (ref 7–14)
AST SERPL-CCNC: 21 U/L — SIGNIFICANT CHANGE UP (ref 4–40)
BASOPHILS # BLD AUTO: 0 K/UL — SIGNIFICANT CHANGE UP (ref 0–0.2)
BASOPHILS NFR BLD AUTO: 0 % — SIGNIFICANT CHANGE UP (ref 0–2)
BICTEGRAVIR RESISTANCE: SIGNIFICANT CHANGE UP
BILIRUB SERPL-MCNC: 0.4 MG/DL — SIGNIFICANT CHANGE UP (ref 0.2–1.2)
BUN SERPL-MCNC: 22 MG/DL — SIGNIFICANT CHANGE UP (ref 7–23)
CABOTEGRAVIR RESISTANCE: SIGNIFICANT CHANGE UP
CALCIUM SERPL-MCNC: 7.3 MG/DL — LOW (ref 8.4–10.5)
CHLORIDE SERPL-SCNC: 110 MMOL/L — HIGH (ref 98–107)
CO2 SERPL-SCNC: 20 MMOL/L — LOW (ref 22–31)
CREAT SERPL-MCNC: 1.43 MG/DL — HIGH (ref 0.5–1.3)
CULTURE RESULTS: SIGNIFICANT CHANGE UP
CULTURE RESULTS: SIGNIFICANT CHANGE UP
DOLUTEGRAVIR ISLT GENOTYP: SIGNIFICANT CHANGE UP
EGFR: 57 ML/MIN/1.73M2 — LOW
ELVITEGRAVIR ISLT GENOTYP: SIGNIFICANT CHANGE UP
EOSINOPHIL # BLD AUTO: 0 K/UL — SIGNIFICANT CHANGE UP (ref 0–0.5)
EOSINOPHIL NFR BLD AUTO: 0 % — SIGNIFICANT CHANGE UP (ref 0–6)
GLUCOSE SERPL-MCNC: 87 MG/DL — SIGNIFICANT CHANGE UP (ref 70–99)
HCT VFR BLD CALC: 20.9 % — CRITICAL LOW (ref 39–50)
HCT VFR BLD CALC: 22.3 % — LOW (ref 39–50)
HGB BLD-MCNC: 6.7 G/DL — CRITICAL LOW (ref 13–17)
HGB BLD-MCNC: 7.7 G/DL — LOW (ref 13–17)
HIV 1 RNA IN SERPLBLD-SEQ: SIGNIFICANT CHANGE UP
HIV 1 RNA RT + PR + IN SERPLBLD-SEQ: DETECTED
HIV RT+PR MUT TESTED ISLT: SIGNIFICANT CHANGE UP
HIV-1 GENOTYPE DRUG RESISTANCE - RESULT: DETECTED
HIV-1 INTEGRASE GENOTYPE - RESULT: SIGNIFICANT CHANGE UP
IANC: 1.81 K/UL — SIGNIFICANT CHANGE UP (ref 1.8–7.4)
LYMPHOCYTES # BLD AUTO: 0.15 K/UL — LOW (ref 1–3.3)
LYMPHOCYTES # BLD AUTO: 5.5 % — LOW (ref 13–44)
MCHC RBC-ENTMCNC: 28.6 PG — SIGNIFICANT CHANGE UP (ref 27–34)
MCHC RBC-ENTMCNC: 30 PG — SIGNIFICANT CHANGE UP (ref 27–34)
MCHC RBC-ENTMCNC: 32.1 GM/DL — SIGNIFICANT CHANGE UP (ref 32–36)
MCHC RBC-ENTMCNC: 34.5 GM/DL — SIGNIFICANT CHANGE UP (ref 32–36)
MCV RBC AUTO: 86.8 FL — SIGNIFICANT CHANGE UP (ref 80–100)
MCV RBC AUTO: 89.3 FL — SIGNIFICANT CHANGE UP (ref 80–100)
MONOCYTES # BLD AUTO: 0.25 K/UL — SIGNIFICANT CHANGE UP (ref 0–0.9)
MONOCYTES NFR BLD AUTO: 9.2 % — SIGNIFICANT CHANGE UP (ref 2–14)
NEUTROPHILS # BLD AUTO: 2.2 K/UL — SIGNIFICANT CHANGE UP (ref 1.8–7.4)
NEUTROPHILS NFR BLD AUTO: 79.8 % — HIGH (ref 43–77)
NON-GYNECOLOGICAL CYTOLOGY STUDY: SIGNIFICANT CHANGE UP
NRBC # BLD: 0 /100 WBCS — SIGNIFICANT CHANGE UP
NRBC # FLD: 0 K/UL — SIGNIFICANT CHANGE UP
PLATELET # BLD AUTO: 238 K/UL — SIGNIFICANT CHANGE UP (ref 150–400)
PLATELET # BLD AUTO: 244 K/UL — SIGNIFICANT CHANGE UP (ref 150–400)
POTASSIUM SERPL-MCNC: 3.5 MMOL/L — SIGNIFICANT CHANGE UP (ref 3.5–5.3)
POTASSIUM SERPL-SCNC: 3.5 MMOL/L — SIGNIFICANT CHANGE UP (ref 3.5–5.3)
PROT SERPL-MCNC: 6.1 G/DL — SIGNIFICANT CHANGE UP (ref 6–8.3)
RALTEGRAVIR ISLT GENOTYP: SIGNIFICANT CHANGE UP
RBC # BLD: 2.34 M/UL — LOW (ref 4.2–5.8)
RBC # BLD: 2.57 M/UL — LOW (ref 4.2–5.8)
RBC # FLD: 16.3 % — HIGH (ref 10.3–14.5)
RBC # FLD: 16.9 % — HIGH (ref 10.3–14.5)
SODIUM SERPL-SCNC: 138 MMOL/L — SIGNIFICANT CHANGE UP (ref 135–145)
SPECIMEN SOURCE: SIGNIFICANT CHANGE UP
SPECIMEN SOURCE: SIGNIFICANT CHANGE UP
VIRAL LOAD DATE: SIGNIFICANT CHANGE UP
WBC # BLD: 2.72 K/UL — LOW (ref 3.8–10.5)
WBC # BLD: 3.57 K/UL — LOW (ref 3.8–10.5)
WBC # FLD AUTO: 2.72 K/UL — LOW (ref 3.8–10.5)
WBC # FLD AUTO: 3.57 K/UL — LOW (ref 3.8–10.5)

## 2022-03-29 RX ADMIN — SODIUM CHLORIDE 1 GRAM(S): 9 INJECTION INTRAMUSCULAR; INTRAVENOUS; SUBCUTANEOUS at 05:23

## 2022-03-29 RX ADMIN — Medication 112 MICROGRAM(S): at 05:23

## 2022-03-29 RX ADMIN — PANTOPRAZOLE SODIUM 40 MILLIGRAM(S): 20 TABLET, DELAYED RELEASE ORAL at 05:22

## 2022-03-29 RX ADMIN — Medication 5 MILLIGRAM(S): at 18:36

## 2022-03-29 RX ADMIN — Medication 100 MILLIGRAM(S): at 11:40

## 2022-03-29 RX ADMIN — CHLORHEXIDINE GLUCONATE 1 APPLICATION(S): 213 SOLUTION TOPICAL at 11:42

## 2022-03-29 RX ADMIN — Medication 650 MILLIGRAM(S): at 21:26

## 2022-03-29 RX ADMIN — ATOVAQUONE 750 MILLIGRAM(S): 750 SUSPENSION ORAL at 18:38

## 2022-03-29 RX ADMIN — SODIUM CHLORIDE 1 GRAM(S): 9 INJECTION INTRAMUSCULAR; INTRAVENOUS; SUBCUTANEOUS at 21:26

## 2022-03-29 RX ADMIN — Medication 650 MILLIGRAM(S): at 13:20

## 2022-03-29 RX ADMIN — Medication 650 MILLIGRAM(S): at 05:23

## 2022-03-29 RX ADMIN — BICTEGRAVIR SODIUM, EMTRICITABINE, AND TENOFOVIR ALAFENAMIDE FUMARATE 1 TABLET(S): 30; 120; 15 TABLET ORAL at 11:40

## 2022-03-29 RX ADMIN — OXYCODONE HYDROCHLORIDE 5 MILLIGRAM(S): 5 TABLET ORAL at 22:30

## 2022-03-29 RX ADMIN — PANTOPRAZOLE SODIUM 40 MILLIGRAM(S): 20 TABLET, DELAYED RELEASE ORAL at 18:36

## 2022-03-29 RX ADMIN — ATOVAQUONE 750 MILLIGRAM(S): 750 SUSPENSION ORAL at 06:56

## 2022-03-29 RX ADMIN — SODIUM CHLORIDE 1 GRAM(S): 9 INJECTION INTRAMUSCULAR; INTRAVENOUS; SUBCUTANEOUS at 13:20

## 2022-03-29 RX ADMIN — OXYCODONE HYDROCHLORIDE 5 MILLIGRAM(S): 5 TABLET ORAL at 21:31

## 2022-03-29 RX ADMIN — TAMSULOSIN HYDROCHLORIDE 0.4 MILLIGRAM(S): 0.4 CAPSULE ORAL at 21:26

## 2022-03-29 RX ADMIN — Medication 1 MILLIGRAM(S): at 11:40

## 2022-03-29 RX ADMIN — Medication 5 MILLIGRAM(S): at 05:24

## 2022-03-29 NOTE — CHART NOTE - NSCHARTNOTEFT_GEN_A_CORE
Pt given 1U pRBCS this morning and repeat CBC showed improvement of Hg to 7.7. Discussed with Dr. Edwards who requested pt be ambulated with pulse ox. Pt ambulated by nurse who reports pt desatted to 90-91% upon ambulation, was 99% at rest on RA. Pt denies any shortness of breath or chest pain with exertion.     Dr. Edwards notified and recommended another unit of pRBCs, given transient hypoxia.     Will continue to monitor. Discussed with RN.

## 2022-03-29 NOTE — PROGRESS NOTE ADULT - SUBJECTIVE AND OBJECTIVE BOX
INTERVAL HPI/OVERNIGHT EVENTS:    expressing that he really would like to go home   denying any rectal bleeding or hematuria   cbc noted    MEDICATIONS  (STANDING):  allopurinol 100 milliGRAM(s) Oral daily  atovaquone  Suspension 750 milliGRAM(s) Oral two times a day  bictegravir 50 mG/emtricitabine 200 mG/tenofovir alafenamide 25 mG (BIKTARVY) 1 Tablet(s) Oral daily  chlorhexidine 2% Cloths 1 Application(s) Topical daily  folic acid 1 milliGRAM(s) Oral daily  influenza   Vaccine 0.5 milliLiter(s) IntraMuscular once  levothyroxine 112 MICROGram(s) Oral daily  mupirocin 2% Ointment 1 Application(s) Both Nostrils two times a day  oxybutynin 5 milliGRAM(s) Oral two times a day  pantoprazole  Injectable 40 milliGRAM(s) IV Push two times a day  penicillin   G benzathine Injectable 2.4 Million Unit(s) IntraMuscular every 7 days  polyethylene glycol 3350 17 Gram(s) Oral daily  senna 2 Tablet(s) Oral at bedtime  sodium bicarbonate 650 milliGRAM(s) Oral three times a day  sodium chloride 1 Gram(s) Oral three times a day  sodium chloride 0.9%. 1000 milliLiter(s) (125 mL/Hr) IV Continuous <Continuous>  tamsulosin 0.4 milliGRAM(s) Oral at bedtime    MEDICATIONS  (PRN):  acetaminophen     Tablet .. 650 milliGRAM(s) Oral every 6 hours PRN Temp greater or equal to 38C (100.4F), Mild Pain (1 - 3)  aluminum hydroxide/magnesium hydroxide/simethicone Suspension 30 milliLiter(s) Oral every 4 hours PRN Dyspepsia  bisacodyl Suppository 10 milliGRAM(s) Rectal once PRN Constipation  guaiFENesin Oral Liquid (Sugar-Free) 100 milliGRAM(s) Oral every 6 hours PRN Cough  hydrocodone/homatropine Syrup 5 milliLiter(s) Oral every 6 hours PRN Cough  melatonin 3 milliGRAM(s) Oral at bedtime PRN Insomnia  ondansetron Injectable 4 milliGRAM(s) IV Push every 8 hours PRN Nausea and/or Vomiting  oxyCODONE    IR 5 milliGRAM(s) Oral every 6 hours PRN moderate pain 4-6, severe pain 7-10  sodium chloride 0.65% Nasal 1 Spray(s) Both Nostrils four times a day PRN Nasal Congestion      Allergies    No Known Allergies    Intolerances        Review of Systems:    General:  No wt loss, fevers, chills, night sweats, fatigue   Eyes:  Good vision, no reported pain  ENT:  No sore throat, pain, runny nose, dysphagia  CV:  No pain, palpitations, hypo/hypertension  Resp:  No dyspnea, cough, tachypnea, wheezing  GI:  No pain, No nausea, No vomiting, No diarrhea, No constipation, No weight loss, No fever, No pruritis, No rectal bleeding, No melena, No dysphagia  :  No pain, bleeding, incontinence, nocturia  Muscle:  No pain, weakness  Neuro:  No weakness, tingling, memory problems  Psych:  No fatigue, insomnia, mood problems, depression  Endocrine:  No polyuria, polydypsia, cold/heat intolerance  Heme:  No petechiae, ecchymosis, easy bruisability  Skin:  No rash, tattoos, scars, edema      Vital Signs Last 24 Hrs  T(C): 37.2 (29 Mar 2022 12:50), Max: 37.4 (28 Mar 2022 17:49)  T(F): 99 (29 Mar 2022 12:50), Max: 99.3 (28 Mar 2022 17:49)  HR: 106 (29 Mar 2022 12:50) (65 - 106)  BP: 136/83 (29 Mar 2022 12:50) (118/77 - 136/83)  BP(mean): --  RR: 18 (29 Mar 2022 12:50) (18 - 20)  SpO2: 99% (29 Mar 2022 12:50) (99% - 100%)    PHYSICAL EXAM:    Constitutional: NAD  HEENT: EOMI, throat clear  Neck: No LAD, supple  Respiratory: CTA and P  Cardiovascular: S1 and S2, RRR, no M  Gastrointestinal: BS+, soft, NT/ND, neg HSM,  Extremities: No peripheral edema, neg clubbing, cyanosis  Vascular: 2+ peripheral pulses  Neurological: A/O x 3, no focal deficits  Psychiatric: Normal mood, normal affect  Skin: No rashes      LABS:                        6.7    2.72  )-----------( 244      ( 29 Mar 2022 07:12 )             20.9     03-    138  |  110<H>  |  22  ----------------------------<  87  3.5   |  20<L>  |  1.43<H>    Ca    7.3<L>      29 Mar 2022 07:12  Phos  3.1       Mg     1.60         TPro  6.1  /  Alb  2.1<L>  /  TBili  0.4  /  DBili  x   /  AST  21  /  ALT  9   /  AlkPhos  60        Urinalysis Basic - ( 27 Mar 2022 15:40 )    Color: Dark Brown / Appearance: Slightly Turbid / S.005 / pH: x  Gluc: x / Ketone: Negative  / Bili: Negative / Urobili: <2 mg/dL   Blood: x / Protein: 100 mg/dL / Nitrite: Negative   Leuk Esterase: Large / RBC: >50 /HPF / WBC 15 /HPF   Sq Epi: x / Non Sq Epi: 3 /HPF / Bacteria: Few        RADIOLOGY & ADDITIONAL TESTS:

## 2022-03-29 NOTE — PROVIDER CONTACT NOTE (CRITICAL VALUE NOTIFICATION) - PERSON GIVING RESULT:
Lab
ZAID Scott / Hematology
Fátima/Devan
AYAZ Apodaca/ Hematology
JO Apodaca/ hematology lab
Iftikhar Nieto
NEREIDA Foy
HELIO Murillo (Lab)

## 2022-03-29 NOTE — PROVIDER CONTACT NOTE (CRITICAL VALUE NOTIFICATION) - ACTION/TREATMENT ORDERED:
1 unit PRBCs
Will order 1 unit PRBC
none at this time
Provider aware. Waiting for orders.
Awaiting ACP
1 unit PRBC ordered

## 2022-03-29 NOTE — PROGRESS NOTE ADULT - SUBJECTIVE AND OBJECTIVE BOX
Patient is a 56y Male whom presented to the hospital with ckd and todd , hyponatremia    PAST MEDICAL & SURGICAL HISTORY:  Deviated septum    Gout    Malignant neoplasm of thyroid gland    S/P ORIF (open reduction internal fixation) fracture  right femur     S/P ACL surgery  Right; 2016    S/P hardware removal  right femur     H/O umbilical hernia repair    History of nasal septoplasty  2020    History of tonsillectomy  as a child        MEDICATIONS  (STANDING):  allopurinol 300 milliGRAM(s) Oral daily  azithromycin  IVPB      chlorhexidine 2% Cloths 1 Application(s) Topical daily  folic acid 1 milliGRAM(s) Oral daily  ibuprofen  Tablet. 400 milliGRAM(s) Oral once  influenza   Vaccine 0.5 milliLiter(s) IntraMuscular once  levothyroxine 112 MICROGram(s) Oral daily  mupirocin 2% Ointment 1 Application(s) Both Nostrils two times a day  senna 2 Tablet(s) Oral at bedtime  sodium chloride 1 Gram(s) Oral three times a day  sodium chloride 0.9%. 1000 milliLiter(s) (75 mL/Hr) IV Continuous <Continuous>      Allergies    No Known Allergies    Intolerances        SOCIAL HISTORY:  Denies ETOh,Smoking,     FAMILY HISTORY:  No pertinent family history in first degree relatives        REVIEW OF SYSTEMS:    CONSTITUTIONAL: No weakness, fevers or chills  RESPIRATORY: No cough, wheezing, hemoptysis; No shortness of breath  CARDIOVASCULAR: No chest pain or palpitations  GASTROINTESTINAL: No abdominal or epigastric pain. No nausea, vomiting,     No diarrhea or constipation. No melena                                                                           6.7    2.72  )-----------( 244      ( 29 Mar 2022 07:12 )             20.9       CBC Full  -  ( 29 Mar 2022 07:12 )  WBC Count : 2.72 K/uL  RBC Count : 2.34 M/uL  Hemoglobin : 6.7 g/dL  Hematocrit : 20.9 %  Platelet Count - Automated : 244 K/uL  Mean Cell Volume : 89.3 fL  Mean Cell Hemoglobin : 28.6 pg  Mean Cell Hemoglobin Concentration : 32.1 gm/dL  Auto Neutrophil # : 2.20 K/uL  Auto Lymphocyte # : 0.15 K/uL  Auto Monocyte # : 0.25 K/uL  Auto Eosinophil # : 0.00 K/uL  Auto Basophil # : 0.00 K/uL  Auto Neutrophil % : 79.8 %  Auto Lymphocyte % : 5.5 %  Auto Monocyte % : 9.2 %  Auto Eosinophil % : 0.0 %  Auto Basophil % : 0.0 %          138  |  110<H>  |  22  ----------------------------<  87  3.5   |  20<L>  |  1.43<H>    Ca    7.3<L>      29 Mar 2022 07:12    TPro  6.1  /  Alb  2.1<L>  /  TBili  0.4  /  DBili  x   /  AST  21  /  ALT  9   /  AlkPhos  60        CAPILLARY BLOOD GLUCOSE          Vital Signs Last 24 Hrs  T(C): 37.2 (29 Mar 2022 12:50), Max: 37.4 (28 Mar 2022 17:49)  T(F): 99 (29 Mar 2022 12:50), Max: 99.3 (28 Mar 2022 17:49)  HR: 106 (29 Mar 2022 12:50) (65 - 106)  BP: 136/83 (29 Mar 2022 12:50) (120/72 - 136/83)  BP(mean): --  RR: 18 (29 Mar 2022 12:50) (18 - 20)  SpO2: 99% (29 Mar 2022 12:50) (99% - 100%)    Urinalysis Basic - ( 27 Mar 2022 15:40 )    Color: Dark Brown / Appearance: Slightly Turbid / S.005 / pH: x  Gluc: x / Ketone: Negative  / Bili: Negative / Urobili: <2 mg/dL   Blood: x / Protein: 100 mg/dL / Nitrite: Negative   Leuk Esterase: Large / RBC: >50 /HPF / WBC 15 /HPF   Sq Epi: x / Non Sq Epi: 3 /HPF / Bacteria: Few                                  PHYSICAL EXAM:    Constitutional: NAD  HEENT: conjunctive   clear   Neck:  No JVD  Respiratory: CTAB  Cardiovascular: S1 and S2  Gastrointestinal: BS+, soft,   Extremities: No peripheral edema

## 2022-03-29 NOTE — PROVIDER CONTACT NOTE (CRITICAL VALUE NOTIFICATION) - ASSESSMENT
Bloody urine output
Vs stable. No signs of bleeding
pt's urine is blood red
pt's urine is dark red
Hemoglobin 6.9  hematocrit 20.4
Pt. asymptomatic. VSS
Patient asymptomatic. Vital signs stable

## 2022-03-29 NOTE — PROGRESS NOTE ADULT - ASSESSMENT
56 yr old male with a pmh of Thyroid Ca (s/p surgery on synthroid), Gout on Allopurinol, presents to the ED with hematuria for the past 5 days    Rectal Bleed (Resolved)  favor occult (+) is contaminant from hematuria/clots  last Colonoscopy 2019 w/diverticulosis  CT without acute GI pathology  cont to monitor cbc and transfuse to keep Hgb 7.5 or greater    Hematology care appreciated   d/w pt for outpatient f/u in our office for non-urgent endoscopic eval; [phone]  ID f/u re: HIV attributing to Anemia     Gross Hematuria   Urology care appreciated; cont to follow their recs    HIV   management per medicine and ID appreciated     I reviewed the overnight course of events on the unit, re-confirming the patient history. I discussed the care with the patient and their family. The plan of care was discussed with the physician assistant and modifications were made to the notation where appropriate. Differential diagnosis and plan of care discussed with patient after the evaluation. Advanced care planning was discussed with patient and family.  Advanced care planning forms were reviewed and discussed.  Risks, benefits and alternatives of gastroenterologic procedures were discussed in detail and all questions were answered. 35 minutes spent on total encounter of which more than fifty percent of the encounter was spent counseling and/or coordinating care by the attending physician.

## 2022-03-29 NOTE — PROGRESS NOTE ADULT - ASSESSMENT
56 yr old male with a pmh of Thyroid Ca (s/p surgery on synthroid), Gout on Allopurinol, presents to the ED with hematuria for the past 5 days - went to [facility] with negative imaging and was sent home with abx- today developed left flank pain and urinary rentention.   Patient has had fatigue and unintentional weight loss over the past few months. Per ED note "seen by Dr. 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 Spoke with Dr. Muhammad, states that pt had cervical LAD not long ago with negative biopsies. Dr. Muhammad sent pt to hospital to get bone marrow bx   poke with Dr. Felix with IR, states he can get bm bx on 3/18 and to admit pt to Dr. Edwards)"  Denies  headache, dizziness, chest pain, palpitations, SOB, joint pain, diarrhea/constipation  Vitals in the ED: T 101, , /85, RR 23 satting 97% RA (17 Mar 2022 23:17)      ACUTE RENAL FAILURE:  continue with iv fluid   Serum creatinine is improving  There is no progression . No uremic symptoms  pos  evidence of anemia .  Fluid status stable.  Will continue to avoid nephrotoxic drugs.  Patient remains asymptomatic.   Continue current therapy.    hyponatremia most likely due to siadh s/p tolvaptan   will check ua , urine osmolality , urine sodium , urine uric acid , serum sodium , serum osmolality , serum uric acid , f/u with hyponatremia work up , f/u with bmp , monitor i and o

## 2022-03-29 NOTE — PROVIDER CONTACT NOTE (CRITICAL VALUE NOTIFICATION) - BACKGROUND
57 y/o M admit with hematuria and urinary retention found to have sepsis secondary to pyelonephritis
Pt. admitted for hematuria and pyelonephritis
pt has hematuria
pt has hematuria and sepsis 2/2 pyelonephritis
thyroid cancer, HIV
Chronic obstructive pyelonephritis   hematuria and retentiojn

## 2022-03-29 NOTE — PROGRESS NOTE ADULT - ASSESSMENT
_________________________________________________________________________________________  ========>>  M E D I C A L   A T T E N D I N G    F O L L O W  U P  N O T E  <<=========  -----------------------------------------------------------------------------------------------------    - Patient seen and examined by me earlier today.   - In summary,  KATHRYN MALIN is a 56y year old man admitted with hematuria..   - Patient today overall doing fairly, comfortable, eating OK.  and comfortable, wants to go home        pt urinating well post cho, no more bleeding noted/ reported    noted hypoxemia with ambulation  >> PRBC X2 ongoing     ==================>> REVIEW OF SYSTEM <<=================    GEN: no fever, no chills, as above   RESP: no SOB at rest, occasional cough, not much sputum   CVS: no chest pain, no palpitations, no edema  GI: no abdominal pain, no nausea, normal stool  :  no hematuria , otherwise ok   Neuro: no headache, no dizziness  Derm : no itching, no rash    ==================>> PHYSICAL EXAM <<=================    GEN: A&O X 3 , NAD , comfortable, pleasant, not anxious   HEENT: NCAT, PERRL, MMM, hearing intact  Neck: supple , no JVD appreciated  CVS: S1S2 , regular , no murmur appreciated   PULM: CTA B/L,  limited exam as not taking deep breaths   ABD.: soft. non tender, non distended,  bowel sounds present  Extrem: intact pulses , no edema   PSYCH : flat affect, somewhat anxious                             ( note written / Date of service   03-29-22 )    ==================>> MEDICATIONS <<====================    allopurinol 100 milliGRAM(s) Oral daily  atovaquone  Suspension 750 milliGRAM(s) Oral two times a day  bictegravir 50 mG/emtricitabine 200 mG/tenofovir alafenamide 25 mG (BIKTARVY) 1 Tablet(s) Oral daily  chlorhexidine 2% Cloths 1 Application(s) Topical daily  folic acid 1 milliGRAM(s) Oral daily  influenza   Vaccine 0.5 milliLiter(s) IntraMuscular once  levothyroxine 112 MICROGram(s) Oral daily  mupirocin 2% Ointment 1 Application(s) Both Nostrils two times a day  oxybutynin 5 milliGRAM(s) Oral two times a day  pantoprazole  Injectable 40 milliGRAM(s) IV Push two times a day  penicillin   G benzathine Injectable 2.4 Million Unit(s) IntraMuscular every 7 days  polyethylene glycol 3350 17 Gram(s) Oral daily  senna 2 Tablet(s) Oral at bedtime  sodium bicarbonate 650 milliGRAM(s) Oral three times a day  sodium chloride 1 Gram(s) Oral three times a day  sodium chloride 0.9%. 1000 milliLiter(s) IV Continuous <Continuous>  tamsulosin 0.4 milliGRAM(s) Oral at bedtime    MEDICATIONS  (PRN):  acetaminophen     Tablet .. 650 milliGRAM(s) Oral every 6 hours PRN Temp greater or equal to 38C (100.4F), Mild Pain (1 - 3)  aluminum hydroxide/magnesium hydroxide/simethicone Suspension 30 milliLiter(s) Oral every 4 hours PRN Dyspepsia  bisacodyl Suppository 10 milliGRAM(s) Rectal once PRN Constipation  guaiFENesin Oral Liquid (Sugar-Free) 100 milliGRAM(s) Oral every 6 hours PRN Cough  hydrocodone/homatropine Syrup 5 milliLiter(s) Oral every 6 hours PRN Cough  melatonin 3 milliGRAM(s) Oral at bedtime PRN Insomnia  ondansetron Injectable 4 milliGRAM(s) IV Push every 8 hours PRN Nausea and/or Vomiting  oxyCODONE    IR 5 milliGRAM(s) Oral every 6 hours PRN moderate pain 4-6, severe pain 7-10  sodium chloride 0.65% Nasal 1 Spray(s) Both Nostrils four times a day PRN Nasal Congestion    ___________  Active diet:  Diet, Regular  Diet, Regular:   1200mL Fluid Restriction ([id number])  Supplement Feeding Modality:  Oral  Ensure Enlive Cans or Servings Per Day:  1       Frequency:  Daily  ___________________    ==================>> VITAL SIGNS <<==================     Vital Signs Last 24 HrsT(C): 37.2 (03-29-22 @ 12:50)  T(F): 99 (03-29-22 @ 12:50), Max: 99.1 (03-29-22 @ 04:18)  HR: 106 (03-29-22 @ 12:50) (65 - 106)  BP: 136/83 (03-29-22 @ 12:50)  RR: 18 (03-29-22 @ 15:57) (18 - 18)  SpO2: 99% (03-29-22 @ 15:57) (91% - 100%)       ==================>> LAB AND IMAGING <<==================                        7.7    3.57  )-----------( 238      ( 29 Mar 2022 15:11 )             22.3        03-29    138  |  110<H>  |  22  ----------------------------<  87  3.5   |  20<L>  |  1.43<H>    Ca    7.3<L>      29 Mar 2022 07:12    TPro  6.1  /  Alb  2.1<L>  /  TBili  0.4  /  DBili  x   /  AST  21  /  ALT  9   /  AlkPhos  60  03-29       noted Syphilis and CMV results      HIV Result: HIV-1 Pos (03.19.22 @ 12:54) ( confirmatory test )  HIV-1 RNA Quantitative, Viral Load: 6,038,892 (03.20.22 @ 11:59)  ABS CD4: 18 /uL (03.22.22 @ 09:13)    other available results noted including EBV, ANCA...     Hematopathology Report:   ACCESSION No:  [id number]  Patient:   KATHRYN MALIN  Accession:                             [id number]  Collected Date/Time:                   3/18/2022 17:30 EDT  Received Date/Time:                    3/19/2022 11:22 EDT  Hematopathology Report - Auth (Verified)  Specimen(s) Submitted  1- Right iliac bone  2- Bone marrow aspirate    Final Diagnosis  1, 2, 3. Bone marrow biopsy, bone marrow clot, and bone marrow aspirate  -Normocellular bone marrow with trilineage hematopoiesis with  maturation and a proportion of smaller megakaryocytes.  - Iron stores are present.  See note and description.    Diagnostic note:  Per chart review, patient has a recent history of pancytopenia.  The current bone marrow biopsy shows nodefinitive evidence of a  lymphoproliferative disorder. Flow cytometry shows a reversed CD4 to CD8  ratio of the T-cells.    Correlation with patient's recent clinical and laboratory findings and  ancillary testing (including cytogenetics/karyotype and additional  surgical pathology reports (70-C-, 79-PG-) are recommended.  Comprehensive report with results of pending ancillary studies to follow.    Dr. Muhammad was notified of the diagnosis on 03/23/2022.    Ancillary studies  Special stains:   Bone marrow aspirate iron stain:   No spicules are  present  to evaluate for iron stores and there are insufficient nRBC to evaluate  for ring sideroblasts.    Flow cytometry:  The myeloid immunophenotypic findings show normal myeloid  granularity, mild monocytosis with normal immunophenotype, no increase  in myeloid immaturity, and normal myeloid antigen maturation pattern.  The  lymphoid immunophenotypic   findings show T-cells with reversed CD4 to CD8  ratio and polytypic B-cells with kappa predominance   _  Immunohistochemical stains:    (block 1A: CD34, , myeloperoxidase,  CD71, E-cadherin, factor VIII, CD15, CD61, CD3 and CD20) show no  significant increase in CD34 positive blasts (less than 3%), with normal  proportionof myeloids (positive with myeloperoxidase and CD15) to  erythroid elements (positive with CD71) including slightly increased  pronormoblasts (positive with E-cadherin, ), and  megakaryocytes  are overall, normal in number with a proportion thatare smaller in size  (positive with factor VIII, CD61). CD3 shows normal proportion of T-cells  with rare B-cells (positive with CD20).  In-situ hybridization   (kappa, lambda) is pending and will be reported in  an addendum.  Cytogenetics/karyotype:    pending  Comment  Iron stain (examined toevaluate for iron stores; see microscopic  description) and Giemsa stain (shows appropriate staining pattern) are  performed and evaluated on block(s): 1A, 2A.  Verified by: Janelle Gruber MD  (Electronic Signature)  Reported on: 03/23/22 10:10 EDT, [facility], [address] [place] [address]  Phone: [phone]   Fax: [phone]  _________________________________________________________________      < from: CT Abdomen and Pelvis No Cont (03.20.22 @ 15:11) >  IMPRESSION:  New groundglass opacities are seen within the left upper lobe, right   middle lobe and left lower lobe likely representing infection.    Mild left hydronephrosis without evidence of obstructing calculi.   Non obstructing calculi are seen bilaterally within the kidneys.    Heterogeneous hyperdense material within the bladder and left renal   collecting system, which may represent hemorrhagic products.  < end of copied text >    ___________________________________________________________________________________  ===============>>  A S S E S S M E N T   A N D   P L A N <<===============  ------------------------------------------------------------------------------------------    · Assessment	  56 yr old male presenting with urinary retention found to have sepsis 2/2 pyelonephritis     Problem/Plan - 1:  ·  Problem: sepsis, in setting of acute HIV / AIDS with suspect opportunistic infections / PCP / EBV / syphilis / CMV ..       concern for hemorrhagic cystitis / pyelonephritis /infected / impacted stone    workup as noted   off Abx for now : monitoring   all specialty follow up  appreciated and discussed   started on HAART /  Mepron for PCP prophylaxis >> to [facility] for insurance coverage for DC planing     started on treatment for syphilis as well     Problem/Plan - 2:  ·  Problem: pt with past + GIANNA, + jammie + ANCA      rule out autoimmune disorders ./ Trever's / good pastures.. etc..      rheumatology not seeing pt as noted / signed off!!!   possible need for kidney bx ?  renal f/u  >> for now crea improving ...  monitor as OP   most likely all related to HIV / AIDS   Bone marrow Bx as above with no significant abnormalities     Problem/Plan - 3:  ·  Problem: Anemia. multifactorial   BM biopsy > follow final results   Hematology on board , appreciated   montior  transfuse further as needed   occult blood  + and + hematochezia before > stopped    GI appreciated      eventual scope before DC vs OP as discussed     hematuria resolved for now >> needs follow up with  re stent removal..     Problem/Plan - 4:  ·  Problem: urinary retention and obstructive Uropathy with JULIO   monitor BMP, I/O     crea improved with IVH > continue    following  pt urinating well, monitor    **Hyponatremia     repeat labs ordered     pt with flank pain > likely SIADH      nephrology on board, following        Tolvaptan as needed     Problem/Plan - 5:  ·  Problem: Hypothyroidism post total thyroidectomy for cancer   Continue home levothyroxine.  Thyroid Stimulating Hormone, Serum: 4.06 uIU/mL (03.17.22 @ 18:46)    OOB, ambulate as able   nutrition     DC planing home if remains stable and asymptomatic post transfusion   reiterated to pt and son at bedside importance of close follow up with specialists / PMD and med / treatment compliance.. all questions answered     --------------------------------------------  Case discussed with pt, son, NP..  Education given on findings and plan of care    ___________________________  H. TAZ Edwards.  Pager: [phone]

## 2022-03-29 NOTE — PROVIDER CONTACT NOTE (CRITICAL VALUE NOTIFICATION) - NAME OF MD/NP/PA/DO NOTIFIED:
LUCRECIA Estrada (ACP)
Evangelina Cortes
Evangelina CIFUENTES
Provider Karoline Estrada notified
Evangelina Ellis
Mina, Yaimlana
Mina, Yamilana
Sebastián Bravo

## 2022-03-29 NOTE — PROGRESS NOTE ADULT - SUBJECTIVE AND OBJECTIVE BOX
Patient is a 56y old  Male who presents with a chief complaint of urinary retention (29 Mar 2022 13:36)    says that he is feeling much better. No further hematuria. Had PRBC earlier today.     Medication:   acetaminophen     Tablet .. 650 milliGRAM(s) Oral every 6 hours PRN  allopurinol 100 milliGRAM(s) Oral daily  aluminum hydroxide/magnesium hydroxide/simethicone Suspension 30 milliLiter(s) Oral every 4 hours PRN  atovaquone  Suspension 750 milliGRAM(s) Oral two times a day  bictegravir 50 mG/emtricitabine 200 mG/tenofovir alafenamide 25 mG (BIKTARVY) 1 Tablet(s) Oral daily  bisacodyl Suppository 10 milliGRAM(s) Rectal once PRN  chlorhexidine 2% Cloths 1 Application(s) Topical daily  folic acid 1 milliGRAM(s) Oral daily  guaiFENesin Oral Liquid (Sugar-Free) 100 milliGRAM(s) Oral every 6 hours PRN  hydrocodone/homatropine Syrup 5 milliLiter(s) Oral every 6 hours PRN  influenza   Vaccine 0.5 milliLiter(s) IntraMuscular once  levothyroxine 112 MICROGram(s) Oral daily  melatonin 3 milliGRAM(s) Oral at bedtime PRN  mupirocin 2% Ointment 1 Application(s) Both Nostrils two times a day  ondansetron Injectable 4 milliGRAM(s) IV Push every 8 hours PRN  oxybutynin 5 milliGRAM(s) Oral two times a day  oxyCODONE    IR 5 milliGRAM(s) Oral every 6 hours PRN  pantoprazole  Injectable 40 milliGRAM(s) IV Push two times a day  penicillin   G benzathine Injectable 2.4 Million Unit(s) IntraMuscular every 7 days  polyethylene glycol 3350 17 Gram(s) Oral daily  senna 2 Tablet(s) Oral at bedtime  sodium bicarbonate 650 milliGRAM(s) Oral three times a day  sodium chloride 1 Gram(s) Oral three times a day  sodium chloride 0.65% Nasal 1 Spray(s) Both Nostrils four times a day PRN  sodium chloride 0.9%. 1000 milliLiter(s) IV Continuous <Continuous>  tamsulosin 0.4 milliGRAM(s) Oral at bedtime      Physical exam    T(C): 37.2 (03-29-22 @ 12:50), Max: 37.4 (03-28-22 @ 17:49)  HR: 106 (03-29-22 @ 12:50) (65 - 106)  BP: 136/83 (03-29-22 @ 12:50) (120/72 - 136/83)  RR: 18 (03-29-22 @ 12:50) (18 - 20)  SpO2: 99% (03-29-22 @ 12:50) (99% - 100%)  Wt(kg): --    alert NAD  EOMI anicteric sclera  Cv s1 S2 RRR  Lungs clear B/L  abd soft NT ND +BS  No LE edema or tenderness    Labs                        7.7    3.57  )-----------( 238      ( 29 Mar 2022 15:11 )             22.3       03-29    138  |  110<H>  |  22  ----------------------------<  87  3.5   |  20<L>  |  1.43<H>    Ca    7.3<L>      29 Mar 2022 07:12    TPro  6.1  /  Alb  2.1<L>  /  TBili  0.4  /  DBili  x   /  AST  21  /  ALT  9   /  AlkPhos  60  03-29      LIVER FUNCTIONS - ( 29 Mar 2022 07:12 )  Alb: 2.1 g/dL / Pro: 6.1 g/dL / ALK PHOS: 60 U/L / ALT: 9 U/L / AST: 21 U/L / GGT: x             [id number]

## 2022-03-29 NOTE — PROVIDER CONTACT NOTE (CRITICAL VALUE NOTIFICATION) - TEST AND RESULT REPORTED:
H/H 6.5/19.6
HIV 1 positive
Hgb. 6.8, Hct. 20.8
H/H 6.8/20.3
hemoglobin 6.3 and hematocrit 18.6
hgb 6.6 hematocrit 20.3
hg 6.7 hct 20.9
Hemoglobin 6.9  hematocrit 20.4

## 2022-03-29 NOTE — PROGRESS NOTE ADULT - ASSESSMENT
Anemia due to blood loss, AOCD from HIV and kidney disease. Transfuse as needed.    Leucopenia due to lymphopenia due to HIV not neutropenic. Monitor.    If he goes home today as he thinks he is then he should f/u with Dr. Muhammad as out-pt for further hematology management.

## 2022-03-30 ENCOUNTER — TRANSCRIPTION ENCOUNTER (OUTPATIENT)
Age: 57
End: 2022-03-30

## 2022-03-30 VITALS
RESPIRATION RATE: 17 BRPM | TEMPERATURE: 99 F | OXYGEN SATURATION: 96 % | SYSTOLIC BLOOD PRESSURE: 120 MMHG | HEART RATE: 73 BPM | DIASTOLIC BLOOD PRESSURE: 80 MMHG

## 2022-03-30 DIAGNOSIS — B00.9 HERPESVIRAL INFECTION, UNSPECIFIED: ICD-10-CM

## 2022-03-30 LAB
ALBUMIN SERPL ELPH-MCNC: 2.2 G/DL — LOW (ref 3.3–5)
ANION GAP SERPL CALC-SCNC: 9 MMOL/L — SIGNIFICANT CHANGE UP (ref 7–14)
BASOPHILS # BLD AUTO: 0.02 K/UL — SIGNIFICANT CHANGE UP (ref 0–0.2)
BASOPHILS NFR BLD AUTO: 0.6 % — SIGNIFICANT CHANGE UP (ref 0–2)
BLD GP AB SCN SERPL QL: NEGATIVE — SIGNIFICANT CHANGE UP
BUN SERPL-MCNC: 25 MG/DL — HIGH (ref 7–23)
CALCIUM SERPL-MCNC: 7.2 MG/DL — LOW (ref 8.4–10.5)
CHLORIDE SERPL-SCNC: 106 MMOL/L — SIGNIFICANT CHANGE UP (ref 98–107)
CO2 SERPL-SCNC: 21 MMOL/L — LOW (ref 22–31)
CREAT SERPL-MCNC: 1.4 MG/DL — HIGH (ref 0.5–1.3)
EGFR: 58 ML/MIN/1.73M2 — LOW
EOSINOPHIL # BLD AUTO: 0.11 K/UL — SIGNIFICANT CHANGE UP (ref 0–0.5)
EOSINOPHIL NFR BLD AUTO: 3.5 % — SIGNIFICANT CHANGE UP (ref 0–6)
GLUCOSE SERPL-MCNC: 99 MG/DL — SIGNIFICANT CHANGE UP (ref 70–99)
HADV DNA FLD NAA+PROBE-LOG#: 210 COPIES/ML — HIGH
HCT VFR BLD CALC: 24.8 % — LOW (ref 39–50)
HGB BLD-MCNC: 8.3 G/DL — LOW (ref 13–17)
HSV+VZV DNA SPEC QL NAA+PROBE: ABNORMAL
IANC: 2.12 K/UL — SIGNIFICANT CHANGE UP (ref 1.8–7.4)
IMM GRANULOCYTES NFR BLD AUTO: 3.5 % — HIGH (ref 0–1.5)
LYMPHOCYTES # BLD AUTO: 0.45 K/UL — LOW (ref 1–3.3)
LYMPHOCYTES # BLD AUTO: 14.3 % — SIGNIFICANT CHANGE UP (ref 13–44)
MAGNESIUM SERPL-MCNC: 1.2 MG/DL — LOW (ref 1.6–2.6)
MCHC RBC-ENTMCNC: 29.3 PG — SIGNIFICANT CHANGE UP (ref 27–34)
MCHC RBC-ENTMCNC: 33.5 GM/DL — SIGNIFICANT CHANGE UP (ref 32–36)
MCV RBC AUTO: 87.6 FL — SIGNIFICANT CHANGE UP (ref 80–100)
MONOCYTES # BLD AUTO: 0.34 K/UL — SIGNIFICANT CHANGE UP (ref 0–0.9)
MONOCYTES NFR BLD AUTO: 10.8 % — SIGNIFICANT CHANGE UP (ref 2–14)
NEUTROPHILS # BLD AUTO: 2.12 K/UL — SIGNIFICANT CHANGE UP (ref 1.8–7.4)
NEUTROPHILS NFR BLD AUTO: 67.3 % — SIGNIFICANT CHANGE UP (ref 43–77)
NRBC # BLD: 0 /100 WBCS — SIGNIFICANT CHANGE UP
NRBC # FLD: 0 K/UL — SIGNIFICANT CHANGE UP
PHOSPHATE SERPL-MCNC: 2.5 MG/DL — SIGNIFICANT CHANGE UP (ref 2.5–4.5)
PLATELET # BLD AUTO: 245 K/UL — SIGNIFICANT CHANGE UP (ref 150–400)
POTASSIUM SERPL-MCNC: 3.6 MMOL/L — SIGNIFICANT CHANGE UP (ref 3.5–5.3)
POTASSIUM SERPL-SCNC: 3.6 MMOL/L — SIGNIFICANT CHANGE UP (ref 3.5–5.3)
RBC # BLD: 2.83 M/UL — LOW (ref 4.2–5.8)
RBC # FLD: 16.2 % — HIGH (ref 10.3–14.5)
RH IG SCN BLD-IMP: POSITIVE — SIGNIFICANT CHANGE UP
SARS-COV-2 RNA SPEC QL NAA+PROBE: SIGNIFICANT CHANGE UP
SODIUM SERPL-SCNC: 136 MMOL/L — SIGNIFICANT CHANGE UP (ref 135–145)
SPECIMEN SOURCE: SIGNIFICANT CHANGE UP
SPECIMEN SOURCE: SIGNIFICANT CHANGE UP COPIES/ML
WBC # BLD: 3.15 K/UL — LOW (ref 3.8–10.5)
WBC # FLD AUTO: 3.15 K/UL — LOW (ref 3.8–10.5)

## 2022-03-30 PROCEDURE — 99232 SBSQ HOSP IP/OBS MODERATE 35: CPT

## 2022-03-30 RX ORDER — VALACYCLOVIR 500 MG/1
1 TABLET, FILM COATED ORAL
Qty: 20 | Refills: 0
Start: 2022-03-30 | End: 2022-04-08

## 2022-03-30 RX ORDER — TAMSULOSIN HYDROCHLORIDE 0.4 MG/1
1 CAPSULE ORAL
Qty: 30 | Refills: 0
Start: 2022-03-30 | End: 2022-04-28

## 2022-03-30 RX ORDER — VALACYCLOVIR 500 MG/1
1000 TABLET, FILM COATED ORAL
Refills: 0 | Status: DISCONTINUED | OUTPATIENT
Start: 2022-03-30 | End: 2022-03-30

## 2022-03-30 RX ORDER — LANOLIN ALCOHOL/MO/W.PET/CERES
1 CREAM (GRAM) TOPICAL
Qty: 0 | Refills: 0 | DISCHARGE
Start: 2022-03-30

## 2022-03-30 RX ORDER — SENNA PLUS 8.6 MG/1
2 TABLET ORAL
Qty: 0 | Refills: 0 | DISCHARGE
Start: 2022-03-30

## 2022-03-30 RX ORDER — FOLIC ACID 0.8 MG
1 TABLET ORAL
Qty: 30 | Refills: 0
Start: 2022-03-30 | End: 2022-04-28

## 2022-03-30 RX ORDER — OXYBUTYNIN CHLORIDE 5 MG
1 TABLET ORAL
Qty: 60 | Refills: 0
Start: 2022-03-30 | End: 2022-04-28

## 2022-03-30 RX ORDER — PANTOPRAZOLE SODIUM 20 MG/1
1 TABLET, DELAYED RELEASE ORAL
Qty: 60 | Refills: 0
Start: 2022-03-30 | End: 2022-04-28

## 2022-03-30 RX ORDER — ALLOPURINOL 300 MG
1 TABLET ORAL
Qty: 0 | Refills: 0 | DISCHARGE

## 2022-03-30 RX ORDER — SODIUM BICARBONATE 1 MEQ/ML
1 SYRINGE (ML) INTRAVENOUS
Qty: 28 | Refills: 0
Start: 2022-03-30 | End: 2022-04-12

## 2022-03-30 RX ORDER — MAGNESIUM SULFATE 500 MG/ML
2 VIAL (ML) INJECTION ONCE
Refills: 0 | Status: COMPLETED | OUTPATIENT
Start: 2022-03-30 | End: 2022-03-30

## 2022-03-30 RX ORDER — ACETAMINOPHEN 500 MG
2 TABLET ORAL
Qty: 0 | Refills: 0 | DISCHARGE
Start: 2022-03-30

## 2022-03-30 RX ORDER — ALLOPURINOL 300 MG
1 TABLET ORAL
Qty: 30 | Refills: 0
Start: 2022-03-30 | End: 2022-04-28

## 2022-03-30 RX ORDER — POLYETHYLENE GLYCOL 3350 17 G/17G
17 POWDER, FOR SOLUTION ORAL
Qty: 0 | Refills: 0 | DISCHARGE
Start: 2022-03-30

## 2022-03-30 RX ADMIN — ATOVAQUONE 750 MILLIGRAM(S): 750 SUSPENSION ORAL at 08:42

## 2022-03-30 RX ADMIN — Medication 1 MILLIGRAM(S): at 11:37

## 2022-03-30 RX ADMIN — CHLORHEXIDINE GLUCONATE 1 APPLICATION(S): 213 SOLUTION TOPICAL at 11:37

## 2022-03-30 RX ADMIN — Medication 112 MICROGRAM(S): at 05:56

## 2022-03-30 RX ADMIN — Medication 5 MILLIGRAM(S): at 05:56

## 2022-03-30 RX ADMIN — Medication 100 MILLIGRAM(S): at 11:37

## 2022-03-30 RX ADMIN — PANTOPRAZOLE SODIUM 40 MILLIGRAM(S): 20 TABLET, DELAYED RELEASE ORAL at 05:55

## 2022-03-30 RX ADMIN — SODIUM CHLORIDE 1 GRAM(S): 9 INJECTION INTRAMUSCULAR; INTRAVENOUS; SUBCUTANEOUS at 05:56

## 2022-03-30 RX ADMIN — Medication 25 GRAM(S): at 08:42

## 2022-03-30 RX ADMIN — Medication 650 MILLIGRAM(S): at 05:56

## 2022-03-30 RX ADMIN — BICTEGRAVIR SODIUM, EMTRICITABINE, AND TENOFOVIR ALAFENAMIDE FUMARATE 1 TABLET(S): 30; 120; 15 TABLET ORAL at 11:37

## 2022-03-30 NOTE — CHART NOTE - NSCHARTNOTEFT_GEN_A_CORE
Pt seen for Nutrition Follow Up    Medical Course: 57 y/o M w/ a PMH of thyroid cancer (s/p surgery on synthroid), gout on allopurinol, present with hematuria and urinary retention found to have sepsis 2/2 pyelonephritis.     Nutrition Course: Nutrition interview: No recent episodes of n/v/d/c, BM noted 3/29 per pt. Denies any chewing/swallowing difficulties. Food preferences explored, no new preferences at present. Intake is % per RN flowsheets. Pt reports consumption of 100% of breakfast today. Feeding skills: Able to feed self independently. Pt reports 100% consumption of supplement this morning as well Ensure Enlive 1x daily (350 lauryn and 20 gm protein).     Diet Prescription: Diet, Regular (03-26-22 @ 13:04)  Diet, Regular:   1200mL Fluid Restriction ([id number])  Supplement Feeding Modality:  Oral  Ensure Enlive Cans or Servings Per Day:  1       Frequency:  Daily (03-25-22 @ 15:59)    Pertinent Medications: MEDICATIONS  (STANDING):  allopurinol 100 milliGRAM(s) Oral daily  atovaquone  Suspension 750 milliGRAM(s) Oral two times a day  bictegravir 50 mG/emtricitabine 200 mG/tenofovir alafenamide 25 mG (BIKTARVY) 1 Tablet(s) Oral daily  chlorhexidine 2% Cloths 1 Application(s) Topical daily  folic acid 1 milliGRAM(s) Oral daily  influenza   Vaccine 0.5 milliLiter(s) IntraMuscular once  levothyroxine 112 MICROGram(s) Oral daily  mupirocin 2% Ointment 1 Application(s) Both Nostrils two times a day  oxybutynin 5 milliGRAM(s) Oral two times a day  pantoprazole  Injectable 40 milliGRAM(s) IV Push two times a day  penicillin   G benzathine Injectable 2.4 Million Unit(s) IntraMuscular every 7 days  polyethylene glycol 3350 17 Gram(s) Oral daily  senna 2 Tablet(s) Oral at bedtime  sodium bicarbonate 650 milliGRAM(s) Oral three times a day  sodium chloride 1 Gram(s) Oral three times a day  sodium chloride 0.9%. 1000 milliLiter(s) (125 mL/Hr) IV Continuous <Continuous>  tamsulosin 0.4 milliGRAM(s) Oral at bedtime  valACYclovir 1000 milliGRAM(s) Oral two times a day    MEDICATIONS  (PRN):  acetaminophen     Tablet .. 650 milliGRAM(s) Oral every 6 hours PRN Temp greater or equal to 38C (100.4F), Mild Pain (1 - 3)  aluminum hydroxide/magnesium hydroxide/simethicone Suspension 30 milliLiter(s) Oral every 4 hours PRN Dyspepsia  bisacodyl Suppository 10 milliGRAM(s) Rectal once PRN Constipation  guaiFENesin Oral Liquid (Sugar-Free) 100 milliGRAM(s) Oral every 6 hours PRN Cough  hydrocodone/homatropine Syrup 5 milliLiter(s) Oral every 6 hours PRN Cough  melatonin 3 milliGRAM(s) Oral at bedtime PRN Insomnia  ondansetron Injectable 4 milliGRAM(s) IV Push every 8 hours PRN Nausea and/or Vomiting  oxyCODONE    IR 5 milliGRAM(s) Oral every 6 hours PRN moderate pain 4-6, severe pain 7-10  sodium chloride 0.65% Nasal 1 Spray(s) Both Nostrils four times a day PRN Nasal Congestion    Pertinent Labs: 03-30 Na136 mmol/L Glu 99 mg/dL K+ 3.6 mmol/L Cr  1.40 mg/dL<H> BUN 25 mg/dL<H> 03-30 Phos 2.5 mg/dL 03-30 Alb 2.2 g/dL<L> 03-21 Chol --    LDL --    HDL --    Trig 161 mg/dL<H>     CAPILLARY BLOOD GLUCOSE          Weight: Height (cm): 177.8 (03-26 @ 10:29),   Weight (kg): 68.8 (03-30 @ 11:30), 72.6 (01-27 @ 06:34), 72.6 (01-24 @ 14:01)  BMI (kg/m2): 21.8 (03-30 @ 11:30),   Weight Assessment: Stable       Physical Assessment, per flowsheets:  Edema: None noted  Pressure Injury: None noted    Estimated Needs:   [ ] No change since previous assessment, based on dosing weight   5846-5089 kcal daily @ kcal/kg,   gm protein daily @ gm/kg   [x] recalculated, with consideration for, based on current weight  (68.8 lbs / 151.4 kg)     Previous Nutrition Diagnosis: Increased nutrient needs (kcal/pro)  Nutrition Diagnosis is [x ] ongoing  [ ] resolved [ ] not applicable   New Nutrition Diagnosis: [ x] not applicable     Interventions:   1) Encourage PO intake and honor food preferences as able.   2) Continue current POC    Monitor & Evaluate:  PO intake, tolerance to diet/supplement, nutrition related lab values, weight trends, BMs/GI distress, hydration status, skin integrity.    Valentina Rosenberg MS, RDN (Pager #[phone]) Pt seen for Nutrition Follow Up    Medical Course: 57 y/o M w/ a PMH of thyroid cancer (s/p surgery on synthroid), gout on allopurinol, present with hematuria and urinary retention found to have sepsis 2/2 pyelonephritis.     Nutrition Course: Nutrition interview: No recent episodes of n/v/d/c, BM noted 3/29 per pt. Denies any chewing/swallowing difficulties. Food preferences explored, no new preferences at present. Intake is % per RN flowsheets. Pt reports consumption of 100% of breakfast today. Feeding skills: Able to feed self independently. Pt reports 100% consumption of supplement this morning as well Ensure Enlive 1x daily (350 lauryn and 20 gm protein).     Diet Prescription: Diet, Regular (03-26-22 @ 13:04)  Diet, Regular:   1200mL Fluid Restriction ([id number])  Supplement Feeding Modality:  Oral  Ensure Enlive Cans or Servings Per Day:  1       Frequency:  Daily (03-25-22 @ 15:59)    Pertinent Medications: MEDICATIONS  (STANDING):  allopurinol 100 milliGRAM(s) Oral daily  atovaquone  Suspension 750 milliGRAM(s) Oral two times a day  bictegravir 50 mG/emtricitabine 200 mG/tenofovir alafenamide 25 mG (BIKTARVY) 1 Tablet(s) Oral daily  chlorhexidine 2% Cloths 1 Application(s) Topical daily  folic acid 1 milliGRAM(s) Oral daily  influenza   Vaccine 0.5 milliLiter(s) IntraMuscular once  levothyroxine 112 MICROGram(s) Oral daily  mupirocin 2% Ointment 1 Application(s) Both Nostrils two times a day  oxybutynin 5 milliGRAM(s) Oral two times a day  pantoprazole  Injectable 40 milliGRAM(s) IV Push two times a day  penicillin   G benzathine Injectable 2.4 Million Unit(s) IntraMuscular every 7 days  polyethylene glycol 3350 17 Gram(s) Oral daily  senna 2 Tablet(s) Oral at bedtime  sodium bicarbonate 650 milliGRAM(s) Oral three times a day  sodium chloride 1 Gram(s) Oral three times a day  sodium chloride 0.9%. 1000 milliLiter(s) (125 mL/Hr) IV Continuous <Continuous>  tamsulosin 0.4 milliGRAM(s) Oral at bedtime  valACYclovir 1000 milliGRAM(s) Oral two times a day    MEDICATIONS  (PRN):  acetaminophen     Tablet .. 650 milliGRAM(s) Oral every 6 hours PRN Temp greater or equal to 38C (100.4F), Mild Pain (1 - 3)  aluminum hydroxide/magnesium hydroxide/simethicone Suspension 30 milliLiter(s) Oral every 4 hours PRN Dyspepsia  bisacodyl Suppository 10 milliGRAM(s) Rectal once PRN Constipation  guaiFENesin Oral Liquid (Sugar-Free) 100 milliGRAM(s) Oral every 6 hours PRN Cough  hydrocodone/homatropine Syrup 5 milliLiter(s) Oral every 6 hours PRN Cough  melatonin 3 milliGRAM(s) Oral at bedtime PRN Insomnia  ondansetron Injectable 4 milliGRAM(s) IV Push every 8 hours PRN Nausea and/or Vomiting  oxyCODONE    IR 5 milliGRAM(s) Oral every 6 hours PRN moderate pain 4-6, severe pain 7-10  sodium chloride 0.65% Nasal 1 Spray(s) Both Nostrils four times a day PRN Nasal Congestion    Pertinent Labs: 03-30 Na136 mmol/L Glu 99 mg/dL K+ 3.6 mmol/L Cr  1.40 mg/dL<H> BUN 25 mg/dL<H> 03-30 Phos 2.5 mg/dL 03-30 Alb 2.2 g/dL<L> 03-21 Chol --    LDL --    HDL --    Trig 161 mg/dL<H>      Weight: Height (cm): 177.8 (03-26 @ 10:29),   Weight (kg): 68.8 (03-30 @ 11:30), (3/17): 69.4 kg  BMI (kg/m2): 21.8 (03-30 @ 11:30)  Weight Assessment: Non-sig wt loss noted x2 weeks (-1.7#/ - 1.1%)      Physical Assessment, per flowsheets:  Edema: None noted  Pressure Injury: None noted    Estimated Needs:   [ ] No change since previous assessment, based on dosing weight   9189-4720 kcal daily @ 30-35 kcal/kg,   gm protein daily @ 1.2-1.4 gm/kg   [x] recalculated, with consideration for, based on current weight  (68.8 lbs / 151.4 kg)     Previous Nutrition Diagnosis: Increased nutrient needs (kcal/pro)  Nutrition Diagnosis is [x ] ongoing  [ ] resolved [ ] not applicable   New Nutrition Diagnosis: [ x] not applicable     Interventions:   1) Encourage PO intake and honor food preferences as able.   2) Continue current POC    Monitor & Evaluate:  PO intake, tolerance to diet/supplement, nutrition related lab values, weight trends, BMs/GI distress, hydration status, skin integrity.    Valentina Rosenberg MS, RDN (Pager #[phone])

## 2022-03-30 NOTE — PROGRESS NOTE ADULT - PROVIDER SPECIALTY LIST ADULT
Gastroenterology
Infectious Disease
Internal Medicine
Internal Medicine
Urology
Gastroenterology
Heme/Onc
Heme/Onc
Infectious Disease
Internal Medicine
Nephrology
Gastroenterology
Heme/Onc
Infectious Disease
Internal Medicine
Internal Medicine
Nephrology
Urology
Gastroenterology
Heme/Onc
Infectious Disease
Infectious Disease
Nephrology
Nephrology
Urology
Urology
Nephrology
Urology
Urology

## 2022-03-30 NOTE — PROVIDER CONTACT NOTE (OTHER) - SITUATION
bright red blood in stool
HSV2 detected
No signs of acute distress noted.
Patient has difficulty urinating
Pt complaining of pain at the urinary cath site.
Pt vitals showed elevated temp of 101.1 F oral.

## 2022-03-30 NOTE — PROGRESS NOTE ADULT - REASON FOR ADMISSION
urinary retention

## 2022-03-30 NOTE — PROVIDER CONTACT NOTE (OTHER) - NAME OF MD/NP/PA/DO NOTIFIED:
ACP [id number]
RICHIE Cortes
maricruz alva
[id number] Provider Karoline Estrada notified
Provider Campbell notified
Provider Evangelina Cortes notified
Provider Good Hightower notified
Hemant Scott

## 2022-03-30 NOTE — CHART NOTE - NSCHARTNOTESELECT_GEN_ALL_CORE
ACP-NP Note/Event Note
Event Note
pre ir note/Event Note
ACP-NP Note/Event Note
ACP-NP Note/Event Note
Blood Tx/Event Note
Event Note
Follow-up/Nutrition Services
Rheumatology/Event Note
Urology/Event Note
iSTOP/Event Note

## 2022-03-30 NOTE — PROGRESS NOTE ADULT - SUBJECTIVE AND OBJECTIVE BOX
KATHRYN MALIN  MRN-[id number]    Patient is a 56y old  Male who presents with a chief complaint of urinary retention (29 Mar 2022 18:46)      Review of System  REVIEW OF SYSTEMS      General:	Denies fatigue, fevers, chills, sweats, decreased appetite.    Skin/Breast: denies pruritis, rash  	  Ophthalmologic: no change in vision or blurring  	  HEENT	Denies dry mouth, oral sores, dysphagia,  change in hearing.    Respiratory and Thorax:  cough, sob, wheeze, hemoptysis  	  Cardiovascular:	no cp , palp, orthopnea    Gastrointestinal:	no n/v/d constipation    Genitourinary:	no dysuria of frequency, no hematuria, no flank pain    Musculoskeletal:	no bone or joint pain. no muscle aches.     Neurological:	no change in sensory or motor function. no headache. no weakness.     Psychiatric:	no depression, no anxiety, insomnia.     Hematology/Lymphatics:	no bleeding or bruising        Current Meds  MEDICATIONS  (STANDING):  allopurinol 100 milliGRAM(s) Oral daily  atovaquone  Suspension 750 milliGRAM(s) Oral two times a day  bictegravir 50 mG/emtricitabine 200 mG/tenofovir alafenamide 25 mG (BIKTARVY) 1 Tablet(s) Oral daily  chlorhexidine 2% Cloths 1 Application(s) Topical daily  folic acid 1 milliGRAM(s) Oral daily  influenza   Vaccine 0.5 milliLiter(s) IntraMuscular once  levothyroxine 112 MICROGram(s) Oral daily  mupirocin 2% Ointment 1 Application(s) Both Nostrils two times a day  oxybutynin 5 milliGRAM(s) Oral two times a day  pantoprazole  Injectable 40 milliGRAM(s) IV Push two times a day  penicillin   G benzathine Injectable 2.4 Million Unit(s) IntraMuscular every 7 days  polyethylene glycol 3350 17 Gram(s) Oral daily  senna 2 Tablet(s) Oral at bedtime  sodium bicarbonate 650 milliGRAM(s) Oral three times a day  sodium chloride 1 Gram(s) Oral three times a day  sodium chloride 0.9%. 1000 milliLiter(s) (125 mL/Hr) IV Continuous <Continuous>  tamsulosin 0.4 milliGRAM(s) Oral at bedtime    MEDICATIONS  (PRN):  acetaminophen     Tablet .. 650 milliGRAM(s) Oral every 6 hours PRN Temp greater or equal to 38C (100.4F), Mild Pain (1 - 3)  aluminum hydroxide/magnesium hydroxide/simethicone Suspension 30 milliLiter(s) Oral every 4 hours PRN Dyspepsia  bisacodyl Suppository 10 milliGRAM(s) Rectal once PRN Constipation  guaiFENesin Oral Liquid (Sugar-Free) 100 milliGRAM(s) Oral every 6 hours PRN Cough  hydrocodone/homatropine Syrup 5 milliLiter(s) Oral every 6 hours PRN Cough  melatonin 3 milliGRAM(s) Oral at bedtime PRN Insomnia  ondansetron Injectable 4 milliGRAM(s) IV Push every 8 hours PRN Nausea and/or Vomiting  oxyCODONE    IR 5 milliGRAM(s) Oral every 6 hours PRN moderate pain 4-6, severe pain 7-10  sodium chloride 0.65% Nasal 1 Spray(s) Both Nostrils four times a day PRN Nasal Congestion      Vitals  Vital Signs Last 24 Hrs  T(C): 37.4 (29 Mar 2022 21:33), Max: 37.4 (29 Mar 2022 21:33)  T(F): 99.4 (29 Mar 2022 21:33), Max: 99.4 (29 Mar 2022 21:33)  HR: 65 (29 Mar 2022 21:33) (65 - 106)  BP: 143/86 (29 Mar 2022 21:33) (136/83 - 143/86)  BP(mean): --  RR: 18 (29 Mar 2022 21:33) (18 - 18)  SpO2: 98% (29 Mar 2022 21:33) (91% - 99%)    Physical Exam  PHYSICAL EXAM:      Constitutional: NAD    Eyes: PERRLA EOMI, anicteric sclera    Heent :No oral sores, no pharyngeal injection. moist mucosa.    Neck: supple, no jvd, no LAD    Respiratory: CTA b/l     Cardiovascular: s1s2, no m/g/r    Gastrointestinal: soft, nt, nd, + BS    Extremities: no c/c/e    Neurological:A&O x 3 moves all ext.    Skin: no rash on exposed skin    Lymph Nodes: no lymphadenopathy.              Lab  CBC Full  -  ( 29 Mar 2022 15:11 )  WBC Count : 3.57 K/uL  RBC Count : 2.57 M/uL  Hemoglobin : 7.7 g/dL  Hematocrit : 22.3 %  Platelet Count - Automated : 238 K/uL  Mean Cell Volume : 86.8 fL  Mean Cell Hemoglobin : 30.0 pg  Mean Cell Hemoglobin Concentration : 34.5 gm/dL  Auto Neutrophil # : x  Auto Lymphocyte # : x  Auto Monocyte # : x  Auto Eosinophil # : x  Auto Basophil # : x  Auto Neutrophil % : x  Auto Lymphocyte % : x  Auto Monocyte % : x  Auto Eosinophil % : x  Auto Basophil % : x    03-29    138  |  110<H>  |  22  ----------------------------<  87  3.5   |  20<L>  |  1.43<H>    Ca    7.3<L>      29 Mar 2022 07:12    TPro  6.1  /  Alb  2.1<L>  /  TBili  0.4  /  DBili  x   /  AST  21  /  ALT  9   /  AlkPhos  60  03-29        Rad:    Assessment/Plan   KATHRYN MALIN  MRN-[id number]    Patient is a 56y old  Male who presents with a chief complaint of urinary retention (29 Mar 2022 18:46)      Review of System    Feels well  no new events    Current Meds  MEDICATIONS  (STANDING):  allopurinol 100 milliGRAM(s) Oral daily  atovaquone  Suspension 750 milliGRAM(s) Oral two times a day  bictegravir 50 mG/emtricitabine 200 mG/tenofovir alafenamide 25 mG (BIKTARVY) 1 Tablet(s) Oral daily  chlorhexidine 2% Cloths 1 Application(s) Topical daily  folic acid 1 milliGRAM(s) Oral daily  influenza   Vaccine 0.5 milliLiter(s) IntraMuscular once  levothyroxine 112 MICROGram(s) Oral daily  mupirocin 2% Ointment 1 Application(s) Both Nostrils two times a day  oxybutynin 5 milliGRAM(s) Oral two times a day  pantoprazole  Injectable 40 milliGRAM(s) IV Push two times a day  penicillin   G benzathine Injectable 2.4 Million Unit(s) IntraMuscular every 7 days  polyethylene glycol 3350 17 Gram(s) Oral daily  senna 2 Tablet(s) Oral at bedtime  sodium bicarbonate 650 milliGRAM(s) Oral three times a day  sodium chloride 1 Gram(s) Oral three times a day  sodium chloride 0.9%. 1000 milliLiter(s) (125 mL/Hr) IV Continuous <Continuous>  tamsulosin 0.4 milliGRAM(s) Oral at bedtime    MEDICATIONS  (PRN):  acetaminophen     Tablet .. 650 milliGRAM(s) Oral every 6 hours PRN Temp greater or equal to 38C (100.4F), Mild Pain (1 - 3)  aluminum hydroxide/magnesium hydroxide/simethicone Suspension 30 milliLiter(s) Oral every 4 hours PRN Dyspepsia  bisacodyl Suppository 10 milliGRAM(s) Rectal once PRN Constipation  guaiFENesin Oral Liquid (Sugar-Free) 100 milliGRAM(s) Oral every 6 hours PRN Cough  hydrocodone/homatropine Syrup 5 milliLiter(s) Oral every 6 hours PRN Cough  melatonin 3 milliGRAM(s) Oral at bedtime PRN Insomnia  ondansetron Injectable 4 milliGRAM(s) IV Push every 8 hours PRN Nausea and/or Vomiting  oxyCODONE    IR 5 milliGRAM(s) Oral every 6 hours PRN moderate pain 4-6, severe pain 7-10  sodium chloride 0.65% Nasal 1 Spray(s) Both Nostrils four times a day PRN Nasal Congestion    Vital Signs Last 24 Hrs  T(C): 37.4 (29 Mar 2022 21:33), Max: 37.4 (29 Mar 2022 21:33)  T(F): 99.4 (29 Mar 2022 21:33), Max: 99.4 (29 Mar 2022 21:33)  HR: 65 (29 Mar 2022 21:33) (65 - 106)  BP: 143/86 (29 Mar 2022 21:33) (136/83 - 143/86)  BP(mean): --  RR: 18 (29 Mar 2022 21:33) (18 - 18)  SpO2: 98% (29 Mar 2022 21:33) (91% - 99%)      PHYSICAL EXAM:    NAD    Lab  CBC Full  -  ( 29 Mar 2022 15:11 )  WBC Count : 3.57 K/uL  RBC Count : 2.57 M/uL  Hemoglobin : 7.7 g/dL  Hematocrit : 22.3 %  Platelet Count - Automated : 238 K/uL  Mean Cell Volume : 86.8 fL  Mean Cell Hemoglobin : 30.0 pg  Mean Cell Hemoglobin Concentration : 34.5 gm/dL  Auto Neutrophil # : x  Auto Lymphocyte # : x  Auto Monocyte # : x  Auto Eosinophil # : x  Auto Basophil # : x  Auto Neutrophil % : x  Auto Lymphocyte % : x  Auto Monocyte % : x  Auto Eosinophil % : x  Auto Basophil % : x    03-29    138  |  110<H>  |  22  ----------------------------<  87  3.5   |  20<L>  |  1.43<H>    Ca    7.3<L>      29 Mar 2022 07:12    TPro  6.1  /  Alb  2.1<L>  /  TBili  0.4  /  DBili  x   /  AST  21  /  ALT  9   /  AlkPhos  60  03-29        Rad:    Assessment/Plan

## 2022-03-30 NOTE — PROGRESS NOTE ADULT - ASSESSMENT
56 yr old male with a pmh of Thyroid Ca (s/p surgery on synthroid), Gout on Allopurinol, presents to the ED with hematuria for the past 5 days    Rectal Bleed (Resolved)  last Colonoscopy 2019 w/diverticulosis  CT without acute GI pathology  favor occult (+) is contaminant from hematuria/clots; having Brown stools  anemia suspect related to active HIV   cont to monitor cbc and transfuse to keep Hgb 7.5 or greater    Hematology and urology care appreciated   outpatient f/u with Dr. Yuan 4/25 @ 3:30pm in our office; [phone]    Gross Hematuria   s/p cysto, bladder bx, left URS, stent placement. Bleeding from left kidney w/o discrete source  Urology care appreciated; cont to follow their recs    HIV   management per medicine and ID appreciated     I reviewed the overnight course of events on the unit, re-confirming the patient history. I discussed the care with the patient and their family. The plan of care was discussed with the physician assistant and modifications were made to the notation where appropriate. Differential diagnosis and plan of care discussed with patient after the evaluation. Advanced care planning was discussed with patient and family.  Advanced care planning forms were reviewed and discussed.  Risks, benefits and alternatives of gastroenterologic procedures were discussed in detail and all questions were answered. 35 minutes spent on total encounter of which more than fifty percent of the encounter was spent counseling and/or coordinating care by the attending physician.    56 yr old male with a pmh of Thyroid Ca (s/p surgery on synthroid), Gout on Allopurinol, presents to the ED with hematuria for the past 5 days    Rectal Bleed (Resolved)  last Colonoscopy 2019 w/diverticulosis  CT without acute GI pathology  favor occult (+) is contaminant from hematuria/clots; having Brown stools  component anemia suspect related to active HIV with BM suppression  cont to monitor cbc and transfuse to keep Hgb 7.5 or greater    Hematology and urology care appreciated   outpatient f/u with Dr. Yuan 4/25 @ 3:30pm in our office; [phone]    Gross Hematuria   s/p cysto, bladder bx, left URS, stent placement. Bleeding from left kidney w/o discrete source  Urology care appreciated; cont to follow their recs    HIV   management per medicine and ID appreciated     I reviewed the overnight course of events on the unit, re-confirming the patient history. I discussed the care with the patient and their family. The plan of care was discussed with the physician assistant and modifications were made to the notation where appropriate. Differential diagnosis and plan of care discussed with patient after the evaluation. Advanced care planning was discussed with patient and family.  Advanced care planning forms were reviewed and discussed.  Risks, benefits and alternatives of gastroenterologic procedures were discussed in detail and all questions were answered. 35 minutes spent on total encounter of which more than fifty percent of the encounter was spent counseling and/or coordinating care by the attending physician.

## 2022-03-30 NOTE — PROVIDER CONTACT NOTE (OTHER) - RECOMMENDATIONS
Make Provider aware
Reinsert cho.
Valtrex ordered per ACP
RICHIE Hutchinson notified and made aware of Pt status.   Urology to be called, notified by Evangelina
Cool packs applied.

## 2022-03-30 NOTE — PROGRESS NOTE ADULT - SUBJECTIVE AND OBJECTIVE BOX
Follow Up:      Inverval History/ROS:Patient is a 56y old  Male who presents with a chief complaint of urinary retention (30 Mar 2022 11:44)    No fever  Required transfusion of 2 units of PRBCs yesterday      Allergies    No Known Allergies    Intolerances        ANTIMICROBIALS:  atovaquone  Suspension 750 two times a day  bictegravir 50 mG/emtricitabine 200 mG/tenofovir alafenamide 25 mG (BIKTARVY) 1 daily  penicillin   G benzathine Injectable 2.4 every 7 days  valACYclovir 1000 two times a day      OTHER MEDS:  acetaminophen     Tablet .. 650 milliGRAM(s) Oral every 6 hours PRN  allopurinol 100 milliGRAM(s) Oral daily  aluminum hydroxide/magnesium hydroxide/simethicone Suspension 30 milliLiter(s) Oral every 4 hours PRN  bisacodyl Suppository 10 milliGRAM(s) Rectal once PRN  chlorhexidine 2% Cloths 1 Application(s) Topical daily  folic acid 1 milliGRAM(s) Oral daily  guaiFENesin Oral Liquid (Sugar-Free) 100 milliGRAM(s) Oral every 6 hours PRN  hydrocodone/homatropine Syrup 5 milliLiter(s) Oral every 6 hours PRN  influenza   Vaccine 0.5 milliLiter(s) IntraMuscular once  levothyroxine 112 MICROGram(s) Oral daily  melatonin 3 milliGRAM(s) Oral at bedtime PRN  mupirocin 2% Ointment 1 Application(s) Both Nostrils two times a day  ondansetron Injectable 4 milliGRAM(s) IV Push every 8 hours PRN  oxybutynin 5 milliGRAM(s) Oral two times a day  oxyCODONE    IR 5 milliGRAM(s) Oral every 6 hours PRN  pantoprazole  Injectable 40 milliGRAM(s) IV Push two times a day  polyethylene glycol 3350 17 Gram(s) Oral daily  senna 2 Tablet(s) Oral at bedtime  sodium bicarbonate 650 milliGRAM(s) Oral three times a day  sodium chloride 1 Gram(s) Oral three times a day  sodium chloride 0.65% Nasal 1 Spray(s) Both Nostrils four times a day PRN  sodium chloride 0.9%. 1000 milliLiter(s) IV Continuous <Continuous>  tamsulosin 0.4 milliGRAM(s) Oral at bedtime      Vital Signs Last 24 Hrs  T(C): 37.4 (30 Mar 2022 05:41), Max: 37.4 (29 Mar 2022 21:33)  T(F): 99.3 (30 Mar 2022 05:41), Max: 99.4 (29 Mar 2022 21:33)  HR: 73 (30 Mar 2022 05:41) (65 - 106)  BP: 120/80 (30 Mar 2022 05:41) (120/80 - 143/86)  BP(mean): --  RR: 17 (30 Mar 2022 05:41) (17 - 18)  SpO2: 96% (30 Mar 2022 05:41) (91% - 99%)    PHYSICAL EXAM:  General: [ x] non-toxic  HEAD/EYES: [ ] PERRL [x ] white sclera [ ] icterus  ENT:  [ ] normal [x ] supple [ ] thrush [ ] pharyngeal exudate  Cardiovascular:   [ ] murmur [x ] normal [ ] PPM/AICD  Respiratory:  [ x] clear to ausculation bilaterally  GI:  [x ] soft, non-tender, normal bowel sounds  :  [ ] cho [ x] no CVA tenderness   Musculoskeletal:  [x ] no synovitis  Neurologic:  [ ] non-focal exam   Skin:  [x ] ulceration of perrectal area  Lymph: [ x] no lymphadenopathy  Psychiatric:  [x ] appropriate affect [ ] alert & oriented  Lines:  [x ] no phlebitis [ ] central line                                8.3    3.15  )-----------( 245      ( 30 Mar 2022 06:28 )             24.8       03-30    136  |  106  |  25<H>  ----------------------------<  99  3.6   |  21<L>  |  1.40<H>    Ca    7.2<L>      30 Mar 2022 06:28  Phos  2.5     03-30  Mg     1.20     03-30    TPro  x   /  Alb  2.2<L>  /  TBili  x   /  DBili  x   /  AST  x   /  ALT  x   /  AlkPhos  x   03-30          MICROBIOLOGY:Culture Results:   Testing in progress (03-26-22 @ 16:16)  Culture Results:   No growth at 48 hours (03-26-22 @ 11:49)  Culture Results:   No Growth Final (03-24-22 @ 04:40)  Culture Results:   No Growth Final (03-24-22 @ 04:40)      RADIOLOGY:

## 2022-03-30 NOTE — PROVIDER CONTACT NOTE (OTHER) - ACTION/TREATMENT ORDERED:
ACP Evangelina notified. No changes at this time. Blood to be administered as per  ACP Evangelina recommendation. Awaiting urology.
Provider aware with no orders. Tylenol 650 mg po given as ordered. Will continue to monitor.
Provider aware with orders.
Provider aware with orders. Tylenol 1000 mg IV given.
Provider aware. Waiting for orders.
transfuse rbcs
PT medicated with tylenol per provider order.
Valtrex ordered per ACP

## 2022-03-30 NOTE — PROGRESS NOTE ADULT - SUBJECTIVE AND OBJECTIVE BOX
Patient is a 56y Male whom presented to the hospital with ckd and todd , hyponatremia    PAST MEDICAL & SURGICAL HISTORY:  Deviated septum    Gout    Malignant neoplasm of thyroid gland    S/P ORIF (open reduction internal fixation) fracture  right femur 1992    S/P ACL surgery  Right; 2016    S/P hardware removal  right femur 1994    H/O umbilical hernia repair    History of nasal septoplasty  02/2020    History of tonsillectomy  as a child        MEDICATIONS  (STANDING):  allopurinol 300 milliGRAM(s) Oral daily  azithromycin  IVPB      chlorhexidine 2% Cloths 1 Application(s) Topical daily  folic acid 1 milliGRAM(s) Oral daily  ibuprofen  Tablet. 400 milliGRAM(s) Oral once  influenza   Vaccine 0.5 milliLiter(s) IntraMuscular once  levothyroxine 112 MICROGram(s) Oral daily  mupirocin 2% Ointment 1 Application(s) Both Nostrils two times a day  senna 2 Tablet(s) Oral at bedtime  sodium chloride 1 Gram(s) Oral three times a day  sodium chloride 0.9%. 1000 milliLiter(s) (75 mL/Hr) IV Continuous <Continuous>      Allergies    No Known Allergies    Intolerances        SOCIAL HISTORY:  Denies ETOh,Smoking,     FAMILY HISTORY:  No pertinent family history in first degree relatives        REVIEW OF SYSTEMS:    CONSTITUTIONAL: No weakness, fevers or chills  RESPIRATORY: No cough, wheezing, hemoptysis; No shortness of breath  CARDIOVASCULAR: No chest pain or palpitations  GASTROINTESTINAL: No abdominal or epigastric pain. No nausea, vomiting,     No diarrhea or constipation. No melena                                                                                           8.3    3.15  )-----------( 245      ( 30 Mar 2022 06:28 )             24.8       CBC Full  -  ( 30 Mar 2022 06:28 )  WBC Count : 3.15 K/uL  RBC Count : 2.83 M/uL  Hemoglobin : 8.3 g/dL  Hematocrit : 24.8 %  Platelet Count - Automated : 245 K/uL  Mean Cell Volume : 87.6 fL  Mean Cell Hemoglobin : 29.3 pg  Mean Cell Hemoglobin Concentration : 33.5 gm/dL  Auto Neutrophil # : 2.12 K/uL  Auto Lymphocyte # : 0.45 K/uL  Auto Monocyte # : 0.34 K/uL  Auto Eosinophil # : 0.11 K/uL  Auto Basophil # : 0.02 K/uL  Auto Neutrophil % : 67.3 %  Auto Lymphocyte % : 14.3 %  Auto Monocyte % : 10.8 %  Auto Eosinophil % : 3.5 %  Auto Basophil % : 0.6 %      03-30    136  |  106  |  25<H>  ----------------------------<  99  3.6   |  21<L>  |  1.40<H>    Ca    7.2<L>      30 Mar 2022 06:28  Phos  2.5     03-30  Mg     1.20     03-30    TPro  x   /  Alb  2.2<L>  /  TBili  x   /  DBili  x   /  AST  x   /  ALT  x   /  AlkPhos  x   03-30      CAPILLARY BLOOD GLUCOSE          Vital Signs Last 24 Hrs  T(C): 37.4 (30 Mar 2022 05:41), Max: 37.4 (29 Mar 2022 21:33)  T(F): 99.3 (30 Mar 2022 05:41), Max: 99.4 (29 Mar 2022 21:33)  HR: 73 (30 Mar 2022 05:41) (65 - 73)  BP: 120/80 (30 Mar 2022 05:41) (120/80 - 143/86)  BP(mean): --  RR: 17 (30 Mar 2022 05:41) (17 - 18)  SpO2: 96% (30 Mar 2022 05:41) (96% - 98%)              PHYSICAL EXAM:    Constitutional: NAD  HEENT: conjunctive   clear   Neck:  No JVD  Respiratory: CTAB  Cardiovascular: S1 and S2  Gastrointestinal: BS+, soft,   Extremities: No peripheral edema

## 2022-03-30 NOTE — PROGRESS NOTE ADULT - ASSESSMENT
56 yr old male with a pmh of Thyroid Ca (s/p surgery on synthroid), Gout on Allopurinol, presents to the ED with hematuria for the past 5 days - went to [facility] with negative imaging and was sent home with abx- today developed left flank pain and urinary rentention.   Patient has had fatigue and unintentional weight loss over the past few months. Per ED note "seen by Dr. 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 Spoke with Dr. Muhammad, states that pt had cervical LAD not long ago with negative biopsies. Dr. Muhammad sent pt to hospital to get bone marrow bx   poke with Dr. Felix with IR, states he can get bm bx on 3/18 and to admit pt to Dr. Edwards)"  Denies  headache, dizziness, chest pain, palpitations, SOB, joint pain, diarrhea/constipation  Vitals in the ED: T 101, , /85, RR 23 satting 97% RA (17 Mar 2022 23:17)      ACUTE RENAL FAILURE:  continue with iv fluid , is improving   Serum creatinine is improving  There is no progression . No uremic symptoms  pos  evidence of anemia .  Fluid status stable.  Will continue to avoid nephrotoxic drugs.  Patient remains asymptomatic.   Continue current therapy.    hyponatremia most likely due to siadh s/p tolvaptan   will check ua , urine osmolality , urine sodium , urine uric acid , serum sodium , serum osmolality , serum uric acid , f/u with hyponatremia work up , f/u with bmp , monitor i and o

## 2022-03-30 NOTE — PROVIDER CONTACT NOTE (OTHER) - ASSESSMENT
Bloody urine on cho
Vital signs stable. Hematuria present.
stool was bright red
Bright red urine output.
Patient refused cooling blanket.
Vitals show patient has elevated temp
VS stable
Bladder scan 410.

## 2022-03-30 NOTE — PROVIDER CONTACT NOTE (OTHER) - REASON
HSV2 detected
PT temp over 100 F
Pt complaining of pain at urinary cath site.
Hgb. 6.8, Hct. 20.8
Patient has difficulty urinating and unable to void.
bright red blood in stool
temp 100.7
temp 101.0

## 2022-03-30 NOTE — PROGRESS NOTE ADULT - SUBJECTIVE AND OBJECTIVE BOX
INTERVAL HPI/OVERNIGHT EVENTS:    endorses brown stools yesterday   no c/o abd pain   asking when he can go home    MEDICATIONS  (STANDING):  allopurinol 100 milliGRAM(s) Oral daily  atovaquone  Suspension 750 milliGRAM(s) Oral two times a day  bictegravir 50 mG/emtricitabine 200 mG/tenofovir alafenamide 25 mG (BIKTARVY) 1 Tablet(s) Oral daily  chlorhexidine 2% Cloths 1 Application(s) Topical daily  folic acid 1 milliGRAM(s) Oral daily  influenza   Vaccine 0.5 milliLiter(s) IntraMuscular once  levothyroxine 112 MICROGram(s) Oral daily  mupirocin 2% Ointment 1 Application(s) Both Nostrils two times a day  oxybutynin 5 milliGRAM(s) Oral two times a day  pantoprazole  Injectable 40 milliGRAM(s) IV Push two times a day  penicillin   G benzathine Injectable 2.4 Million Unit(s) IntraMuscular every 7 days  polyethylene glycol 3350 17 Gram(s) Oral daily  senna 2 Tablet(s) Oral at bedtime  sodium bicarbonate 650 milliGRAM(s) Oral three times a day  sodium chloride 1 Gram(s) Oral three times a day  sodium chloride 0.9%. 1000 milliLiter(s) (125 mL/Hr) IV Continuous <Continuous>  tamsulosin 0.4 milliGRAM(s) Oral at bedtime  valACYclovir 1000 milliGRAM(s) Oral two times a day    MEDICATIONS  (PRN):  acetaminophen     Tablet .. 650 milliGRAM(s) Oral every 6 hours PRN Temp greater or equal to 38C (100.4F), Mild Pain (1 - 3)  aluminum hydroxide/magnesium hydroxide/simethicone Suspension 30 milliLiter(s) Oral every 4 hours PRN Dyspepsia  bisacodyl Suppository 10 milliGRAM(s) Rectal once PRN Constipation  guaiFENesin Oral Liquid (Sugar-Free) 100 milliGRAM(s) Oral every 6 hours PRN Cough  hydrocodone/homatropine Syrup 5 milliLiter(s) Oral every 6 hours PRN Cough  melatonin 3 milliGRAM(s) Oral at bedtime PRN Insomnia  ondansetron Injectable 4 milliGRAM(s) IV Push every 8 hours PRN Nausea and/or Vomiting  oxyCODONE    IR 5 milliGRAM(s) Oral every 6 hours PRN moderate pain 4-6, severe pain 7-10  sodium chloride 0.65% Nasal 1 Spray(s) Both Nostrils four times a day PRN Nasal Congestion      Allergies    No Known Allergies    Intolerances        Review of Systems:    General:  No wt loss, fevers, chills, night sweats, fatigue   Eyes:  Good vision, no reported pain  ENT:  No sore throat, pain, runny nose, dysphagia  CV:  No pain, palpitations, hypo/hypertension  Resp:  No dyspnea, cough, tachypnea, wheezing  GI:  No pain, No nausea, No vomiting, No diarrhea, No constipation, No weight loss, No fever, No pruritis, No rectal bleeding, No melena, No dysphagia  :  No pain, bleeding, incontinence, nocturia  Muscle:  No pain, weakness  Neuro:  No weakness, tingling, memory problems  Psych:  No fatigue, insomnia, mood problems, depression  Endocrine:  No polyuria, polydypsia, cold/heat intolerance  Heme:  No petechiae, ecchymosis, easy bruisability  Skin:  No rash, tattoos, scars, edema      Vital Signs Last 24 Hrs  T(C): 37.4 (30 Mar 2022 05:41), Max: 37.4 (29 Mar 2022 21:33)  T(F): 99.3 (30 Mar 2022 05:41), Max: 99.4 (29 Mar 2022 21:33)  HR: 73 (30 Mar 2022 05:41) (65 - 106)  BP: 120/80 (30 Mar 2022 05:41) (120/80 - 143/86)  BP(mean): --  RR: 17 (30 Mar 2022 05:41) (17 - 18)  SpO2: 96% (30 Mar 2022 05:41) (91% - 99%)    PHYSICAL EXAM:    Constitutional: NAD  HEENT: EOMI, throat clear  Neck: No LAD, supple  Respiratory: CTA and P  Cardiovascular: S1 and S2, RRR, no M  Gastrointestinal: BS+, soft, NT/ND, neg HSM,  Extremities: No peripheral edema, neg clubbing, cyanosis  Vascular: 2+ peripheral pulses  Neurological: A/O x 3, no focal deficits  Psychiatric: Normal mood, normal affect  Skin: No rashes      LABS:                        8.3    3.15  )-----------( 245      ( 30 Mar 2022 06:28 )             24.8     03-30    136  |  106  |  25<H>  ----------------------------<  99  3.6   |  21<L>  |  1.40<H>    Ca    7.2<L>      30 Mar 2022 06:28  Phos  2.5     03-30  Mg     1.20     03-30    TPro  x   /  Alb  2.2<L>  /  TBili  x   /  DBili  x   /  AST  x   /  ALT  x   /  AlkPhos  x   03-30          RADIOLOGY & ADDITIONAL TESTS:

## 2022-03-30 NOTE — DISCHARGE NOTE NURSING/CASE MANAGEMENT/SOCIAL WORK - PATIENT PORTAL LINK FT
You can access the FollowMyHealth Patient Portal offered by [facility] by registering at the following website: [email or web address]. By joining [facility]’s FollowMyHealth portal, you will also be able to view your health information using other applications (apps) compatible with our system.

## 2022-03-30 NOTE — PROGRESS NOTE ADULT - ASSESSMENT
_________________________________________________________________________________________  ========>>  M E D I C A L   A T T E N D I N G    F O L L O W  U P  N O T E  <<=========  -----------------------------------------------------------------------------------------------------    - Patient seen and examined by me earlier today.   - In summary,  KATHRYN MALIN is a 56y year old man admitted with hematuria..   - Patient today overall doing fairly, comfortable, eating OK.  and comfortable, wants to go home        pt urinating well post cho, no more bleeding noted/ reported in urine or stool    no more hypoxemia with ambulation post PRBC X2      ==================>> REVIEW OF SYSTEM <<=================    GEN: no fever, no chills, as above   RESP: no SOB at rest, occasional cough, not much sputum   CVS: no chest pain, no palpitations, no edema  GI: no abdominal pain, no nausea, normal stool  :  no hematuria , otherwise ok   Neuro: no headache, no dizziness  Derm : no itching, no rash    ==================>> PHYSICAL EXAM <<=================    GEN: A&O X 3 , NAD , comfortable, pleasant, not anxious   HEENT: NCAT, PERRL, MMM, hearing intact  Neck: supple , no JVD appreciated  CVS: S1S2 , regular , no murmur appreciated   PULM: CTA B/L,  limited exam as not taking deep breaths   ABD.: soft. non tender, non distended,  bowel sounds present  Extrem: intact pulses , no edema   PSYCH : flat affect, somewhat anxious                              ( Note written / Date of service 03-30-22 )    ==================>> MEDICATIONS <<====================    allopurinol 100 milliGRAM(s) Oral daily  atovaquone  Suspension 750 milliGRAM(s) Oral two times a day  bictegravir 50 mG/emtricitabine 200 mG/tenofovir alafenamide 25 mG (BIKTARVY) 1 Tablet(s) Oral daily  chlorhexidine 2% Cloths 1 Application(s) Topical daily  folic acid 1 milliGRAM(s) Oral daily  influenza   Vaccine 0.5 milliLiter(s) IntraMuscular once  levothyroxine 112 MICROGram(s) Oral daily  mupirocin 2% Ointment 1 Application(s) Both Nostrils two times a day  oxybutynin 5 milliGRAM(s) Oral two times a day  pantoprazole  Injectable 40 milliGRAM(s) IV Push two times a day  penicillin   G benzathine Injectable 2.4 Million Unit(s) IntraMuscular every 7 days  polyethylene glycol 3350 17 Gram(s) Oral daily  senna 2 Tablet(s) Oral at bedtime  sodium bicarbonate 650 milliGRAM(s) Oral three times a day  sodium chloride 1 Gram(s) Oral three times a day  sodium chloride 0.9%. 1000 milliLiter(s) IV Continuous <Continuous>  tamsulosin 0.4 milliGRAM(s) Oral at bedtime  valACYclovir 1000 milliGRAM(s) Oral two times a day    MEDICATIONS  (PRN):  acetaminophen     Tablet .. 650 milliGRAM(s) Oral every 6 hours PRN Temp greater or equal to 38C (100.4F), Mild Pain (1 - 3)  aluminum hydroxide/magnesium hydroxide/simethicone Suspension 30 milliLiter(s) Oral every 4 hours PRN Dyspepsia  bisacodyl Suppository 10 milliGRAM(s) Rectal once PRN Constipation  guaiFENesin Oral Liquid (Sugar-Free) 100 milliGRAM(s) Oral every 6 hours PRN Cough  hydrocodone/homatropine Syrup 5 milliLiter(s) Oral every 6 hours PRN Cough  melatonin 3 milliGRAM(s) Oral at bedtime PRN Insomnia  ondansetron Injectable 4 milliGRAM(s) IV Push every 8 hours PRN Nausea and/or Vomiting  oxyCODONE    IR 5 milliGRAM(s) Oral every 6 hours PRN moderate pain 4-6, severe pain 7-10  sodium chloride 0.65% Nasal 1 Spray(s) Both Nostrils four times a day PRN Nasal Congestion    ___________  Active diet:  Diet, Regular  Diet, Regular:   1200mL Fluid Restriction ([id number])  Supplement Feeding Modality:  Oral  Ensure Enlive Cans or Servings Per Day:  1       Frequency:  Daily  ___________________    ==================>> VITAL SIGNS <<==================    Weight (kg): 68.8  Vital Signs Last 24 HrsT(C): 37.4 (03-30-22 @ 05:41)  T(F): 99.3 (03-30-22 @ 05:41), Max: 99.4 (03-29-22 @ 21:33)  HR: 73 (03-30-22 @ 05:41) (65 - 73)  BP: 120/80 (03-30-22 @ 05:41)  RR: 17 (03-30-22 @ 05:41) (17 - 18)  SpO2: 96% (03-30-22 @ 05:41) (91% - 99%)       ==================>> LAB AND IMAGING <<==================                        8.3    3.15  )-----------( 245      ( 30 Mar 2022 06:28 )             24.8        03-30    136  |  106  |  25<H>  ----------------------------<  99  3.6   |  21<L>  |  1.40<H>    Ca    7.2<L>      30 Mar 2022 06:28  Phos  2.5     03-30  Mg     1.20     03-30    TPro  x   /  Alb  2.2<L>  /  TBili  x   /  DBili  x   /  AST  x   /  ALT  x   /  AlkPhos  x   03-30    WBC count:   3.15 <<== ,  3.57 <<== ,  2.72 <<== ,  3.12 <<== ,  5.44 <<== ,  3.18 <<==   Hemoglobin:   8.3 <<==,  7.7 <<==,  6.7 <<==,  7.5 <<==,  7.1 <<==,  7.3 <<==  platelets:  245 <==, 238 <==, 244 <==, 225 <==, 228 <==, 191 <==, 261 <==    Creatinine:  1.40  <<==, 1.43  <<==, 1.70  <<==, 1.82  <<==, 1.95  <<==, 2.10  <<==  Sodium:   136  <==, 138  <==, 141  <==, 137  <==, 134  <==, 135  <==, 135  <==       AST:          21 <== , 24 <== , 24 <== , 38 <== , 40 <==      ALT:        9  <== , 9  <== , 9  <== , 14  <== , 14  <==      AP:        60  <=, 61  <=, 56  <=, 65  <=, 61  <=     Bili:        0.4  <=, 0.4  <=, 0.3  <=, 0.4  <=, 0.4  <=    Herpes Simplex Virus 1/2  VZV PCR Result: HSV2 Detected HSV 1/2  and VZV assay is a Real-Time PCR test for the qualitative  detection and differentiation of herpes simplex virus type 1 (HSV1), 2  (HSV2) and varicella-zoster virus (VZV) DNA in lesion samples. The result  should be interpretedwith consideration of all clinical and laboratory  findings. (03.28.22 @ 21:20)    HIV Result: HIV-1 Pos (03.19.22 @ 12:54) ( confirmatory test )  HIV-1 RNA Quantitative, Viral Load: [id number] (03.20.22 @ 11:59)  ABS CD4: 18 /uL (03.22.22 @ 09:13)    other available results noted including EBV, ANCA...     Hematopathology Report:   ACCESSION No:  [id number]  Patient:   KATHRYN MALIN  Accession:                             [id number]  Collected Date/Time:                   3/18/2022 17:30 EDT  Received Date/Time:                    3/19/2022 11:22 EDT  Hematopathology Report - Auth (Verified)  Specimen(s) Submitted  1- Right iliac bone  2- Bone marrow aspirate    Final Diagnosis  1, 2, 3. Bone marrow biopsy, bone marrow clot, and bone marrow aspirate  -Normocellular bone marrow with trilineage hematopoiesis with  maturation and a proportion of smaller megakaryocytes.  - Iron stores are present.  See note and description.    Diagnostic note:  Per chart review, patient has a recent history of pancytopenia.  The current bone marrow biopsy shows nodefinitive evidence of a  lymphoproliferative disorder. Flow cytometry shows a reversed CD4 to CD8  ratio of the T-cells.    Correlation with patient's recent clinical and laboratory findings and  ancillary testing (including cytogenetics/karyotype and additional  surgical pathology reports (19-Y-, 72-ZC-) are recommended.  Comprehensive report with results of pending ancillary studies to follow.    Dr. Muhammad was notified of the diagnosis on 03/23/2022.    Ancillary studies  Special stains:   Bone marrow aspirate iron stain:   No spicules are  present  to evaluate for iron stores and there are insufficient nRBC to evaluate  for ring sideroblasts.    Flow cytometry:  The myeloid immunophenotypic findings show normal myeloid  granularity, mild monocytosis with normal immunophenotype, no increase  in myeloid immaturity, and normal myeloid antigen maturation pattern.  The  lymphoid immunophenotypic   findings show T-cells with reversed CD4 to CD8  ratio and polytypic B-cells with kappa predominance   _  Immunohistochemical stains:    (block 1A: CD34, , myeloperoxidase,  CD71, E-cadherin, factor VIII, CD15, CD61, CD3 and CD20) show no  significant increase in CD34 positive blasts (less than 3%), with normal  proportionof myeloids (positive with myeloperoxidase and CD15) to  erythroid elements (positive with CD71) including slightly increased  pronormoblasts (positive with E-cadherin, ), and  megakaryocytes  are overall, normal in number with a proportion thatare smaller in size  (positive with factor VIII, CD61). CD3 shows normal proportion of T-cells  with rare B-cells (positive with CD20).  In-situ hybridization   (kappa, lambda) is pending and will be reported in  an addendum.  Cytogenetics/karyotype:    pending  Comment  Iron stain (examined toevaluate for iron stores; see microscopic  description) and Giemsa stain (shows appropriate staining pattern) are  performed and evaluated on block(s): 1A, 2A.  Verified by: Janelle Gruber MD  (Electronic Signature)  Reported on: 03/23/22 10:10 EDT, [facility], [address] [place] [address]. [place] [address]  Phone: [phone]   Fax: [phone]  _________________________________________________________________      < from: CT Abdomen and Pelvis No Cont (03.20.22 @ 15:11) >  IMPRESSION:  New groundglass opacities are seen within the left upper lobe, right   middle lobe and left lower lobe likely representing infection.    Mild left hydronephrosis without evidence of obstructing calculi.   Non obstructing calculi are seen bilaterally within the kidneys.    Heterogeneous hyperdense material within the bladder and left renal   collecting system, which may represent hemorrhagic products.  < end of copied text >    ___________________________________________________________________________________  ===============>>  A S S E S S M E N T   A N D   P L A N <<===============  ------------------------------------------------------------------------------------------    · Assessment	  56 yr old male presenting with urinary retention found to have sepsis 2/2 pyelonephritis     Problem/Plan - 1:  ·  Problem: sepsis, in setting of acute HIV / AIDS with suspect opportunistic infections / PCP / EBV / syphilis / CMV / herpes 2 ...      concern for hemorrhagic cystitis / pyelonephritis /infected / impacted stone    workup as noted   off Abx for now : monitoring   all specialty follow up  appreciated and discussed   started on HAART /  Mepron for PCP prophylaxis , valcyclovir ... >> to chaeck for insurance coverage for DC planing     started on treatment for syphilis as well     Problem/Plan - 2:  ·  Problem: pt with past + GIANNA, + jammie + ANCA      rule out autoimmune disorders ./ Trever's / good pastures.. etc..      rheumatology not seeing pt as noted / signed off!!!  >> should likely follow up as OP   at this time no need for kidney bx per discussion >> for now crea improving ...  monitor as OP   most likely all related to HIV / AIDS   Bone marrow Bx as above with no significant abnormalities     Problem/Plan - 3:  ·  Problem: Anemia. multifactorial   BM biopsy > follow final results   Hematology on board , appreciated   montior  transfuse further as needed as OP  ( d/w pt and son to follow up closely with hem )   occult blood  + and + hematochezia before > stopped    GI appreciated      eventual scope as OP ( pt preference)     hematuria resolved for now >> needs follow up with  re stent removal..     Problem/Plan - 4:  ·  Problem: urinary retention and obstructive Uropathy with JULIO   monitor BMP, I/O     crea improved with IVH > continue    following  pt urinating well, monitor    **Hyponatremia     repeat labs ordered     pt with flank pain > likely SIADH      nephrology on board, following        Tolvaptan as needed     Problem/Plan - 5:  ·  Problem: Hypothyroidism post total thyroidectomy for cancer   Continue home levothyroxine.  Thyroid Stimulating Hormone, Serum: 4.06 uIU/mL (03.17.22 @ 18:46)    OOB, ambulate as able   nutrition     DC planing home   reiterated to pt and son at bedside importance of close follow up with specialists / PMD and med / treatment compliance.. all questions answered     --------------------------------------------  Case discussed with pt, son, NP..  Education given on findings and plan of care    ___________________________  H. TAZ Edwards.  Pager: [phone]

## 2022-03-30 NOTE — PROVIDER CONTACT NOTE (OTHER) - BACKGROUND
Sepsis
thyroid cancer, HIV pos, latent syphillis
pt hgb 6.6
PT admitted for pyelonephritis
Chronic obstructive pyelonephritis

## 2022-03-30 NOTE — PROGRESS NOTE ADULT - NUTRITIONAL ASSESSMENT
MEDICATIONS  (STANDING):  allopurinol 300 milliGRAM(s) Oral daily  atovaquone  Suspension 750 milliGRAM(s) Oral two times a day  azithromycin  IVPB      azithromycin  IVPB 500 milliGRAM(s) IV Intermittent every 24 hours  chlorhexidine 2% Cloths 1 Application(s) Topical daily  folic acid 1 milliGRAM(s) Oral daily  influenza   Vaccine 0.5 milliLiter(s) IntraMuscular once  levothyroxine 112 MICROGram(s) Oral daily  mupirocin 2% Ointment 1 Application(s) Both Nostrils two times a day  senna 2 Tablet(s) Oral at bedtime  sodium chloride 1 Gram(s) Oral three times a day  sodium chloride 0.9%. 1000 milliLiter(s) (75 mL/Hr) IV Continuous <Continuous>

## 2022-03-30 NOTE — PROVIDER CONTACT NOTE (OTHER) - DATE AND TIME:
20-Mar-2022 02:49
30-Mar-2022 10:38
20-Mar-2022 06:10
20-Mar-2022 19:02
21-Mar-2022 04:28
21-Mar-2022 07:00
18-Mar-2022 21:45
22-Mar-2022 10:05

## 2022-03-30 NOTE — PROGRESS NOTE ADULT - ASSESSMENT
56 m with Papillary thyroid cancer s/p total thyroidectomy on synthroid 1/27/22, Gout on Allopurinol, presents to the ED with hematuria for the past 5 days  recent FTT to weight loss  Prior chest Ct with ground glass opacities  Now with anemia/ leukopenia/JULIO   Febrile    1) Newly Diagnosed HIV  Viralload over 6 million  CD4 of 18 (3%)     Genotype sent  Check baseline serologies    Continue biktarvy    Continue Mepron- has elevated fungitell- ? due to PCP in pt with ground glass opacities    Can hold JUDI prophylaxis at this time.    2) + RPR- no known history of prior diagnosis/tx syphilis  Treat for latent syphillis- pcn im weekly times 3- first dose on 3/25    3) Anemia/ leukopenia   HIV can cause some degree of marrow suppression  But I would not expect HIV to cause anemia to the extent that transfusion is needed  Hematuria has resolved    ? Loss from GI tract vs   GI and urology following  Hematology following     4) HSV-2  10 day coruse of valtrex      Follow up at [address]  [address]tr[id number]  [place], [place] [id number] [phone]    Appt on 4/4 at 1: 30 PM

## 2022-03-31 LAB
CMV DNA # UR NAA+PROBE: SIGNIFICANT CHANGE UP IU/ML
CMV DNA CSF QL NAA+PROBE: SIGNIFICANT CHANGE UP
HLA-B*57:01 SPEC QL: NEGATIVE — SIGNIFICANT CHANGE UP
SURGICAL PATHOLOGY STUDY: SIGNIFICANT CHANGE UP

## 2022-04-01 ENCOUNTER — NON-APPOINTMENT (OUTPATIENT)
Age: 57
End: 2022-04-01

## 2022-04-03 ENCOUNTER — FORM ENCOUNTER (OUTPATIENT)
Age: 57
End: 2022-04-03

## 2022-04-04 ENCOUNTER — APPOINTMENT (OUTPATIENT)
Dept: INFECTIOUS DISEASE | Facility: CLINIC | Age: 57
End: 2022-04-04
Payer: COMMERCIAL

## 2022-04-04 ENCOUNTER — LABORATORY RESULT (OUTPATIENT)
Age: 57
End: 2022-04-04

## 2022-04-04 VITALS
HEIGHT: 70 IN | DIASTOLIC BLOOD PRESSURE: 87 MMHG | RESPIRATION RATE: 16 BRPM | TEMPERATURE: 99.3 F | BODY MASS INDEX: 22.62 KG/M2 | SYSTOLIC BLOOD PRESSURE: 142 MMHG | OXYGEN SATURATION: 97 % | WEIGHT: 158 LBS | HEART RATE: 93 BPM

## 2022-04-04 DIAGNOSIS — R19.5 OTHER FECAL ABNORMALITIES: ICD-10-CM

## 2022-04-04 DIAGNOSIS — Z83.79 FAMILY HISTORY OF OTHER DISEASES OF THE DIGESTIVE SYSTEM: ICD-10-CM

## 2022-04-04 DIAGNOSIS — Z78.9 OTHER SPECIFIED HEALTH STATUS: ICD-10-CM

## 2022-04-04 PROCEDURE — 96372 THER/PROPH/DIAG INJ SC/IM: CPT

## 2022-04-04 PROCEDURE — 99214 OFFICE O/P EST MOD 30 MIN: CPT | Mod: 25

## 2022-04-04 RX ORDER — PENICILLIN G BENZATHINE 2400000 [IU]/4ML
2400000 INJECTION, SUSPENSION INTRAMUSCULAR
Qty: 0 | Refills: 0 | Status: COMPLETED | OUTPATIENT
Start: 2022-04-04

## 2022-04-04 RX ORDER — ALLOPURINOL 300 MG/1
300 TABLET ORAL
Refills: 0 | Status: DISCONTINUED | COMMUNITY
End: 2022-04-04

## 2022-04-04 RX ORDER — OXYCODONE 5 MG/1
5 TABLET ORAL
Qty: 20 | Refills: 0 | Status: DISCONTINUED | COMMUNITY
Start: 2022-03-30

## 2022-04-04 RX ADMIN — PENICILLIN G BENZATHINE 2.4 UNIT/4ML: 1200000 INJECTION, SUSPENSION INTRAMUSCULAR at 00:00

## 2022-04-06 LAB
ALBUMIN SERPL ELPH-MCNC: 3.1 G/DL
ALP BLD-CCNC: 131 U/L
ALT SERPL-CCNC: 12 U/L
ANION GAP SERPL CALC-SCNC: 9 MMOL/L
AST SERPL-CCNC: 25 U/L
BASOPHILS # BLD AUTO: 0.12 K/UL
BASOPHILS NFR BLD AUTO: 1.8 %
BILIRUB SERPL-MCNC: 0.4 MG/DL
BUN SERPL-MCNC: 31 MG/DL
CALCIUM SERPL-MCNC: 8.4 MG/DL
CD3 CELLS # BLD: 760 /UL
CD3 CELLS NFR BLD: 81 %
CD3+CD4+ CELLS # BLD: 210 /UL
CD3+CD4+ CELLS NFR BLD: 22 %
CD3+CD4+ CELLS/CD3+CD8+ CLL SPEC: 0.39 RATIO
CD3+CD8+ CELLS # SPEC: 543 /UL
CD3+CD8+ CELLS NFR BLD: 58 %
CHLORIDE SERPL-SCNC: 98 MMOL/L
CO2 SERPL-SCNC: 25 MMOL/L
CREAT SERPL-MCNC: 1.48 MG/DL
CULTURE RESULTS: SIGNIFICANT CHANGE UP
EGFR: 54 ML/MIN/1.73M2
EOSINOPHIL # BLD AUTO: 0.24 K/UL
EOSINOPHIL NFR BLD AUTO: 3.6 %
GLUCOSE SERPL-MCNC: 96 MG/DL
HCT VFR BLD CALC: 29.9 %
HEPATITIS A IGG ANTIBODY: REACTIVE
HGB BLD-MCNC: 9.4 G/DL
HIV1 RNA # SERPL NAA+PROBE: ABNORMAL
HIV1 RNA # SERPL NAA+PROBE: NORMAL
LYMPHOCYTES # BLD AUTO: 0.31 K/UL
LYMPHOCYTES NFR BLD AUTO: 4.5 %
M TB IFN-G BLD-IMP: NEGATIVE
MAN DIFF?: NORMAL
MCHC RBC-ENTMCNC: 29.9 PG
MCHC RBC-ENTMCNC: 31.4 GM/DL
MCV RBC AUTO: 95.2 FL
MONOCYTES # BLD AUTO: 0.36 K/UL
MONOCYTES NFR BLD AUTO: 5.3 %
NEUTROPHILS # BLD AUTO: 5.75 K/UL
NEUTROPHILS NFR BLD AUTO: 84.8 %
PLATELET # BLD AUTO: 347 K/UL
POTASSIUM SERPL-SCNC: 4.4 MMOL/L
PROT SERPL-MCNC: 7.6 G/DL
QUANTIFERON TB PLUS MITOGEN MINUS NIL: 0.53 IU/ML
QUANTIFERON TB PLUS NIL: 0.02 IU/ML
QUANTIFERON TB PLUS TB1 MINUS NIL: 0 IU/ML
QUANTIFERON TB PLUS TB2 MINUS NIL: -0.01 IU/ML
RBC # BLD: 3.14 M/UL
RBC # FLD: 17.3 %
SODIUM SERPL-SCNC: 132 MMOL/L
SPECIMEN SOURCE: SIGNIFICANT CHANGE UP
VIRAL LOAD INTERP: NORMAL
VIRAL LOAD LOG: 3.71
WBC # FLD AUTO: 6.78 K/UL

## 2022-04-11 ENCOUNTER — APPOINTMENT (OUTPATIENT)
Dept: INFECTIOUS DISEASE | Facility: CLINIC | Age: 57
End: 2022-04-11
Payer: COMMERCIAL

## 2022-04-11 PROCEDURE — 96372 THER/PROPH/DIAG INJ SC/IM: CPT

## 2022-04-11 RX ORDER — PENICILLIN G BENZATHINE 2400000 [IU]/4ML
2400000 INJECTION, SUSPENSION INTRAMUSCULAR
Qty: 0 | Refills: 0 | Status: COMPLETED | OUTPATIENT
Start: 2022-04-11

## 2022-04-11 RX ADMIN — PENICILLIN G BENZATHINE 2.4 UNIT/4ML: 2400000 INJECTION, SUSPENSION INTRAMUSCULAR at 00:00

## 2022-04-13 ENCOUNTER — NON-APPOINTMENT (OUTPATIENT)
Age: 57
End: 2022-04-13

## 2022-04-13 LAB
CMV DNA SPEC QL NAA+PROBE: 423 IU/ML
FUNGITELL QUANTITATIVE VALUE: >500 PG/ML

## 2022-04-20 ENCOUNTER — APPOINTMENT (OUTPATIENT)
Dept: ULTRASOUND IMAGING | Facility: CLINIC | Age: 57
End: 2022-04-20
Payer: COMMERCIAL

## 2022-04-20 PROCEDURE — 76775 US EXAM ABDO BACK WALL LIM: CPT

## 2022-04-25 ENCOUNTER — APPOINTMENT (OUTPATIENT)
Dept: UROLOGY | Facility: CLINIC | Age: 57
End: 2022-04-25

## 2022-04-26 ENCOUNTER — APPOINTMENT (OUTPATIENT)
Dept: UROLOGY | Facility: CLINIC | Age: 57
End: 2022-04-26

## 2022-04-26 ENCOUNTER — APPOINTMENT (OUTPATIENT)
Dept: UROLOGY | Facility: CLINIC | Age: 57
End: 2022-04-26
Payer: COMMERCIAL

## 2022-04-26 VITALS
HEIGHT: 70 IN | BODY MASS INDEX: 22.62 KG/M2 | SYSTOLIC BLOOD PRESSURE: 133 MMHG | DIASTOLIC BLOOD PRESSURE: 77 MMHG | TEMPERATURE: 97.9 F | OXYGEN SATURATION: 98 % | HEART RATE: 102 BPM | RESPIRATION RATE: 14 BRPM | WEIGHT: 158 LBS

## 2022-04-26 PROCEDURE — 52310 CYSTOSCOPY AND TREATMENT: CPT

## 2022-04-29 LAB — BACTERIA UR CULT: ABNORMAL

## 2022-05-01 ENCOUNTER — FORM ENCOUNTER (OUTPATIENT)
Age: 57
End: 2022-05-01

## 2022-05-02 ENCOUNTER — LABORATORY RESULT (OUTPATIENT)
Age: 57
End: 2022-05-02

## 2022-05-02 ENCOUNTER — NON-APPOINTMENT (OUTPATIENT)
Age: 57
End: 2022-05-02

## 2022-05-02 ENCOUNTER — APPOINTMENT (OUTPATIENT)
Dept: INFECTIOUS DISEASE | Facility: CLINIC | Age: 57
End: 2022-05-02
Payer: COMMERCIAL

## 2022-05-02 VITALS
HEIGHT: 70 IN | OXYGEN SATURATION: 99 % | DIASTOLIC BLOOD PRESSURE: 77 MMHG | WEIGHT: 151 LBS | TEMPERATURE: 99.1 F | HEART RATE: 108 BPM | RESPIRATION RATE: 14 BRPM | SYSTOLIC BLOOD PRESSURE: 137 MMHG | BODY MASS INDEX: 21.62 KG/M2

## 2022-05-02 PROCEDURE — 99214 OFFICE O/P EST MOD 30 MIN: CPT

## 2022-05-02 RX ORDER — LEVOTHYROXINE SODIUM 0.1 MG/1
100 TABLET ORAL
Refills: 0 | Status: DISCONTINUED | COMMUNITY
End: 2022-05-02

## 2022-05-02 RX ORDER — FLUCONAZOLE 100 MG/1
100 TABLET ORAL DAILY
Qty: 3 | Refills: 0 | Status: DISCONTINUED | COMMUNITY
Start: 2022-04-29 | End: 2022-05-02

## 2022-05-02 RX ORDER — LEVOTHYROXINE SODIUM 0.12 MG/1
125 TABLET ORAL DAILY
Qty: 30 | Refills: 0 | Status: DISCONTINUED | COMMUNITY
Start: 2022-03-02 | End: 2022-05-02

## 2022-05-02 RX ORDER — VALACYCLOVIR 1 G/1
1 TABLET, FILM COATED ORAL
Qty: 20 | Refills: 0 | Status: DISCONTINUED | COMMUNITY
Start: 2022-03-30 | End: 2022-05-02

## 2022-05-02 RX ORDER — LEVOTHYROXINE SODIUM 0.11 MG/1
112 TABLET ORAL
Refills: 0 | Status: ACTIVE | COMMUNITY
Start: 2022-05-02

## 2022-05-02 RX ORDER — SULFAMETHOXAZOLE AND TRIMETHOPRIM 800; 160 MG/1; MG/1
800-160 TABLET ORAL
Qty: 10 | Refills: 0 | Status: DISCONTINUED | COMMUNITY
Start: 2022-04-26 | End: 2022-05-02

## 2022-05-02 RX ORDER — ATOVAQUONE 750 MG/5ML
750 SUSPENSION ORAL DAILY
Qty: 1 | Refills: 5 | Status: DISCONTINUED | COMMUNITY
Start: 2022-04-11 | End: 2022-05-02

## 2022-05-02 NOTE — HISTORY OF PRESENT ILLNESS
[FreeTextEntry1] : 57 year old with HIV/AIDS and hematuria.\par \par He is  tolerating biktarvy- denies missed doses or side effects. \par He states atovaquone causes dry mouth.\par \par He had his ureteral stent removed. \par \par \par

## 2022-05-02 NOTE — ASSESSMENT
[FreeTextEntry1] : 56 year old with previous thyroid cancer s/p surgery recently admitted with weight loss and heamturia\par Diagnosed with HIV/AIDS\par \par 1) HIV: initial viral load of 6 million\par CD4 of 18\par Had genotype done\par Toxo IgG negative, CMV IgG positive, Hep B NR\par HLA  negative\par Repeat VL at 5 K\par \par Continue biktarvy\par Change mepron to Bactrim\par \par \par 2) Syphillis- late latent\par s/p 3 pcn injections\par \par 3) HSV-2\par s/p valtrex for 10 days\par \par 4) Low level cmv viremia\par ? due to stress/ immunosuppression\par Repeat today\par \par 4) Anemia\par Will repeat today\par guaiac positive in hospital \par GI Eval\par \par 5) Hematuria\par S/p cystoscopy\par Urology follow up\par \par 6) HCM\par Will need Hep B vaccine\par Will need prevnar, pneumovax, meanctra\par Will need anal pap\par Will repeat quantiferon. \par \par RTC 3 months

## 2022-05-02 NOTE — HISTORY OF PRESENT ILLNESS
[FreeTextEntry1] : Reason For Visit\par KATHRYN MALIN is a 56 year old male being seen for a follow-up visit. \par  \par History of Present Illness\par 57 year old with HIV/AIDS and hematuria.\par \par He is tolerating biktarvy- denies missed doses or side effects. \par He states atovaquone causes dry mouth.\par \par He had his ureteral stent removed. \par \par \par \par  \par Active Problems\par Epigastric hernia (553.29) (K43.9)\par Gross hematuria (599.71) (R31.0)\par Guaiac + stool (792.1) (R19.5)\par HSV infection (054.9) (B00.9)\par Lymphadenopathy (785.6) (R59.1)\par Sigmoid diverticulitis (562.11) (K57.32)\par Symptomatic HIV infection (042) (B20)\par Syphilis (097.9) (A53.9)\par Thyroid cancer (193) (C73)\par Umbilical hernia (553.1) (K42.9)\par \par Current Meds\par Allopurinol 100 MG Oral Tablet\par Biktarvy -25 MG Oral Tablet; TAKE ONE TABLET BY MOUTH DAILY\par Folic Acid 1 MG Oral Tablet\par Levothyroxine Sodium 112 MCG Oral Tablet\par Oxybutynin Chloride 5 MG Oral Tablet\par Pantoprazole Sodium 40 MG Oral Tablet Delayed Release\par Sulfamethoxazole-Trimethoprim 800-160 MG Oral Tablet; TAKE 1 TABLET DAILY ON\par MONDAY, WEDNESDAY, AND FRIDAY\par \par Allergies\par No Known Drug Allergies\par \par Review of Systems\par \par Constitutional, Eyes, ENT, Cardiovascular, Respiratory, Gastrointestinal, Genitourinary, Musculoskeletal, Integumentary, Neurological, Psychiatric and Heme/Lymph are otherwise negative. \par  \par Vitals\par Vital Signs \par 	Recorded: 04Apr2022 02:02PM\par Systolic	142, LUE, Sitting\par Diastolic	87, LUE, Sitting\par Pain Scale	0\par Height	5 ft 10 in\par Weight	158 lb \par BMI Calculated	22.67 kg/m2\par BSA Calculated	1.89\par Temperature	99.3 F, Oral\par Heart Rate	93\par Respiration	16\par O2 Saturation	97\par \par Physical Exam\par Constitutional: in no acute distress. \par Eyes: the sclera and conjunctiva were normal. \par ENT: the ears and nose were normal in appearance. \par Neck: the appearance of the neck was normal and no neck mass was observed. \par Heart: heart rate was normal and rhythm regular and normal S1 and S2. \par Abdomen: normal bowel sounds and non-tender. \par Lymphatics: no palpable adenopathy. \par Musculoskeletal: no joint swelling. \par Skin: normal skin color and pigmentation and no rash. \par Psychiatric: oriented to person, place, and time and the affect was normal. \par  \par Assessment\par Symptomatic HIV infection (042) (B20)\par HSV infection (054.9) (B00.9)\par Syphilis (097.9) (A53.9)\par History of Umbilical Hernia Repair\par History of Thyroid Surgery Total Thyroidectomy\par Family history of colitis (V18.59) (Z83.79) : Father\par \par No tobacco smoke exposure (V49.89) (Z78.9)\par Guaiac + stool (792.1) (R19.5)\par \par 56 year old with previous thyroid cancer s/p surgery recently admitted with weight loss and heamturia\par Diagnosed with HIV/AIDS\par \par 1) HIV: initial viral load of 6 million\par CD4 of 18\par Had genotype done\par Toxo IgG negative, CMV IgG positive, Hep B NR\par HLA  negative\par Repeat VL at 5 K\par \par Continue biktarvy\par Change mepron to Bactrim\par \par \par 2) Syphillis- late latent\par s/p 3 pcn injections\par \par 3) HSV-2\par s/p valtrex for 10 days\par \par 4) Low level cmv viremia\par ? due to stress/ immunosuppression\par Repeat today\par \par 4) Anemia\par Will repeat today\par guaiac positive in hospital \par GI Eval\par \par 5) Hematuria\par S/p cystoscopy\par Urology follow up\par \par 6) HCM\par Will need Hep B vaccine\par Will need prevnar, pneumovax, meanctra\par Will need anal pap\par Will repeat quantiferon. \par \par RTC 3 months.

## 2022-05-02 NOTE — PHYSICAL EXAM
[General Appearance - In No Acute Distress] : in no acute distress [Sclera] : the sclera and conjunctiva were normal [Outer Ear] : the ears and nose were normal in appearance [Neck Appearance] : the appearance of the neck was normal [Neck Cervical Mass (___cm)] : no neck mass was observed [Heart Rate And Rhythm] : heart rate was normal and rhythm regular [Heart Sounds] : normal S1 and S2 [Bowel Sounds] : normal bowel sounds [Abdomen Tenderness] : non-tender [No Palpable Adenopathy] : no palpable adenopathy [Musculoskeletal - Swelling] : no joint swelling [Skin Color & Pigmentation] : normal skin color and pigmentation [] : no rash [Oriented To Time, Place, And Person] : oriented to person, place, and time [Affect] : the affect was normal

## 2022-05-02 NOTE — ASSESSMENT
[FreeTextEntry1] : 56 year old with previous thyroid cancer s/p surgery recently admitted with weight loss and heamturia\par Diagnosed with HIV/AIDS\par \par 1) HIV: initial viral load of 6 million\par CD4 of 18\par Had genotype done\par Toxo IgG negative, CMV IgG positive, Hep B NR\par HLA  negative\par Repeat VL at 5 K\par \par Continue biktarvy\par Change mepron to Bactrim\par \par \par 2) Syphillis- late latent\par s/p 3 pcn injections\par \par 3) HSV-2\par s/p valtrex for 10 days\par \par 4) Low level cmv viremia\par ? due to stress/ immunosuppression\par Repeat today\par \par 4) Anemia\par Will repeat today\par guaiac positive in hospital \par GI Eval\par \par 5) Hematuria\par S/p cystoscopy\par Urology follow up\par \par 6) HCM\par Will need Hep B vaccine\par Will need prevnar, pneumovax, meanctra\par Will need anal pap\par Will repeat quantiferon. \par \par RTC 3 months.

## 2022-05-03 ENCOUNTER — APPOINTMENT (OUTPATIENT)
Dept: UROLOGY | Facility: CLINIC | Age: 57
End: 2022-05-03
Payer: COMMERCIAL

## 2022-05-03 VITALS — DIASTOLIC BLOOD PRESSURE: 76 MMHG | SYSTOLIC BLOOD PRESSURE: 117 MMHG

## 2022-05-03 PROCEDURE — 99213 OFFICE O/P EST LOW 20 MIN: CPT

## 2022-05-03 NOTE — PHYSICAL EXAM
[Normal Appearance] : normal appearance [Abdomen Tenderness] : non-tender [Urethral Meatus] : meatus normal [Penis Abnormality] : normal uncircumcised penis [Testes Tenderness] : no tenderness of the testes [Testes Mass (___cm)] : there were no testicular masses [] : no rash [Edema] : no peripheral edema [Exaggerated Use Of Accessory Muscles For Inspiration] : no accessory muscle use [FreeTextEntry1] : tender CRISTI, non boggy, non indurated

## 2022-05-03 NOTE — ASSESSMENT
[FreeTextEntry1] : Mr Betzy is a 57 y.o. M with recent dx of Aguila and HIV who is s/p TURBT and left URS (for hematuria) s/p stent removal, complicated by presumed prostatitis. \par - UA, UCx\par - Continue tamsulosin\par - Initiate ciprofloxacin\par - Can d/c bactrim BID dosing. Was placed on MWF bactrim by ID physician for PCP prophylaxis which he should continue\par - RV 4 weeks\par - Will need PSA screening once infection has resolved\par

## 2022-05-03 NOTE — HISTORY OF PRESENT ILLNESS
[FreeTextEntry1] : KATHRYN MALIN is a 57 year M who presents today for follow-up.\par \par To review, he is a 56y male with recent dx of Aguila and HIV who was admitted with gross hematuria. He was taken to the OR for cystoscopy, bladder biopsy, and diagnostic LEFT URS with stent placement. Stent was removed 4/26/2022. At the time of his cystoscopy, his urine was cloudy and he was started on bactrim. His urine culture was positive for yeast and he was started on diflucan. He re-presents today.\par \par He reports starting several days before the stent removal, he had perineal pain, as well as dysuria.  He reports it improved initially following stent removal, but has persisted. He has remained on antibiotics.  His symptoms have gotten somewhat better.  Denies fever, chills, nausea, emesis.  Believes he is emptying his bladder. He remains on tamsulosin. \par \par Denies gross hematuria, flank pain, fevers, chills, nausea, vomiting.

## 2022-05-05 LAB — BACTERIA UR CULT: NORMAL

## 2022-05-08 ENCOUNTER — NON-APPOINTMENT (OUTPATIENT)
Age: 57
End: 2022-05-08

## 2022-05-09 ENCOUNTER — NON-APPOINTMENT (OUTPATIENT)
Age: 57
End: 2022-05-09

## 2022-05-10 ENCOUNTER — APPOINTMENT (OUTPATIENT)
Dept: UROLOGY | Facility: CLINIC | Age: 57
End: 2022-05-10
Payer: COMMERCIAL

## 2022-05-10 ENCOUNTER — NON-APPOINTMENT (OUTPATIENT)
Age: 57
End: 2022-05-10

## 2022-05-10 PROCEDURE — 99213 OFFICE O/P EST LOW 20 MIN: CPT

## 2022-05-10 NOTE — REASON FOR VISIT
Naz Gonzalez is a 32 y o  Kyra Anand 35w0d who presents for routine PNV  28 week labs reviewed: WNL  TWG 24 5 kg (54 lb) discussed making good food choices  Adequate nutrition and hydration  Denies OB complaints  Good fetal movement  Denies cramping, leakage of fluid or vaginal bleeding  + BH  Tdap given today  Reviewed  labor precautions and FKCs  Advised to start Perineal massages     Pregnancy Essential guide and Baby and Me web site recommended [Follow-up Visit ___] : a follow-up visit  for [unfilled]

## 2022-05-10 NOTE — ASSESSMENT
[FreeTextEntry1] : Mr Betzy is a 57 y.o. M with recent dx of Aguila and HIV who is s/p TURBT and left URS (for hematuria) s/p stent removal, complicated by presumed prostatitis / pelvic pain / hematuria. \par - UCx\par - Continue tamsulosin\par - Continue ciprofloxacin\par - Initiate finasteride given possible prostatic bleeding\par - No flank pain, negative renal US 4/20/2022\par - RV 1 week to re-assess symptoms

## 2022-05-10 NOTE — HISTORY OF PRESENT ILLNESS
[FreeTextEntry1] : KATHRYN MALIN is a 57 year M who presents today for follow-up.\par \par To review, he is a 56 y.o. male with recent dx of Aguila and HIV who was admitted with gross hematuria. He was taken to the OR 3/26/2022 for cystoscopy, bladder biopsy, and diagnostic LEFT URS with stent placement.  During the surgery, he had blood emanating from the left ureteral orifice.  The left collecting system was friable, but there was no discrete lesion.  Cytology was negative.  Stent was removed 4/26/2022. At the time of his cystoscopy, his urine was cloudy and he was started on bactrim. His urine culture was positive for yeast and he was started on diflucan. He reports starting several days before the stent removal, he had perineal pain, as well as dysuria.  He reports it improved initially following stent removal, but has persisted. He is on flomax. He was started on cipro at last visit but his pain is persisted.  He also started develop gross hematuria which is intermittent, and passage of stringy clots.  Denies flank pain, fever, chills, nausea, emesis.  He has bowel movements every 3 to 4 days, and is constipated.\par

## 2022-05-10 NOTE — PHYSICAL EXAM
[Normal Appearance] : normal appearance [Abdomen Tenderness] : non-tender [Urethral Meatus] : meatus normal [Penis Abnormality] : normal uncircumcised penis [Testes Mass (___cm)] : there were no testicular masses [Testes Tenderness] : no tenderness of the testes [] : no rash [Edema] : no peripheral edema [Exaggerated Use Of Accessory Muscles For Inspiration] : no accessory muscle use [Costovertebral Angle Tenderness] : no ~M costovertebral angle tenderness [FreeTextEntry1] : tender CRISTI, non boggy, non indurated

## 2022-05-11 LAB
ALBUMIN SERPL ELPH-MCNC: 4 G/DL
ALP BLD-CCNC: 147 U/L
ALT SERPL-CCNC: 12 U/L
ANION GAP SERPL CALC-SCNC: 14 MMOL/L
AST SERPL-CCNC: 16 U/L
BASOPHILS # BLD AUTO: 0.05 K/UL
BASOPHILS NFR BLD AUTO: 0.7 %
BILIRUB SERPL-MCNC: 0.2 MG/DL
BUN SERPL-MCNC: 62 MG/DL
CALCIUM SERPL-MCNC: 9.8 MG/DL
CD3 CELLS # BLD: 899 /UL
CD3 CELLS NFR BLD: 83 %
CD3+CD4+ CELLS # BLD: 175 /UL
CD3+CD4+ CELLS NFR BLD: 16 %
CD3+CD4+ CELLS/CD3+CD8+ CLL SPEC: 0.23 RATIO
CD3+CD8+ CELLS # SPEC: 752 /UL
CD3+CD8+ CELLS NFR BLD: 69 %
CHLORIDE SERPL-SCNC: 102 MMOL/L
CMV DNA SPEC QL NAA+PROBE: 349 IU/ML
CMVPCR LOG: 2.54 LOG10IU/ML
CO2 SERPL-SCNC: 15 MMOL/L
CREAT SERPL-MCNC: 2.74 MG/DL
EGFR: 26 ML/MIN/1.73M2
EOSINOPHIL # BLD AUTO: 0.12 K/UL
EOSINOPHIL NFR BLD AUTO: 1.8 %
FUNGITELL QUANTITATIVE VALUE: >500 PG/ML
GLUCOSE SERPL-MCNC: 100 MG/DL
HCT VFR BLD CALC: 35.2 %
HGB BLD-MCNC: 11.4 G/DL
HIV1 RNA # SERPL NAA+PROBE: 512
HIV1 RNA # SERPL NAA+PROBE: ABNORMAL
IMM GRANULOCYTES NFR BLD AUTO: 0.7 %
LYMPHOCYTES # BLD AUTO: 1.05 K/UL
LYMPHOCYTES NFR BLD AUTO: 15.4 %
M TB IFN-G BLD-IMP: NEGATIVE
MAN DIFF?: NORMAL
MCHC RBC-ENTMCNC: 31.8 PG
MCHC RBC-ENTMCNC: 32.4 GM/DL
MCV RBC AUTO: 98.3 FL
MONOCYTES # BLD AUTO: 0.67 K/UL
MONOCYTES NFR BLD AUTO: 9.8 %
NEUTROPHILS # BLD AUTO: 4.9 K/UL
NEUTROPHILS NFR BLD AUTO: 71.6 %
PLATELET # BLD AUTO: 335 K/UL
POTASSIUM SERPL-SCNC: 4.8 MMOL/L
PROT SERPL-MCNC: 8.8 G/DL
QUANTIFERON TB PLUS MITOGEN MINUS NIL: 2.81 IU/ML
QUANTIFERON TB PLUS NIL: 0.02 IU/ML
QUANTIFERON TB PLUS TB1 MINUS NIL: 0.01 IU/ML
QUANTIFERON TB PLUS TB2 MINUS NIL: 0 IU/ML
RBC # BLD: 3.58 M/UL
RBC # FLD: 20.1 %
SODIUM SERPL-SCNC: 131 MMOL/L
T PALLIDUM AB SER QL IA: POSITIVE
TSH SERPL-ACNC: 5.72 UIU/ML
VIRAL LOAD INTERP: NORMAL
VIRAL LOAD LOG: 2.71
WBC # FLD AUTO: 6.84 K/UL

## 2022-05-12 LAB — BACTERIA UR CULT: NORMAL

## 2022-05-13 ENCOUNTER — NON-APPOINTMENT (OUTPATIENT)
Age: 57
End: 2022-05-13

## 2022-05-16 ENCOUNTER — NON-APPOINTMENT (OUTPATIENT)
Age: 57
End: 2022-05-16

## 2022-05-16 ENCOUNTER — LABORATORY RESULT (OUTPATIENT)
Age: 57
End: 2022-05-16

## 2022-05-16 ENCOUNTER — APPOINTMENT (OUTPATIENT)
Dept: INFECTIOUS DISEASE | Facility: CLINIC | Age: 57
End: 2022-05-16

## 2022-05-17 ENCOUNTER — APPOINTMENT (OUTPATIENT)
Dept: UROLOGY | Facility: CLINIC | Age: 57
End: 2022-05-17
Payer: COMMERCIAL

## 2022-05-17 VITALS
WEIGHT: 163 LBS | TEMPERATURE: 97.7 F | RESPIRATION RATE: 15 BRPM | SYSTOLIC BLOOD PRESSURE: 114 MMHG | HEART RATE: 94 BPM | HEIGHT: 70 IN | DIASTOLIC BLOOD PRESSURE: 75 MMHG | OXYGEN SATURATION: 100 % | BODY MASS INDEX: 23.34 KG/M2

## 2022-05-17 LAB
ANION GAP SERPL CALC-SCNC: 12 MMOL/L
BKV DNA UR QL NAA+PROBE: SIGNIFICANT CHANGE UP
BUN SERPL-MCNC: 30 MG/DL
CALCIUM SERPL-MCNC: 8.8 MG/DL
CHLORIDE SERPL-SCNC: 110 MMOL/L
CO2 SERPL-SCNC: 21 MMOL/L
CREAT SERPL-MCNC: 1.65 MG/DL
CYSTATIN C SERPL-MCNC: 1.62 MG/L
EGFR: 48 ML/MIN/1.73M2
GFR/BSA.PRED SERPLBLD CYS-BASED-ARV: 41 ML/MIN/1.73M2
GLUCOSE SERPL-MCNC: 102 MG/DL
POTASSIUM SERPL-SCNC: 4.3 MMOL/L
SODIUM SERPL-SCNC: 143 MMOL/L
TSH SERPL-ACNC: 10.5 UIU/ML

## 2022-05-17 PROCEDURE — 99212 OFFICE O/P EST SF 10 MIN: CPT

## 2022-05-17 NOTE — PHYSICAL EXAM
[Normal Appearance] : normal appearance [Abdomen Tenderness] : non-tender [Costovertebral Angle Tenderness] : no ~M costovertebral angle tenderness [Urethral Meatus] : meatus normal [Penis Abnormality] : normal uncircumcised penis [Testes Tenderness] : no tenderness of the testes [Testes Mass (___cm)] : there were no testicular masses [] : no rash [Edema] : no peripheral edema [Exaggerated Use Of Accessory Muscles For Inspiration] : no accessory muscle use

## 2022-05-17 NOTE — HISTORY OF PRESENT ILLNESS
[FreeTextEntry1] : KATHRYN MALIN is a 57 year M who presents today for follow-up.\par \par To review, he is a 56 y.o. male with recent dx of Aguila and HIV who was admitted with gross hematuria. He was taken to the OR 3/26/2022 for cystoscopy, bladder biopsy, and diagnostic LEFT URS with stent placement.  During the surgery, he had blood emanating from the left ureteral orifice.  The left collecting system was friable, but there was no discrete lesion. Cytology was negative. Stent was removed 4/26/2022. At the time of his cystoscopy, his urine was cloudy and he was started on bactrim. His urine culture was positive for yeast and he was started on diflucan. He reports starting several days before the stent removal, he had perineal pain, as well as dysuria.  He reports it improved initially following stent removal, but has persisted. He is on flomax. He was started on cipro at last visit but his pain is persisted.  He also started develop gross hematuria which is intermittent, and passage of stringy clots.  Denies flank pain, fever, chills, nausea, emesis.  He has bowel movements every 3 to 4 days, and is constipated.\par \par He was initiated on finasteride at last visit and returns today.  His symptoms have almost completely resolved.  No longer having pain.  He did have 1 episode of hematuria last week, but was able to void out a small clot.  No more hematuria.

## 2022-05-17 NOTE — ASSESSMENT
[FreeTextEntry1] : Mr Betzy is a 57 y.o. M with recent dx of Aguila and HIV who is s/p TURBT and left URS (for hematuria) s/p stent removal, complicated by presumed prostatitis / pelvic pain / hematuria, which have resolved with flomax / finasteride / antibiotics\par - Continue tamsulosin\par - Continue finasteride \par - Negative renal US 4/20/2022\par - RV 2 months.

## 2022-05-18 ENCOUNTER — EMERGENCY (EMERGENCY)
Facility: HOSPITAL | Age: 57
LOS: 1 days | Discharge: HOME | End: 2022-05-18
Attending: EMERGENCY MEDICINE | Admitting: EMERGENCY MEDICINE
Payer: COMMERCIAL

## 2022-05-18 ENCOUNTER — NON-APPOINTMENT (OUTPATIENT)
Age: 57
End: 2022-05-18

## 2022-05-18 VITALS
RESPIRATION RATE: 16 BRPM | DIASTOLIC BLOOD PRESSURE: 76 MMHG | SYSTOLIC BLOOD PRESSURE: 134 MMHG | TEMPERATURE: 98 F | HEART RATE: 94 BPM | OXYGEN SATURATION: 100 % | HEIGHT: 70 IN

## 2022-05-18 VITALS
RESPIRATION RATE: 16 BRPM | HEART RATE: 83 BPM | OXYGEN SATURATION: 100 % | DIASTOLIC BLOOD PRESSURE: 80 MMHG | TEMPERATURE: 99 F | SYSTOLIC BLOOD PRESSURE: 141 MMHG

## 2022-05-18 DIAGNOSIS — R31.0 GROSS HEMATURIA: ICD-10-CM

## 2022-05-18 DIAGNOSIS — Z98.890 OTHER SPECIFIED POSTPROCEDURAL STATES: Chronic | ICD-10-CM

## 2022-05-18 DIAGNOSIS — Z90.89 ACQUIRED ABSENCE OF OTHER ORGANS: Chronic | ICD-10-CM

## 2022-05-18 LAB
ALBUMIN SERPL ELPH-MCNC: 3.5 G/DL — SIGNIFICANT CHANGE UP (ref 3.3–5)
ALP SERPL-CCNC: 86 U/L — SIGNIFICANT CHANGE UP (ref 40–120)
ALT FLD-CCNC: 13 U/L — SIGNIFICANT CHANGE UP (ref 4–41)
ANION GAP SERPL CALC-SCNC: 9 MMOL/L — SIGNIFICANT CHANGE UP (ref 7–14)
APPEARANCE UR: ABNORMAL
AST SERPL-CCNC: 17 U/L — SIGNIFICANT CHANGE UP (ref 4–40)
BACTERIA # UR AUTO: ABNORMAL
BASOPHILS # BLD AUTO: 0.03 K/UL — SIGNIFICANT CHANGE UP (ref 0–0.2)
BASOPHILS NFR BLD AUTO: 0.5 % — SIGNIFICANT CHANGE UP (ref 0–2)
BILIRUB SERPL-MCNC: 0.2 MG/DL — SIGNIFICANT CHANGE UP (ref 0.2–1.2)
BILIRUB UR-MCNC: NEGATIVE — SIGNIFICANT CHANGE UP
BUN SERPL-MCNC: 40 MG/DL — HIGH (ref 7–23)
CALCIUM SERPL-MCNC: 9 MG/DL — SIGNIFICANT CHANGE UP (ref 8.4–10.5)
CHLORIDE SERPL-SCNC: 103 MMOL/L — SIGNIFICANT CHANGE UP (ref 98–107)
CO2 SERPL-SCNC: 21 MMOL/L — LOW (ref 22–31)
COLOR SPEC: ABNORMAL
CREAT SERPL-MCNC: 1.81 MG/DL — HIGH (ref 0.5–1.3)
DIFF PNL FLD: ABNORMAL
EGFR: 43 ML/MIN/1.73M2 — LOW
EOSINOPHIL # BLD AUTO: 0.12 K/UL — SIGNIFICANT CHANGE UP (ref 0–0.5)
EOSINOPHIL NFR BLD AUTO: 2.1 % — SIGNIFICANT CHANGE UP (ref 0–6)
EPI CELLS # UR: SIGNIFICANT CHANGE UP /HPF (ref 0–5)
GLUCOSE SERPL-MCNC: 104 MG/DL — HIGH (ref 70–99)
GLUCOSE UR QL: NEGATIVE — SIGNIFICANT CHANGE UP
HCT VFR BLD CALC: 25.6 % — LOW (ref 39–50)
HGB BLD-MCNC: 8.6 G/DL — LOW (ref 13–17)
IANC: 3.92 K/UL — SIGNIFICANT CHANGE UP (ref 1.8–7.4)
IMM GRANULOCYTES NFR BLD AUTO: 1.2 % — SIGNIFICANT CHANGE UP (ref 0–1.5)
KETONES UR-MCNC: ABNORMAL
LEUKOCYTE ESTERASE UR-ACNC: ABNORMAL
LYMPHOCYTES # BLD AUTO: 0.98 K/UL — LOW (ref 1–3.3)
LYMPHOCYTES # BLD AUTO: 17.2 % — SIGNIFICANT CHANGE UP (ref 13–44)
MCHC RBC-ENTMCNC: 33.2 PG — SIGNIFICANT CHANGE UP (ref 27–34)
MCHC RBC-ENTMCNC: 33.6 GM/DL — SIGNIFICANT CHANGE UP (ref 32–36)
MCV RBC AUTO: 98.8 FL — SIGNIFICANT CHANGE UP (ref 80–100)
MONOCYTES # BLD AUTO: 0.57 K/UL — SIGNIFICANT CHANGE UP (ref 0–0.9)
MONOCYTES NFR BLD AUTO: 10 % — SIGNIFICANT CHANGE UP (ref 2–14)
NEUTROPHILS # BLD AUTO: 3.92 K/UL — SIGNIFICANT CHANGE UP (ref 1.8–7.4)
NEUTROPHILS NFR BLD AUTO: 69 % — SIGNIFICANT CHANGE UP (ref 43–77)
NITRITE UR-MCNC: NEGATIVE — SIGNIFICANT CHANGE UP
NRBC # BLD: 0 /100 WBCS — SIGNIFICANT CHANGE UP
NRBC # FLD: 0 K/UL — SIGNIFICANT CHANGE UP
PH UR: 6 — SIGNIFICANT CHANGE UP (ref 5–8)
PLATELET # BLD AUTO: 272 K/UL — SIGNIFICANT CHANGE UP (ref 150–400)
POTASSIUM SERPL-MCNC: 4.5 MMOL/L — SIGNIFICANT CHANGE UP (ref 3.5–5.3)
POTASSIUM SERPL-SCNC: 4.5 MMOL/L — SIGNIFICANT CHANGE UP (ref 3.5–5.3)
PROT SERPL-MCNC: 7.5 G/DL — SIGNIFICANT CHANGE UP (ref 6–8.3)
PROT UR-MCNC: ABNORMAL
RBC # BLD: 2.59 M/UL — LOW (ref 4.2–5.8)
RBC # FLD: 19.9 % — HIGH (ref 10.3–14.5)
RBC CASTS # UR COMP ASSIST: SIGNIFICANT CHANGE UP /HPF (ref 0–4)
SODIUM SERPL-SCNC: 133 MMOL/L — LOW (ref 135–145)
SP GR SPEC: 1.01 — SIGNIFICANT CHANGE UP (ref 1–1.05)
UROBILINOGEN FLD QL: SIGNIFICANT CHANGE UP
WBC # BLD: 5.69 K/UL — SIGNIFICANT CHANGE UP (ref 3.8–10.5)
WBC # FLD AUTO: 5.69 K/UL — SIGNIFICANT CHANGE UP (ref 3.8–10.5)
WBC UR QL: SIGNIFICANT CHANGE UP /HPF (ref 0–5)

## 2022-05-18 PROCEDURE — 99285 EMERGENCY DEPT VISIT HI MDM: CPT

## 2022-05-18 PROCEDURE — 76770 US EXAM ABDO BACK WALL COMP: CPT | Mod: 26

## 2022-05-18 RX ORDER — CEFAZOLIN SODIUM 1 G
1000 VIAL (EA) INJECTION ONCE
Refills: 0 | Status: COMPLETED | OUTPATIENT
Start: 2022-05-18 | End: 2022-05-18

## 2022-05-18 RX ADMIN — Medication 100 MILLIGRAM(S): at 04:41

## 2022-05-18 NOTE — ED PROVIDER NOTE - PHYSICAL EXAMINATION
Gen: Alert, NAD  Head: NC, AT,  EOMI, normal lids/conjunctiva  ENT:  normal hearing, patent oropharynx without erythema/exudate  Neck: +supple, no tenderness/meningismus/JVD, +Trachea midline  Chest: no chest wall tenderness, equal chest rise  Pulm: Bilateral BS, normal resp effort, no wheeze/stridor/retractions  CV: RRR, no M/R/G, +dist pulses  Abd: +BS, soft, ND, mild ttp suprapubic  Rectal: deferred  Mskel: no edema/erythema/cyanosis  Skin: no rash  Neuro: AAOx3

## 2022-05-18 NOTE — ED PROVIDER NOTE - NSFOLLOWUPINSTRUCTIONS_ED_ALL_ED_FT
You were seen in our emergency department for hematuria (bloody urine) and intermittent urinary retention (from blockage by blood clots).  You asked to have the hco catheter removed (against our advice of being discharged WITH the catheter) knowing that this may lead to recurrence of clot/urine retention (as discussed).      If you are unable to urinate, please return to the emergency department.    Please follow up with Dr. Winchester (call [phone]) to schedule a follow up appointment.    Return to the emergency department if you experience fever, chills, severe shortness of breath, severe pain, worsening symptoms, or any other symptoms that are concerning to you.

## 2022-05-18 NOTE — ED PROVIDER NOTE - PATIENT PORTAL LINK FT
You can access the FollowMyHealth Patient Portal offered by [facility] by registering at the following website: [email or web address]. By joining [employer]’s FollowMyHealth portal, you will also be able to view your health information using other applications (apps) compatible with our system.

## 2022-05-18 NOTE — ED PROVIDER NOTE - CLINICAL SUMMARY MEDICAL DECISION MAKING FREE TEXT BOX
58yo M w/ pmh as above p/w hematuria and intermittent urinary retention. Given recent instrumention by urologist outpt w/ persistent hematuria/intermittent retention and visualization of clots in bladder on POCUS, pt will likely benefit from CBI. Labs, ua/culture ordered and urology consulted.

## 2022-05-18 NOTE — CONSULT NOTE ADULT - ASSESSMENT
57 y.o male with recently diagnosed HIV, gross hematuria s/p resection of bladder lesion, L URS/stent on 3/26/22, s/p left stent removal presented with gross hematuria and passage of clots. He was irrigated with a 20 FR coude catheter to clear.

## 2022-05-18 NOTE — ED PROVIDER NOTE - OBJECTIVE STATEMENT
Pertinent PMH/PSH/FHx/SHx and Review of Systems contained within:  56yo M w/ pmh of thyroid ca (s/p thyroidectomy on synthroid), gout on allopurinol, CKD, recently diagnosed HIV (on biktarvy), latent syphilis, HSV2 and anemia, s/p cystoscopy w/ instrumentation and ?stent removal 2wks ago by Dr. Jaden Winchester of urology, p/w persistent hematuria x 2 wks worsening tonight a/w blood clots and interrupted urine stream. Hematuria is worse at night. States he was unable to urinate for 2hour period tonight, now passing urine (ana red blood w/ clots and still feeling urgency and suprapubic pressure/pain). Pt completed course of ciprofloxacin ystdy, followed up with his urologist ystdy morning but at the time was not experiencing hematuria and bladder scan did not show retention then. States he is compliant w/ flomax.    No fever/chills, No photophobia/eye pain/changes in vision, No ear pain/sore throat/dysphagia, No chest pain/palpitations, no SOB/cough/wheeze/stridor, No N/V/D, No neck/back pain, no rash, no new focal neuro symptoms.

## 2022-05-18 NOTE — ED ADULT TRIAGE NOTE - CHIEF COMPLAINT QUOTE
Pt. c/o urinary retention x2 weeks. States he has difficulty emptying his bladder and noticed blood in his urine.

## 2022-05-18 NOTE — CONSULT NOTE ADULT - SUBJECTIVE AND OBJECTIVE BOX
HPI  57 y.o male with PMHx of recently diagnosed HIV, s/p cystoscopy, resection of bladder lesion, left URS with stent placement due to gross hemat  presented to the ED with hematuria, passing clots since the stent was removed. He has been emptying his bladder otherwise.  Patient did not want to stay and did not want to go home with the catheter. In ED, a 20FR six-eye could not pass because his urethra was narrow. He was irrigated well  with a 2-way coude catheter, about 100cc of clots removed, color was clear. Wayne was removed after that and he voided about 100cc clear urine.   PAST MEDICAL & SURGICAL HISTORY:  Deviated septum      Gout      Malignant neoplasm of thyroid gland      S/P ORIF (open reduction internal fixation) fracture  right femur       S/P ACL surgery  Right; 2016      S/P hardware removal  right femur       H/O umbilical hernia repair      History of nasal septoplasty  2020      History of tonsillectomy  as a child          MEDICATIONS  (STANDING):    MEDICATIONS  (PRN):      FAMILY HISTORY:  No pertinent family history in first degree relatives        Allergies    No Known Allergies    Intolerances        SOCIAL HISTORY: NC    REVIEW OF SYSTEMS: Otherwise negative as stated in HPI    Physical Exam  Vital signs  T(F): 98.5 (22 @ 00:20), Max: 98.5 (22 @ 00:20)  HR: 94 (22 @ 00:20)  BP: 134/76 (22 @ 00:20)  SpO2: 100% (22 @ 00:20)    Output    UOP    Gen:  [x] NAD [] toxic    Pulm:  [x] no resp distress [x] no substernal retractions  	  CV:  [] no JVD  [x] RRR    GI:  ] Soft [x] ND [x] NT    :  Glans Circumcised [x]Y  []N, []lesions:  Meatus Discharge []Y  [x] N,  Blood []Y [x] N  Testes  Descended [x]Y  []N,    Tender []Y  [x]N,   Epididymis Tender  []Y [x]N                        	  MSK:  Edema []Y [x]N    LABS:       @ 02:05    WBC 5.69  / Hct 25.6  / SCr 1.81       Urinalysis Basic - ( 18 May 2022 03:05 )    Color: Red / Appearance: Turbid / S.012 / pH: x  Gluc: x / Ketone: Small  / Bili: Negative / Urobili: <2 mg/dL   Blood: x / Protein: 100 mg/dL / Nitrite: Negative   Leuk Esterase: Large / RBC: many /HPF / WBC many /HPF   Sq Epi: x / Non Sq Epi: 0-5 /HPF / Bacteria: Occasional        Urine Cx: p  Blood Cx:    RADIOLOGY: p

## 2022-05-18 NOTE — ED PROVIDER NOTE - ATTENDING CONTRIBUTION TO CARE
History and physical above obtained and documented (or dictated) by me (attending physician).  Resident or ACP and/or medical student involved in case for assistance with disposition and may document involvement as necessary via progress note(s).   -Dr. Holden

## 2022-05-18 NOTE — ED PROVIDER NOTE - PROGRESS NOTE DETAILS
Pt did not want to go home w/ cho cath and asked urology PA to remove it for dc. He understands that urinary retention may recur and was given return instructions. Pt also instructed to f/u with his urologist. Will DC.  -Dr. Holden

## 2022-05-18 NOTE — ED ADULT NURSE NOTE - OBJECTIVE STATEMENT
pt presents to the ED, A&04 ambualtory at baseline coming in for multiple episodes of hematuria. Patient states he was admitted recently for hematuria with stent and cho placement, but during the removal of the stent a couple weeks ago, they were "having difficulty removing the stent". Patient denies pain during urination but afterwards due to clots pain arises. deneis blood thinner use. Respirations even and unlabored. Lung sounds clear with equal chest rise bilaterally. ABD is soft, non tender, non distended with normal active bowel sounds. No complaints of chest pain, headache, nausea, dizziness, vomiting  SOB, fever, chills verbalized. 20GLAC in place, labs sent awaiting further orders.

## 2022-05-18 NOTE — CONSULT NOTE ADULT - PROBLEM SELECTOR RECOMMENDATION 9
Patient did not want to have an indwelling catheter and did not want to stay. He was irrigated to clear and catheter was removed. He understood that he may have to return to the ED if he developed clot retention  Monitor urine color  Hydration  F/U urine culture  F/U in office with Dr Winchester as outpatient [phone]  Case discussed with Dr Winchester

## 2022-05-19 LAB
CULTURE RESULTS: NO GROWTH — SIGNIFICANT CHANGE UP
SPECIMEN SOURCE: SIGNIFICANT CHANGE UP

## 2022-05-20 ENCOUNTER — EMERGENCY (EMERGENCY)
Facility: HOSPITAL | Age: 57
LOS: 1 days | Discharge: HOME | End: 2022-05-20
Attending: EMERGENCY MEDICINE | Admitting: EMERGENCY MEDICINE
Payer: COMMERCIAL

## 2022-05-20 VITALS
TEMPERATURE: 98 F | DIASTOLIC BLOOD PRESSURE: 94 MMHG | SYSTOLIC BLOOD PRESSURE: 131 MMHG | HEART RATE: 106 BPM | OXYGEN SATURATION: 95 % | RESPIRATION RATE: 16 BRPM

## 2022-05-20 VITALS
SYSTOLIC BLOOD PRESSURE: 134 MMHG | RESPIRATION RATE: 16 BRPM | HEIGHT: 70 IN | HEART RATE: 120 BPM | TEMPERATURE: 98 F | DIASTOLIC BLOOD PRESSURE: 75 MMHG | OXYGEN SATURATION: 100 %

## 2022-05-20 DIAGNOSIS — Z98.890 OTHER SPECIFIED POSTPROCEDURAL STATES: Chronic | ICD-10-CM

## 2022-05-20 DIAGNOSIS — Z90.89 ACQUIRED ABSENCE OF OTHER ORGANS: Chronic | ICD-10-CM

## 2022-05-20 DIAGNOSIS — R31.0 GROSS HEMATURIA: ICD-10-CM

## 2022-05-20 LAB
ALBUMIN SERPL ELPH-MCNC: 3.7 G/DL — SIGNIFICANT CHANGE UP (ref 3.3–5)
ALP SERPL-CCNC: 95 U/L — SIGNIFICANT CHANGE UP (ref 40–120)
ALT FLD-CCNC: 10 U/L — SIGNIFICANT CHANGE UP (ref 4–41)
ANION GAP SERPL CALC-SCNC: 10 MMOL/L — SIGNIFICANT CHANGE UP (ref 7–14)
APPEARANCE UR: ABNORMAL
AST SERPL-CCNC: 18 U/L — SIGNIFICANT CHANGE UP (ref 4–40)
BASOPHILS # BLD AUTO: 0.05 K/UL — SIGNIFICANT CHANGE UP (ref 0–0.2)
BASOPHILS NFR BLD AUTO: 0.9 % — SIGNIFICANT CHANGE UP (ref 0–2)
BILIRUB SERPL-MCNC: <0.2 MG/DL — SIGNIFICANT CHANGE UP (ref 0.2–1.2)
BILIRUB UR-MCNC: NEGATIVE — SIGNIFICANT CHANGE UP
BUN SERPL-MCNC: 30 MG/DL — HIGH (ref 7–23)
CALCIUM SERPL-MCNC: 9.2 MG/DL — SIGNIFICANT CHANGE UP (ref 8.4–10.5)
CHLORIDE SERPL-SCNC: 104 MMOL/L — SIGNIFICANT CHANGE UP (ref 98–107)
CK SERPL-CCNC: 183 U/L — SIGNIFICANT CHANGE UP (ref 30–200)
CO2 SERPL-SCNC: 22 MMOL/L — SIGNIFICANT CHANGE UP (ref 22–31)
COLOR SPEC: ABNORMAL
CREAT SERPL-MCNC: 1.62 MG/DL — HIGH (ref 0.5–1.3)
DIFF PNL FLD: ABNORMAL
EGFR: 49 ML/MIN/1.73M2 — LOW
EOSINOPHIL # BLD AUTO: 0.16 K/UL — SIGNIFICANT CHANGE UP (ref 0–0.5)
EOSINOPHIL NFR BLD AUTO: 3 % — SIGNIFICANT CHANGE UP (ref 0–6)
FLUAV AG NPH QL: SIGNIFICANT CHANGE UP
FLUBV AG NPH QL: SIGNIFICANT CHANGE UP
GLUCOSE SERPL-MCNC: 113 MG/DL — HIGH (ref 70–99)
GLUCOSE UR QL: NEGATIVE — SIGNIFICANT CHANGE UP
HCT VFR BLD CALC: 27.5 % — LOW (ref 39–50)
HGB BLD-MCNC: 9 G/DL — LOW (ref 13–17)
IANC: 3.41 K/UL — SIGNIFICANT CHANGE UP (ref 1.8–7.4)
IMM GRANULOCYTES NFR BLD AUTO: 1.7 % — HIGH (ref 0–1.5)
KETONES UR-MCNC: NEGATIVE — SIGNIFICANT CHANGE UP
LEUKOCYTE ESTERASE UR-ACNC: ABNORMAL
LYMPHOCYTES # BLD AUTO: 1.06 K/UL — SIGNIFICANT CHANGE UP (ref 1–3.3)
LYMPHOCYTES # BLD AUTO: 19.7 % — SIGNIFICANT CHANGE UP (ref 13–44)
MCHC RBC-ENTMCNC: 32.7 GM/DL — SIGNIFICANT CHANGE UP (ref 32–36)
MCHC RBC-ENTMCNC: 33.2 PG — SIGNIFICANT CHANGE UP (ref 27–34)
MCV RBC AUTO: 101.5 FL — HIGH (ref 80–100)
MONOCYTES # BLD AUTO: 0.62 K/UL — SIGNIFICANT CHANGE UP (ref 0–0.9)
MONOCYTES NFR BLD AUTO: 11.5 % — SIGNIFICANT CHANGE UP (ref 2–14)
NEUTROPHILS # BLD AUTO: 3.41 K/UL — SIGNIFICANT CHANGE UP (ref 1.8–7.4)
NEUTROPHILS NFR BLD AUTO: 63.2 % — SIGNIFICANT CHANGE UP (ref 43–77)
NITRITE UR-MCNC: NEGATIVE — SIGNIFICANT CHANGE UP
NRBC # BLD: 0 /100 WBCS — SIGNIFICANT CHANGE UP
NRBC # FLD: 0 K/UL — SIGNIFICANT CHANGE UP
PH UR: 7 — SIGNIFICANT CHANGE UP (ref 5–8)
PLATELET # BLD AUTO: 311 K/UL — SIGNIFICANT CHANGE UP (ref 150–400)
POTASSIUM SERPL-MCNC: 4 MMOL/L — SIGNIFICANT CHANGE UP (ref 3.5–5.3)
POTASSIUM SERPL-SCNC: 4 MMOL/L — SIGNIFICANT CHANGE UP (ref 3.5–5.3)
PROT SERPL-MCNC: 7.7 G/DL — SIGNIFICANT CHANGE UP (ref 6–8.3)
PROT UR-MCNC: ABNORMAL
RBC # BLD: 2.71 M/UL — LOW (ref 4.2–5.8)
RBC # FLD: 20 % — HIGH (ref 10.3–14.5)
RSV RNA NPH QL NAA+NON-PROBE: SIGNIFICANT CHANGE UP
SARS-COV-2 RNA SPEC QL NAA+PROBE: SIGNIFICANT CHANGE UP
SODIUM SERPL-SCNC: 136 MMOL/L — SIGNIFICANT CHANGE UP (ref 135–145)
SP GR SPEC: 1.02 — SIGNIFICANT CHANGE UP (ref 1–1.05)
UROBILINOGEN FLD QL: SIGNIFICANT CHANGE UP
WBC # BLD: 5.39 K/UL — SIGNIFICANT CHANGE UP (ref 3.8–10.5)
WBC # FLD AUTO: 5.39 K/UL — SIGNIFICANT CHANGE UP (ref 3.8–10.5)

## 2022-05-20 PROCEDURE — 99283 EMERGENCY DEPT VISIT LOW MDM: CPT | Mod: 25

## 2022-05-20 PROCEDURE — 99285 EMERGENCY DEPT VISIT HI MDM: CPT

## 2022-05-20 PROCEDURE — 51703 INSERT BLADDER CATH COMPLEX: CPT

## 2022-05-20 PROCEDURE — 74178 CT ABD&PLV WO CNTR FLWD CNTR: CPT | Mod: 26,MA

## 2022-05-20 RX ORDER — OXYCODONE AND ACETAMINOPHEN 5; 325 MG/1; MG/1
1 TABLET ORAL ONCE
Refills: 0 | Status: DISCONTINUED | OUTPATIENT
Start: 2022-05-20 | End: 2022-05-20

## 2022-05-20 RX ORDER — FENTANYL CITRATE 50 UG/ML
50 INJECTION INTRAVENOUS ONCE
Refills: 0 | Status: DISCONTINUED | OUTPATIENT
Start: 2022-05-20 | End: 2022-05-20

## 2022-05-20 RX ORDER — HYDROMORPHONE HYDROCHLORIDE 2 MG/ML
1 INJECTION INTRAMUSCULAR; INTRAVENOUS; SUBCUTANEOUS ONCE
Refills: 0 | Status: DISCONTINUED | OUTPATIENT
Start: 2022-05-20 | End: 2022-05-20

## 2022-05-20 RX ORDER — LIDOCAINE 4 G/100G
1 CREAM TOPICAL ONCE
Refills: 0 | Status: COMPLETED | OUTPATIENT
Start: 2022-05-20 | End: 2022-05-20

## 2022-05-20 RX ORDER — SODIUM CHLORIDE 9 MG/ML
1000 INJECTION, SOLUTION INTRAVENOUS ONCE
Refills: 0 | Status: COMPLETED | OUTPATIENT
Start: 2022-05-20 | End: 2022-05-20

## 2022-05-20 RX ADMIN — HYDROMORPHONE HYDROCHLORIDE 1 MILLIGRAM(S): 2 INJECTION INTRAMUSCULAR; INTRAVENOUS; SUBCUTANEOUS at 04:30

## 2022-05-20 RX ADMIN — FENTANYL CITRATE 50 MICROGRAM(S): 50 INJECTION INTRAVENOUS at 03:30

## 2022-05-20 RX ADMIN — SODIUM CHLORIDE 1000 MILLILITER(S): 9 INJECTION, SOLUTION INTRAVENOUS at 09:05

## 2022-05-20 RX ADMIN — FENTANYL CITRATE 50 MICROGRAM(S): 50 INJECTION INTRAVENOUS at 03:16

## 2022-05-20 RX ADMIN — LIDOCAINE 1 APPLICATION(S): 4 CREAM TOPICAL at 06:07

## 2022-05-20 RX ADMIN — HYDROMORPHONE HYDROCHLORIDE 1 MILLIGRAM(S): 2 INJECTION INTRAMUSCULAR; INTRAVENOUS; SUBCUTANEOUS at 03:26

## 2022-05-20 NOTE — ED ADULT NURSE NOTE - OBJECTIVE STATEMENT
Patient received in trauma B, A/Ox4, ambulatory, c/o urinary/bladder difficulties. Patient seen x3 days ago for urinary issues, had cho placed. States that he had not noticed any output since 12AM and is endorsing abdominal pain. Denies fevers, chills, N/V, SOB, chest pain and headache. Patient appears visibly uncomfortable. Urine in leg bag noted to be dark red with clots. 18G IV placed to left forearm, medicated as ordered. Bed placed in lowest position, uro to see.

## 2022-05-20 NOTE — ED PROVIDER NOTE - PHYSICAL EXAMINATION
Gen: uncomfortable appearing  Head: normal appearing  HEENT: normal conjunctiva, oral mucosa moist  Lung: no respiratory distress, speaking in full sentences, CTA b/l     CV: regular rate and rhythm, no murmurs  Abd: soft, non distended, suprapubic tenderness  : Wayne inplace not draining urine   MSK: no visible deformities  Neuro: No focal deficits, AAOx3  Skin: Warm  Psych: normal affect

## 2022-05-20 NOTE — ED PROVIDER NOTE - CLINICAL SUMMARY MEDICAL DECISION MAKING FREE TEXT BOX
56y/o M w/ h/o thyroid ca s/p thyroidectomy, gout, CKD, HIV, latent syphilis, HSV2, and anemia, s/p cystoscopy w/ instrumentation and ?stent removal 2wks ago returns to the ED w/ urinary retention for 2h likely 2/2 blood clots. Plan CBI, UA, labs and observe

## 2022-05-20 NOTE — ED ADULT TRIAGE NOTE - CHIEF COMPLAINT QUOTE
Pt c/o bladder pain since midnight. pt states he had his bladder irrigated at his urology and had a cho placed. Pt states he hasn't has any urinary output in approx 2 hrs. Pt cho bag noted to have blood with clots inside. No complaints of chest pain, headache, nausea, dizziness, vomiting  SOB, fever, chills verbalized.. Pt is very uncomfortable.

## 2022-05-20 NOTE — CONSULT NOTE ADULT - PROBLEM SELECTOR RECOMMENDATION 9
with clot retention, s/p manual irrigation and CBI  -Continue cho catheter.  -Continue CBI x1hour, replacing CBI bags as needed, and emptying cho bags.   -Please clamp CBI around 6am  -Urology to reassess.  -If color and exam are acceptable, patient can likely be discharged with cho catheter in place and follow-up with Dr. Winchester at the [facility], [phone].  -Please page Urology if catheter stops draining, color worsens, patient develops increased pain, pager [phone]  -Discussed with Dr. Tucker.

## 2022-05-20 NOTE — ED PROVIDER NOTE - ATTENDING CONTRIBUTION TO CARE
DR. VANEGAS, ATTENDING MD-  I performed a face to face bedside interview with the patient regarding history of present illness, review of symptoms and past medical history. I completed an independent physical exam.  I have discussed the patient's plan of care with the resident.   Documentation as above in the note.    56 y/o male h/o hiv bladder lesion l ureteral stent here with hematuria, urinary retention despite cho placed by uro, Dr. Winchester, today in office.  Severe suprapubic pain d/t inability to void.  Had visit two days ago for same, had cbi, left without cho at pt request.  Had retention today saw uro in office initially able to void into collection bag but now unable to.  Cho in place, ana blood in collection bag.  Will give opioids, attempt flush/suction to clear clot, obtain cbc bmp ua ucx consult uro for likely cbi.

## 2022-05-20 NOTE — ED PROVIDER NOTE - NSFOLLOWUPINSTRUCTIONS_ED_ALL_ED_FT
Please follow up with your urologist as discussed.    Please use the equipment given to you upon discharge to help flush your cho, should you need it.     Please see general info below:    Urinary Retention    Urinary retention is the inability to completely empty your bladder. This is a common problem in older men, especially with enlarged prostates. If you are sent home with a cho catheter and a drainage system make sure to keep the drainage bag emptied and lower than your catheter. Keep the cho catheter in until you follow up with a urologist.    SEEK IMMEDIATE MEDICAL CARE IF YOU DEVELOP THE FOLLOWING SYMPTOMS: the catheter stops draining urine, the catheter falls out, abdominal pain, nausea/vomiting, or chills/fever.

## 2022-05-20 NOTE — ED PROVIDER NOTE - OBJECTIVE STATEMENT
58y/o M w/ h/o thyroid ca s/p thyroidectomy, gout, CKD, HIV, latent syphilis, HSV2, and anemia, s/p cystoscopy w/ instrumentation and ?stent removal 2wks ago by SUZY Winchester of urology, p/w urinary retention. Pt last urinated around 12a, has been having lower abdominal pain feeling similar to presentation 2d ago when was in retention. Denies fevers, chills, nausea, vomiting, chest pain, cough, sob, back pain, numbness, tingling, blood thinners.

## 2022-05-20 NOTE — CONSULT NOTE ADULT - ASSESSMENT
56 y/o male with medical history of Thyroid ca, Gout, newly diagnosed HIV, and Aguila+, CKD, and hematuria since March of this year, s/p cystoscopy, TURBT, left URS with stent placement 3/26, with improvement in hematuria, and stent removal 4/26 with recurrent hematuria after that presenting to the ED in clot retention, s/p cho replacement, and manual irrigation and initiation of CBI.    56 y/o male with medical history of Thyroid ca, Gout, newly diagnosed HIV, and Aguila+, CKD, and hematuria since March of this year, s/p cystoscopy, TURBT, left URS with stent placement 3/26, with improvement in hematuria, and stent removal 4/26 with recurrent hematuria after that presenting to the ED in clot retention, s/p cho replacement, and manual irrigation and initiation of CBI.       **Consult update**  pt reevaluated, hematuria noted to be cranberry/cherry semi-translucent.  Discussed with patient this color is borderline and may result in clot formation.  Patient understands and was taught to irrigate catheter.  Pt demonstrated skill necessary for bladder irrigation.   Pt understands risks of discharge and that he may represent to the ED, pt is adamant on discharge and wishes to proceed with outpatient follow up.  The nature of the bleeding from the left kidney is still unknown, ureteroscopy was unrevealing aside from clots present in the collecting system.  recommend CTUrogram prior to discharge to further evaluate tracts.  pt will be added on for Monday at [facility] for ureteral stent placement.  Discussed with patient his elevated SCr is a risk factor for contrast induced nephropathy, pt understands and agrees to proceed with procedure.       Recommendation:  -CT Urogram  -d/c to home with irrigation materials (piston syringe and sterile water/saline).   -Pt should aim to follow at [facility] monday for procedure with Dr. Winchester.     Please call with any further questions    Alonzo Tucker MD    Urology     Reachable on Teams M-F 6am-6pm (preferred)   [facility] Urology Pager #[phone]  [facility] Urology Pager #[phone]    [facility]  [address] [place] [address], [place] [address]  [phone]

## 2022-05-20 NOTE — ED PROVIDER NOTE - PATIENT PORTAL LINK FT
You can access the FollowMyHealth Patient Portal offered by [facility] by registering at the following website: [email or web address]. By joining [employer]’s FollowMyHealth portal, you will also be able to view your health information using other applications (apps) compatible with our system.

## 2022-05-20 NOTE — ED PROVIDER NOTE - CARE PROVIDER_API CALL
Jaden Winchester)  Urology  [address] [place] [address], [address]  [place] [address]  Phone: [phone]  Fax: [phone]  Follow Up Time:

## 2022-05-20 NOTE — ED PROVIDER NOTE - PROGRESS NOTE DETAILS
Sanam: Discussed cased with urology, Dr Tucker.  Urology team and patient noted to have extensive discussion regarding admission vs discharge. Pt noted to have no clots in cho bag, however noted to be blood tinged.  Urology discussed return precautions and provided patient with instruction of how to flush cho if it were to get clogged.  Pt noted to have plan of surgery on Monday as an add on.  In addition, urology is recommending CT urogram with contrast prior to discharge.  Pt is agreeable.  Urology noted to have extensive discussion regarding risk fo LILIANA given CKD, though given persistent hematuria, there is a need to further evaluate kidney for possible underlying malignancy and given his history patient will need CT urogram to further evaluate kidney prior to surgical management.  Pt ordered for 1L LR prior to CT for hydration. Joseph Frankel PGY3: Urine now clear. CT urogram done. Has urology follow up. Discussed plan and return precautions with patient who understands and agrees. All questions answered.

## 2022-05-21 LAB
CULTURE RESULTS: SIGNIFICANT CHANGE UP
SPECIMEN SOURCE: SIGNIFICANT CHANGE UP

## 2022-05-22 ENCOUNTER — TRANSCRIPTION ENCOUNTER (OUTPATIENT)
Age: 57
End: 2022-05-22

## 2022-05-23 ENCOUNTER — NON-APPOINTMENT (OUTPATIENT)
Age: 57
End: 2022-05-23

## 2022-05-23 ENCOUNTER — APPOINTMENT (OUTPATIENT)
Age: 57
End: 2022-05-23

## 2022-05-23 ENCOUNTER — OUTPATIENT (OUTPATIENT)
Dept: OUTPATIENT SERVICES | Facility: HOSPITAL | Age: 57
LOS: 1 days | End: 2022-05-23
Payer: COMMERCIAL

## 2022-05-23 ENCOUNTER — RESULT REVIEW (OUTPATIENT)
Age: 57
End: 2022-05-23

## 2022-05-23 ENCOUNTER — TRANSCRIPTION ENCOUNTER (OUTPATIENT)
Age: 57
End: 2022-05-23

## 2022-05-23 VITALS
TEMPERATURE: 97 F | SYSTOLIC BLOOD PRESSURE: 120 MMHG | RESPIRATION RATE: 15 BRPM | HEART RATE: 95 BPM | DIASTOLIC BLOOD PRESSURE: 84 MMHG | OXYGEN SATURATION: 100 %

## 2022-05-23 VITALS
OXYGEN SATURATION: 99 % | RESPIRATION RATE: 18 BRPM | HEART RATE: 81 BPM | SYSTOLIC BLOOD PRESSURE: 121 MMHG | DIASTOLIC BLOOD PRESSURE: 66 MMHG | TEMPERATURE: 98 F

## 2022-05-23 DIAGNOSIS — Z98.890 OTHER SPECIFIED POSTPROCEDURAL STATES: Chronic | ICD-10-CM

## 2022-05-23 DIAGNOSIS — R31.0 GROSS HEMATURIA: ICD-10-CM

## 2022-05-23 DIAGNOSIS — Z90.89 ACQUIRED ABSENCE OF OTHER ORGANS: Chronic | ICD-10-CM

## 2022-05-23 PROCEDURE — 52001 CYSTO W/IRRG&EVAC MLT CLOTS: CPT | Mod: 59

## 2022-05-23 PROCEDURE — 88305 TISSUE EXAM BY PATHOLOGIST: CPT

## 2022-05-23 PROCEDURE — 52214 CYSTOSCOPY AND TREATMENT: CPT

## 2022-05-23 PROCEDURE — 88305 TISSUE EXAM BY PATHOLOGIST: CPT | Mod: 26

## 2022-05-23 RX ORDER — SODIUM CHLORIDE 9 MG/ML
1000 INJECTION, SOLUTION INTRAVENOUS
Refills: 0 | Status: DISCONTINUED | OUTPATIENT
Start: 2022-05-23 | End: 2022-05-23

## 2022-05-23 RX ORDER — HYDROMORPHONE HYDROCHLORIDE 2 MG/ML
0.5 INJECTION INTRAMUSCULAR; INTRAVENOUS; SUBCUTANEOUS
Refills: 0 | Status: DISCONTINUED | OUTPATIENT
Start: 2022-05-23 | End: 2022-05-23

## 2022-05-23 RX ORDER — CEPHALEXIN 500 MG
1 CAPSULE ORAL
Qty: 12 | Refills: 0
Start: 2022-05-23 | End: 2022-05-25

## 2022-05-23 RX ADMIN — HYDROMORPHONE HYDROCHLORIDE 0.5 MILLIGRAM(S): 2 INJECTION INTRAMUSCULAR; INTRAVENOUS; SUBCUTANEOUS at 14:20

## 2022-05-23 RX ADMIN — SODIUM CHLORIDE 75 MILLILITER(S): 9 INJECTION, SOLUTION INTRAVENOUS at 14:28

## 2022-05-23 RX ADMIN — HYDROMORPHONE HYDROCHLORIDE 0.5 MILLIGRAM(S): 2 INJECTION INTRAMUSCULAR; INTRAVENOUS; SUBCUTANEOUS at 14:05

## 2022-05-23 NOTE — ASU DISCHARGE PLAN (ADULT/PEDIATRIC) - CARE PROVIDER_API CALL
Jaden Winchester)  Urology  [address] [place] [address], [address]  [place] [address]  Phone: [phone]  Fax: [phone]  Follow Up Time:

## 2022-05-23 NOTE — ASU DISCHARGE PLAN (ADULT/PEDIATRIC) - NS MD DC FALL RISK RISK
For information on Fall & Injury Prevention, visit: [email or web address] OR  [email or web address] OR  https://www.cdc.gov/steadi/patient.html

## 2022-05-23 NOTE — ASU DISCHARGE PLAN (ADULT/PEDIATRIC) - ASU DC SPECIAL INSTRUCTIONSFT
GENERAL: It is common to have blood in your urine after your procedure. It may be pink or even red; inform your doctor if you have a significant amount of clot in the urine or if you are unable to void at all or if your catheter stops draining. The urine may clear and then become bloody again especially as you are more physically active. It is not uncommon to have some burning when you urinate, this will gradually improve. With a catheter in place, it is not uncommon to have occasional leakage or urine or blood around the catheter. Please call your urologist if this is excessive and/or the urine is not draining through the catheter into the bag.  BATHING: You may shower or bathe. If going home with cho, shower only until catheter is removed.  DIET: You may resume your regular diet and regular medication regimen.  PAIN: You may take Tylenol (acetaminophen) 650-975mg and/or Motrin (ibuprofen) 400-600mg, both available over the counter, for pain every 6 hours as needed. Do not exceed 4000mg of Tylenol (acetaminophen) daily. You may alternate these medications such that you take one or the other every 3 hours for around the clock pain coverage.  ANTIBIOTICS: You may be given a prescription for an antibiotic, please take this medication as instructed and be sure to complete the entire course.  STOOL SOFTENERS: Do not allow yourself to become constipated as straining may cause bleeding. Take stool softeners or a laxative (ex. Miralax, Colace, Senokot, ExLax, etc), available over the counter, if needed.  ACTIVITY: No heavy lifting or strenuous exercise until you are evaluated at your post-operative appointment. Otherwise, you may return to your usual level of physical activity.  ANTICOAGULATION: If you are taking any blood thinning medications, please discuss with your urologist prior to restarting these medications unless otherwise specified.  FOLLOW-UP: If you did not already schedule your post-operative appointment, please call your urologist to schedule and follow-up appointment.  CALL YOUR UROLOGIST IF: You have any bleeding that does not stop, inability to void >8 hours, fever over 100.4 F, chills, persistent nausea/vomiting, changes in your incision concerning for infection, or if your pain is not controlled on your discharge pain medications.

## 2022-05-23 NOTE — BRIEF OPERATIVE NOTE - OPERATION/FINDINGS
Procedure: cystoscopy, fulguration and clot evacuation   Preop dx: gross hematuria  Postop dx:  gross hematuria    friable bladder mucosa. no clear tumor or mucosal abnormalities.

## 2022-05-27 LAB — SURGICAL PATHOLOGY STUDY: SIGNIFICANT CHANGE UP

## 2022-06-07 ENCOUNTER — APPOINTMENT (OUTPATIENT)
Dept: UROLOGY | Facility: CLINIC | Age: 57
End: 2022-06-07

## 2022-06-07 PROCEDURE — 99212 OFFICE O/P EST SF 10 MIN: CPT

## 2022-06-07 NOTE — ASSESSMENT
[FreeTextEntry1] : Mr Bo is a 57 y.o. M with recent dx of Aguila and HIV who is s/p TURBT and left URS (for hematuria) s/p stent removal, complicated by presumed prostatitis / pelvic pain / hematuria, s/p cysto clot evac 5/23/2022. Tissue sent for viral staining - negative for CMV, adenovirus, BK. Etiology of hematuria still unclear. Initial bladder biopsy negative. Kidney cytology negative, renal tissue biopsy non diagnostic. No masses. His symptoms currently are consistent with prostatitis.\par - Continue tamsulosin\par - Re-initiate finasteride \par - UA, UCx\par - Mycoplasma / ureaplasma\par - Fungal culture\par - Empiric doxycycline x 2 weeks\par - RV 1 month.

## 2022-06-07 NOTE — PHYSICAL EXAM
[Normal Appearance] : normal appearance [Abdomen Tenderness] : non-tender [Costovertebral Angle Tenderness] : no ~M costovertebral angle tenderness [Urethral Meatus] : meatus normal [Penis Abnormality] : normal uncircumcised penis [Testes Tenderness] : no tenderness of the testes [Testes Mass (___cm)] : there were no testicular masses [] : no rash [Edema] : no peripheral edema [Exaggerated Use Of Accessory Muscles For Inspiration] : no accessory muscle use [Prostate Tenderness] : the prostate was not tender [No Prostate Nodules] : no prostate nodules

## 2022-06-07 NOTE — HISTORY OF PRESENT ILLNESS
[FreeTextEntry1] : KATHRYN MALIN is a 57 year M who presents today for follow-up.\par \par To review, he is a 56 y.o. male with recent dx of Aguila and HIV who was admitted with gross hematuria. He was taken to the OR 3/26/2022 for cystoscopy, bladder biopsy, and diagnostic LEFT URS with stent placement.  During the surgery, he had blood emanating from the left ureteral orifice.  The left collecting system was friable, but there was no discrete lesion. Cytology was negative. Stent was removed 4/26/2022. At the time of his cystoscopy, his urine was cloudy and he was started on bactrim. His urine culture was positive for yeast and he was started on diflucan. He reports starting several days before the stent removal, he had perineal pain, as well as dysuria.  He then experienced gross hematuria again and was taken to the OR for cystoscopy / clot evacuation on 5/23/2022. No bladder mass was visualized, but the posterior bladder mucosa was friable, which was fulgurated. He returns today. \par \par CT A/P 5/20/2022\par IMPRESSION:\par Large clot within the bladder lumen. Wall thickening along the left lateral aspect of the bladder, likely adherent clot. Underlying bladder mass is not excluded.\par No evidence of upper tract disease. No evidence of enhancing renal mass, or hydronephrosis.\par Bilateral nonobstructing renal stones.\par \par He has been doing well. Passed one clot on Sunday but otherwise no passage of clots or hematuria. Still with intermittent penile pain with voiding. Nocturia q 1 hour. No incontinence.\par

## 2022-06-08 LAB
APPEARANCE: ABNORMAL
BACTERIA: NEGATIVE
BILIRUBIN URINE: NEGATIVE
BLOOD URINE: ABNORMAL
C TRACH RRNA SPEC QL NAA+PROBE: NOT DETECTED
COLOR: ABNORMAL
GLUCOSE QUALITATIVE U: NEGATIVE
HYALINE CASTS: 1 /LPF
KETONES URINE: NEGATIVE
LEUKOCYTE ESTERASE URINE: ABNORMAL
MICROSCOPIC-UA: NORMAL
N GONORRHOEA RRNA SPEC QL NAA+PROBE: NOT DETECTED
NITRITE URINE: NEGATIVE
PH URINE: 6
PROTEIN URINE: ABNORMAL
RED BLOOD CELLS URINE: 77 /HPF
SOURCE AMPLIFICATION: NORMAL
SPECIFIC GRAVITY URINE: 1.02
SQUAMOUS EPITHELIAL CELLS: 1 /HPF
UROBILINOGEN URINE: NORMAL
WHITE BLOOD CELLS URINE: >720 /HPF

## 2022-06-09 LAB
BACTERIA UR CULT: ABNORMAL
FUNGUS SPEC CULT ORG #8: ABNORMAL

## 2022-06-21 ENCOUNTER — APPOINTMENT (OUTPATIENT)
Dept: UROLOGY | Facility: CLINIC | Age: 57
End: 2022-06-21
Payer: COMMERCIAL

## 2022-06-21 VITALS
RESPIRATION RATE: 14 BRPM | SYSTOLIC BLOOD PRESSURE: 119 MMHG | TEMPERATURE: 97.9 F | OXYGEN SATURATION: 99 % | DIASTOLIC BLOOD PRESSURE: 77 MMHG | HEIGHT: 70 IN | HEART RATE: 104 BPM | WEIGHT: 163 LBS | BODY MASS INDEX: 23.34 KG/M2

## 2022-06-21 PROCEDURE — 99213 OFFICE O/P EST LOW 20 MIN: CPT

## 2022-06-21 NOTE — PHYSICAL EXAM
[Normal Appearance] : normal appearance [Abdomen Tenderness] : non-tender [Costovertebral Angle Tenderness] : no ~M costovertebral angle tenderness [Urethral Meatus] : meatus normal [Penis Abnormality] : normal uncircumcised penis [Testes Tenderness] : no tenderness of the testes [Testes Mass (___cm)] : there were no testicular masses [No Prostate Nodules] : no prostate nodules [] : no rash [Edema] : no peripheral edema [Exaggerated Use Of Accessory Muscles For Inspiration] : no accessory muscle use [FreeTextEntry1] : mild tenderness with CRISTI

## 2022-06-21 NOTE — ASSESSMENT
[FreeTextEntry1] : Mr Bo is a 57 y.o. M with recent dx of Aguila and HIV who is s/p TURBT and left URS (for hematuria) s/p stent removal, complicated by presumed prostatitis / pelvic pain / hematuria, s/p cysto clot evac 5/23/2022. Tissue sent for viral staining - negative for CMV, adenovirus, BK. Etiology of hematuria still unclear. Initial bladder biopsy negative. Kidney cytology negative, renal tissue biopsy non diagnostic. No masses. His symptoms currently are consistent with prostatitis.\par - Continue tamsulosin\par - Had recommended finasteride / trospium. Patient believes this was making symptoms worse\par - Ciprofloxacin x 1 month\par - Prostate MRI \par - RV 1 month.

## 2022-06-21 NOTE — HISTORY OF PRESENT ILLNESS
[FreeTextEntry1] : KATHRYN MALIN is a 57 year M who presents today for follow-up.\par \par To review, he is a 56 y.o. male with recent dx of Aguila and HIV who was admitted with gross hematuria. He was taken to the OR 3/26/2022 for cystoscopy, bladder biopsy, and diagnostic LEFT URS with stent placement.  During the surgery, he had blood emanating from the left ureteral orifice.  The left collecting system was friable, but there was no discrete lesion. Cytology was negative. Stent was removed 4/26/2022. At the time of his cystoscopy, his urine was cloudy and he was started on bactrim. His urine culture was positive for yeast and he was started on diflucan. He reports starting several days before the stent removal, he had perineal pain, as well as dysuria.  He then experienced gross hematuria again and was taken to the OR for cystoscopy / clot evacuation on 5/23/2022. No bladder mass was visualized, but the posterior bladder mucosa was friable, which was fulgurated. He returns today. \par \par CT A/P 5/20/2022\par IMPRESSION:\par Large clot within the bladder lumen. Wall thickening along the left lateral aspect of the bladder, likely adherent clot. Underlying bladder mass is not excluded.\par No evidence of upper tract disease. No evidence of enhancing renal mass, or hydronephrosis.\par Bilateral nonobstructing renal stones.\par \par He has been doing well.  Initiated on ciprofloxacin, fluconazole x 2 weeks, and recommended prostate MRI. He is still on flomax. He stopped trospium and finasteride as he did not think they were helping (he thought trospium made him worse)\par PSA 1/2021 - 0.66\par

## 2022-07-08 ENCOUNTER — APPOINTMENT (OUTPATIENT)
Dept: MRI IMAGING | Facility: CLINIC | Age: 57
End: 2022-07-08

## 2022-07-11 ENCOUNTER — APPOINTMENT (OUTPATIENT)
Dept: MRI IMAGING | Facility: CLINIC | Age: 57
End: 2022-07-11

## 2022-07-11 ENCOUNTER — RESULT REVIEW (OUTPATIENT)
Age: 57
End: 2022-07-11

## 2022-07-11 PROCEDURE — A9585: CPT | Mod: JW

## 2022-07-11 PROCEDURE — 76498P: CUSTOM

## 2022-07-11 PROCEDURE — 72197 MRI PELVIS W/O & W/DYE: CPT

## 2022-07-14 ENCOUNTER — APPOINTMENT (OUTPATIENT)
Dept: UROLOGY | Facility: CLINIC | Age: 57
End: 2022-07-14

## 2022-07-14 VITALS
OXYGEN SATURATION: 98 % | RESPIRATION RATE: 15 BRPM | WEIGHT: 163 LBS | SYSTOLIC BLOOD PRESSURE: 143 MMHG | TEMPERATURE: 97.8 F | HEIGHT: 70 IN | HEART RATE: 85 BPM | BODY MASS INDEX: 23.34 KG/M2 | DIASTOLIC BLOOD PRESSURE: 83 MMHG

## 2022-07-14 PROCEDURE — 99214 OFFICE O/P EST MOD 30 MIN: CPT

## 2022-07-14 RX ORDER — TROSPIUM CHLORIDE 20 MG/1
20 TABLET, FILM COATED ORAL
Qty: 90 | Refills: 3 | Status: DISCONTINUED | COMMUNITY
Start: 2022-06-13 | End: 2022-07-14

## 2022-07-14 RX ORDER — CIPROFLOXACIN HYDROCHLORIDE 500 MG/1
500 TABLET, FILM COATED ORAL TWICE DAILY
Qty: 28 | Refills: 0 | Status: DISCONTINUED | COMMUNITY
Start: 2022-05-03 | End: 2022-07-14

## 2022-07-14 RX ORDER — DOXYCYCLINE HYCLATE 100 MG/1
100 TABLET ORAL
Qty: 28 | Refills: 0 | Status: DISCONTINUED | COMMUNITY
Start: 2022-06-07 | End: 2022-07-14

## 2022-07-14 RX ORDER — FINASTERIDE 5 MG/1
5 TABLET, FILM COATED ORAL DAILY
Qty: 90 | Refills: 2 | Status: DISCONTINUED | COMMUNITY
Start: 2022-05-10 | End: 2022-07-14

## 2022-07-14 RX ORDER — FLUCONAZOLE 100 MG/1
100 TABLET ORAL TWICE DAILY
Qty: 28 | Refills: 0 | Status: DISCONTINUED | COMMUNITY
Start: 2022-06-09 | End: 2022-07-14

## 2022-07-14 RX ORDER — SULFAMETHOXAZOLE AND TRIMETHOPRIM 400; 80 MG/1; MG/1
400-80 TABLET ORAL TWICE DAILY
Qty: 6 | Refills: 0 | Status: DISCONTINUED | COMMUNITY
Start: 2022-05-20 | End: 2022-07-14

## 2022-07-14 RX ORDER — OXYCODONE 5 MG/1
5 TABLET ORAL
Qty: 7 | Refills: 0 | Status: DISCONTINUED | COMMUNITY
Start: 2022-05-20 | End: 2022-07-14

## 2022-07-14 RX ORDER — OXYBUTYNIN CHLORIDE 5 MG/1
5 TABLET ORAL
Qty: 60 | Refills: 0 | Status: DISCONTINUED | COMMUNITY
Start: 2022-03-30 | End: 2022-07-14

## 2022-07-14 NOTE — HISTORY OF PRESENT ILLNESS
[FreeTextEntry1] : He is a 57-year-old man who is seen today for urinary symptoms.  He has a rather complicated history summarized below.  Since he was started on Cipro daily his symptoms have significantly subsided.  He is on tamsulosin at this time.  Residual urine volume today was minimal.  Nocturia has improved to about every 2 hours.  He is following closely with infectious disease.  Residual urine volume today was minimal and the bladder wall did not appear very thickened on office ultrasound.\par \par He initially presented to the hospital with gross hematuria and was found to have HIV positive.  He went to the operating room in March 2022 for cystoscopy bladder biopsy and left-sided ureteroscopy.  Blood was seen coming from the left side.  No obvious lesions were noted.  Urine cytology was negative.  Stent has been removed.  Urine cultures have shown yeast.  He has been given Diflucan and also antibiotics.  He returned for cystoscopy and clot evacuation again in May 2022.  No obvious lesions were noted.  CT scan showed in May 2022 clot within the bladder, thickened bladder wall and bilateral kidney stones.  MRI of the prostate showed a 24 g prostate with no obvious lesions with inflammatory changes and thickened bladder wall.  He is no longer on trospium and finasteride.  PSA level was 0.66 previously.

## 2022-07-14 NOTE — ASSESSMENT
[FreeTextEntry1] : His urinary symptoms have improved significantly in the recent past.  He will finish course of prophylactic Cipro.  Urine studies will be repeated.  His laboratory values and imaging studies were discussed with him.  He is not symptomatic with kidney stones at this time.  He no longer has gross hematuria.  Future treatment options for him include considering daily tadalafil for urinary frequency and urgency.  Also bladder cocktail installations can be considered.  Rarely Elmiron has been shown to be effective for bladder pain but given his recent improvement in his symptoms, he would rather hold off on further treatment options at this time.  Urine culture will be repeated.

## 2022-07-14 NOTE — PHYSICAL EXAM
[General Appearance - Well Developed] : well developed [General Appearance - Well Nourished] : well nourished [Normal Appearance] : normal appearance [Well Groomed] : well groomed [General Appearance - In No Acute Distress] : no acute distress [Abdomen Soft] : soft [Abdomen Tenderness] : non-tender [Costovertebral Angle Tenderness] : no ~M costovertebral angle tenderness [FreeTextEntry1] : Bladder not distended [] : no respiratory distress [Respiration, Rhythm And Depth] : normal respiratory rhythm and effort [Exaggerated Use Of Accessory Muscles For Inspiration] : no accessory muscle use [Oriented To Time, Place, And Person] : oriented to person, place, and time [Affect] : the affect was normal [Mood] : the mood was normal [Not Anxious] : not anxious

## 2022-07-25 NOTE — PHYSICAL EXAM
[Normal Appearance] : normal appearance [Abdomen Tenderness] : non-tender [Costovertebral Angle Tenderness] : no ~M costovertebral angle tenderness [Urethral Meatus] : meatus normal [Penis Abnormality] : normal uncircumcised penis [Testes Tenderness] : no tenderness of the testes [Testes Mass (___cm)] : there were no testicular masses [No Prostate Nodules] : no prostate nodules [] : no rash [Edema] : no peripheral edema [Exaggerated Use Of Accessory Muscles For Inspiration] : no accessory muscle use

## 2022-07-26 ENCOUNTER — APPOINTMENT (OUTPATIENT)
Dept: UROLOGY | Facility: CLINIC | Age: 57
End: 2022-07-26

## 2022-07-26 VITALS
WEIGHT: 163 LBS | HEART RATE: 89 BPM | SYSTOLIC BLOOD PRESSURE: 106 MMHG | BODY MASS INDEX: 23.34 KG/M2 | TEMPERATURE: 97.6 F | DIASTOLIC BLOOD PRESSURE: 75 MMHG | HEIGHT: 70 IN | OXYGEN SATURATION: 98 % | RESPIRATION RATE: 16 BRPM

## 2022-07-26 PROCEDURE — 99213 OFFICE O/P EST LOW 20 MIN: CPT

## 2022-07-26 NOTE — ASSESSMENT
[FreeTextEntry1] : Mr Bo is a 57 y.o. M with recent dx HIV who is s/p TURBT and left URS (for hematuria) s/p stent removal, complicated by presumed prostatitis / pelvic pain / hematuria, s/p cysto clot evac 5/23/2022. Tissue sent for viral staining - negative for CMV, adenovirus, BK. Etiology of hematuria still unclear but has resolved. Initial bladder biopsy negative. Kidney cytology negative, renal tissue biopsy non diagnostic. No masses. His symptoms currently are consistent with prostatitis, improved with 30 days of ciprofloxacin. Still with intermittent LUTS. On flomax. Feels symptoms were worse with anticholinergic.\par - Azo prn\par - Cranberry extract\par - Toradol prn for pain. Last Cr 1.4\par - Hydroxyzine 10mg qhs, titrate to 20mg qhs for ?IC / BPS\par - Continue tamsulosin\par - TTM 2 weeks\par

## 2022-07-26 NOTE — HISTORY OF PRESENT ILLNESS
[FreeTextEntry1] : KATHRYN MALIN is a 57 year M who presents today for follow-up.\par \par To review, he is a 57 y.o. male with recent dx of HIV who was admitted with gross hematuria. He was taken to the OR 3/26/2022 for cystoscopy, bladder biopsy, and diagnostic LEFT URS with stent placement.  During the surgery, he had blood emanating from the left ureteral orifice.  The left collecting system was friable, but there was no discrete lesion. Cytology was negative. Stent was removed 4/26/2022. At the time of his cystoscopy, his urine was cloudy and he was started on bactrim. His urine culture was positive for yeast and he was started on diflucan. He reports starting several days before the stent removal, he had perineal pain, as well as dysuria.  He then experienced gross hematuria again and was taken to the OR for cystoscopy / clot evacuation on 5/23/2022. No bladder mass was visualized, but the posterior bladder mucosa was friable, which was fulgurated.\par \par CT A/P 5/20/2022\par IMPRESSION:\par Large clot within the bladder lumen. Wall thickening along the left lateral aspect of the bladder, likely adherent clot. Underlying bladder mass is not excluded. No evidence of upper tract disease. No evidence of enhancing renal mass, or hydronephrosis. Bilateral nonobstructing renal stones.\par \par MRI Prostate 7/11/2022\par Impression: \par 24cc prostate, PIRADS 2, inflammatory lesions in the PZ\par \par Seen by Dr. Wilson 7/14/2022 - symptoms nearly resolved on ciprofloxacin x 1 month\par \par 7/26/2022\par He is doing much better.  He reports the ciprofloxacin worked well.  He is still having intermittent suprapubic and perineal pain when his bladder is full and with voiding.  This is relieved with voiding.  Denies fever, chills, nausea, emesis.  No more gross hematuria.  He is still having nocturia approximately every 2 hours, and daytime frequency sometimes as often as every 30 minutes, mostly every 2 hours.  He is on the tamsulosin.  He has previously been on ciprofloxacin, fluconazole, finasteride, trospium (thought this made him worse), and Ditropan.\par PSA 1/2021 - 0.66\par

## 2022-07-28 LAB — BACTERIA UR CULT: NORMAL

## 2022-08-01 ENCOUNTER — APPOINTMENT (OUTPATIENT)
Dept: INFECTIOUS DISEASE | Facility: CLINIC | Age: 57
End: 2022-08-01

## 2022-08-01 ENCOUNTER — LABORATORY RESULT (OUTPATIENT)
Age: 57
End: 2022-08-01

## 2022-08-02 ENCOUNTER — NON-APPOINTMENT (OUTPATIENT)
Age: 57
End: 2022-08-02

## 2022-08-09 ENCOUNTER — FORM ENCOUNTER (OUTPATIENT)
Age: 57
End: 2022-08-09

## 2022-08-10 ENCOUNTER — APPOINTMENT (OUTPATIENT)
Dept: INFECTIOUS DISEASE | Facility: CLINIC | Age: 57
End: 2022-08-10

## 2022-08-10 ENCOUNTER — APPOINTMENT (OUTPATIENT)
Dept: UROLOGY | Facility: CLINIC | Age: 57
End: 2022-08-10

## 2022-08-10 VITALS
HEART RATE: 90 BPM | WEIGHT: 170 LBS | HEIGHT: 70 IN | OXYGEN SATURATION: 97 % | SYSTOLIC BLOOD PRESSURE: 123 MMHG | BODY MASS INDEX: 24.34 KG/M2 | DIASTOLIC BLOOD PRESSURE: 83 MMHG | TEMPERATURE: 96.4 F

## 2022-08-10 PROCEDURE — 99442: CPT

## 2022-08-10 PROCEDURE — 99214 OFFICE O/P EST MOD 30 MIN: CPT

## 2022-08-10 NOTE — HISTORY OF PRESENT ILLNESS
[FreeTextEntry1] : 57 year ol old with HIV , low level CMV viremia\par Episodes of ehamturia\par \par Following with urology\par \par Tolerating biktarvy.\par No change in visions- rarely  sees a small brown spot. \par \par He is not sexually active. \par \par Notes improved energy.\par Has gained 20 pounds.\par \par \par \par

## 2022-08-10 NOTE — ASSESSMENT
[FreeTextEntry1] : 56 year old with previous thyroid cancer s/p surgery recently admitted with weight loss and heamturia\par Diagnosed with HIV/AIDS\par \par 1) HIV: initial viral load of 6 million\par CD4 of 18 at diagnosis- most recently 125\par \par Toxo IgG negative, CMV IgG positive, Hep B NR\par HLA  negative\par Repeat VL at 115\par \par Continue biktarvy\par Bactrim for pcp prophylaxis\par \par 2) Syphillis- late latent\par s/p 3 pcn injections\par \par 3) HSV-2\par s/p valtrex for 10 days\par \par 4) Low level cmv viremia\par ? due to stress/ immunosuppression\par Repeat today\par \par 4) Anemia\par Improved\par \par 5) Hematuria\par S/p cystoscopy\par Urology follow up\par \par Repeat funigtell\par \par RTC in 3 months\par \par \par \par \par 6) HCM\par Will need Hep B vaccine\par Will need prevnar, pneumovax, meanctra\par Will need anal pap\par Will repeat quantiferon.

## 2022-08-11 LAB
25(OH)D3 SERPL-MCNC: 20.9 NG/ML
ALBUMIN SERPL ELPH-MCNC: 3.9 G/DL
ALP BLD-CCNC: 117 U/L
ALT SERPL-CCNC: 13 U/L
ANION GAP SERPL CALC-SCNC: 11 MMOL/L
APPEARANCE: ABNORMAL
AST SERPL-CCNC: 14 U/L
BACTERIA: NEGATIVE
BASOPHILS # BLD AUTO: 0.01 K/UL
BASOPHILS NFR BLD AUTO: 0.2 %
BILIRUB SERPL-MCNC: 0.2 MG/DL
BILIRUBIN URINE: NEGATIVE
BLOOD URINE: ABNORMAL
BUN SERPL-MCNC: 34 MG/DL
C TRACH RRNA SPEC QL NAA+PROBE: NOT DETECTED
CALCIUM SERPL-MCNC: 10.7 MG/DL
CD3 CELLS # BLD: 371 /UL
CD3 CELLS NFR BLD: 84 %
CD3+CD4+ CELLS # BLD: 125 /UL
CD3+CD4+ CELLS NFR BLD: 29 %
CD3+CD4+ CELLS/CD3+CD8+ CLL SPEC: 0.5 RATIO
CD3+CD8+ CELLS # SPEC: 248 /UL
CD3+CD8+ CELLS NFR BLD: 57 %
CHLORIDE SERPL-SCNC: 106 MMOL/L
CHOLEST SERPL-MCNC: 210 MG/DL
CMV DNA SPEC QL NAA+PROBE: NOT DETECTED IU/ML
CMVPCR LOG: NOT DETECTED LOG10IU/ML
CO2 SERPL-SCNC: 21 MMOL/L
COLOR: YELLOW
CREAT SERPL-MCNC: 1.78 MG/DL
CYSTATIN C SERPL-MCNC: 2.04 MG/L
EGFR: 43 ML/MIN/1.73M2
EOSINOPHIL # BLD AUTO: 0.09 K/UL
EOSINOPHIL NFR BLD AUTO: 1.9 %
ESTIMATED AVERAGE GLUCOSE: 103 MG/DL
GFR/BSA.PRED SERPLBLD CYS-BASED-ARV: 30 ML/MIN/1.73M2
GLUCOSE QUALITATIVE U: NEGATIVE
GLUCOSE SERPL-MCNC: 102 MG/DL
HBA1C MFR BLD HPLC: 5.2 %
HCT VFR BLD CALC: 39.5 %
HDLC SERPL-MCNC: 37 MG/DL
HGB BLD-MCNC: 12.8 G/DL
HIV1 RNA # SERPL NAA+PROBE: 132
HIV1 RNA # SERPL NAA+PROBE: ABNORMAL
HYALINE CASTS: 0 /LPF
IMM GRANULOCYTES NFR BLD AUTO: 0.4 %
KETONES URINE: NEGATIVE
LDLC SERPL CALC-MCNC: 146 MG/DL
LEUKOCYTE ESTERASE URINE: ABNORMAL
LYMPHOCYTES # BLD AUTO: 0.5 K/UL
LYMPHOCYTES NFR BLD AUTO: 10.6 %
MAN DIFF?: NORMAL
MCHC RBC-ENTMCNC: 31.5 PG
MCHC RBC-ENTMCNC: 32.4 GM/DL
MCV RBC AUTO: 97.3 FL
MICROSCOPIC-UA: NORMAL
MONOCYTES # BLD AUTO: 0.45 K/UL
MONOCYTES NFR BLD AUTO: 9.6 %
N GONORRHOEA RRNA SPEC QL NAA+PROBE: NOT DETECTED
NEUTROPHILS # BLD AUTO: 3.63 K/UL
NEUTROPHILS NFR BLD AUTO: 77.3 %
NITRITE URINE: NEGATIVE
NONHDLC SERPL-MCNC: 173 MG/DL
PH URINE: 6
PLATELET # BLD AUTO: 244 K/UL
POTASSIUM SERPL-SCNC: 4.1 MMOL/L
PROT SERPL-MCNC: 7.5 G/DL
PROTEIN URINE: ABNORMAL
RBC # BLD: 4.06 M/UL
RBC # FLD: 12.6 %
RED BLOOD CELLS URINE: 38 /HPF
SODIUM SERPL-SCNC: 138 MMOL/L
SOURCE AMPLIFICATION: NORMAL
SPECIFIC GRAVITY URINE: 1.01
SQUAMOUS EPITHELIAL CELLS: 0 /HPF
T PALLIDUM AB SER QL IA: POSITIVE
TRIGL SERPL-MCNC: 137 MG/DL
UROBILINOGEN URINE: NORMAL
VIRAL LOAD INTERP: NORMAL
VIRAL LOAD LOG: 2.12
WBC # FLD AUTO: 4.7 K/UL
WHITE BLOOD CELLS URINE: >720 /HPF

## 2022-08-12 ENCOUNTER — NON-APPOINTMENT (OUTPATIENT)
Age: 57
End: 2022-08-12

## 2022-08-12 RX ORDER — HYDROXYZINE HYDROCHLORIDE 10 MG/1
10 TABLET ORAL
Qty: 110 | Refills: 0 | Status: DISCONTINUED | COMMUNITY
Start: 2022-07-26 | End: 2022-08-12

## 2022-08-12 RX ORDER — FOLIC ACID 1 MG/1
1 TABLET ORAL
Qty: 30 | Refills: 0 | Status: DISCONTINUED | COMMUNITY
Start: 2022-03-30 | End: 2022-08-12

## 2022-08-12 RX ORDER — CIPROFLOXACIN HYDROCHLORIDE 500 MG/1
500 TABLET, FILM COATED ORAL TWICE DAILY
Qty: 56 | Refills: 0 | Status: DISCONTINUED | COMMUNITY
Start: 2022-06-17 | End: 2022-08-12

## 2022-08-12 RX ORDER — PANTOPRAZOLE 40 MG/1
40 TABLET, DELAYED RELEASE ORAL
Qty: 60 | Refills: 0 | Status: DISCONTINUED | COMMUNITY
Start: 2022-03-30 | End: 2022-08-12

## 2022-08-12 RX ORDER — ALPRAZOLAM 0.25 MG/1
0.25 TABLET ORAL
Qty: 30 | Refills: 0 | Status: DISCONTINUED | COMMUNITY
Start: 2022-01-25 | End: 2022-08-12

## 2022-08-12 RX ORDER — KETOROLAC TROMETHAMINE 10 MG/1
10 TABLET, FILM COATED ORAL EVERY 6 HOURS
Qty: 20 | Refills: 0 | Status: DISCONTINUED | COMMUNITY
Start: 2022-07-26 | End: 2022-08-12

## 2022-08-12 RX ORDER — ALLOPURINOL 100 MG/1
100 TABLET ORAL
Qty: 30 | Refills: 0 | Status: DISCONTINUED | COMMUNITY
Start: 2022-03-30 | End: 2022-08-12

## 2022-08-16 LAB — FUNGITELL QUANTITATIVE VALUE: 193 PG/ML

## 2022-09-06 ENCOUNTER — APPOINTMENT (OUTPATIENT)
Dept: UROLOGY | Facility: CLINIC | Age: 57
End: 2022-09-06

## 2022-09-06 VITALS
RESPIRATION RATE: 16 BRPM | BODY MASS INDEX: 24.34 KG/M2 | WEIGHT: 170 LBS | SYSTOLIC BLOOD PRESSURE: 104 MMHG | HEART RATE: 88 BPM | DIASTOLIC BLOOD PRESSURE: 71 MMHG | OXYGEN SATURATION: 97 % | TEMPERATURE: 97.3 F | HEIGHT: 70 IN

## 2022-09-06 DIAGNOSIS — Z12.5 ENCOUNTER FOR SCREENING FOR MALIGNANT NEOPLASM OF PROSTATE: ICD-10-CM

## 2022-09-06 PROCEDURE — 99214 OFFICE O/P EST MOD 30 MIN: CPT

## 2022-09-06 RX ORDER — MIRABEGRON 25 MG/1
25 TABLET, FILM COATED, EXTENDED RELEASE ORAL
Qty: 30 | Refills: 5 | Status: ACTIVE | COMMUNITY
Start: 2022-09-06 | End: 1900-01-01

## 2022-09-06 NOTE — ASSESSMENT
[FreeTextEntry1] : Mr Bo is a 57 y.o. M with recent dx HIV who is s/p TURBT and left URS (for hematuria) s/p stent removal, complicated by presumed prostatitis / pelvic pain / hematuria, s/p cysto clot evac 5/23/2022. Tissue sent for viral staining - negative for CMV, adenovirus, BK. Etiology of hematuria still unclear but has resolved. Initial bladder biopsy negative. Kidney cytology negative, renal tissue biopsy non diagnostic. No masses. His symptoms currently are consistent with prostatitis, improved with 30 days of ciprofloxacin. Still with intermittent LUTS. On flomax. Feels symptoms were worse with anticholinergic.\par - Azo prn\par - Cranberry extract\par - Toradol prn for pain. Last Cr 1.4\par - Continue flomax\par - Discussed a trial of mirabegron. Side effects discussed. He will start this. He is inquiring about elmiron. Was given a prescription by Dr. Wilson. Discussed that if he is not seeing benefit from mirabegron after several weeks, he should transition to elmiron\par - f/u with Dr Bocanegra as scheduled 10/31/2022\par - RV 1 month

## 2022-09-06 NOTE — HISTORY OF PRESENT ILLNESS
[FreeTextEntry1] : KATHRYN MALIN is a 57 year M who presents today for follow-up.\par \par To review, he is a 57 y.o. male with recent dx of HIV who was admitted with gross hematuria. He was taken to the OR 3/26/2022 for cystoscopy, bladder biopsy, and diagnostic LEFT URS with stent placement.  During the surgery, he had blood emanating from the left ureteral orifice.  The left collecting system was friable, but there was no discrete lesion. Cytology was negative. Stent was removed 4/26/2022. At the time of his cystoscopy, his urine was cloudy and he was started on bactrim. His urine culture was positive for yeast and he was started on diflucan. He reports starting several days before the stent removal, he had perineal pain, as well as dysuria.  He then experienced gross hematuria again and was taken to the OR for cystoscopy / clot evacuation on 5/23/2022. No bladder mass was visualized, but the posterior bladder mucosa was friable, which was fulgurated.\par \par CT A/P 5/20/2022\par IMPRESSION:\par Large clot within the bladder lumen. Wall thickening along the left lateral aspect of the bladder, likely adherent clot. Underlying bladder mass is not excluded. No evidence of upper tract disease. No evidence of enhancing renal mass, or hydronephrosis. Bilateral nonobstructing renal stones.\par \par MRI Prostate 7/11/2022\par Impression: \par 24cc prostate, PIRADS 2, inflammatory lesions in the PZ\par \par Seen by Dr. Wilson 7/14/2022 - symptoms nearly resolved on ciprofloxacin x 1 month\par \par 7/26/2022\par He is doing much better.  He reports the ciprofloxacin worked well.  He is still having intermittent suprapubic and perineal pain when his bladder is full and with voiding.  This is relieved with voiding.  Denies fever, chills, nausea, emesis.  No more gross hematuria.  He is still having nocturia approximately every 2 hours, and daytime frequency sometimes as often as every 30 minutes, mostly every 2 hours.  He is on the tamsulosin.  He has previously been on ciprofloxacin, fluconazole, finasteride, trospium (thought this made him worse), and Ditropan.\par PSA 1/2021 - 0.66\par \par 9/5/2022\par His pain is intermittent, but is currently improved.  For the past week, he has had mild pain.  He is still having nocturia every 2 hours.  He is only taking tamsulosin.  He thinks ginger ale may improve his urinary symptoms.  He was given a prescription for Elmiron by Dr. Wilson, but due to side effects he read about, he has not started this.\par \par

## 2022-09-07 LAB — PSA SERPL-MCNC: 3.01 NG/ML

## 2022-09-12 ENCOUNTER — APPOINTMENT (OUTPATIENT)
Dept: INFECTIOUS DISEASE | Facility: CLINIC | Age: 57
End: 2022-09-12

## 2022-09-12 ENCOUNTER — NON-APPOINTMENT (OUTPATIENT)
Age: 57
End: 2022-09-12

## 2022-09-20 ENCOUNTER — APPOINTMENT (OUTPATIENT)
Dept: OTOLARYNGOLOGY | Facility: CLINIC | Age: 57
End: 2022-09-20

## 2022-09-20 VITALS
DIASTOLIC BLOOD PRESSURE: 68 MMHG | HEIGHT: 70 IN | HEART RATE: 69 BPM | BODY MASS INDEX: 24.91 KG/M2 | WEIGHT: 174 LBS | OXYGEN SATURATION: 96 % | SYSTOLIC BLOOD PRESSURE: 118 MMHG

## 2022-09-20 PROCEDURE — 99214 OFFICE O/P EST MOD 30 MIN: CPT

## 2022-09-20 NOTE — HISTORY OF PRESENT ILLNESS
[de-identified] : Mr. Bo presents for follow up. He is s/p excisional biopsy of left level 4 lymph node as well as a total thyroidectomy on 1/27/2022  for pathological left lower cervical adenopathy, suspicious for lymphoproliferative and PTCA.\par ? On Levothyroxine 125 mcg daily managed by  Dr. Domingo (Endocrinologist).  \par \par Pt in fu w Dr Domingo.\par \par No SOTO planned post thyroidecotmy as low/int risk disease.  The L level 4 node was fekt ultimately to be benign. He did see heme onc and was dc. \par \par \par

## 2022-10-18 ENCOUNTER — APPOINTMENT (OUTPATIENT)
Dept: UROLOGY | Facility: CLINIC | Age: 57
End: 2022-10-18

## 2022-10-18 VITALS
DIASTOLIC BLOOD PRESSURE: 74 MMHG | BODY MASS INDEX: 25.77 KG/M2 | RESPIRATION RATE: 12 BRPM | OXYGEN SATURATION: 98 % | TEMPERATURE: 98.8 F | WEIGHT: 180 LBS | HEART RATE: 74 BPM | HEIGHT: 70 IN | SYSTOLIC BLOOD PRESSURE: 129 MMHG

## 2022-10-18 PROCEDURE — 99213 OFFICE O/P EST LOW 20 MIN: CPT

## 2022-10-18 NOTE — HISTORY OF PRESENT ILLNESS
[FreeTextEntry1] : KATHRYN MALIN is a 57 year M who presents today for follow-up.\par \par To review, he is a 57 y.o. male with recent dx of HIV who was admitted with gross hematuria. He was taken to the OR 3/26/2022 for cystoscopy, bladder biopsy, and diagnostic LEFT URS with stent placement.  During the surgery, he had blood emanating from the left ureteral orifice.  The left collecting system was friable, but there was no discrete lesion. Cytology was negative. Stent was removed 4/26/2022. At the time of his cystoscopy, his urine was cloudy and he was started on bactrim. His urine culture was positive for yeast and he was started on diflucan. He reports starting several days before the stent removal, he had perineal pain, as well as dysuria.  He then experienced gross hematuria again and was taken to the OR for cystoscopy / clot evacuation on 5/23/2022. No bladder mass was visualized, but the posterior bladder mucosa was friable, which was fulgurated.\par \par CT A/P 5/20/2022\par IMPRESSION:\par Large clot within the bladder lumen. Wall thickening along the left lateral aspect of the bladder, likely adherent clot. Underlying bladder mass is not excluded. No evidence of upper tract disease. No evidence of enhancing renal mass, or hydronephrosis. Bilateral nonobstructing renal stones.\par \par MRI Prostate 7/11/2022\par Impression: \par 24cc prostate, PIRADS 2, inflammatory lesions in the PZ\par \par 7/26/2022\par He is doing much better.  He reports the ciprofloxacin worked well.  He is still having intermittent suprapubic and perineal pain when his bladder is full and with voiding.  This is relieved with voiding.  Denies fever, chills, nausea, emesis.  No more gross hematuria.  He is still having nocturia approximately every 2 hours, and daytime frequency sometimes as often as every 30 minutes, mostly every 2 hours.  He is on the tamsulosin.  He has previously been on ciprofloxacin, fluconazole, finasteride, trospium (thought this made him worse), and Ditropan.\par PSA 1/2021 - 0.66\par \par 9/5/2022\par His pain is intermittent, but is currently improved.  For the past week, he has had mild pain.  He is still having nocturia every 2 hours.  He is only taking tamsulosin.  He thinks ginger ale may improve his urinary symptoms.  He was given a prescription for Elmiron by Dr. Wilson, but due to side effects he read about, he has not started this.\par \par 10/17/2022\par Things are improving. Nocturia is every 1.5 - 2 hours. Has intermittent urethral pain / irritation that is relieved with voiding. Tried mirabegron which caused hematuria and he stopped. Did not take elmiron due to side effect profile. He is on tamsulosin.  He has previously been on ciprofloxacin, fluconazole, finasteride, trospium (thought this made him worse), Ditropan, and mirabegron.\par \par

## 2022-10-18 NOTE — PHYSICAL EXAM
[Normal Appearance] : normal appearance [Well Groomed] : well groomed [Abdomen Tenderness] : non-tender [Costovertebral Angle Tenderness] : no ~M costovertebral angle tenderness [Urethral Meatus] : meatus normal [Epididymis] : the epididymides were normal [Testes Tenderness] : no tenderness of the testes [Testes Mass (___cm)] : there were no testicular masses [] : no rash

## 2022-10-18 NOTE — ASSESSMENT
[FreeTextEntry1] : Mr Bo is a 57 y.o. M with recent dx HIV who is s/p TURBT and left URS (for hematuria) s/p stent removal, complicated by presumed prostatitis / pelvic pain / hematuria, s/p cysto clot evac 5/23/2022. Tissue sent for viral staining - negative for CMV, adenovirus, BK. Etiology of hematuria still unclear but has resolved. Initial bladder biopsy negative. Kidney cytology negative, renal tissue biopsy non diagnostic. No masses. His symptoms currently are consistent with prostatitis, improved with 30 days of ciprofloxacin. Still with intermittent LUTS, pelvic pain. On flomax. Feels symptoms were worse with anticholinergic. Tried mirabegron without improvement. \par - UCx\par - Azo prn\par - Cranberry extract\par - Continue flomax\par - f/u with Dr Bocanegra as scheduled 10/31/2022\par - RV 2 month

## 2022-10-21 LAB — BACTERIA UR CULT: ABNORMAL

## 2022-10-24 ENCOUNTER — LABORATORY RESULT (OUTPATIENT)
Age: 57
End: 2022-10-24

## 2022-10-24 ENCOUNTER — APPOINTMENT (OUTPATIENT)
Dept: INFECTIOUS DISEASE | Facility: CLINIC | Age: 57
End: 2022-10-24

## 2022-10-31 ENCOUNTER — APPOINTMENT (OUTPATIENT)
Dept: UROLOGY | Facility: CLINIC | Age: 57
End: 2022-10-31

## 2022-10-31 VITALS — DIASTOLIC BLOOD PRESSURE: 81 MMHG | HEART RATE: 93 BPM | SYSTOLIC BLOOD PRESSURE: 126 MMHG

## 2022-10-31 DIAGNOSIS — A53.9 SYPHILIS, UNSPECIFIED: ICD-10-CM

## 2022-10-31 PROCEDURE — 88112 CYTOPATH CELL ENHANCE TECH: CPT | Mod: 26

## 2022-10-31 PROCEDURE — 99215 OFFICE O/P EST HI 40 MIN: CPT

## 2022-10-31 RX ORDER — METHENAMINE, SODIUM PHOSPHATE, MONOBASIC, MONOHYDRATE, PHENYL SALICYLATE, METHYLENE BLUE, AND HYOSCYAMINE SULFATE 118; 40.8; 36; 10; .12 MG/1; MG/1; MG/1; MG/1; MG/1
118 CAPSULE ORAL
Qty: 50 | Refills: 3 | Status: ACTIVE | COMMUNITY
Start: 2022-10-31 | End: 1900-01-01

## 2022-10-31 NOTE — ASSESSMENT
[FreeTextEntry1] : Red is a 58 yo with hx of HIV, kidney stones and gross hematuria. Reports urinary frequency and urgency ongoing for the last 6 months not relieved with OAB medications previously prescribed.  Discussed intermittent use of Uribel or Pyridium when symptoms are worse, increasing fluid intake and avoiding constipation. Discussed trying these methods first prior to proceeding with intravesical instillations. Discussed risk of catheters with patient and concern for causing infection/ bleeding. Discussed persistent candiduria and will discuss this with patient ID doctor. Will send urine for cytology and fungal culture.\par \par Spoke with Dr. Rangel, who believes that the Candida is probably a  colonizer. I'm concerned that as no other pathologic process has been identified in the face of a thickened and erythematous bladder wall, significant pain with bladder filling, and intermittent gross hematuria, it would be reasonable to treat for candiduria and identify a clinical response. Discussed wiuth Mr. Bo by phone. Suggested Rx for Diflucan 100mg BID x 2 weeks (eGFR <50). The patient will return in 4 weeks for reevaluation.

## 2022-10-31 NOTE — HISTORY OF PRESENT ILLNESS
[FreeTextEntry1] : Red is a 58 yo with hx of kidney stones and HIV. He reports urinary frequency and urgency. Daytime voids q1hour and nocturia q1hour, this has been ongoing since April 2022. He was prescribed Myrbetriq 25mg which he took for about 3-4 days but then stopped because he had developed gross hematuria. He was also prescribed Elmiron but decided not to take it after reading the side effect profile. He reports that he has been having stressors at home and that’s when he noticed the symptoms worsened. He has previously been prescribed Trospium, Ditropan and Tamsulosin but stopped taking these medications due to side effect profile of each. He is currently taking Flomax at night time. Previous ultrasound reviewed in which there was bladder wall thickening noted, biopsy performed which was benign. He also has a hx of recurrent candidal bladder infection and was currently prescribed Fluconazole 100mg x 5 days on 10/21/2022. He reports that he is not sure if he took the course as prescribed because he was worried that this medication was leading to his hematuria. \par \par Denies smoking, hx of ketamine use. He is circumcised.

## 2022-10-31 NOTE — PHYSICAL EXAM
[General Appearance - Well Developed] : well developed [General Appearance - Well Nourished] : well nourished [Normal Appearance] : normal appearance [Edema] : no peripheral edema [Exaggerated Use Of Accessory Muscles For Inspiration] : no accessory muscle use [Abdomen Soft] : soft [Abdomen Tenderness] : non-tender [Normal Station and Gait] : the gait and station were normal for the patient's age [Skin Color & Pigmentation] : normal skin color and pigmentation [] : no rash [No Focal Deficits] : no focal deficits [Oriented To Time, Place, And Person] : oriented to person, place, and time [Affect] : the affect was normal

## 2022-11-01 ENCOUNTER — FORM ENCOUNTER (OUTPATIENT)
Age: 57
End: 2022-11-01

## 2022-11-02 ENCOUNTER — APPOINTMENT (OUTPATIENT)
Dept: INFECTIOUS DISEASE | Facility: CLINIC | Age: 57
End: 2022-11-02

## 2022-11-02 VITALS
DIASTOLIC BLOOD PRESSURE: 78 MMHG | HEIGHT: 70 IN | HEART RATE: 91 BPM | SYSTOLIC BLOOD PRESSURE: 114 MMHG | TEMPERATURE: 97.6 F | BODY MASS INDEX: 25.77 KG/M2 | OXYGEN SATURATION: 98 % | WEIGHT: 180 LBS

## 2022-11-02 DIAGNOSIS — B00.9 HERPESVIRAL INFECTION, UNSPECIFIED: ICD-10-CM

## 2022-11-02 PROCEDURE — 36415 COLL VENOUS BLD VENIPUNCTURE: CPT

## 2022-11-02 PROCEDURE — 99214 OFFICE O/P EST MOD 30 MIN: CPT | Mod: 25

## 2022-11-02 NOTE — ASSESSMENT
[FreeTextEntry1] : 56 year old with previous thyroid cancer s/p surgery recently admitted with weight loss and heamturia\par Diagnosed with HIV/AIDS\par \par 1) HIV: initial viral load of 6 million\par CD4 of 18 at diagnosis- most recently 138 (29%)\par \par Toxo IgG negative, CMV IgG positive, Hep B NR\par HLA  negative\par Repeat VL still detected- but at a low level\par \par Continue biktarvy\par Bactrim for pcp prophylaxis\par \par Check gennosure archive\par \par 2) Syphilis- late latent\par s/p 3 pcn injections\par \par 3) HSV-2\par treated in the past\par \par 4) Low level cmv viremia\par ? due to stress/ immunosuppression\par Repeat today\par \par 4) Anemia\par Improved\par \par 5) Hematuria/ Cystitis\par \par Spoke with urology- they had a concern re candiduria. \par \par Not clear that candida is a pathogen here.  He did not have candiduria at the time of his initial hospitalization- and had changes in bladder at that time.  He has been treated with course of fluconazole for thrush in recent past  (June)\par \par I have no objection to a trial of fluconazole - but not clear that candida is a pathogen causing his symptoms or changed noted on prior cystoscopy\par S/p cystoscopy\par Urology follow up\par \par 6) CKD\par Creatinine has risen.\par He also has immune reconstituted- with about a 30 pound weight gain\par \par Nephrology referral \par  \par 6) HCM\par Will need Hep B vaccine\par Will need prevnar, pneumovax, menactra\par Will need anal pap\par Will repeat quantiferon. \par \par Recommend influeza vaccine and covid boost\par

## 2022-11-02 NOTE — HISTORY OF PRESENT ILLNESS
[FreeTextEntry1] : 56 year old with HIV/AIDS, CMV viremia\par with CKD\par Unspecified cystitis\par \par Presents for follow up \par \par At the time of diagnosis, he was 148 pounds.  He is not 180. \par His baseline weight was 180-190. \par \par He notes intermittent  bladder pain and discomfort . \par Symptoms are not daily\par No associated fever\par

## 2022-11-02 NOTE — PHYSICAL EXAM
[General Appearance - Alert] : alert [General Appearance - In No Acute Distress] : in no acute distress [Extraocular Movements] : extraocular movements were intact [Outer Ear] : the ears and nose were normal in appearance [Neck Appearance] : the appearance of the neck was normal [Neck Cervical Mass (___cm)] : no neck mass was observed [] : no respiratory distress [Exaggerated Use Of Accessory Muscles For Inspiration] : no accessory muscle use [Heart Rate And Rhythm] : heart rate was normal and rhythm regular [Heart Sounds] : normal S1 and S2 [Abdomen Tenderness] : non-tender [No Palpable Adenopathy] : no palpable adenopathy [Musculoskeletal - Swelling] : no joint swelling [Nail Clubbing] : no clubbing  or cyanosis of the fingernails

## 2022-11-03 LAB — URINE CYTOLOGY: NORMAL

## 2022-11-11 ENCOUNTER — APPOINTMENT (OUTPATIENT)
Dept: NEPHROLOGY | Facility: CLINIC | Age: 57
End: 2022-11-11

## 2022-11-11 VITALS
DIASTOLIC BLOOD PRESSURE: 77 MMHG | BODY MASS INDEX: 25.83 KG/M2 | SYSTOLIC BLOOD PRESSURE: 121 MMHG | HEART RATE: 89 BPM | TEMPERATURE: 98 F | OXYGEN SATURATION: 97 % | WEIGHT: 180 LBS

## 2022-11-11 PROCEDURE — 99205 OFFICE O/P NEW HI 60 MIN: CPT

## 2022-11-15 NOTE — CONSULT LETTER
[Dear  ___] : Dear  [unfilled], [Courtesy Letter:] : I had the pleasure of seeing your patient, [unfilled], in my office today. [Please see my note below.] : Please see my note below. [Sincerely,] : Sincerely, [FreeTextEntry3] : Rj Zaragoza MD\par Nephrology\par

## 2022-11-15 NOTE — ASSESSMENT
[FreeTextEntry1] : Mr. Bo is a 57 year old man with a recent diagnosis of HIV with some complications, and persistently elevated creatinine, now here for kidney evaluation and management.\par \par Unclear etiology of Mr. Bo's kidney disease, but certainly was present prior to diagnosis of HIV. Although we don't have labs prior to 2022, he did see a nephrologist, suggesting a lurking issue. In January 2022, his creatinine was 1.6, so clearly there was an issue already then. At the time of his HIV diagnosis, his creatinine was 1.5, though it increased to 2.7, before settling back to the 1.7-2 range. Notably as well, he had over 12 grams of proteinuria in March 2022, although at recent urine testing, it was down to <1 gram (600mg).\par \par While medication toxicity is always a concern, I am most worried about an etiology directly related to his HIV disease. He has no other known comorbid conditions that would potentially contribute to kidney disease (e.g., diabetes, hypertension, obesity, etc.), and his kidney issues predated his initiation of HIV therapy.\par \par I am favoring proceeding with a kidney biopsy for definitive diagnosis. While he may not have active inflammatory disease, and thus treatment would be largely supportive with medications to reduce proteinuria, if he has active disease, it may have therapeutic implications.\par \par Mr. Bo is uncertain about how to proceed. I agreed I will speak with his other physicians, including Dr. Man, before proceeding. Follow up plans will be pending that discussion.\par \par Recheck urine tests today. Will probably want to recheck other labs and serologies, including chemistry, SPEP/SHYANNE/KL, complement, etc.\par \par I have spent a total of 80 minutes in which >50% was spent in discussion with patient regarding kidney disease.

## 2022-11-15 NOTE — HISTORY OF PRESENT ILLNESS
[FreeTextEntry1] : Contacts:\par \par 	Dr. Demetrius Man (ID)\par \par -------------------------------------------------------------------------------\par Problem List:\par 	Chronic kidney disease stage III with proteinuria\par 	History of hematuria, presumed bladder etiology\par 	HIV, diagnosed 2022\par \par -------------------------------------------------------------------------------\par HPI: [Vay-ano]\par Mr. Bo is a 57 year old man with a recent diagnosis of HIV with some complications, and persistently elevated creatinine, now here for kidney evaluation and management.\par \par For the last 2 years, Mr. Bo has been told that his kidney function was a little off (by his PCP). About a year and a half ago, he saw a nephrologist. He was told that his "kidney function was perfect", and his brief issues were attributed to NSAIDs or some other transient event.\par \par In January 2022, he had thyroid nodules removed. At tht time, his serum creatinine was 1.6, though it did not merit any mention or discussion. In March 2022, he was admitted with gross hematuria and clotting with blockage, for which he was admitted. It was during that admission that he was diagnosed with HIV.\par \par He had prostate and bladder biopsy, though reportedly normal. He briefly needed a stent. The ultimate cause of the gross hematuria is unclear, though Mr. Bo notes that he was told his bladder was friable.\par \par He now has quite an urge to urinate and goes frequently.\par \par In retrospect, Mr. Bo notes that he's been sick for the last couple of years, with lethargy, tiredness, weight loss, etc. This feeling is now attributed to his previously undiagnosed HIV.\par \par He was down to <150 lbs, but now back up to 180 lbs. He is generally feeling quite well on his therapy and has no specific symptoms of note outside of his urinary issues. \par \par He is currently on fluconazole for presumed fungal urinary infection.\par \par \par Personally, Mr. Bo has been  for about 30 years, although he is currently going through a very "tough time right now" with his wife and family. 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.\par \par -------------------------------------------------------------------------------\par Social History:\par 	Lifelong [place];  \par 	Has two children in their 20s (son is a physical therapist; daughter finishing PA training)\par 	Works in auto repair (owns a repair shop)\par 	No history of tobacco, [significant] alcohol, or illicit drug use\par \par Family History:\par 	Mother had colon cancer\par 	Father has colitis (has colostomy since age 26)\par 	No known history of renal disease, hematuria, proteinuria, ESRD, dialysis, transplant\par \par -------------------------------------------------------------------------------\par Allergies: NKDA\par \par Medications:\par 	Biktarvy\par 	Bactrim M/W/F\par 	Levothyroxine 137mcg daily\par 	Uribel \par 	\par -------------------------------------------------------------------------------\par Physical Exam:\par 	Gen: NAD, well-appearing\par 	HEENT: Supple neck\par 	Pulm: CTA\par 	CV: RRR\par 	Back: No spinal or CVA terndeness\par 	Abd: +BS, soft, nontender/nondistended\par 	UE: Warm, FROM, intact strength\par 	LE: Warm, FROM, intact strength; no edema\par 	Neuro: No focal deficits, intact gait\par 	Psych: Normal affect\par 	Skin: Warm, without rashes\par 	\par -------------------------------------------------------------------------------\par Labs/Studies: [notable]\par \par 	Creatinine Trend\par 		\par 		2022-11-02 SCr 2.00 (eGFR 38)\par 		2022-10-24 SCr 1.83 (eGFR 42; Cystatin-C 2.05, eGFR 30)\par 		2022-09-12 SCr 1.76 (eGFR 44)\par 		2022-08-01 SCr 1.78 (eGFR 43; Cystatin-C 2.04, eGFR 30)\par 		2022-05-16 SCr 1.65 (eGFR 48; Cystatin-C 1.62, eGFR 41)\par 		2022-05-02 SCr 2.74 (eGFR 26)\par 		2022-04-04 SCr 1.48 (eGFR 54)\par 		...\par 		2022-03-17 SCr 2.23 (eGFR 33)\par 		2022-03-02 SCr 1.54 (eGFR 52)\par 		...\par 		2022-01-24 SCr 1.62\par \par 	2022-10-24 UPCR 0.6 g/g\par 	2022-03-20 UPCR 12.7 g/g\par \par 	2022-10-24 U/A: protein 30, blood large, glucose negative, LE large, nitrite negative, , WBC >720, bacteria moderate\par \par 	2022-10-24 CBC: WBC 3.8 / Hgb 11.7 / plt 240\par 	2022-10-24 Lipid: chol 170, , HDL 37, \par 	2022-10-24 HbA1c 4.7\par \par 	2022-03-23 KL FLC: kappa 42.8, lambda 34.8, ratio = 1.23\par \par 	2022-03-21 Cryoglobulins negative

## 2022-11-21 ENCOUNTER — APPOINTMENT (OUTPATIENT)
Dept: ULTRASOUND IMAGING | Facility: CLINIC | Age: 57
End: 2022-11-21

## 2022-11-21 ENCOUNTER — APPOINTMENT (OUTPATIENT)
Dept: UROLOGY | Facility: CLINIC | Age: 57
End: 2022-11-21

## 2022-11-21 VITALS
DIASTOLIC BLOOD PRESSURE: 72 MMHG | RESPIRATION RATE: 17 BRPM | HEIGHT: 70 IN | WEIGHT: 180 LBS | HEART RATE: 91 BPM | TEMPERATURE: 98 F | BODY MASS INDEX: 25.77 KG/M2 | SYSTOLIC BLOOD PRESSURE: 136 MMHG

## 2022-11-21 LAB
APPEARANCE: ABNORMAL
BACTERIA: NEGATIVE
BILIRUBIN URINE: NEGATIVE
BLOOD URINE: ABNORMAL
COLOR: NORMAL
CREAT SPEC-SCNC: 38 MG/DL
CREAT SPEC-SCNC: 38 MG/DL
CREAT/PROT UR: 0.6 RATIO
GLUCOSE QUALITATIVE U: NEGATIVE
HYALINE CASTS: 2 /LPF
KETONES URINE: NEGATIVE
LEUKOCYTE ESTERASE URINE: ABNORMAL
MICROALBUMIN 24H UR DL<=1MG/L-MCNC: 6.1 MG/DL
MICROALBUMIN/CREAT 24H UR-RTO: 163 MG/G
MICROSCOPIC-UA: NORMAL
NITRITE URINE: NEGATIVE
PH URINE: 6
PROT UR-MCNC: 24 MG/DL
PROTEIN URINE: NORMAL
RED BLOOD CELLS URINE: 200 /HPF
SPECIFIC GRAVITY URINE: 1.01
SQUAMOUS EPITHELIAL CELLS: 0 /HPF
UROBILINOGEN URINE: NORMAL
WHITE BLOOD CELLS URINE: 212 /HPF

## 2022-11-21 PROCEDURE — 99215 OFFICE O/P EST HI 40 MIN: CPT

## 2022-11-21 PROCEDURE — 76775 US EXAM ABDO BACK WALL LIM: CPT

## 2022-11-21 RX ORDER — FLUCONAZOLE 100 MG/1
100 TABLET ORAL
Qty: 28 | Refills: 1 | Status: DISCONTINUED | COMMUNITY
Start: 2022-10-21 | End: 2022-11-21

## 2022-11-21 RX ORDER — PENTOSAN POLYSULFATE SODIUM 100 MG/1
100 CAPSULE, GELATIN COATED ORAL 3 TIMES DAILY
Qty: 90 | Refills: 3 | Status: DISCONTINUED | COMMUNITY
Start: 2022-08-15 | End: 2022-11-21

## 2022-12-07 LAB
APPEARANCE: ABNORMAL
BACTERIA UR CULT: NORMAL
BACTERIA: NEGATIVE
BILIRUBIN URINE: NEGATIVE
BLOOD URINE: ABNORMAL
COLOR: ABNORMAL
GLUCOSE QUALITATIVE U: NEGATIVE
HYALINE CASTS: 2 /LPF
INR PPP: 1.08 RATIO
KETONES URINE: NEGATIVE
LEUKOCYTE ESTERASE URINE: ABNORMAL
MICROSCOPIC-UA: NORMAL
NITRITE URINE: NEGATIVE
PH URINE: 6
PROTEIN URINE: ABNORMAL
PT BLD: 12.5 SEC
RED BLOOD CELLS URINE: >720 /HPF
SPECIFIC GRAVITY URINE: 1.01
SQUAMOUS EPITHELIAL CELLS: 0 /HPF
UROBILINOGEN URINE: NORMAL
WHITE BLOOD CELLS URINE: 176 /HPF

## 2022-12-08 LAB — FUNGUS SPEC CULT ORG #8: NORMAL

## 2022-12-12 ENCOUNTER — OUTPATIENT (OUTPATIENT)
Dept: OUTPATIENT SERVICES | Facility: HOSPITAL | Age: 57
LOS: 1 days | End: 2022-12-12
Payer: COMMERCIAL

## 2022-12-12 ENCOUNTER — APPOINTMENT (OUTPATIENT)
Dept: UROLOGY | Facility: CLINIC | Age: 57
End: 2022-12-12

## 2022-12-12 DIAGNOSIS — Z90.89 ACQUIRED ABSENCE OF OTHER ORGANS: Chronic | ICD-10-CM

## 2022-12-12 DIAGNOSIS — Z98.890 OTHER SPECIFIED POSTPROCEDURAL STATES: Chronic | ICD-10-CM

## 2022-12-12 DIAGNOSIS — R35.0 FREQUENCY OF MICTURITION: ICD-10-CM

## 2022-12-12 PROCEDURE — 51700 IRRIGATION OF BLADDER: CPT | Mod: 59

## 2022-12-12 PROCEDURE — 52000 CYSTOURETHROSCOPY: CPT

## 2022-12-12 PROCEDURE — 99212 OFFICE O/P EST SF 10 MIN: CPT | Mod: 25

## 2022-12-12 PROCEDURE — 88112 CYTOPATH CELL ENHANCE TECH: CPT | Mod: 26

## 2022-12-13 DIAGNOSIS — N20.0 CALCULUS OF KIDNEY: ICD-10-CM

## 2022-12-13 DIAGNOSIS — R30.0 DYSURIA: ICD-10-CM

## 2022-12-13 DIAGNOSIS — B20 HUMAN IMMUNODEFICIENCY VIRUS [HIV] DISEASE: ICD-10-CM

## 2022-12-13 DIAGNOSIS — Z22.8 CARRIER OF OTHER INFECTIOUS DISEASES: ICD-10-CM

## 2022-12-13 DIAGNOSIS — N35.919 UNSPECIFIED URETHRAL STRICTURE, MALE, UNSPECIFIED SITE: ICD-10-CM

## 2022-12-13 LAB
APPEARANCE: ABNORMAL
BACTERIA UR CULT: NORMAL
BACTERIA: NEGATIVE
BILIRUBIN URINE: NEGATIVE
BLOOD URINE: ABNORMAL
COLOR: NORMAL
GLUCOSE QUALITATIVE U: NEGATIVE
HYALINE CASTS: 2 /LPF
KETONES URINE: NEGATIVE
LEUKOCYTE ESTERASE URINE: ABNORMAL
MICROSCOPIC-UA: NORMAL
NITRITE URINE: NEGATIVE
PH URINE: 6
PROTEIN URINE: ABNORMAL
RED BLOOD CELLS URINE: 29 /HPF
SPECIFIC GRAVITY URINE: 1.02
SQUAMOUS EPITHELIAL CELLS: 0 /HPF
URINE CYTOLOGY: NORMAL
UROBILINOGEN URINE: NORMAL
WHITE BLOOD CELLS URINE: 580 /HPF

## 2022-12-16 LAB — FUNGUS SPEC CULT ORG #8: ABNORMAL

## 2022-12-19 ENCOUNTER — APPOINTMENT (OUTPATIENT)
Dept: UROLOGY | Facility: CLINIC | Age: 57
End: 2022-12-19

## 2022-12-19 ENCOUNTER — OUTPATIENT (OUTPATIENT)
Dept: OUTPATIENT SERVICES | Facility: HOSPITAL | Age: 57
LOS: 1 days | End: 2022-12-19
Payer: COMMERCIAL

## 2022-12-19 VITALS — HEART RATE: 90 BPM | DIASTOLIC BLOOD PRESSURE: 87 MMHG | RESPIRATION RATE: 16 BRPM | SYSTOLIC BLOOD PRESSURE: 152 MMHG

## 2022-12-19 DIAGNOSIS — Z98.890 OTHER SPECIFIED POSTPROCEDURAL STATES: Chronic | ICD-10-CM

## 2022-12-19 DIAGNOSIS — Z90.89 ACQUIRED ABSENCE OF OTHER ORGANS: Chronic | ICD-10-CM

## 2022-12-19 DIAGNOSIS — R31.0 GROSS HEMATURIA: ICD-10-CM

## 2022-12-19 DIAGNOSIS — R30.0 DYSURIA: ICD-10-CM

## 2022-12-19 DIAGNOSIS — R35.0 FREQUENCY OF MICTURITION: ICD-10-CM

## 2022-12-19 PROCEDURE — 51700 IRRIGATION OF BLADDER: CPT

## 2022-12-20 ENCOUNTER — APPOINTMENT (OUTPATIENT)
Dept: UROLOGY | Facility: CLINIC | Age: 57
End: 2022-12-20

## 2022-12-20 ENCOUNTER — APPOINTMENT (OUTPATIENT)
Dept: NEPHROLOGY | Facility: CLINIC | Age: 57
End: 2022-12-20

## 2022-12-20 VITALS
WEIGHT: 185 LBS | TEMPERATURE: 97.7 F | DIASTOLIC BLOOD PRESSURE: 77 MMHG | HEIGHT: 70 IN | OXYGEN SATURATION: 97 % | BODY MASS INDEX: 26.48 KG/M2 | SYSTOLIC BLOOD PRESSURE: 121 MMHG | HEART RATE: 94 BPM

## 2022-12-20 DIAGNOSIS — Z87.448 PERSONAL HISTORY OF OTHER DISEASES OF URINARY SYSTEM: ICD-10-CM

## 2022-12-20 PROCEDURE — 99215 OFFICE O/P EST HI 40 MIN: CPT

## 2022-12-20 NOTE — ASSESSMENT
[FreeTextEntry1] : Mr. Bo is a 57 year old man with a recent diagnosis of HIV with some complications, and persistently elevated creatinine, here for ongoing kidney evaluation and management.\par \par Unclear etiology of Mr. Bo's kidney disease, but certainly was present prior to diagnosis of HIV. Creatinine now hovering about 2, proteinuria 600mg (with albuminuria 160mg), down from about 13 grams at time of HIV diagnosis. I wonder if there was a component of HIV nephropathy, which has improved with control of HIV.\par \par While I still favor proceeding with a kidney biopsy for definitive diagnosis, given ongoing urologic issues, will defer for today and monitor. Will repeat full suite of labs, including chemistry, urine indices, and some serologies. No treatment for today, but likely in new year. Mr. Bo will follow up in February 2023.\par \par \par I have spent a total of 40 minutes in which >50% was spent in discussion with patient regarding kidney disease.

## 2022-12-20 NOTE — HISTORY OF PRESENT ILLNESS
[FreeTextEntry1] : Contacts:\par \par 	Dr. Demetrius Man (ID)\par \par -------------------------------------------------------------------------------\par Problem List:\par 	Chronic kidney disease stage III with proteinuria\par 		Earliest note of CKD in 1/2022 = SCr 1.6\par 		At time of HIV diagnosis, nephrotic-range proteinuria of UPCR 12.7 g/g\par 	History of hematuria, presumed bladder etiology\par 	HIV, diagnosed 2022\par \par -------------------------------------------------------------------------------\par HPI: [Vay-ano]\par Mr. Bo is a 57 year old man with a recent diagnosis of HIV with some complications, and persistently elevated creatinine, here for ongoing kidney evaluation and management.\par \par First met Mr. Bo in Nov. 2022, here for follow-up. Doing well since then without significant interval change. He is following with urology, where bladder inflammation was noted. He is undergoing irrigation, including lidocaine/antibiotic, for a total 5-6 doses/weekly. He is not seeing any more blood. \par \par Mr. Bo also notes sleeping better, now up to 2 hour intervals between having to urinate.\par \par Overall he feels "great". No dizziness/lightheadedness, headaches, chest pain, dyspnea, cough, nausea, vomiting, abdominal pain, diarrhea, constipation, leg swelling.\par \par Still with lots of family stress as he is currently  from wife following his HIV diagnosis.\par \par -------------------------------------------------------------------------------\par Social History:\par 	Lifelong [place];  ~30 years, though currently \par 	Has two children in their 20s (son is a physical therapist; daughter finishing PA training)\par 	Son getting  in March 2023\par 	Works in auto repair (owns a repair shop)\par 	No history of tobacco, [significant] alcohol, or illicit drug use\par \par Family History:\par 	Mother had colon cancer\par 	Father has colitis (has colostomy since age 26)\par 	No known history of renal disease, hematuria, proteinuria, ESRD, dialysis, transplant\par \par -------------------------------------------------------------------------------\par Allergies: NKDA\par \par Medications:\par 	Biktarvy\par 	Bactrim M/W/F\par 	Levothyroxine 137mcg daily\par 	Uribel \par 	\par -------------------------------------------------------------------------------\par Physical Exam:\par 	Gen: NAD, well-appearing\par 	HEENT: Supple neck\par 	Pulm: CTA\par 	CV: RRR\par 	Back: No spinal or CVA terndeness\par 	Abd: +BS, soft, nontender/nondistended\par 	UE: Warm, FROM, intact strength\par 	LE: Warm, FROM, intact strength; no edema\par 	Neuro: No focal deficits, intact gait\par 	Psych: Normal affect\par 	Skin: Warm, without rashes\par 	\par -------------------------------------------------------------------------------\par Labs/Studies: [notable]\par \par 	Creatinine Trend\par 		\par 		2022-11-02 SCr 2.00 (eGFR 38)\par 		2022-10-24 SCr 1.83 (eGFR 42; Cystatin-C 2.05, eGFR 30)\par 		2022-09-12 SCr 1.76 (eGFR 44)\par 		2022-08-01 SCr 1.78 (eGFR 43; Cystatin-C 2.04, eGFR 30)\par 		2022-05-16 SCr 1.65 (eGFR 48; Cystatin-C 1.62, eGFR 41)\par 		2022-05-02 SCr 2.74 (eGFR 26)\par 		2022-04-04 SCr 1.48 (eGFR 54)\par 		...\par 		2022-03-17 SCr 2.23 (eGFR 33)\par 		2022-03-02 SCr 1.54 (eGFR 52)\par 		...\par 		2022-01-24 SCr 1.62\par \par 	2022-11-11 UACR 163 mg/g\par \par 	2022-11-11 UPCR 0.6 g/g\par 	2022-10-24 UPCR 0.6 g/g\par 	2022-03-20 UPCR 12.7 g/g\par \par 	2022-10-24 CBC: WBC 3.8 / Hgb 11.7 / plt 240\par 	2022-10-24 Lipid: chol 170, , HDL 37, \par 	2022-10-24 HbA1c 4.7\par \par 	2022-03-23 KL FLC: kappa 42.8, lambda 34.8, ratio = 1.23\par \par 	2022-03-21 Cryoglobulins negative

## 2022-12-21 ENCOUNTER — APPOINTMENT (OUTPATIENT)
Dept: UROLOGY | Facility: CLINIC | Age: 57
End: 2022-12-21

## 2022-12-22 DIAGNOSIS — R35.0 FREQUENCY OF MICTURITION: ICD-10-CM

## 2022-12-23 DIAGNOSIS — R30.0 DYSURIA: ICD-10-CM

## 2022-12-23 DIAGNOSIS — R31.0 GROSS HEMATURIA: ICD-10-CM

## 2022-12-28 ENCOUNTER — APPOINTMENT (OUTPATIENT)
Dept: UROLOGY | Facility: CLINIC | Age: 57
End: 2022-12-28

## 2022-12-28 ENCOUNTER — OUTPATIENT (OUTPATIENT)
Dept: OUTPATIENT SERVICES | Facility: HOSPITAL | Age: 57
LOS: 1 days | End: 2022-12-28
Payer: COMMERCIAL

## 2022-12-28 VITALS — DIASTOLIC BLOOD PRESSURE: 85 MMHG | HEART RATE: 80 BPM | SYSTOLIC BLOOD PRESSURE: 136 MMHG

## 2022-12-28 VITALS — SYSTOLIC BLOOD PRESSURE: 127 MMHG | HEART RATE: 84 BPM | DIASTOLIC BLOOD PRESSURE: 86 MMHG

## 2022-12-28 DIAGNOSIS — R35.0 FREQUENCY OF MICTURITION: ICD-10-CM

## 2022-12-28 PROCEDURE — 51700 IRRIGATION OF BLADDER: CPT

## 2022-12-29 DIAGNOSIS — N41.9 INFLAMMATORY DISEASE OF PROSTATE, UNSPECIFIED: ICD-10-CM

## 2022-12-29 DIAGNOSIS — R30.0 DYSURIA: ICD-10-CM

## 2023-01-03 ENCOUNTER — APPOINTMENT (OUTPATIENT)
Dept: UROLOGY | Facility: CLINIC | Age: 58
End: 2023-01-03
Payer: COMMERCIAL

## 2023-01-03 VITALS
DIASTOLIC BLOOD PRESSURE: 84 MMHG | WEIGHT: 186 LBS | SYSTOLIC BLOOD PRESSURE: 126 MMHG | BODY MASS INDEX: 26.63 KG/M2 | HEART RATE: 83 BPM | OXYGEN SATURATION: 98 % | TEMPERATURE: 98.1 F | HEIGHT: 70 IN | RESPIRATION RATE: 16 BRPM

## 2023-01-03 PROCEDURE — 51700 IRRIGATION OF BLADDER: CPT

## 2023-01-09 ENCOUNTER — OUTPATIENT (OUTPATIENT)
Dept: OUTPATIENT SERVICES | Facility: HOSPITAL | Age: 58
LOS: 1 days | End: 2023-01-09
Payer: COMMERCIAL

## 2023-01-09 ENCOUNTER — APPOINTMENT (OUTPATIENT)
Dept: UROLOGY | Facility: CLINIC | Age: 58
End: 2023-01-09
Payer: COMMERCIAL

## 2023-01-09 VITALS — SYSTOLIC BLOOD PRESSURE: 100 MMHG | DIASTOLIC BLOOD PRESSURE: 67 MMHG | HEART RATE: 97 BPM | RESPIRATION RATE: 16 BRPM

## 2023-01-09 VITALS
DIASTOLIC BLOOD PRESSURE: 77 MMHG | RESPIRATION RATE: 16 BRPM | HEART RATE: 97 BPM | SYSTOLIC BLOOD PRESSURE: 119 MMHG | OXYGEN SATURATION: 99 %

## 2023-01-09 DIAGNOSIS — Z98.890 OTHER SPECIFIED POSTPROCEDURAL STATES: Chronic | ICD-10-CM

## 2023-01-09 DIAGNOSIS — R35.0 FREQUENCY OF MICTURITION: ICD-10-CM

## 2023-01-09 DIAGNOSIS — Z90.89 ACQUIRED ABSENCE OF OTHER ORGANS: Chronic | ICD-10-CM

## 2023-01-09 LAB
ALBUMIN MFR SERPL ELPH: 51.3 %
ALBUMIN SERPL ELPH-MCNC: 4.3 G/DL
ALBUMIN SERPL-MCNC: 4 G/DL
ALBUMIN/GLOB SERPL: 1.1 RATIO
ALPHA1 GLOB MFR SERPL ELPH: 5.2 %
ALPHA1 GLOB SERPL ELPH-MCNC: 0.4 G/DL
ALPHA2 GLOB MFR SERPL ELPH: 8.5 %
ALPHA2 GLOB SERPL ELPH-MCNC: 0.7 G/DL
ANION GAP SERPL CALC-SCNC: 10 MMOL/L
APPEARANCE: ABNORMAL
B-GLOBULIN MFR SERPL ELPH: 10.9 %
B-GLOBULIN SERPL ELPH-MCNC: 0.8 G/DL
BACTERIA: NEGATIVE
BASOPHILS # BLD AUTO: 0.01 K/UL
BASOPHILS NFR BLD AUTO: 0.2 %
BILIRUBIN URINE: NEGATIVE
BLOOD URINE: ABNORMAL
BUN SERPL-MCNC: 32 MG/DL
C3 SERPL-MCNC: 139 MG/DL
C4 SERPL-MCNC: 30 MG/DL
CALCIUM SERPL-MCNC: 9.7 MG/DL
CHLORIDE SERPL-SCNC: 106 MMOL/L
CHOLEST SERPL-MCNC: 188 MG/DL
CO2 SERPL-SCNC: 20 MMOL/L
COLOR: YELLOW
CREAT SERPL-MCNC: 1.84 MG/DL
CREAT SPEC-SCNC: 66 MG/DL
CREAT SPEC-SCNC: 66 MG/DL
CREAT/PROT UR: 1.1 RATIO
CYSTATIN C SERPL-MCNC: 1.66 MG/L
DEPRECATED KAPPA LC FREE/LAMBDA SER: 1.64 RATIO
EGFR: 42 ML/MIN/1.73M2
EOSINOPHIL # BLD AUTO: 0.07 K/UL
EOSINOPHIL NFR BLD AUTO: 1.1 %
GAMMA GLOB FLD ELPH-MCNC: 1.9 G/DL
GAMMA GLOB MFR SERPL ELPH: 24.1 %
GFR/BSA.PRED SERPLBLD CYS-BASED-ARV: 40 ML/MIN/1.73M2
GLUCOSE QUALITATIVE U: NEGATIVE
GLUCOSE SERPL-MCNC: 96 MG/DL
HCT VFR BLD CALC: 39.3 %
HDLC SERPL-MCNC: 43 MG/DL
HGB BLD-MCNC: 12.9 G/DL
HYALINE CASTS: 0 /LPF
IMM GRANULOCYTES NFR BLD AUTO: 0.5 %
INTERPRETATION SERPL IEP-IMP: NORMAL
KAPPA LC CSF-MCNC: 3.84 MG/DL
KAPPA LC SERPL-MCNC: 6.3 MG/DL
KETONES URINE: NEGATIVE
LDLC SERPL CALC-MCNC: 119 MG/DL
LEUKOCYTE ESTERASE URINE: ABNORMAL
LYMPHOCYTES # BLD AUTO: 0.67 K/UL
LYMPHOCYTES NFR BLD AUTO: 10.5 %
M PROTEIN SPEC IFE-MCNC: NORMAL
MAN DIFF?: NORMAL
MCHC RBC-ENTMCNC: 29.6 PG
MCHC RBC-ENTMCNC: 32.8 GM/DL
MCV RBC AUTO: 90.1 FL
MICROALBUMIN 24H UR DL<=1MG/L-MCNC: 26.2 MG/DL
MICROALBUMIN/CREAT 24H UR-RTO: 400 MG/G
MICROSCOPIC-UA: NORMAL
MONOCYTES # BLD AUTO: 0.57 K/UL
MONOCYTES NFR BLD AUTO: 8.9 %
NEUTROPHILS # BLD AUTO: 5.02 K/UL
NEUTROPHILS NFR BLD AUTO: 78.8 %
NITRITE URINE: NEGATIVE
NONHDLC SERPL-MCNC: 145 MG/DL
PH URINE: 6
PHOSPHATE SERPL-MCNC: 3.6 MG/DL
PLATELET # BLD AUTO: 301 K/UL
POTASSIUM SERPL-SCNC: 4.6 MMOL/L
PROT SERPL-MCNC: 7.7 G/DL
PROT SERPL-MCNC: 7.7 G/DL
PROT UR-MCNC: 72 MG/DL
PROTEIN URINE: ABNORMAL
RBC # BLD: 4.36 M/UL
RBC # FLD: 13.9 %
RED BLOOD CELLS URINE: 60 /HPF
SODIUM SERPL-SCNC: 136 MMOL/L
SPECIFIC GRAVITY URINE: 1.02
SQUAMOUS EPITHELIAL CELLS: 0 /HPF
TRIGL SERPL-MCNC: 128 MG/DL
UROBILINOGEN URINE: NORMAL
WBC # FLD AUTO: 6.37 K/UL
WHITE BLOOD CELLS URINE: >720 /HPF

## 2023-01-09 PROCEDURE — 51700 IRRIGATION OF BLADDER: CPT

## 2023-01-12 DIAGNOSIS — R30.0 DYSURIA: ICD-10-CM

## 2023-01-12 DIAGNOSIS — N41.9 INFLAMMATORY DISEASE OF PROSTATE, UNSPECIFIED: ICD-10-CM

## 2023-01-17 ENCOUNTER — APPOINTMENT (OUTPATIENT)
Dept: UROLOGY | Facility: CLINIC | Age: 58
End: 2023-01-17
Payer: COMMERCIAL

## 2023-01-17 ENCOUNTER — APPOINTMENT (OUTPATIENT)
Dept: INFECTIOUS DISEASE | Facility: CLINIC | Age: 58
End: 2023-01-17

## 2023-01-17 VITALS
BODY MASS INDEX: 26.63 KG/M2 | DIASTOLIC BLOOD PRESSURE: 71 MMHG | HEIGHT: 70 IN | OXYGEN SATURATION: 96 % | RESPIRATION RATE: 16 BRPM | SYSTOLIC BLOOD PRESSURE: 120 MMHG | TEMPERATURE: 97.1 F | WEIGHT: 186 LBS | HEART RATE: 98 BPM

## 2023-01-17 PROCEDURE — 51700 IRRIGATION OF BLADDER: CPT

## 2023-01-18 ENCOUNTER — NON-APPOINTMENT (OUTPATIENT)
Age: 58
End: 2023-01-18

## 2023-01-20 ENCOUNTER — NON-APPOINTMENT (OUTPATIENT)
Age: 58
End: 2023-01-20

## 2023-01-20 ENCOUNTER — APPOINTMENT (OUTPATIENT)
Dept: UROLOGY | Facility: CLINIC | Age: 58
End: 2023-01-20

## 2023-01-23 ENCOUNTER — OUTPATIENT (OUTPATIENT)
Dept: OUTPATIENT SERVICES | Facility: HOSPITAL | Age: 58
LOS: 1 days | End: 2023-01-23
Payer: COMMERCIAL

## 2023-01-23 ENCOUNTER — APPOINTMENT (OUTPATIENT)
Dept: UROLOGY | Facility: CLINIC | Age: 58
End: 2023-01-23
Payer: COMMERCIAL

## 2023-01-23 ENCOUNTER — APPOINTMENT (OUTPATIENT)
Dept: INFECTIOUS DISEASE | Facility: CLINIC | Age: 58
End: 2023-01-23
Payer: COMMERCIAL

## 2023-01-23 VITALS
WEIGHT: 187 LBS | BODY MASS INDEX: 26.77 KG/M2 | HEIGHT: 70 IN | SYSTOLIC BLOOD PRESSURE: 124 MMHG | HEART RATE: 89 BPM | TEMPERATURE: 97.1 F | OXYGEN SATURATION: 96 % | DIASTOLIC BLOOD PRESSURE: 85 MMHG

## 2023-01-23 VITALS
HEART RATE: 97 BPM | HEIGHT: 70 IN | BODY MASS INDEX: 26.77 KG/M2 | TEMPERATURE: 97.4 F | DIASTOLIC BLOOD PRESSURE: 81 MMHG | WEIGHT: 187 LBS | SYSTOLIC BLOOD PRESSURE: 133 MMHG

## 2023-01-23 DIAGNOSIS — Z98.890 OTHER SPECIFIED POSTPROCEDURAL STATES: Chronic | ICD-10-CM

## 2023-01-23 DIAGNOSIS — R35.0 FREQUENCY OF MICTURITION: ICD-10-CM

## 2023-01-23 PROCEDURE — 51700 IRRIGATION OF BLADDER: CPT

## 2023-01-23 PROCEDURE — 99214 OFFICE O/P EST MOD 30 MIN: CPT

## 2023-01-23 NOTE — PHYSICAL EXAM
[General Appearance - Alert] : alert [General Appearance - In No Acute Distress] : in no acute distress [Extraocular Movements] : extraocular movements were intact [Outer Ear] : the ears and nose were normal in appearance [Neck Appearance] : the appearance of the neck was normal [Neck Cervical Mass (___cm)] : no neck mass was observed [Exaggerated Use Of Accessory Muscles For Inspiration] : no accessory muscle use [Heart Rate And Rhythm] : heart rate was normal and rhythm regular [Heart Sounds] : normal S1 and S2 [Abdomen Tenderness] : non-tender [No Palpable Adenopathy] : no palpable adenopathy [Musculoskeletal - Swelling] : no joint swelling [Nail Clubbing] : no clubbing  or cyanosis of the fingernails [Skin Color & Pigmentation] : normal skin color and pigmentation [] : no rash [Oriented To Time, Place, And Person] : oriented to person, place, and time [Affect] : the affect was normal

## 2023-01-24 DIAGNOSIS — R39.15 URGENCY OF URINATION: ICD-10-CM

## 2023-01-24 DIAGNOSIS — R30.0 DYSURIA: ICD-10-CM

## 2023-01-24 LAB
25(OH)D3 SERPL-MCNC: 16.1 NG/ML
ALBUMIN SERPL ELPH-MCNC: 3.9 G/DL
ALBUMIN SERPL ELPH-MCNC: 4 G/DL
ALBUMIN SERPL ELPH-MCNC: 4.2 G/DL
ALP BLD-CCNC: 109 U/L
ALP BLD-CCNC: 112 U/L
ALP BLD-CCNC: 131 U/L
ALT SERPL-CCNC: 10 U/L
ALT SERPL-CCNC: 17 U/L
ALT SERPL-CCNC: 9 U/L
ANION GAP SERPL CALC-SCNC: 10 MMOL/L
ANION GAP SERPL CALC-SCNC: 13 MMOL/L
ANION GAP SERPL CALC-SCNC: 14 MMOL/L
ANION GAP SERPL CALC-SCNC: 14 MMOL/L
APPEARANCE: ABNORMAL
AST SERPL-CCNC: 12 U/L
AST SERPL-CCNC: 13 U/L
AST SERPL-CCNC: 17 U/L
BACTERIA: ABNORMAL
BASOPHILS # BLD AUTO: 0.02 K/UL
BASOPHILS # BLD AUTO: 0.02 K/UL
BASOPHILS # BLD AUTO: 0.03 K/UL
BASOPHILS NFR BLD AUTO: 0.3 %
BASOPHILS NFR BLD AUTO: 0.5 %
BASOPHILS NFR BLD AUTO: 0.5 %
BILIRUB SERPL-MCNC: 0.2 MG/DL
BILIRUB SERPL-MCNC: 0.2 MG/DL
BILIRUB SERPL-MCNC: 0.3 MG/DL
BILIRUBIN URINE: NEGATIVE
BLOOD URINE: ABNORMAL
BUN SERPL-MCNC: 32 MG/DL
BUN SERPL-MCNC: 33 MG/DL
BUN SERPL-MCNC: 36 MG/DL
BUN SERPL-MCNC: 38 MG/DL
C TRACH RRNA SPEC QL NAA+PROBE: NOT DETECTED
CALCIUM SERPL-MCNC: 9.3 MG/DL
CALCIUM SERPL-MCNC: 9.3 MG/DL
CALCIUM SERPL-MCNC: 9.6 MG/DL
CALCIUM SERPL-MCNC: 9.6 MG/DL
CD3 CELLS # BLD: 378 /UL
CD3 CELLS # BLD: 447 CELLS/UL
CD3 CELLS # BLD: 495 /UL
CD3 CELLS NFR BLD: 79 %
CD3 CELLS NFR BLD: 80 %
CD3 CELLS NFR BLD: 82 %
CD3+CD4+ CELLS # BLD: 138 /UL
CD3+CD4+ CELLS # BLD: 152 CELLS/UL
CD3+CD4+ CELLS # BLD: 162 /UL
CD3+CD4+ CELLS NFR BLD: 26 %
CD3+CD4+ CELLS NFR BLD: 28 %
CD3+CD4+ CELLS NFR BLD: 29 %
CD3+CD4+ CELLS/CD3+CD8+ CLL SPEC: 0.52 RATIO
CD3+CD4+ CELLS/CD3+CD8+ CLL SPEC: 0.55 RATIO
CD3+CD4+ CELLS/CD3+CD8+ CLL SPEC: 0.61 RATIO
CD3+CD8+ CELLS # SPEC: 227 /UL
CD3+CD8+ CELLS # SPEC: 273 CELLS/UL
CD3+CD8+ CELLS # SPEC: 312 /UL
CD3+CD8+ CELLS NFR BLD: 48 %
CD3+CD8+ CELLS NFR BLD: 50 %
CD3+CD8+ CELLS NFR BLD: 50 %
CHLORIDE SERPL-SCNC: 105 MMOL/L
CHLORIDE SERPL-SCNC: 106 MMOL/L
CHLORIDE SERPL-SCNC: 106 MMOL/L
CHLORIDE SERPL-SCNC: 108 MMOL/L
CHOLEST SERPL-MCNC: 170 MG/DL
CMV DNA SPEC QL NAA+PROBE: NOT DETECTED IU/ML
CMV DNA SPEC QL NAA+PROBE: NOT DETECTED IU/ML
CMVPCR LOG: NOT DETECTED LOG10IU/ML
CMVPCR LOG: NOT DETECTED LOG10IU/ML
CO2 SERPL-SCNC: 17 MMOL/L
CO2 SERPL-SCNC: 18 MMOL/L
CO2 SERPL-SCNC: 19 MMOL/L
CO2 SERPL-SCNC: 20 MMOL/L
COLOR: YELLOW
CREAT SERPL-MCNC: 1.76 MG/DL
CREAT SERPL-MCNC: 1.83 MG/DL
CREAT SERPL-MCNC: 1.95 MG/DL
CREAT SERPL-MCNC: 2 MG/DL
CREAT SPEC-SCNC: 84 MG/DL
CREAT/PROT UR: 0.6 RATIO
CYSTATIN C SERPL-MCNC: 1.62 MG/L
CYSTATIN C SERPL-MCNC: 2.05 MG/L
EGFR: 38 ML/MIN/1.73M2
EGFR: 39 ML/MIN/1.73M2
EGFR: 42 ML/MIN/1.73M2
EGFR: 44 ML/MIN/1.73M2
EOSINOPHIL # BLD AUTO: 0.05 K/UL
EOSINOPHIL # BLD AUTO: 0.07 K/UL
EOSINOPHIL # BLD AUTO: 0.11 K/UL
EOSINOPHIL NFR BLD AUTO: 1.2 %
EOSINOPHIL NFR BLD AUTO: 1.3 %
EOSINOPHIL NFR BLD AUTO: 2 %
ESTIMATED AVERAGE GLUCOSE: 88 MG/DL
GFR/BSA.PRED SERPLBLD CYS-BASED-ARV: 30 ML/MIN/1.73M2
GFR/BSA.PRED SERPLBLD CYS-BASED-ARV: 41 ML/MIN/1.73M2
GLUCOSE QUALITATIVE U: NEGATIVE
GLUCOSE SERPL-MCNC: 103 MG/DL
GLUCOSE SERPL-MCNC: 105 MG/DL
GLUCOSE SERPL-MCNC: 86 MG/DL
GLUCOSE SERPL-MCNC: 97 MG/DL
HBA1C MFR BLD HPLC: 4.7 %
HCT VFR BLD CALC: 36.3 %
HCT VFR BLD CALC: 38.4 %
HCT VFR BLD CALC: 39.8 %
HDLC SERPL-MCNC: 37 MG/DL
HGB BLD-MCNC: 11.7 G/DL
HGB BLD-MCNC: 12.4 G/DL
HGB BLD-MCNC: 12.5 G/DL
HIV GENOSURE ARCHIVE 1: NORMAL
HIV1 PROVIR DNA RT + PR + IN MUT DET SEQ: NORMAL
HIV1 PROVIRAL DNA GENTYP BLD MC NAR: NORMAL
HIV1 RNA # SERPL NAA+PROBE: 102
HIV1 RNA # SERPL NAA+PROBE: 42
HIV1 RNA # SERPL NAA+PROBE: 53
HIV1 RNA # SERPL NAA+PROBE: ABNORMAL
HIV1 RNA # SERPL NAA+PROBE: ABNORMAL COPIES/ML
HYALINE CASTS: 2 /LPF
IMM GRANULOCYTES NFR BLD AUTO: 0.3 %
IMM GRANULOCYTES NFR BLD AUTO: 0.5 %
IMM GRANULOCYTES NFR BLD AUTO: 0.8 %
KETONES URINE: NEGATIVE
LDLC SERPL CALC-MCNC: 108 MG/DL
LEUKOCYTE ESTERASE URINE: ABNORMAL
LYMPHOCYTES # BLD AUTO: 0.54 K/UL
LYMPHOCYTES # BLD AUTO: 0.57 K/UL
LYMPHOCYTES # BLD AUTO: 0.6 K/UL
LYMPHOCYTES NFR BLD AUTO: 10.9 %
LYMPHOCYTES NFR BLD AUTO: 14.3 %
LYMPHOCYTES NFR BLD AUTO: 9.6 %
M TB IFN-G BLD-IMP: ABNORMAL
MAN DIFF?: NORMAL
MCHC RBC-ENTMCNC: 29 PG
MCHC RBC-ENTMCNC: 29.2 PG
MCHC RBC-ENTMCNC: 29.5 PG
MCHC RBC-ENTMCNC: 31.4 GM/DL
MCHC RBC-ENTMCNC: 32.2 GM/DL
MCHC RBC-ENTMCNC: 32.3 GM/DL
MCV RBC AUTO: 90.5 FL
MCV RBC AUTO: 91.2 FL
MCV RBC AUTO: 92.3 FL
MICROSCOPIC-UA: NORMAL
MONOCYTES # BLD AUTO: 0.5 K/UL
MONOCYTES # BLD AUTO: 0.55 K/UL
MONOCYTES # BLD AUTO: 0.57 K/UL
MONOCYTES NFR BLD AUTO: 10.3 %
MONOCYTES NFR BLD AUTO: 13.3 %
MONOCYTES NFR BLD AUTO: 9.3 %
N GONORRHOEA RRNA SPEC QL NAA+PROBE: NOT DETECTED
NEUTROPHILS # BLD AUTO: 2.63 K/UL
NEUTROPHILS # BLD AUTO: 4.17 K/UL
NEUTROPHILS # BLD AUTO: 4.71 K/UL
NEUTROPHILS NFR BLD AUTO: 69.8 %
NEUTROPHILS NFR BLD AUTO: 75.8 %
NEUTROPHILS NFR BLD AUTO: 79.3 %
NITRITE URINE: NEGATIVE
NONHDLC SERPL-MCNC: 132 MG/DL
PH URINE: 6.5
PHOSPHATE 24H UR-MCNC: 59.5 MG/DL
PHOSPHATE SERPL-MCNC: 3.9 MG/DL
PLATELET # BLD AUTO: 240 K/UL
PLATELET # BLD AUTO: 271 K/UL
PLATELET # BLD AUTO: 302 K/UL
POTASSIUM SERPL-SCNC: 4.3 MMOL/L
POTASSIUM SERPL-SCNC: 4.4 MMOL/L
POTASSIUM SERPL-SCNC: 4.5 MMOL/L
POTASSIUM SERPL-SCNC: 4.6 MMOL/L
PROT SERPL-MCNC: 7.4 G/DL
PROT SERPL-MCNC: 7.4 G/DL
PROT SERPL-MCNC: 7.9 G/DL
PROT UR-MCNC: 54 MG/DL
PROTEIN URINE: ABNORMAL
PSA SERPL-MCNC: 2.88 NG/ML
QUANTIFERON TB PLUS MITOGEN MINUS NIL: 0.32 IU/ML
QUANTIFERON TB PLUS NIL: 0.03 IU/ML
QUANTIFERON TB PLUS TB1 MINUS NIL: 0 IU/ML
QUANTIFERON TB PLUS TB2 MINUS NIL: 0 IU/ML
RBC # BLD: 4.01 M/UL
RBC # BLD: 4.21 M/UL
RBC # BLD: 4.31 M/UL
RBC # FLD: 14.1 %
RBC # FLD: 14.1 %
RBC # FLD: 15.1 %
RED BLOOD CELLS URINE: 119 /HPF
SODIUM ?TM SUB UR QN: 124 MMOL/L
SODIUM SERPL-SCNC: 137 MMOL/L
SODIUM SERPL-SCNC: 137 MMOL/L
SODIUM SERPL-SCNC: 138 MMOL/L
SODIUM SERPL-SCNC: 138 MMOL/L
SOURCE AMPLIFICATION: NORMAL
SPECIFIC GRAVITY URINE: 1.02
SQUAMOUS EPITHELIAL CELLS: 0 /HPF
T PALLIDUM AB SER QL IA: POSITIVE
TRIGL SERPL-MCNC: 119 MG/DL
UROBILINOGEN URINE: NORMAL
VIRAL LOAD INTERP: NORMAL
VIRAL LOAD LOG: 1.63
VIRAL LOAD LOG: 1.73
VIRAL LOAD LOG: 2.01
VIRAL LOAD LOG: ABNORMAL LG COP/ML
WBC # FLD AUTO: 3.77 K/UL
WBC # FLD AUTO: 5.51 K/UL
WBC # FLD AUTO: 5.94 K/UL
WHITE BLOOD CELLS URINE: >720 /HPF

## 2023-02-06 ENCOUNTER — APPOINTMENT (OUTPATIENT)
Dept: UROLOGY | Facility: CLINIC | Age: 58
End: 2023-02-06
Payer: COMMERCIAL

## 2023-02-06 ENCOUNTER — OUTPATIENT (OUTPATIENT)
Dept: OUTPATIENT SERVICES | Facility: HOSPITAL | Age: 58
LOS: 1 days | End: 2023-02-06
Payer: COMMERCIAL

## 2023-02-06 VITALS — RESPIRATION RATE: 16 BRPM | SYSTOLIC BLOOD PRESSURE: 128 MMHG | HEART RATE: 89 BPM | DIASTOLIC BLOOD PRESSURE: 82 MMHG

## 2023-02-06 VITALS — SYSTOLIC BLOOD PRESSURE: 125 MMHG | HEART RATE: 89 BPM | RESPIRATION RATE: 16 BRPM | DIASTOLIC BLOOD PRESSURE: 86 MMHG

## 2023-02-06 DIAGNOSIS — Z98.890 OTHER SPECIFIED POSTPROCEDURAL STATES: Chronic | ICD-10-CM

## 2023-02-06 DIAGNOSIS — R35.0 FREQUENCY OF MICTURITION: ICD-10-CM

## 2023-02-06 DIAGNOSIS — Z90.89 ACQUIRED ABSENCE OF OTHER ORGANS: Chronic | ICD-10-CM

## 2023-02-06 PROCEDURE — 51700 IRRIGATION OF BLADDER: CPT

## 2023-02-06 PROCEDURE — 99213 OFFICE O/P EST LOW 20 MIN: CPT | Mod: 25

## 2023-02-07 DIAGNOSIS — N41.9 INFLAMMATORY DISEASE OF PROSTATE, UNSPECIFIED: ICD-10-CM

## 2023-02-07 DIAGNOSIS — R39.15 URGENCY OF URINATION: ICD-10-CM

## 2023-02-08 ENCOUNTER — NON-APPOINTMENT (OUTPATIENT)
Age: 58
End: 2023-02-08

## 2023-02-08 LAB
APPEARANCE: ABNORMAL
BACTERIA UR CULT: NORMAL
BACTERIA: ABNORMAL
BILIRUBIN URINE: NEGATIVE
BLOOD URINE: ABNORMAL
COLOR: YELLOW
GLUCOSE QUALITATIVE U: NEGATIVE
HYALINE CASTS: 0 /LPF
KETONES URINE: NEGATIVE
LEUKOCYTE ESTERASE URINE: ABNORMAL
MICROSCOPIC-UA: NORMAL
NITRITE URINE: NEGATIVE
PH URINE: 6
PROTEIN URINE: ABNORMAL
RED BLOOD CELLS URINE: 146 /HPF
SPECIFIC GRAVITY URINE: 1.02
SQUAMOUS EPITHELIAL CELLS: 1 /HPF
URINE COMMENTS: NORMAL
UROBILINOGEN URINE: NORMAL
WHITE BLOOD CELLS URINE: 525 /HPF

## 2023-02-15 ENCOUNTER — NON-APPOINTMENT (OUTPATIENT)
Age: 58
End: 2023-02-15

## 2023-02-17 ENCOUNTER — APPOINTMENT (OUTPATIENT)
Dept: NEPHROLOGY | Facility: CLINIC | Age: 58
End: 2023-02-17
Payer: COMMERCIAL

## 2023-02-17 VITALS
DIASTOLIC BLOOD PRESSURE: 76 MMHG | SYSTOLIC BLOOD PRESSURE: 132 MMHG | TEMPERATURE: 97.8 F | HEART RATE: 77 BPM | WEIGHT: 190 LBS | HEIGHT: 70 IN | BODY MASS INDEX: 27.2 KG/M2 | OXYGEN SATURATION: 98 %

## 2023-02-17 PROCEDURE — 99215 OFFICE O/P EST HI 40 MIN: CPT

## 2023-02-17 NOTE — ASSESSMENT
[FreeTextEntry1] : Mr. Bo is a 57 year old man with chronic kidney disease stage III with proteinuria, HIV, chronic dysuria, and hypothyrodism, here for ongoing kidney evaluation and management.\par \par Likely etiology of CKD from HIV infection. Now that HIV is under control, his kidney disease has stabilized, but he is left with low GFR and proteinuria. I suspect this is now chronic fibrosis, and a kidney biopsy is unlikely to reveal anything that will alter managemnet.\par \par Spent time today discussing need for initiation of anti-proteinuric therapy, specifically ACEi/ARB. We also discussed elevated cholesterol with goal LDL <70. Mr. Bo asks to wait until after his son's wedding, so we will aim to initiate therapy at next visit in April.\par \par CKD III with proteinuria\par - Aim to start losartan at next visit\par - Would also benefit from SGLT2i, but will hold for now given ongoing urologic issues\par \par Hyperlipidemia, goal LDL <70\par - Recheck lipids prior to next visit\par - Will likely need statin, would aim for atorvastatin\par \par HIV: Per ID\par \par Hypothyroidism: Per endocrinology\par \par Dysuria: Per urology\par \par \par PLAN:\par - No medication changes today\par - Follow up in ~2 months with labs prior\par \par \par Discussed importance of healthful habits, including physical activity/exercise and improvements in diet.\par \par I have spent a total of 40 minutes in which >50% was spent in discussion with patient regarding kidney disease.

## 2023-02-17 NOTE — HISTORY OF PRESENT ILLNESS
[FreeTextEntry1] : Contacts:\par 	Dr. Felipe Aguirre (PCP)\par 	Dr. Demetrius Man (ID)\par 	Dr. Theresa Domingo (endocrinology)\par 	Dr. Donny Bocanegra (urology)\par \par -------------------------------------------------------------------------------\par Problem List:\par 	Chronic kidney disease stage III with proteinuria\par 		Earliest note of CKD in 1/2022 = SCr 1.6\par 		At time of HIV diagnosis, nephrotic-range proteinuria of UPCR 12.7 g/g\par 	History of hematuria, presumed bladder etiology\par 	HIV, diagnosed 2022\par 	Dysuria, managed by urology\par 	Hypothyroidism	\par \par -------------------------------------------------------------------------------\par HPI: [Vay-ano]\par Mr. Bo is a 57 year old man with chronic kidney disease stage III with proteinuria, HIV, chronic dysuria, and hypothyrodism, here for ongoing kidney evaluation and management.\par \[facility] Last saw Mr. Bo in Dec. 2022, here for follow-up. Doing well since then without significant interval change, and feeling physically good. He is continuing to follow with his urologist for the dysuria, with installations being done.\par \par No dizziness/lightheadedness, headaches, chest pain, dyspnea, cough, nausea, vomiting, abdominal pain, diarrhea, constipation, leg swelling.\par \[facility] Has upcoming wedding of his 27-year old son, on March 18.\par \par Still with lots of family stress as he is currently  from wife following his HIV diagnosis. He says it is slowly improving.\par \par -------------------------------------------------------------------------------\par Social History:\par 	Lifelong [place];  ~30 years, though currently \par 	Has two children in their 20s (son is a physical therapist; daughter finishing PA training)\par 	Son getting  in March 2023\par 	Works in auto repair (owns a repair shop)\par 	No history of tobacco, [significant] alcohol, or illicit drug use\par \par Family History:\par 	Mother had colon cancer\par 	Father has colitis (has colostomy since age 26)\par 	No known history of renal disease, hematuria, proteinuria, ESRD, dialysis, transplant\par \par -------------------------------------------------------------------------------\par Allergies: NKDA\par \par Medications:\par 	Biktarvy\par 	Bactrim M/W/F\par 	Levothyroxine 150mcg daily\par 	\par -------------------------------------------------------------------------------\par Physical Exam: 132/76, 77\par \par 	Gen: NAD, well-appearing\par 	HEENT: Supple neck\par 	Pulm: CTA\par 	CV: RRR\par 	Back: No spinal or CVA terndeness\par 	Abd: +BS, soft\par 	UE: Warm, FROM\par 	LE: Warm, FROM, intact strength; no edema\par 	Neuro: No focal deficits, intact gait\par 	Psych: Normal affect\par 	Skin: Warm, without rashes\par 	\par -------------------------------------------------------------------------------\par Labs/Studies: [notable]\par \par 	2023-01-17\par 	\par 		138 | 106 | 36\par 		------------------< 105 Ca 9.3 eGFR 39\par 		4.5 |  19 | 1.95\par 		\par 		Albumin 4.2\par \par 	Creatinine Trend\par 		2023-01-17 SCr 1.95 (eGFR 39; Cystatin-C 1.62, eGFR 41)\par 		2022-12-20 SCr 1.84 (eGFR 42)		\par 		2022-11-02 SCr 2.00 (eGFR 38)\par 		2022-10-24 SCr 1.83 (eGFR 42; Cystatin-C 2.05, eGFR 30)\par 		2022-09-12 SCr 1.76 (eGFR 44)\par 		2022-08-01 SCr 1.78 (eGFR 43; Cystatin-C 2.04, eGFR 30)\par 		2022-05-16 SCr 1.65 (eGFR 48; Cystatin-C 1.62, eGFR 41)\par 		2022-05-02 SCr 2.74 (eGFR 26)\par 		2022-04-04 SCr 1.48 (eGFR 54)\par 		...\par 		2022-03-17 SCr 2.23 (eGFR 33)\par 		2022-03-02 SCr 1.54 (eGFR 52)\par 		...\par 		2022-01-24 SCr 1.62\par \par 	2022-12-20 UACR 400 mg/g\par 	2022-11-11 UACR 163 mg/g\par \par 	2022-12-20 UPCR 1.1 g/g\par 	2022-11-11 UPCR 0.6 g/g\par 	2022-10-24 UPCR 0.6 g/g\par 	2022-03-20 UPCR 12.7 g/g\par \par 	2023-01-17 CBC: WBC 5.9 / Hgb 12.5 / plt 302\par 	2022-12-20 Lipid: chol 188, , HDL 43, \par 	2022-10-24 HbA1c 4.7\par \par 	2022-12-20 SPEP/SHYANNE mild polyclonal gammopathy\par 	2022-12-20 KL FLC: kappa 6.3, lambda 3.8, ratio = 1.64\par 	\par 	2022-12-20 Complement C3 139, C4 30\par \par 	2022-03-21 Cryoglobulins negative

## 2023-02-21 ENCOUNTER — APPOINTMENT (OUTPATIENT)
Dept: UROLOGY | Facility: CLINIC | Age: 58
End: 2023-02-21
Payer: COMMERCIAL

## 2023-02-21 DIAGNOSIS — R31.0 GROSS HEMATURIA: ICD-10-CM

## 2023-02-21 PROCEDURE — 51700 IRRIGATION OF BLADDER: CPT

## 2023-02-27 NOTE — HISTORY OF PRESENT ILLNESS
[FreeTextEntry1] : 57 year old with CKD, HIV presents for follow up.\par \par Tolerates biktarvy.\par No missed doses\par \par Not sexually active\par \par \par He had been getting bladder instillations for dysuria.  Overall, he states his dysuria has resolved and his frequency is much better.\par \par

## 2023-02-27 NOTE — ASSESSMENT
[FreeTextEntry1] : \par 57 year old with ckd and HIV presents for follow up\par \par \par 1) HIV \par Continue Biktarvy\par Viral load around 40- which is suppressed\par CD % has increased but CD4 #  still low\par \par Continue to follow\par \par 2) CKD- 3\par Follows with nephrology\par Considering renal biopsy\par \par 3) Cystitis\par getting bladder irrigation\par Follows with urology\par \par 4) HCM\par refused flu shot\par Will start vacinations soon\par Will need prevnar 20, meningococcal, TDAP, zostavax\par \par RTC 6 months\par

## 2023-03-06 ENCOUNTER — APPOINTMENT (OUTPATIENT)
Dept: UROLOGY | Facility: CLINIC | Age: 58
End: 2023-03-06
Payer: COMMERCIAL

## 2023-03-06 ENCOUNTER — OUTPATIENT (OUTPATIENT)
Dept: OUTPATIENT SERVICES | Facility: HOSPITAL | Age: 58
LOS: 1 days | End: 2023-03-06
Payer: COMMERCIAL

## 2023-03-06 VITALS — SYSTOLIC BLOOD PRESSURE: 153 MMHG | DIASTOLIC BLOOD PRESSURE: 80 MMHG | OXYGEN SATURATION: 97 % | HEART RATE: 94 BPM

## 2023-03-06 VITALS — OXYGEN SATURATION: 97 % | DIASTOLIC BLOOD PRESSURE: 81 MMHG | HEART RATE: 94 BPM | SYSTOLIC BLOOD PRESSURE: 135 MMHG

## 2023-03-06 DIAGNOSIS — Z98.890 OTHER SPECIFIED POSTPROCEDURAL STATES: Chronic | ICD-10-CM

## 2023-03-06 DIAGNOSIS — Z90.89 ACQUIRED ABSENCE OF OTHER ORGANS: Chronic | ICD-10-CM

## 2023-03-06 DIAGNOSIS — R35.0 FREQUENCY OF MICTURITION: ICD-10-CM

## 2023-03-06 PROCEDURE — 51700 IRRIGATION OF BLADDER: CPT

## 2023-03-08 DIAGNOSIS — N35.919 UNSPECIFIED URETHRAL STRICTURE, MALE, UNSPECIFIED SITE: ICD-10-CM

## 2023-03-08 DIAGNOSIS — N41.9 INFLAMMATORY DISEASE OF PROSTATE, UNSPECIFIED: ICD-10-CM

## 2023-03-08 DIAGNOSIS — R30.0 DYSURIA: ICD-10-CM

## 2023-03-16 NOTE — PATIENT PROFILE ADULT - NSPROPTRIGHTNOTIFY_GEN_A_NUR
SEEG Report      IMPLANTATION DATE:  2/27/2023  DATE OF ADMISSION: 2/27/2023       ADMITTING/REQUESTING PROVIDER: Ruchi Chen MD     REASON FOR CONSULT:  58-year-old man admitted for phase 2 epilepsy surgical workup with the placement of intracranial sEEG leads for seizure capture and onset localization.     METHODOLOGY  Depth electrodes consist of thin wires with electrical contacts at fixed distances along the wire. These are implanted using a stereotactic procedure targeting cortical and subcortical structures. Digital video recording of the patient is simultaneously recorded with the EEG. The patient is instructed to report clinical symptoms which may occur during the recording session. EEG and video recording are stored and archived in digital format. Activation procedures which include photic stimulation, hyperventilation and instructing patients to perform simple tasks are done in selected patients. Compresses spectral analysis (CSA) is performed on the activity recorded from each individual channel. This is displayed as a power display of frequencies from 0 to 30 Hz over time. The CSA analysis is done and displayed continuously. This is reviewed for asymmetries in power between homologous areas of the scalp and for presence of changes in power which can be seen when seizures occur. Sections of suspected abnormalities on the CSA is then compared with the original EEG recording.      The following is a chart outlying the details related to the depth electrodes implanted:       Electrode    Medial target  Lateral target  Cable Color Serial Number # Of Contacts Head Box  Location   1 L Sup margin Piriformis Inf Temp Gyrus Blk/Gr 55740 16 1-16   2 L Entorihinal Entorhinal Inf Temp Gyrus Yel/Scott 31677 12 17-28   3 L Prefrontal Ant Cing Mid Frontal Gyrus Scott/Blk 22176 16 29-44   4 L Ant Insular Ant Insula Frontal Operculum Yel/Gre 38880 10 45-54   5 L Mid Insular Mid Insula Pars Triangularis Yel/Scott 61400 10  55-64   6 L Orbitofrontal Med OrbFr Lat OrbFr Blk/Yel 42995 16 65-80   7 L Mid Cing Mid Cingulate Sup Frontal Gyrus Red/Yel 94238 14 81-94   8 L Hippo Tail Post Hippo Mid Temp Gyrus Red/Gre 72795 14     EKG           109-110        RECORDING TIMES  Start on March 8, 2023 at hours 7 minute 0 seconds 43  End on March 8, 2023 at hours 10 minute 38 seconds 56   The total time of EEG recording for the study was 3 hours and 38 minutes     EEG FINDINGS     Interictal:  - Independent spikes LSuMar1-2  - Spikes with a broad field LSuMar1-2, LPreFr 1-4, LOrFr 1-4  - Independent spikes Lhippo1-2    Ictal:   During this no seizures were recorded.    Impression:    This is an abnormal EEG during wakefulness, drowsiness and sleep.  The presence of interictal discharges in the   LSuMar1-2 contacts is suggestive of an irritable region.  At times a broad field was noted involving LPreFr 1-4, LOrFr 1-4 contacts.  In addition independent interictal discharges were also noted in the Lhippo1-2 contacts.  No electrographic seizures were recorded    declines

## 2023-03-20 ENCOUNTER — APPOINTMENT (OUTPATIENT)
Dept: UROLOGY | Facility: CLINIC | Age: 58
End: 2023-03-20
Payer: COMMERCIAL

## 2023-03-20 PROCEDURE — 88112 CYTOPATH CELL ENHANCE TECH: CPT | Mod: 26

## 2023-03-20 PROCEDURE — 99214 OFFICE O/P EST MOD 30 MIN: CPT

## 2023-03-22 LAB
BACTERIA UR CULT: NORMAL
URINE CYTOLOGY: NORMAL

## 2023-03-28 LAB — FUNGUS SPEC CULT ORG #8: ABNORMAL

## 2023-04-24 ENCOUNTER — LABORATORY RESULT (OUTPATIENT)
Age: 58
End: 2023-04-24

## 2023-04-24 ENCOUNTER — APPOINTMENT (OUTPATIENT)
Dept: INFECTIOUS DISEASE | Facility: CLINIC | Age: 58
End: 2023-04-24

## 2023-04-26 ENCOUNTER — NON-APPOINTMENT (OUTPATIENT)
Age: 58
End: 2023-04-26

## 2023-04-26 DIAGNOSIS — E55.9 VITAMIN D DEFICIENCY, UNSPECIFIED: ICD-10-CM

## 2023-04-28 ENCOUNTER — APPOINTMENT (OUTPATIENT)
Dept: NEPHROLOGY | Facility: CLINIC | Age: 58
End: 2023-04-28
Payer: COMMERCIAL

## 2023-04-28 VITALS
HEART RATE: 68 BPM | OXYGEN SATURATION: 99 % | HEIGHT: 70 IN | WEIGHT: 192 LBS | SYSTOLIC BLOOD PRESSURE: 116 MMHG | BODY MASS INDEX: 27.49 KG/M2 | DIASTOLIC BLOOD PRESSURE: 80 MMHG | TEMPERATURE: 97.7 F

## 2023-04-28 PROCEDURE — 99215 OFFICE O/P EST HI 40 MIN: CPT

## 2023-04-28 NOTE — ASSESSMENT
[FreeTextEntry1] : Mr. Bo is a 58 year old man with chronic kidney disease stage III with proteinuria, HIV, chronic dysuria, and hypothyrodism, here for ongoing kidney evaluation and management.\par \par Likely etiology of CKD from HIV infection. Now that HIV is under control, his kidney disease has stabilized, but he is left with low GFR and proteinuria. I suspect this is now chronic fibrosis.\par \par Spent time today discussing need for initiation of anti-proteinuric therapy, specifically ACEi/ARB. We also discussed elevated cholesterol with goal LDL <70. Mr. Bo is agreeable to losartan, but will try to work on lifestyle modifications first.\par \par CKD III with proteinuria\par - START ARB (losartan, low dose given normal BP)\par - Would also benefit from SGLT2i, but will hold for now given ongoing urologic issues\par \par Hyperlipidemia, goal LDL <70\par - Recheck lipids prior to next visit\par - Will likely need statin, would aim for atorvastatin\par \par HIV: Per ID\par \par Hypothyroidism: Per endocrinology\par \par Dysuria: Per urology\par \par \par PLAN:\par - START losartan 25mg daily for CKD/proteinuria\par - Check basic metabolic panel 2 weeks after starting losartan to assess potassium and creatinine\par - Follow up in ~4 months with labs prior\par \par \par Discussed importance of healthful habits, including physical activity/exercise and improvements in diet.\par \par I have spent a total of 40 minutes in which >50% was spent in discussion with patient regarding kidney disease.

## 2023-04-28 NOTE — HISTORY OF PRESENT ILLNESS
[FreeTextEntry1] : Contacts:\par 	Dr. Felipe Aguirre (PCP)\par 	Dr. Demetrius Man (ID)\par 	Dr. Theresa Domingo (endocrinology)\par 	Dr. Donny Bocanegra (urology)\par \par -------------------------------------------------------------------------------\par Problem List:\par 	Chronic kidney disease stage III with proteinuria\par 		Earliest note of CKD in 1/2022 = SCr 1.6\par 		At time of HIV diagnosis, nephrotic-range proteinuria of UPCR 12.7 g/g\par 	History of hematuria, presumed bladder etiology\par 	HIV, diagnosed 2022\par 	Dysuria, managed by urology\par 	Hypothyroidism	\par \par -------------------------------------------------------------------------------\par HPI: [Vay-ano]\par Mr. Bo is a 58 year old man with chronic kidney disease stage III with proteinuria, HIV, chronic dysuria, and hypothyrodism, here for ongoing kidney evaluation and management.\par \par Last saw Mr. Bo in Feb. 2023, here for follow-up. Doing well since then without significant interval change, and feeling physically good. Mr. Bo says he is eating very well, and perhaps put on too much weight.\par \par He very much enjoyed his son's wedding, which took place last month. He is seeing a therapist and continuing to work with his wife to reconcile.\par \par Urologically, things are relatively stable.\par \par No dizziness/lightheadedness, headaches, chest pain, dyspnea, cough, nausea, vomiting, abdominal pain, diarrhea, constipation, leg swelling.\par \par -------------------------------------------------------------------------------\par Social History:\par 	[place]elong [place];  ~30 years, though currently \par 	Has two children in their 20s (son is a physical therapist; daughter finishing PA training)\par 	Son getting  in March 2023\par 	Works in auto repair (owns a repair shop)\par 	No history of tobacco, [significant] alcohol, or illicit drug use\par \par Family History:\par 	Mother had colon cancer\par 	Father has colitis (has colostomy since age 26)\par 	No known history of renal disease, hematuria, proteinuria, ESRD, dialysis, transplant\par \par -------------------------------------------------------------------------------\par Allergies: NKDA\par \par Medications:\par 	Biktarvy\par 	Bactrim M/W/F\par 	Levothyroxine 150mcg daily\par 	\par -------------------------------------------------------------------------------\par Physical Exam:\par \par 	Gen: NAD, well-appearing\par 	HEENT: Supple neck\par 	Pulm: CTA\par 	CV: RRR\par 	Back: No spinal or CVA terndeness\par 	Abd: +BS, soft\par 	UE: Warm, FROM\par 	LE: Warm, FROM, intact strength; no edema\par 	Neuro: No focal deficits, intact gait\par 	Psych: Normal affect\par 	Skin: Warm, without rashes\par 	\par -------------------------------------------------------------------------------\par Labs/Studies: [notable]\par \par 	2023-04-24\par 	\par 		140 | 107 | 37\par 		------------------< 97 Ca 9.5 eGFR 43\par 		4.4 |  20 | 1.8\par 		\par 		Albumin 4.2\par \par 	Creatinine Trend\par 		2023-04-24 SCr 1.80 (eGFR 43; Cystatin-C 1.74, eGFR 37)\par 		2023-01-17 SCr 1.95 (eGFR 39; Cystatin-C 1.62, eGFR 41)\par 		2022-12-20 SCr 1.84 (eGFR 42)		\par 		2022-11-02 SCr 2.00 (eGFR 38)\par 		2022-10-24 SCr 1.83 (eGFR 42; Cystatin-C 2.05, eGFR 30)\par 		2022-09-12 SCr 1.76 (eGFR 44)\par 		2022-08-01 SCr 1.78 (eGFR 43; Cystatin-C 2.04, eGFR 30)\par 		2022-05-16 SCr 1.65 (eGFR 48; Cystatin-C 1.62, eGFR 41)\par 		2022-05-02 SCr 2.74 (eGFR 26)\par 		2022-04-04 SCr 1.48 (eGFR 54)\par 		...\par 		2022-03-17 SCr 2.23 (eGFR 33)\par 		2022-03-02 SCr 1.54 (eGFR 52)\par 		...\par 		2022-01-24 SCr 1.62\par \par 	2022-12-20 UACR 400 mg/g\par 	2022-11-11 UACR 163 mg/g\par \par 	2023-04-24 UPCR 0.6 g/g\par 	2022-12-20 UPCR 1.1 g/g\par 	2022-11-11 UPCR 0.6 g/g\par 	2022-10-24 UPCR 0.6 g/g\par 	2022-03-20 UPCR 12.7 g/g\par \par 	2023-04-24 CBC: WBC 4.5 / Hgb 12.6 / plt 267\par 	2023-04-24 Lipid: chol 198, , HDL 43, \par 	2022-10-24 HbA1c 5.5\par \par 	2022-12-20 SPEP/SHYANNE mild polyclonal gammopathy\par 	2022-12-20 KL FLC: kappa 6.3, lambda 3.8, ratio = 1.64\par 	\par 	2022-12-20 Complement C3 139, C4 30\par \par 	2022-03-21 Cryoglobulins negative

## 2023-04-28 NOTE — CONSULT LETTER
[Dear  ___] : Dear  [unfilled], [Courtesy Letter:] : I had the pleasure of seeing your patient, [unfilled], in my office today. [Please see my note below.] : Please see my note below. [Sincerely,] : Sincerely, [FreeTextEntry3] : Rj Zaragoza MD\par Nephrology\par  [DrMick  ___] : Dr. GATICA [DrMick ___] : Dr. GATICA

## 2023-05-03 ENCOUNTER — APPOINTMENT (OUTPATIENT)
Dept: INFECTIOUS DISEASE | Facility: CLINIC | Age: 58
End: 2023-05-03
Payer: COMMERCIAL

## 2023-05-03 VITALS
HEIGHT: 70 IN | DIASTOLIC BLOOD PRESSURE: 79 MMHG | TEMPERATURE: 97.7 F | OXYGEN SATURATION: 99 % | SYSTOLIC BLOOD PRESSURE: 133 MMHG | HEART RATE: 106 BPM | WEIGHT: 191 LBS | BODY MASS INDEX: 27.35 KG/M2

## 2023-05-03 DIAGNOSIS — Z92.89 PERSONAL HISTORY OF OTHER MEDICAL TREATMENT: ICD-10-CM

## 2023-05-03 PROCEDURE — 99214 OFFICE O/P EST MOD 30 MIN: CPT

## 2023-05-03 NOTE — HISTORY OF PRESENT ILLNESS
[FreeTextEntry1] : 58 year old with CKD with HIV\par He is tolerating biktarvy- he denies missing doses.\par \par He has gained weight but is close to his baseline adult weight.\par \par No other new complaints.\par \par He and his wife are living separately.

## 2023-05-03 NOTE — ASSESSMENT
[FreeTextEntry1] : 57 year old with ckd and HIV presents for follow up\par \par 1) HIV \par Continue Biktarvy\par Viral load around 40- which is suppressed\par Repeat CD4\par Continue Bactrim prophylaxis\par \par Continue to follow\par \par 2) CKD- 3\par Follows with nephrology\par He was advised to start losartan  - he had not started \par Advised him to start losartan- repeat cmp in 2 weeks as planned with nephrology\par \par 3) Cystitis\par He was getting bladder irrigation\par Follows with urology\par He is waiting for repeat cystoscopy\par \par 4) HCM\par Will need prevnar 20, meningococcal, TDAP, zostavax\par He will continue to consider it. \par RTC 6 months

## 2023-05-08 ENCOUNTER — APPOINTMENT (OUTPATIENT)
Dept: INFECTIOUS DISEASE | Facility: CLINIC | Age: 58
End: 2023-05-08

## 2023-05-17 ENCOUNTER — NON-APPOINTMENT (OUTPATIENT)
Age: 58
End: 2023-05-17

## 2023-06-05 ENCOUNTER — APPOINTMENT (OUTPATIENT)
Dept: UROLOGY | Facility: CLINIC | Age: 58
End: 2023-06-05
Payer: COMMERCIAL

## 2023-06-05 ENCOUNTER — OUTPATIENT (OUTPATIENT)
Dept: OUTPATIENT SERVICES | Facility: HOSPITAL | Age: 58
LOS: 1 days | End: 2023-06-05
Payer: COMMERCIAL

## 2023-06-05 VITALS
DIASTOLIC BLOOD PRESSURE: 77 MMHG | SYSTOLIC BLOOD PRESSURE: 126 MMHG | HEART RATE: 83 BPM | RESPIRATION RATE: 16 BRPM | OXYGEN SATURATION: 99 %

## 2023-06-05 DIAGNOSIS — Z98.890 OTHER SPECIFIED POSTPROCEDURAL STATES: Chronic | ICD-10-CM

## 2023-06-05 DIAGNOSIS — Z90.89 ACQUIRED ABSENCE OF OTHER ORGANS: Chronic | ICD-10-CM

## 2023-06-05 DIAGNOSIS — R35.0 FREQUENCY OF MICTURITION: ICD-10-CM

## 2023-06-05 PROCEDURE — 51700 IRRIGATION OF BLADDER: CPT

## 2023-06-05 PROCEDURE — 99213 OFFICE O/P EST LOW 20 MIN: CPT | Mod: 25

## 2023-06-07 DIAGNOSIS — R39.15 URGENCY OF URINATION: ICD-10-CM

## 2023-06-07 DIAGNOSIS — N41.9 INFLAMMATORY DISEASE OF PROSTATE, UNSPECIFIED: ICD-10-CM

## 2023-06-07 DIAGNOSIS — R35.1 NOCTURIA: ICD-10-CM

## 2023-06-07 DIAGNOSIS — R30.0 DYSURIA: ICD-10-CM

## 2023-06-07 DIAGNOSIS — N35.919 UNSPECIFIED URETHRAL STRICTURE, MALE, UNSPECIFIED SITE: ICD-10-CM

## 2023-06-12 ENCOUNTER — APPOINTMENT (OUTPATIENT)
Dept: UROLOGY | Facility: CLINIC | Age: 58
End: 2023-06-12
Payer: COMMERCIAL

## 2023-06-12 ENCOUNTER — OUTPATIENT (OUTPATIENT)
Dept: OUTPATIENT SERVICES | Facility: HOSPITAL | Age: 58
LOS: 1 days | End: 2023-06-12
Payer: COMMERCIAL

## 2023-06-12 VITALS
HEART RATE: 83 BPM | SYSTOLIC BLOOD PRESSURE: 122 MMHG | BODY MASS INDEX: 27.26 KG/M2 | WEIGHT: 190 LBS | DIASTOLIC BLOOD PRESSURE: 81 MMHG

## 2023-06-12 DIAGNOSIS — Z98.890 OTHER SPECIFIED POSTPROCEDURAL STATES: Chronic | ICD-10-CM

## 2023-06-12 DIAGNOSIS — R35.0 FREQUENCY OF MICTURITION: ICD-10-CM

## 2023-06-12 DIAGNOSIS — Z90.89 ACQUIRED ABSENCE OF OTHER ORGANS: Chronic | ICD-10-CM

## 2023-06-12 DIAGNOSIS — B20 HUMAN IMMUNODEFICIENCY VIRUS [HIV] DISEASE: ICD-10-CM

## 2023-06-12 DIAGNOSIS — R10.2 PELVIC AND PERINEAL PAIN: ICD-10-CM

## 2023-06-12 DIAGNOSIS — N32.89 OTHER SPECIFIED DISORDERS OF BLADDER: ICD-10-CM

## 2023-06-12 DIAGNOSIS — R36.1 HEMATOSPERMIA: ICD-10-CM

## 2023-06-12 PROCEDURE — 52281 CYSTOSCOPY AND TREATMENT: CPT

## 2023-06-13 DIAGNOSIS — Z22.8 CARRIER OF OTHER INFECTIOUS DISEASES: ICD-10-CM

## 2023-06-13 DIAGNOSIS — R36.1 HEMATOSPERMIA: ICD-10-CM

## 2023-06-13 DIAGNOSIS — N34.2 OTHER URETHRITIS: ICD-10-CM

## 2023-06-13 DIAGNOSIS — R10.2 PELVIC AND PERINEAL PAIN: ICD-10-CM

## 2023-06-13 DIAGNOSIS — N20.0 CALCULUS OF KIDNEY: ICD-10-CM

## 2023-06-13 DIAGNOSIS — B20 HUMAN IMMUNODEFICIENCY VIRUS [HIV] DISEASE: ICD-10-CM

## 2023-06-13 DIAGNOSIS — N35.919 UNSPECIFIED URETHRAL STRICTURE, MALE, UNSPECIFIED SITE: ICD-10-CM

## 2023-06-13 DIAGNOSIS — N30.20 OTHER CHRONIC CYSTITIS WITHOUT HEMATURIA: ICD-10-CM

## 2023-06-13 DIAGNOSIS — B37.41 CANDIDAL CYSTITIS AND URETHRITIS: ICD-10-CM

## 2023-06-13 DIAGNOSIS — N32.89 OTHER SPECIFIED DISORDERS OF BLADDER: ICD-10-CM

## 2023-06-19 ENCOUNTER — APPOINTMENT (OUTPATIENT)
Dept: UROLOGY | Facility: CLINIC | Age: 58
End: 2023-06-19
Payer: COMMERCIAL

## 2023-06-19 ENCOUNTER — OUTPATIENT (OUTPATIENT)
Dept: OUTPATIENT SERVICES | Facility: HOSPITAL | Age: 58
LOS: 1 days | End: 2023-06-19
Payer: COMMERCIAL

## 2023-06-19 VITALS — DIASTOLIC BLOOD PRESSURE: 76 MMHG | SYSTOLIC BLOOD PRESSURE: 113 MMHG

## 2023-06-19 VITALS
RESPIRATION RATE: 16 BRPM | TEMPERATURE: 97.2 F | BODY MASS INDEX: 27.2 KG/M2 | HEIGHT: 70 IN | DIASTOLIC BLOOD PRESSURE: 75 MMHG | SYSTOLIC BLOOD PRESSURE: 117 MMHG | OXYGEN SATURATION: 97 % | WEIGHT: 190 LBS | HEART RATE: 102 BPM

## 2023-06-19 DIAGNOSIS — Z98.890 OTHER SPECIFIED POSTPROCEDURAL STATES: Chronic | ICD-10-CM

## 2023-06-19 DIAGNOSIS — R35.0 FREQUENCY OF MICTURITION: ICD-10-CM

## 2023-06-19 DIAGNOSIS — Z90.89 ACQUIRED ABSENCE OF OTHER ORGANS: Chronic | ICD-10-CM

## 2023-06-19 PROCEDURE — 51700 IRRIGATION OF BLADDER: CPT

## 2023-06-20 ENCOUNTER — RX RENEWAL (OUTPATIENT)
Age: 58
End: 2023-06-20

## 2023-06-20 DIAGNOSIS — N30.20 OTHER CHRONIC CYSTITIS WITHOUT HEMATURIA: ICD-10-CM

## 2023-06-20 DIAGNOSIS — R30.0 DYSURIA: ICD-10-CM

## 2023-06-21 ENCOUNTER — NON-APPOINTMENT (OUTPATIENT)
Age: 58
End: 2023-06-21

## 2023-06-26 ENCOUNTER — APPOINTMENT (OUTPATIENT)
Dept: UROLOGY | Facility: CLINIC | Age: 58
End: 2023-06-26
Payer: COMMERCIAL

## 2023-06-26 ENCOUNTER — OUTPATIENT (OUTPATIENT)
Dept: OUTPATIENT SERVICES | Facility: HOSPITAL | Age: 58
LOS: 1 days | End: 2023-06-26
Payer: COMMERCIAL

## 2023-06-26 VITALS
HEART RATE: 101 BPM | WEIGHT: 190 LBS | BODY MASS INDEX: 27.2 KG/M2 | HEIGHT: 70 IN | DIASTOLIC BLOOD PRESSURE: 72 MMHG | TEMPERATURE: 97.5 F | OXYGEN SATURATION: 98 % | SYSTOLIC BLOOD PRESSURE: 115 MMHG | RESPIRATION RATE: 16 BRPM

## 2023-06-26 VITALS — SYSTOLIC BLOOD PRESSURE: 123 MMHG | DIASTOLIC BLOOD PRESSURE: 75 MMHG | HEART RATE: 99 BPM

## 2023-06-26 DIAGNOSIS — Z98.890 OTHER SPECIFIED POSTPROCEDURAL STATES: Chronic | ICD-10-CM

## 2023-06-26 DIAGNOSIS — Z87.448 PERSONAL HISTORY OF OTHER DISEASES OF URINARY SYSTEM: ICD-10-CM

## 2023-06-26 DIAGNOSIS — R35.0 FREQUENCY OF MICTURITION: ICD-10-CM

## 2023-06-26 DIAGNOSIS — Z90.89 ACQUIRED ABSENCE OF OTHER ORGANS: Chronic | ICD-10-CM

## 2023-06-26 PROCEDURE — 51700 IRRIGATION OF BLADDER: CPT

## 2023-06-27 DIAGNOSIS — Z87.448 PERSONAL HISTORY OF OTHER DISEASES OF URINARY SYSTEM: ICD-10-CM

## 2023-06-27 DIAGNOSIS — R30.0 DYSURIA: ICD-10-CM

## 2023-06-27 DIAGNOSIS — N30.20 OTHER CHRONIC CYSTITIS WITHOUT HEMATURIA: ICD-10-CM

## 2023-07-03 ENCOUNTER — APPOINTMENT (OUTPATIENT)
Dept: UROLOGY | Facility: CLINIC | Age: 58
End: 2023-07-03
Payer: COMMERCIAL

## 2023-07-03 ENCOUNTER — OUTPATIENT (OUTPATIENT)
Dept: OUTPATIENT SERVICES | Facility: HOSPITAL | Age: 58
LOS: 1 days | End: 2023-07-03
Payer: COMMERCIAL

## 2023-07-03 VITALS — SYSTOLIC BLOOD PRESSURE: 124 MMHG | DIASTOLIC BLOOD PRESSURE: 77 MMHG | HEART RATE: 90 BPM | RESPIRATION RATE: 17 BRPM

## 2023-07-03 VITALS
HEART RATE: 99 BPM | SYSTOLIC BLOOD PRESSURE: 123 MMHG | WEIGHT: 190 LBS | DIASTOLIC BLOOD PRESSURE: 79 MMHG | RESPIRATION RATE: 17 BRPM | HEIGHT: 70 IN | BODY MASS INDEX: 27.2 KG/M2

## 2023-07-03 DIAGNOSIS — Z98.890 OTHER SPECIFIED POSTPROCEDURAL STATES: Chronic | ICD-10-CM

## 2023-07-03 DIAGNOSIS — R35.0 FREQUENCY OF MICTURITION: ICD-10-CM

## 2023-07-03 DIAGNOSIS — Z90.89 ACQUIRED ABSENCE OF OTHER ORGANS: Chronic | ICD-10-CM

## 2023-07-03 PROCEDURE — 51700 IRRIGATION OF BLADDER: CPT

## 2023-07-05 ENCOUNTER — APPOINTMENT (OUTPATIENT)
Dept: UROLOGY | Facility: CLINIC | Age: 58
End: 2023-07-05

## 2023-07-05 DIAGNOSIS — R39.15 URGENCY OF URINATION: ICD-10-CM

## 2023-07-05 DIAGNOSIS — R30.0 DYSURIA: ICD-10-CM

## 2023-07-05 DIAGNOSIS — R35.1 NOCTURIA: ICD-10-CM

## 2023-07-10 ENCOUNTER — APPOINTMENT (OUTPATIENT)
Dept: UROLOGY | Facility: CLINIC | Age: 58
End: 2023-07-10
Payer: COMMERCIAL

## 2023-07-10 ENCOUNTER — OUTPATIENT (OUTPATIENT)
Dept: OUTPATIENT SERVICES | Facility: HOSPITAL | Age: 58
LOS: 1 days | End: 2023-07-10
Payer: COMMERCIAL

## 2023-07-10 VITALS — HEART RATE: 92 BPM | RESPIRATION RATE: 16 BRPM | SYSTOLIC BLOOD PRESSURE: 127 MMHG | DIASTOLIC BLOOD PRESSURE: 81 MMHG

## 2023-07-10 VITALS — SYSTOLIC BLOOD PRESSURE: 129 MMHG | HEART RATE: 68 BPM | DIASTOLIC BLOOD PRESSURE: 81 MMHG

## 2023-07-10 DIAGNOSIS — Z98.890 OTHER SPECIFIED POSTPROCEDURAL STATES: Chronic | ICD-10-CM

## 2023-07-10 DIAGNOSIS — R35.0 FREQUENCY OF MICTURITION: ICD-10-CM

## 2023-07-10 DIAGNOSIS — Z90.89 ACQUIRED ABSENCE OF OTHER ORGANS: Chronic | ICD-10-CM

## 2023-07-10 LAB
APPEARANCE: ABNORMAL
BACTERIA UR CULT: NORMAL
BACTERIA: ABNORMAL /HPF
BILIRUBIN URINE: NEGATIVE
BLOOD URINE: ABNORMAL
CAST: 6 /LPF
COLOR: YELLOW
EPITHELIAL CELLS: 4 /HPF
GLUCOSE QUALITATIVE U: NEGATIVE MG/DL
KETONES URINE: NEGATIVE MG/DL
LEUKOCYTE ESTERASE URINE: ABNORMAL
MICROSCOPIC-UA: NORMAL
NITRITE URINE: NEGATIVE
PH URINE: 6
PROTEIN URINE: 100 MG/DL
RED BLOOD CELLS URINE: 60 /HPF
REVIEW: NORMAL
SPECIFIC GRAVITY URINE: 1.01
UROBILINOGEN URINE: 0.2 MG/DL
WHITE BLOOD CELLS URINE: 3040 /HPF
YEAST-LIKE CELLS: PRESENT

## 2023-07-10 PROCEDURE — 51700 IRRIGATION OF BLADDER: CPT

## 2023-07-12 DIAGNOSIS — R35.1 NOCTURIA: ICD-10-CM

## 2023-07-12 DIAGNOSIS — N20.0 CALCULUS OF KIDNEY: ICD-10-CM

## 2023-07-12 DIAGNOSIS — R30.0 DYSURIA: ICD-10-CM

## 2023-07-12 DIAGNOSIS — R39.15 URGENCY OF URINATION: ICD-10-CM

## 2023-07-17 ENCOUNTER — APPOINTMENT (OUTPATIENT)
Dept: UROLOGY | Facility: CLINIC | Age: 58
End: 2023-07-17
Payer: COMMERCIAL

## 2023-07-17 ENCOUNTER — OUTPATIENT (OUTPATIENT)
Dept: OUTPATIENT SERVICES | Facility: HOSPITAL | Age: 58
LOS: 1 days | End: 2023-07-17
Payer: COMMERCIAL

## 2023-07-17 VITALS
HEART RATE: 86 BPM | TEMPERATURE: 98.2 F | SYSTOLIC BLOOD PRESSURE: 121 MMHG | RESPIRATION RATE: 17 BRPM | DIASTOLIC BLOOD PRESSURE: 76 MMHG

## 2023-07-17 DIAGNOSIS — Z98.890 OTHER SPECIFIED POSTPROCEDURAL STATES: Chronic | ICD-10-CM

## 2023-07-17 DIAGNOSIS — R35.0 FREQUENCY OF MICTURITION: ICD-10-CM

## 2023-07-17 DIAGNOSIS — Z90.89 ACQUIRED ABSENCE OF OTHER ORGANS: Chronic | ICD-10-CM

## 2023-07-17 PROCEDURE — 51700 IRRIGATION OF BLADDER: CPT

## 2023-07-18 DIAGNOSIS — R30.0 DYSURIA: ICD-10-CM

## 2023-07-18 DIAGNOSIS — R35.1 NOCTURIA: ICD-10-CM

## 2023-07-24 ENCOUNTER — OUTPATIENT (OUTPATIENT)
Dept: OUTPATIENT SERVICES | Facility: HOSPITAL | Age: 58
LOS: 1 days | End: 2023-07-24
Payer: COMMERCIAL

## 2023-07-24 ENCOUNTER — APPOINTMENT (OUTPATIENT)
Dept: UROLOGY | Facility: CLINIC | Age: 58
End: 2023-07-24
Payer: COMMERCIAL

## 2023-07-24 VITALS — HEART RATE: 89 BPM | SYSTOLIC BLOOD PRESSURE: 212 MMHG | DIASTOLIC BLOOD PRESSURE: 80 MMHG

## 2023-07-24 VITALS
DIASTOLIC BLOOD PRESSURE: 81 MMHG | RESPIRATION RATE: 16 BRPM | SYSTOLIC BLOOD PRESSURE: 125 MMHG | OXYGEN SATURATION: 98 % | HEART RATE: 89 BPM

## 2023-07-24 DIAGNOSIS — Z98.890 OTHER SPECIFIED POSTPROCEDURAL STATES: Chronic | ICD-10-CM

## 2023-07-24 DIAGNOSIS — R35.0 FREQUENCY OF MICTURITION: ICD-10-CM

## 2023-07-24 DIAGNOSIS — Z90.89 ACQUIRED ABSENCE OF OTHER ORGANS: Chronic | ICD-10-CM

## 2023-07-24 PROCEDURE — 51700 IRRIGATION OF BLADDER: CPT

## 2023-07-31 ENCOUNTER — APPOINTMENT (OUTPATIENT)
Dept: UROLOGY | Facility: CLINIC | Age: 58
End: 2023-07-31
Payer: COMMERCIAL

## 2023-07-31 ENCOUNTER — OUTPATIENT (OUTPATIENT)
Dept: OUTPATIENT SERVICES | Facility: HOSPITAL | Age: 58
LOS: 1 days | End: 2023-07-31
Payer: COMMERCIAL

## 2023-07-31 VITALS — HEART RATE: 85 BPM | SYSTOLIC BLOOD PRESSURE: 129 MMHG | DIASTOLIC BLOOD PRESSURE: 85 MMHG

## 2023-07-31 DIAGNOSIS — R30.0 DYSURIA: ICD-10-CM

## 2023-07-31 DIAGNOSIS — Z98.890 OTHER SPECIFIED POSTPROCEDURAL STATES: Chronic | ICD-10-CM

## 2023-07-31 DIAGNOSIS — Z90.89 ACQUIRED ABSENCE OF OTHER ORGANS: Chronic | ICD-10-CM

## 2023-07-31 DIAGNOSIS — N30.20 OTHER CHRONIC CYSTITIS WITHOUT HEMATURIA: ICD-10-CM

## 2023-07-31 DIAGNOSIS — R35.0 FREQUENCY OF MICTURITION: ICD-10-CM

## 2023-07-31 PROCEDURE — 51700 IRRIGATION OF BLADDER: CPT

## 2023-08-02 DIAGNOSIS — N30.20 OTHER CHRONIC CYSTITIS WITHOUT HEMATURIA: ICD-10-CM

## 2023-08-02 DIAGNOSIS — R30.0 DYSURIA: ICD-10-CM

## 2023-08-07 ENCOUNTER — APPOINTMENT (OUTPATIENT)
Dept: UROLOGY | Facility: CLINIC | Age: 58
End: 2023-08-07
Payer: COMMERCIAL

## 2023-08-07 ENCOUNTER — OUTPATIENT (OUTPATIENT)
Dept: OUTPATIENT SERVICES | Facility: HOSPITAL | Age: 58
LOS: 1 days | End: 2023-08-07
Payer: COMMERCIAL

## 2023-08-07 VITALS — DIASTOLIC BLOOD PRESSURE: 78 MMHG | HEART RATE: 106 BPM | SYSTOLIC BLOOD PRESSURE: 119 MMHG

## 2023-08-07 VITALS — SYSTOLIC BLOOD PRESSURE: 120 MMHG | HEART RATE: 103 BPM | DIASTOLIC BLOOD PRESSURE: 79 MMHG

## 2023-08-07 DIAGNOSIS — R35.0 FREQUENCY OF MICTURITION: ICD-10-CM

## 2023-08-07 DIAGNOSIS — Z98.890 OTHER SPECIFIED POSTPROCEDURAL STATES: Chronic | ICD-10-CM

## 2023-08-07 DIAGNOSIS — Z90.89 ACQUIRED ABSENCE OF OTHER ORGANS: Chronic | ICD-10-CM

## 2023-08-07 PROCEDURE — 51700 IRRIGATION OF BLADDER: CPT

## 2023-08-08 DIAGNOSIS — N34.2 OTHER URETHRITIS: ICD-10-CM

## 2023-08-08 DIAGNOSIS — R35.1 NOCTURIA: ICD-10-CM

## 2023-08-08 DIAGNOSIS — R39.15 URGENCY OF URINATION: ICD-10-CM

## 2023-08-08 DIAGNOSIS — R30.0 DYSURIA: ICD-10-CM

## 2023-08-14 ENCOUNTER — RX RENEWAL (OUTPATIENT)
Age: 58
End: 2023-08-14

## 2023-08-14 ENCOUNTER — OUTPATIENT (OUTPATIENT)
Dept: OUTPATIENT SERVICES | Facility: HOSPITAL | Age: 58
LOS: 1 days | End: 2023-08-14
Payer: COMMERCIAL

## 2023-08-14 ENCOUNTER — APPOINTMENT (OUTPATIENT)
Dept: UROLOGY | Facility: CLINIC | Age: 58
End: 2023-08-14
Payer: COMMERCIAL

## 2023-08-14 VITALS
HEART RATE: 105 BPM | DIASTOLIC BLOOD PRESSURE: 70 MMHG | SYSTOLIC BLOOD PRESSURE: 130 MMHG | OXYGEN SATURATION: 98 % | RESPIRATION RATE: 15 BRPM

## 2023-08-14 VITALS — DIASTOLIC BLOOD PRESSURE: 74 MMHG | HEART RATE: 97 BPM | SYSTOLIC BLOOD PRESSURE: 117 MMHG

## 2023-08-14 DIAGNOSIS — Z98.890 OTHER SPECIFIED POSTPROCEDURAL STATES: Chronic | ICD-10-CM

## 2023-08-14 DIAGNOSIS — R35.0 FREQUENCY OF MICTURITION: ICD-10-CM

## 2023-08-14 DIAGNOSIS — Z90.89 ACQUIRED ABSENCE OF OTHER ORGANS: Chronic | ICD-10-CM

## 2023-08-14 PROCEDURE — 99213 OFFICE O/P EST LOW 20 MIN: CPT | Mod: 25

## 2023-08-14 PROCEDURE — 51700 IRRIGATION OF BLADDER: CPT

## 2023-08-15 DIAGNOSIS — N41.9 INFLAMMATORY DISEASE OF PROSTATE, UNSPECIFIED: ICD-10-CM

## 2023-08-15 DIAGNOSIS — R39.15 URGENCY OF URINATION: ICD-10-CM

## 2023-08-15 DIAGNOSIS — R30.0 DYSURIA: ICD-10-CM

## 2023-08-16 ENCOUNTER — RX RENEWAL (OUTPATIENT)
Age: 58
End: 2023-08-16

## 2023-08-21 ENCOUNTER — OUTPATIENT (OUTPATIENT)
Dept: OUTPATIENT SERVICES | Facility: HOSPITAL | Age: 58
LOS: 1 days | End: 2023-08-21
Payer: COMMERCIAL

## 2023-08-21 ENCOUNTER — APPOINTMENT (OUTPATIENT)
Dept: UROLOGY | Facility: CLINIC | Age: 58
End: 2023-08-21
Payer: COMMERCIAL

## 2023-08-21 VITALS — DIASTOLIC BLOOD PRESSURE: 77 MMHG | SYSTOLIC BLOOD PRESSURE: 125 MMHG | HEART RATE: 85 BPM

## 2023-08-21 VITALS
HEART RATE: 89 BPM | SYSTOLIC BLOOD PRESSURE: 122 MMHG | RESPIRATION RATE: 15 BRPM | DIASTOLIC BLOOD PRESSURE: 71 MMHG | OXYGEN SATURATION: 99 %

## 2023-08-21 DIAGNOSIS — Z98.890 OTHER SPECIFIED POSTPROCEDURAL STATES: Chronic | ICD-10-CM

## 2023-08-21 DIAGNOSIS — Z87.438 PERSONAL HISTORY OF OTHER DISEASES OF MALE GENITAL ORGANS: ICD-10-CM

## 2023-08-21 DIAGNOSIS — Z90.89 ACQUIRED ABSENCE OF OTHER ORGANS: Chronic | ICD-10-CM

## 2023-08-21 DIAGNOSIS — R35.0 FREQUENCY OF MICTURITION: ICD-10-CM

## 2023-08-21 PROCEDURE — 51700 IRRIGATION OF BLADDER: CPT

## 2023-08-21 RX ORDER — ALFUZOSIN HYDROCHLORIDE 10 MG/1
10 TABLET, EXTENDED RELEASE ORAL
Qty: 90 | Refills: 1 | Status: ACTIVE | COMMUNITY
Start: 2023-08-14 | End: 1900-01-01

## 2023-08-22 DIAGNOSIS — N41.9 INFLAMMATORY DISEASE OF PROSTATE, UNSPECIFIED: ICD-10-CM

## 2023-08-22 DIAGNOSIS — R30.0 DYSURIA: ICD-10-CM

## 2023-08-22 DIAGNOSIS — N30.20 OTHER CHRONIC CYSTITIS WITHOUT HEMATURIA: ICD-10-CM

## 2023-08-25 LAB
25(OH)D3 SERPL-MCNC: 13.1 NG/ML
ALBUMIN SERPL ELPH-MCNC: 4 G/DL
ALBUMIN SERPL ELPH-MCNC: 4.2 G/DL
ALP BLD-CCNC: 130 U/L
ALP BLD-CCNC: 132 U/L
ALT SERPL-CCNC: 10 U/L
ALT SERPL-CCNC: 8 U/L
ANION GAP SERPL CALC-SCNC: 10 MMOL/L
ANION GAP SERPL CALC-SCNC: 13 MMOL/L
APPEARANCE: ABNORMAL
AST SERPL-CCNC: 12 U/L
AST SERPL-CCNC: 12 U/L
BACTERIA: NEGATIVE /HPF
BASOPHILS # BLD AUTO: 0.02 K/UL
BASOPHILS NFR BLD AUTO: 0.4 %
BILIRUB SERPL-MCNC: 0.2 MG/DL
BILIRUB SERPL-MCNC: 0.3 MG/DL
BILIRUBIN URINE: NEGATIVE
BLOOD URINE: ABNORMAL
BUN SERPL-MCNC: 36 MG/DL
BUN SERPL-MCNC: 37 MG/DL
C TRACH RRNA SPEC QL NAA+PROBE: NOT DETECTED
CALCIUM SERPL-MCNC: 9 MG/DL
CALCIUM SERPL-MCNC: 9.5 MG/DL
CAST: 0 /LPF
CD3 CELLS # BLD: 627 CELLS/UL
CD3 CELLS NFR BLD: 81 %
CD3+CD4+ CELLS # BLD: 212 CELLS/UL
CD3+CD4+ CELLS NFR BLD: 27 %
CD3+CD4+ CELLS/CD3+CD8+ CLL SPEC: 0.54 RATIO
CD3+CD8+ CELLS # SPEC: 395 CELLS/UL
CD3+CD8+ CELLS NFR BLD: 51 %
CHLORIDE SERPL-SCNC: 107 MMOL/L
CHLORIDE SERPL-SCNC: 107 MMOL/L
CHOLEST SERPL-MCNC: 198 MG/DL
CO2 SERPL-SCNC: 20 MMOL/L
CO2 SERPL-SCNC: 20 MMOL/L
COLOR: YELLOW
CREAT SERPL-MCNC: 1.8 MG/DL
CREAT SERPL-MCNC: 1.89 MG/DL
CREAT SPEC-SCNC: 78 MG/DL
CREAT/PROT UR: 0.6 RATIO
CYSTATIN C SERPL-MCNC: 1.74 MG/L
EGFR: 40 ML/MIN/1.73M2
EGFR: 43 ML/MIN/1.73M2
EOSINOPHIL # BLD AUTO: 0.11 K/UL
EOSINOPHIL NFR BLD AUTO: 2.4 %
EPITHELIAL CELLS: 0 /HPF
ESTIMATED AVERAGE GLUCOSE: 111 MG/DL
GFR/BSA.PRED SERPLBLD CYS-BASED-ARV: 37 ML/MIN/1.73M2
GLUCOSE QUALITATIVE U: NEGATIVE MG/DL
GLUCOSE SERPL-MCNC: 103 MG/DL
GLUCOSE SERPL-MCNC: 97 MG/DL
HBA1C MFR BLD HPLC: 5.5 %
HCT VFR BLD CALC: 41.4 %
HDLC SERPL-MCNC: 43 MG/DL
HGB BLD-MCNC: 12.6 G/DL
HIV1 RNA # SERPL NAA+PROBE: 169
HIV1 RNA # SERPL NAA+PROBE: ABNORMAL
HIV1 RNA # SERPL NAA+PROBE: ABNORMAL
HIV1 RNA # SERPL NAA+PROBE: ABNORMAL COPIES/ML
IMM GRANULOCYTES NFR BLD AUTO: 0.4 %
KETONES URINE: NEGATIVE MG/DL
LDLC SERPL CALC-MCNC: 134 MG/DL
LEUKOCYTE ESTERASE URINE: ABNORMAL
LYMPHOCYTES # BLD AUTO: 0.76 K/UL
LYMPHOCYTES NFR BLD AUTO: 16.8 %
MAN DIFF?: NORMAL
MCHC RBC-ENTMCNC: 27.3 PG
MCHC RBC-ENTMCNC: 30.4 GM/DL
MCV RBC AUTO: 89.6 FL
MICROSCOPIC-UA: NORMAL
MONOCYTES # BLD AUTO: 0.46 K/UL
MONOCYTES NFR BLD AUTO: 10.2 %
N GONORRHOEA RRNA SPEC QL NAA+PROBE: NOT DETECTED
NEUTROPHILS # BLD AUTO: 3.16 K/UL
NEUTROPHILS NFR BLD AUTO: 69.8 %
NITRITE URINE: NEGATIVE
NONHDLC SERPL-MCNC: 155 MG/DL
PH URINE: 6
PLATELET # BLD AUTO: 267 K/UL
POTASSIUM SERPL-SCNC: 4.4 MMOL/L
POTASSIUM SERPL-SCNC: 4.6 MMOL/L
PROT SERPL-MCNC: 7.5 G/DL
PROT SERPL-MCNC: 7.7 G/DL
PROT UR-MCNC: 50 MG/DL
PROTEIN URINE: 30 MG/DL
RBC # BLD: 4.62 M/UL
RBC # FLD: 15 %
RED BLOOD CELLS URINE: 49 /HPF
REVIEW: NORMAL
SODIUM SERPL-SCNC: 137 MMOL/L
SODIUM SERPL-SCNC: 140 MMOL/L
SOURCE AMPLIFICATION: NORMAL
SPECIFIC GRAVITY URINE: 1.02
T PALLIDUM AB SER QL IA: POSITIVE
T4 FREE SERPL-MCNC: 1.4 NG/DL
TRIGL SERPL-MCNC: 104 MG/DL
TSH SERPL-ACNC: 1.82 UIU/ML
UROBILINOGEN URINE: 0.2 MG/DL
VIRAL LOAD INTERP: NORMAL
VIRAL LOAD INTERP: NORMAL
VIRAL LOAD LOG: 2.23
VIRAL LOAD LOG: ABNORMAL LG COP/ML
WBC # FLD AUTO: 4.53 K/UL
WHITE BLOOD CELLS URINE: 930 /HPF
YEAST-LIKE CELLS: PRESENT

## 2023-08-28 ENCOUNTER — OUTPATIENT (OUTPATIENT)
Dept: OUTPATIENT SERVICES | Facility: HOSPITAL | Age: 58
LOS: 1 days | End: 2023-08-28
Payer: COMMERCIAL

## 2023-08-28 ENCOUNTER — RX RENEWAL (OUTPATIENT)
Age: 58
End: 2023-08-28

## 2023-08-28 ENCOUNTER — APPOINTMENT (OUTPATIENT)
Dept: UROLOGY | Facility: CLINIC | Age: 58
End: 2023-08-28
Payer: COMMERCIAL

## 2023-08-28 VITALS
SYSTOLIC BLOOD PRESSURE: 119 MMHG | RESPIRATION RATE: 15 BRPM | OXYGEN SATURATION: 98 % | DIASTOLIC BLOOD PRESSURE: 75 MMHG | HEART RATE: 93 BPM

## 2023-08-28 VITALS — SYSTOLIC BLOOD PRESSURE: 123 MMHG | DIASTOLIC BLOOD PRESSURE: 74 MMHG

## 2023-08-28 DIAGNOSIS — Z98.890 OTHER SPECIFIED POSTPROCEDURAL STATES: Chronic | ICD-10-CM

## 2023-08-28 DIAGNOSIS — Z90.89 ACQUIRED ABSENCE OF OTHER ORGANS: Chronic | ICD-10-CM

## 2023-08-28 DIAGNOSIS — R35.0 FREQUENCY OF MICTURITION: ICD-10-CM

## 2023-08-28 PROCEDURE — 51700 IRRIGATION OF BLADDER: CPT

## 2023-08-29 DIAGNOSIS — R30.0 DYSURIA: ICD-10-CM

## 2023-08-29 DIAGNOSIS — N41.9 INFLAMMATORY DISEASE OF PROSTATE, UNSPECIFIED: ICD-10-CM

## 2023-08-29 DIAGNOSIS — N34.2 OTHER URETHRITIS: ICD-10-CM

## 2023-09-05 ENCOUNTER — APPOINTMENT (OUTPATIENT)
Dept: UROLOGY | Facility: CLINIC | Age: 58
End: 2023-09-05
Payer: COMMERCIAL

## 2023-09-05 VITALS
WEIGHT: 190 LBS | DIASTOLIC BLOOD PRESSURE: 78 MMHG | SYSTOLIC BLOOD PRESSURE: 115 MMHG | BODY MASS INDEX: 27.2 KG/M2 | RESPIRATION RATE: 17 BRPM | OXYGEN SATURATION: 95 % | HEART RATE: 95 BPM | HEIGHT: 70 IN

## 2023-09-05 PROCEDURE — 51702 INSERT TEMP BLADDER CATH: CPT

## 2023-09-06 ENCOUNTER — APPOINTMENT (OUTPATIENT)
Dept: UROLOGY | Facility: CLINIC | Age: 58
End: 2023-09-06

## 2023-09-08 ENCOUNTER — APPOINTMENT (OUTPATIENT)
Dept: NEPHROLOGY | Facility: CLINIC | Age: 58
End: 2023-09-08

## 2023-09-18 ENCOUNTER — APPOINTMENT (OUTPATIENT)
Dept: UROLOGY | Facility: CLINIC | Age: 58
End: 2023-09-18

## 2023-09-19 ENCOUNTER — APPOINTMENT (OUTPATIENT)
Dept: OTOLARYNGOLOGY | Facility: CLINIC | Age: 58
End: 2023-09-19
Payer: COMMERCIAL

## 2023-09-19 DIAGNOSIS — C73 MALIGNANT NEOPLASM OF THYROID GLAND: ICD-10-CM

## 2023-09-19 PROCEDURE — 99214 OFFICE O/P EST MOD 30 MIN: CPT

## 2023-09-20 ENCOUNTER — RX RENEWAL (OUTPATIENT)
Age: 58
End: 2023-09-20

## 2023-09-26 ENCOUNTER — APPOINTMENT (OUTPATIENT)
Dept: UROLOGY | Facility: CLINIC | Age: 58
End: 2023-09-26
Payer: COMMERCIAL

## 2023-09-26 VITALS
OXYGEN SATURATION: 99 % | HEART RATE: 91 BPM | DIASTOLIC BLOOD PRESSURE: 86 MMHG | SYSTOLIC BLOOD PRESSURE: 126 MMHG | RESPIRATION RATE: 18 BRPM

## 2023-09-26 PROCEDURE — 51700 IRRIGATION OF BLADDER: CPT

## 2023-10-02 ENCOUNTER — APPOINTMENT (OUTPATIENT)
Dept: UROLOGY | Facility: CLINIC | Age: 58
End: 2023-10-02
Payer: COMMERCIAL

## 2023-10-02 ENCOUNTER — OUTPATIENT (OUTPATIENT)
Dept: OUTPATIENT SERVICES | Facility: HOSPITAL | Age: 58
LOS: 1 days | End: 2023-10-02
Payer: COMMERCIAL

## 2023-10-02 VITALS
HEART RATE: 99 BPM | TEMPERATURE: 97.2 F | DIASTOLIC BLOOD PRESSURE: 74 MMHG | SYSTOLIC BLOOD PRESSURE: 120 MMHG | RESPIRATION RATE: 16 BRPM

## 2023-10-02 DIAGNOSIS — N30.20 OTHER CHRONIC CYSTITIS WITHOUT HEMATURIA: ICD-10-CM

## 2023-10-02 DIAGNOSIS — R35.0 FREQUENCY OF MICTURITION: ICD-10-CM

## 2023-10-02 DIAGNOSIS — Z98.890 OTHER SPECIFIED POSTPROCEDURAL STATES: Chronic | ICD-10-CM

## 2023-10-02 DIAGNOSIS — Z90.89 ACQUIRED ABSENCE OF OTHER ORGANS: Chronic | ICD-10-CM

## 2023-10-02 DIAGNOSIS — R30.0 DYSURIA: ICD-10-CM

## 2023-10-02 DIAGNOSIS — R39.15 URGENCY OF URINATION: ICD-10-CM

## 2023-10-02 PROCEDURE — 51700 IRRIGATION OF BLADDER: CPT

## 2023-10-02 PROCEDURE — 99213 OFFICE O/P EST LOW 20 MIN: CPT

## 2023-10-03 NOTE — HISTORY OF PRESENT ILLNESS
Wt Readings from Last 3 Encounters:   10/05/23 80.8 kg (178 lb 2.1 oz)   05/22/23 83.3 kg (183 lb 9.6 oz)   01/20/23 82.6 kg (182 lb)     Patient arriving with respiratory distress, eventually resolving with a short period of BIPAP now weaned to 4 L/min NC    Labs notable for elevated , no baseline to compare to. Elevated WBC 19.9k    Magnesium 1.6  At her baseline, she is able to walk up two flights of stairs without becoming short of breath. History of CAD with most recent Echo in 2017, EF 65% and consistent with a pseudonormal left ventricular filling pattern, with concomitant abnormal relaxation and increased filling pressure (grade 2 diastolic dysfunction). The left atrium was mildly to moderately dilated. Her bioprosthesis aortic valve exhibited normal function. There was moderate tricuspid valve regurgitation. Estimated peak PA pressure was 55 mmHg. The findings suggest moderate pulmonary hypertension. 1.1L urine output in past 24, 3 lbs weight loss  Echo-  moderate to severe asymmetric hypertrophy of the septal wall. The left ventricular ejection fraction is 55%. Systolic function is normal. Diastolic function is moderately abnormal, consistent with grade II (pseudonormal) relaxation.   •  The following segments are akinetic: basal inferior and mid inferolateral.      Plan:    -Consult Cardiology for CHF exacerbation and elevated troponins, appreciate recs  -Lasix 40 MG PO BID, appreciate diuresis reccs  -Replete magnesium   -prn nitroglycerin  -normal TSH  -Cardiac diet [None] : No associated symptoms are reported. [de-identified] : Mr. Bo is accompanied by his wife. He is s/p excisional biopsy of left level 4 lymph node as well as a total thyroidectomy on 1/27/2022  for pathological left lower cervical adenopathy, suspicious for lymphoproliferative and PTCA. Presents today for incision assessment  and suture removal. Reports feeling well. Denies pain, dysphagia, dysphonia and or dyspnea. Denies any numbness or tingling. On Levothyroxine 100 mcg daily.\par

## 2023-10-05 ENCOUNTER — APPOINTMENT (OUTPATIENT)
Dept: UROLOGY | Facility: CLINIC | Age: 58
End: 2023-10-05
Payer: COMMERCIAL

## 2023-10-05 ENCOUNTER — OUTPATIENT (OUTPATIENT)
Dept: OUTPATIENT SERVICES | Facility: HOSPITAL | Age: 58
LOS: 1 days | End: 2023-10-05
Payer: COMMERCIAL

## 2023-10-05 VITALS
SYSTOLIC BLOOD PRESSURE: 116 MMHG | DIASTOLIC BLOOD PRESSURE: 78 MMHG | HEART RATE: 101 BPM | TEMPERATURE: 97.2 F | RESPIRATION RATE: 16 BRPM

## 2023-10-05 DIAGNOSIS — Z98.890 OTHER SPECIFIED POSTPROCEDURAL STATES: Chronic | ICD-10-CM

## 2023-10-05 DIAGNOSIS — N35.111: ICD-10-CM

## 2023-10-05 PROCEDURE — 52281 CYSTOSCOPY AND TREATMENT: CPT

## 2023-10-05 PROCEDURE — ZZZZZ: CPT

## 2023-10-06 LAB — BACTERIA UR CULT: NORMAL

## 2023-10-09 ENCOUNTER — APPOINTMENT (OUTPATIENT)
Dept: UROLOGY | Facility: CLINIC | Age: 58
End: 2023-10-09
Payer: COMMERCIAL

## 2023-10-09 ENCOUNTER — OUTPATIENT (OUTPATIENT)
Dept: OUTPATIENT SERVICES | Facility: HOSPITAL | Age: 58
LOS: 1 days | End: 2023-10-09
Payer: COMMERCIAL

## 2023-10-09 ENCOUNTER — NON-APPOINTMENT (OUTPATIENT)
Age: 58
End: 2023-10-09

## 2023-10-09 VITALS — SYSTOLIC BLOOD PRESSURE: 115 MMHG | HEART RATE: 109 BPM | DIASTOLIC BLOOD PRESSURE: 75 MMHG

## 2023-10-09 VITALS — SYSTOLIC BLOOD PRESSURE: 121 MMHG | DIASTOLIC BLOOD PRESSURE: 75 MMHG | HEART RATE: 102 BPM

## 2023-10-09 DIAGNOSIS — Z98.890 OTHER SPECIFIED POSTPROCEDURAL STATES: Chronic | ICD-10-CM

## 2023-10-09 DIAGNOSIS — Z90.89 ACQUIRED ABSENCE OF OTHER ORGANS: Chronic | ICD-10-CM

## 2023-10-09 DIAGNOSIS — R35.0 FREQUENCY OF MICTURITION: ICD-10-CM

## 2023-10-09 PROCEDURE — 51700 IRRIGATION OF BLADDER: CPT

## 2023-10-10 ENCOUNTER — APPOINTMENT (OUTPATIENT)
Dept: UROLOGY | Facility: CLINIC | Age: 58
End: 2023-10-10
Payer: COMMERCIAL

## 2023-10-10 VITALS
DIASTOLIC BLOOD PRESSURE: 69 MMHG | HEIGHT: 70 IN | BODY MASS INDEX: 27.2 KG/M2 | HEART RATE: 103 BPM | SYSTOLIC BLOOD PRESSURE: 109 MMHG | WEIGHT: 190 LBS

## 2023-10-10 DIAGNOSIS — N35.112 POSTINFECTIVE BULBOUS URETHRAL STRICTURE, NOT ELSEWHERE CLASSIFIED, MALE: ICD-10-CM

## 2023-10-10 PROCEDURE — 99214 OFFICE O/P EST MOD 30 MIN: CPT

## 2023-10-11 DIAGNOSIS — N30.20 OTHER CHRONIC CYSTITIS WITHOUT HEMATURIA: ICD-10-CM

## 2023-10-11 DIAGNOSIS — R35.0 FREQUENCY OF MICTURITION: ICD-10-CM

## 2023-10-11 DIAGNOSIS — N41.9 INFLAMMATORY DISEASE OF PROSTATE, UNSPECIFIED: ICD-10-CM

## 2023-10-11 DIAGNOSIS — N35.112 POSTINFECTIVE BULBOUS URETHRAL STRICTURE, NOT ELSEWHERE CLASSIFIED, MALE: ICD-10-CM

## 2023-10-11 LAB — FUNGUS SPEC CULT ORG #8: ABNORMAL

## 2023-10-16 ENCOUNTER — APPOINTMENT (OUTPATIENT)
Dept: UROLOGY | Facility: CLINIC | Age: 58
End: 2023-10-16
Payer: COMMERCIAL

## 2023-10-16 VITALS — HEART RATE: 78 BPM | RESPIRATION RATE: 16 BRPM | DIASTOLIC BLOOD PRESSURE: 73 MMHG | SYSTOLIC BLOOD PRESSURE: 125 MMHG

## 2023-10-16 DIAGNOSIS — R39.15 URGENCY OF URINATION: ICD-10-CM

## 2023-10-16 PROCEDURE — 51720 TREATMENT OF BLADDER LESION: CPT | Mod: 53

## 2023-10-18 DIAGNOSIS — N30.20 OTHER CHRONIC CYSTITIS WITHOUT HEMATURIA: ICD-10-CM

## 2023-10-18 DIAGNOSIS — N32.89 OTHER SPECIFIED DISORDERS OF BLADDER: ICD-10-CM

## 2023-10-18 DIAGNOSIS — R30.0 DYSURIA: ICD-10-CM

## 2023-10-18 DIAGNOSIS — N20.0 CALCULUS OF KIDNEY: ICD-10-CM

## 2023-10-18 DIAGNOSIS — Z22.8 CARRIER OF OTHER INFECTIOUS DISEASES: ICD-10-CM

## 2023-10-18 DIAGNOSIS — N35.112 POSTINFECTIVE BULBOUS URETHRAL STRICTURE, NOT ELSEWHERE CLASSIFIED, MALE: ICD-10-CM

## 2023-10-18 DIAGNOSIS — N35.111: ICD-10-CM

## 2023-10-23 ENCOUNTER — APPOINTMENT (OUTPATIENT)
Dept: UROLOGY | Facility: CLINIC | Age: 58
End: 2023-10-23

## 2023-10-27 DIAGNOSIS — E78.5 HYPERLIPIDEMIA, UNSPECIFIED: ICD-10-CM

## 2023-10-30 ENCOUNTER — LABORATORY RESULT (OUTPATIENT)
Age: 58
End: 2023-10-30

## 2023-10-30 ENCOUNTER — APPOINTMENT (OUTPATIENT)
Dept: UROLOGY | Facility: CLINIC | Age: 58
End: 2023-10-30

## 2023-10-30 ENCOUNTER — APPOINTMENT (OUTPATIENT)
Dept: INFECTIOUS DISEASE | Facility: CLINIC | Age: 58
End: 2023-10-30

## 2023-11-02 ENCOUNTER — APPOINTMENT (OUTPATIENT)
Dept: NEPHROLOGY | Facility: CLINIC | Age: 58
End: 2023-11-02
Payer: COMMERCIAL

## 2023-11-02 VITALS
TEMPERATURE: 97.9 F | WEIGHT: 187 LBS | DIASTOLIC BLOOD PRESSURE: 76 MMHG | SYSTOLIC BLOOD PRESSURE: 134 MMHG | HEIGHT: 70 IN | HEART RATE: 98 BPM | BODY MASS INDEX: 26.77 KG/M2 | OXYGEN SATURATION: 97 %

## 2023-11-02 PROCEDURE — 99215 OFFICE O/P EST HI 40 MIN: CPT

## 2023-11-03 ENCOUNTER — NON-APPOINTMENT (OUTPATIENT)
Age: 58
End: 2023-11-03

## 2023-11-03 LAB
ALBUMIN SERPL ELPH-MCNC: 4.5 G/DL
ANION GAP SERPL CALC-SCNC: 15 MMOL/L
APPEARANCE: CLEAR
BACTERIA: NEGATIVE /HPF
BILIRUBIN URINE: NEGATIVE
BLOOD URINE: ABNORMAL
BUN SERPL-MCNC: 64 MG/DL
CALCIUM SERPL-MCNC: 9.8 MG/DL
CAST: 1 /LPF
CHLORIDE SERPL-SCNC: 107 MMOL/L
CO2 SERPL-SCNC: 17 MMOL/L
COLOR: YELLOW
CREAT SERPL-MCNC: 3.28 MG/DL
CREAT SPEC-SCNC: 60 MG/DL
CREAT SPEC-SCNC: 60 MG/DL
CREAT/PROT UR: 0.6 RATIO
CYSTATIN C SERPL-MCNC: 2.17 MG/L
EGFR: 21 ML/MIN/1.73M2
EPITHELIAL CELLS: 0 /HPF
GFR/BSA.PRED SERPLBLD CYS-BASED-ARV: 28 ML/MIN/1.73M2
GLUCOSE QUALITATIVE U: NEGATIVE MG/DL
GLUCOSE SERPL-MCNC: 92 MG/DL
KETONES URINE: NEGATIVE MG/DL
LEUKOCYTE ESTERASE URINE: ABNORMAL
MICROALBUMIN 24H UR DL<=1MG/L-MCNC: 13.1 MG/DL
MICROALBUMIN/CREAT 24H UR-RTO: 218 MG/G
MICROSCOPIC-UA: NORMAL
NITRITE URINE: NEGATIVE
PH URINE: 6
PHOSPHATE SERPL-MCNC: 4.2 MG/DL
POTASSIUM SERPL-SCNC: 4.5 MMOL/L
PROT UR-MCNC: 38 MG/DL
PROTEIN URINE: 30 MG/DL
RED BLOOD CELLS URINE: 15 /HPF
SODIUM SERPL-SCNC: 139 MMOL/L
SPECIFIC GRAVITY URINE: 1.01
UROBILINOGEN URINE: 0.2 MG/DL
WHITE BLOOD CELLS URINE: 722 /HPF

## 2023-11-06 ENCOUNTER — APPOINTMENT (OUTPATIENT)
Dept: ULTRASOUND IMAGING | Facility: CLINIC | Age: 58
End: 2023-11-06
Payer: COMMERCIAL

## 2023-11-06 ENCOUNTER — APPOINTMENT (OUTPATIENT)
Dept: UROLOGY | Facility: CLINIC | Age: 58
End: 2023-11-06

## 2023-11-06 PROCEDURE — 76775 US EXAM ABDO BACK WALL LIM: CPT

## 2023-11-08 ENCOUNTER — APPOINTMENT (OUTPATIENT)
Dept: INFECTIOUS DISEASE | Facility: CLINIC | Age: 58
End: 2023-11-08
Payer: COMMERCIAL

## 2023-11-08 VITALS
BODY MASS INDEX: 26.48 KG/M2 | TEMPERATURE: 98.2 F | HEIGHT: 70 IN | OXYGEN SATURATION: 98 % | DIASTOLIC BLOOD PRESSURE: 80 MMHG | SYSTOLIC BLOOD PRESSURE: 128 MMHG | HEART RATE: 93 BPM | WEIGHT: 185 LBS

## 2023-11-08 DIAGNOSIS — Z23 ENCOUNTER FOR IMMUNIZATION: ICD-10-CM

## 2023-11-08 PROCEDURE — 99214 OFFICE O/P EST MOD 30 MIN: CPT | Mod: 25

## 2023-11-08 PROCEDURE — G0009: CPT

## 2023-11-08 PROCEDURE — 90677 PCV20 VACCINE IM: CPT

## 2023-11-08 PROCEDURE — 90472 IMMUNIZATION ADMIN EACH ADD: CPT

## 2023-11-08 PROCEDURE — 90619 MENACWY-TT VACCINE IM: CPT

## 2023-11-08 PROCEDURE — 90686 IIV4 VACC NO PRSV 0.5 ML IM: CPT

## 2023-11-08 RX ORDER — ERGOCALCIFEROL 1.25 MG/1
1.25 MG CAPSULE, LIQUID FILLED ORAL
Qty: 12 | Refills: 1 | Status: DISCONTINUED | COMMUNITY
Start: 2023-04-26 | End: 2023-11-08

## 2023-11-08 RX ORDER — SULFAMETHOXAZOLE AND TRIMETHOPRIM 800; 160 MG/1; MG/1
800-160 TABLET ORAL DAILY
Qty: 12 | Refills: 0 | Status: DISCONTINUED | COMMUNITY
Start: 2022-05-02 | End: 2023-11-08

## 2023-11-08 RX ORDER — CIPROFLOXACIN HYDROCHLORIDE 500 MG/1
500 TABLET, FILM COATED ORAL
Qty: 14 | Refills: 0 | Status: DISCONTINUED | COMMUNITY
Start: 2023-02-06 | End: 2023-11-08

## 2023-11-13 ENCOUNTER — APPOINTMENT (OUTPATIENT)
Dept: CT IMAGING | Facility: CLINIC | Age: 58
End: 2023-11-13

## 2023-11-13 ENCOUNTER — APPOINTMENT (OUTPATIENT)
Dept: UROLOGY | Facility: CLINIC | Age: 58
End: 2023-11-13
Payer: COMMERCIAL

## 2023-11-13 ENCOUNTER — APPOINTMENT (OUTPATIENT)
Dept: ULTRASOUND IMAGING | Facility: CLINIC | Age: 58
End: 2023-11-13
Payer: COMMERCIAL

## 2023-11-13 PROCEDURE — 99214 OFFICE O/P EST MOD 30 MIN: CPT

## 2023-11-13 PROCEDURE — 76536 US EXAM OF HEAD AND NECK: CPT

## 2023-11-15 LAB
25(OH)D3 SERPL-MCNC: 46.5 NG/ML
ALBUMIN SERPL ELPH-MCNC: 3.9 G/DL
ALP BLD-CCNC: 103 U/L
ALT SERPL-CCNC: 7 U/L
ANION GAP SERPL CALC-SCNC: 12 MMOL/L
APPEARANCE: ABNORMAL
AST SERPL-CCNC: 11 U/L
BACTERIA: ABNORMAL /HPF
BILIRUB SERPL-MCNC: 0.3 MG/DL
BILIRUBIN URINE: NEGATIVE
BLOOD URINE: ABNORMAL
BUN SERPL-MCNC: 50 MG/DL
C TRACH RRNA SPEC QL NAA+PROBE: NOT DETECTED
CALCIUM SERPL-MCNC: 9.3 MG/DL
CAST: 1 /LPF
CD3 CELLS # BLD: 663 CELLS/UL
CD3 CELLS NFR BLD: 81 %
CD3+CD4+ CELLS # BLD: 200 CELLS/UL
CD3+CD4+ CELLS NFR BLD: 24 %
CD3+CD4+ CELLS/CD3+CD8+ CLL SPEC: 0.46 RATIO
CD3+CD8+ CELLS # SPEC: 437 CELLS/UL
CD3+CD8+ CELLS NFR BLD: 53 %
CHLORIDE SERPL-SCNC: 106 MMOL/L
CHOLEST SERPL-MCNC: 196 MG/DL
CO2 SERPL-SCNC: 19 MMOL/L
COLOR: YELLOW
CREAT SERPL-MCNC: 3.09 MG/DL
CREAT SPEC-SCNC: 83 MG/DL
CREAT/PROT UR: 1 RATIO
EGFR: 22 ML/MIN/1.73M2
EPITHELIAL CELLS: 3 /HPF
ESTIMATED AVERAGE GLUCOSE: 108 MG/DL
GLUCOSE QUALITATIVE U: NEGATIVE MG/DL
GLUCOSE SERPL-MCNC: 100 MG/DL
HBA1C MFR BLD HPLC: 5.4 %
HCT VFR BLD CALC: 36.1 %
HDLC SERPL-MCNC: 36 MG/DL
HGB BLD-MCNC: 11.5 G/DL
HIV1 RNA # SERPL NAA+PROBE: ABNORMAL
HIV1 RNA # SERPL NAA+PROBE: ABNORMAL COPIES/ML
KETONES URINE: NEGATIVE MG/DL
LDLC SERPL CALC-MCNC: 136 MG/DL
LEUKOCYTE ESTERASE URINE: ABNORMAL
MCHC RBC-ENTMCNC: 28.4 PG
MCHC RBC-ENTMCNC: 31.9 GM/DL
MCV RBC AUTO: 89.1 FL
MICROSCOPIC-UA: NORMAL
N GONORRHOEA RRNA SPEC QL NAA+PROBE: NOT DETECTED
NITRITE URINE: NEGATIVE
NONHDLC SERPL-MCNC: 160 MG/DL
PH URINE: 6
PLATELET # BLD AUTO: 290 K/UL
POTASSIUM SERPL-SCNC: 4.5 MMOL/L
PROT SERPL-MCNC: 7.6 G/DL
PROT UR-MCNC: 85 MG/DL
PROTEIN URINE: 100 MG/DL
RBC # BLD: 4.05 M/UL
RBC # FLD: 15.3 %
RED BLOOD CELLS URINE: 6 /HPF
SODIUM SERPL-SCNC: 137 MMOL/L
SOURCE AMPLIFICATION: NORMAL
SPECIFIC GRAVITY URINE: 1.01
T PALLIDUM AB SER QL IA: POSITIVE
TRIGL SERPL-MCNC: 132 MG/DL
UROBILINOGEN URINE: 0.2 MG/DL
VIRAL LOAD INTERP: NORMAL
VIRAL LOAD LOG: ABNORMAL LG COP/ML
WBC # FLD AUTO: 5.29 K/UL
WHITE BLOOD CELLS URINE: >998 /HPF

## 2023-11-20 ENCOUNTER — APPOINTMENT (OUTPATIENT)
Dept: UROLOGY | Facility: CLINIC | Age: 58
End: 2023-11-20

## 2023-11-20 ENCOUNTER — APPOINTMENT (OUTPATIENT)
Dept: CT IMAGING | Facility: CLINIC | Age: 58
End: 2023-11-20

## 2023-11-20 ENCOUNTER — APPOINTMENT (OUTPATIENT)
Dept: CT IMAGING | Facility: CLINIC | Age: 58
End: 2023-11-20
Payer: COMMERCIAL

## 2023-11-20 PROCEDURE — 74176 CT ABD & PELVIS W/O CONTRAST: CPT

## 2023-11-27 ENCOUNTER — APPOINTMENT (OUTPATIENT)
Dept: UROLOGY | Facility: CLINIC | Age: 58
End: 2023-11-27

## 2023-11-29 ENCOUNTER — NON-APPOINTMENT (OUTPATIENT)
Age: 58
End: 2023-11-29

## 2023-12-04 ENCOUNTER — APPOINTMENT (OUTPATIENT)
Dept: UROLOGY | Facility: CLINIC | Age: 58
End: 2023-12-04

## 2023-12-04 ENCOUNTER — APPOINTMENT (OUTPATIENT)
Dept: NUCLEAR MEDICINE | Facility: HOSPITAL | Age: 58
End: 2023-12-04
Payer: COMMERCIAL

## 2023-12-04 ENCOUNTER — OUTPATIENT (OUTPATIENT)
Dept: OUTPATIENT SERVICES | Facility: HOSPITAL | Age: 58
LOS: 1 days | End: 2023-12-04

## 2023-12-04 DIAGNOSIS — Z90.89 ACQUIRED ABSENCE OF OTHER ORGANS: Chronic | ICD-10-CM

## 2023-12-04 DIAGNOSIS — Z98.890 OTHER SPECIFIED POSTPROCEDURAL STATES: Chronic | ICD-10-CM

## 2023-12-04 DIAGNOSIS — N20.2 CALCULUS OF KIDNEY WITH CALCULUS OF URETER: ICD-10-CM

## 2023-12-04 PROCEDURE — 78708 K FLOW/FUNCT IMAGE W/DRUG: CPT | Mod: 26

## 2023-12-05 ENCOUNTER — NON-APPOINTMENT (OUTPATIENT)
Age: 58
End: 2023-12-05

## 2023-12-11 ENCOUNTER — APPOINTMENT (OUTPATIENT)
Dept: UROLOGY | Facility: CLINIC | Age: 58
End: 2023-12-11

## 2023-12-11 ENCOUNTER — APPOINTMENT (OUTPATIENT)
Dept: UROLOGY | Facility: CLINIC | Age: 58
End: 2023-12-11
Payer: COMMERCIAL

## 2023-12-11 VITALS — HEART RATE: 109 BPM | SYSTOLIC BLOOD PRESSURE: 120 MMHG | DIASTOLIC BLOOD PRESSURE: 73 MMHG

## 2023-12-11 PROCEDURE — 99215 OFFICE O/P EST HI 40 MIN: CPT

## 2023-12-11 RX ORDER — TAMSULOSIN HYDROCHLORIDE 0.4 MG/1
0.4 CAPSULE ORAL
Qty: 90 | Refills: 3 | Status: DISCONTINUED | COMMUNITY
Start: 2022-03-15 | End: 2023-12-11

## 2023-12-12 LAB
ALBUMIN SERPL ELPH-MCNC: 4.2 G/DL
ALP BLD-CCNC: 105 U/L
ALT SERPL-CCNC: 8 U/L
ANION GAP SERPL CALC-SCNC: 14 MMOL/L
AST SERPL-CCNC: 12 U/L
BILIRUB SERPL-MCNC: 0.2 MG/DL
BUN SERPL-MCNC: 59 MG/DL
CALCIUM SERPL-MCNC: 9.6 MG/DL
CHLORIDE SERPL-SCNC: 106 MMOL/L
CO2 SERPL-SCNC: 19 MMOL/L
CREAT SERPL-MCNC: 3.17 MG/DL
EGFR: 22 ML/MIN/1.73M2
GLUCOSE SERPL-MCNC: 101 MG/DL
POTASSIUM SERPL-SCNC: 4.3 MMOL/L
PROT SERPL-MCNC: 7.9 G/DL
SODIUM SERPL-SCNC: 139 MMOL/L

## 2023-12-15 LAB — FUNGUS SPEC CULT ORG #8: ABNORMAL

## 2023-12-18 ENCOUNTER — APPOINTMENT (OUTPATIENT)
Dept: UROLOGY | Facility: CLINIC | Age: 58
End: 2023-12-18

## 2023-12-21 ENCOUNTER — APPOINTMENT (OUTPATIENT)
Dept: UROLOGY | Facility: CLINIC | Age: 58
End: 2023-12-21
Payer: COMMERCIAL

## 2023-12-21 PROCEDURE — 99215 OFFICE O/P EST HI 40 MIN: CPT

## 2023-12-21 NOTE — HISTORY OF PRESENT ILLNESS
[FreeTextEntry1] : Patient continues to feel well however, there is still clinical concern for the patient's serum creatinine elevation in the face of bilateral mild hydronephrosis.  The patient continues with chronic funguria but has no current significant voiding symptoms.  Lasix renal scan as noted below shows no evidence of obstruction but does show decreased functionality of the right kidney compared to the left.  XAM: [id number] - CT ABDOMEN AND PELVIS  - ORDERED BY: RADHA KOWALSKI PROCEDURE DATE:  11/20/2023 INTERPRETATION:  CLINICAL INFORMATION: Hydronephrosis COMPARISON: 5/20/2022 and  CONTRAST/COMPLICATIONS: IV Contrast: NONE Oral Contrast: NONE Complications: None reported at time of study completion  PROCEDURE: CT of the Abdomen and Pelvis was performed in the prone position. Sagittal and coronal reformats were performed.  FINDINGS: LOWER CHEST: Within normal limits.  LIVER: Within normal limits. BILE DUCTS: Normal caliber. GALLBLADDER: Within normal limits. SPLEEN: Within normal limits. PANCREAS: Within normal limits. ADRENALS: 1.8 cm right adrenal adenoma again seen KIDNEYS/URETERS: There is right hydronephrosis and hydroureter. No obstructing stone is seen in the ureter. A lower pole stone is seen measuring 1 cm. The left kidney also demonstrates hydronephrosis and hydroureter without evidence of obstructing stone.  BLADDER: The urinary bladder is mostly collapsed and demonstrates a mildly thickened wall which may represent inflammation or chronic bladder outlet obstruction REPRODUCTIVE ORGANS: The prostate appears normal  BOWEL: No bowel obstruction. Appendix is not visualized. No evidence of inflammation in the pericecal region. PERITONEUM: No ascites. VESSELS: Within normal limits. RETROPERITONEUM/LYMPH NODES: No lymphadenopathy. ABDOMINAL WALL: Within normal limits. BONES: Within normal limits.  IMPRESSION: Right kidney lower pole nonobstructing stone and bilateral hydronephrosis and hydroureter without evidence of obstructing stone. Findings may be due to bladder outlet obstruction.  ACC: [id number]     EXAM:  NM KIDNEY IMG LASIX   ORDERED BY: RADHA KOWALSKI  PROCEDURE DATE:  12/04/2023    INTERPRETATION:  RADIOPHARMACEUTICAL: 10.0 mCi [id number] (MAG3), I.V.  CLINICAL INFORMATION: 58 year old male with nephrolithiasis and rising creatinine; referred for evaluation of function and obstruction.  TECHNIQUE:  The patient received approximately 863 cc normal saline I.V. over about one hour prior to radiopharmaceutical injection. Urinary catheter placement was unsuccessful due to ureteral stricture. The referring service was notified of this. Dynamic images of the posterior abdomen were obtained for 45 minutes following administration of radiopharmaceutical. Furosemide 40 mg I.V. was administered at 22 minutes postinjection.  COMPARISON: No previous renal scans were available for comparison.  FINDINGS:  The renal perfusion images moderately decreased flow to both kidneys, which are similar in size.  The functional images show moderately decreased cortical uptake of radiopharmaceutical by both kidneys. Activity appears in the right collecting system at about 6 minutes and in the left collecting system at about 8 minutes. There is spontaneous drainage from the right collecting system prior to diuretic challenge. There is no drainage from the left collecting system prior to diuretic challenge. Following diuretic challenge, the T-1/2 washout from the left collecting system is 9 minutes (normal:10 minutes or less). There is prominent hepatic uptake of radiopharmaceutical consistent with compromised renal function.  Differential renal function: Right kidney: 62%; Left kidney: 38%.  IMPRESSION: Diuretic renal scan demonstrates:  Moderately decreased flow to and function of  the right kidney with no evidence of obstruction.  Moderately decreased flow to and function of  the left kidney with no evidence of obstruction.  Differential renal function: Right kidney: 62%; Left kidney: 38%.  Impression: Bilateral mild hydronephrosis with decreased renal function, right greater than left, with no evidence of significant obstruction.  Discussed this clinical scenario with Dr. Zaragoza as well as multiple partners.  Although VUDS would be helpful, recommendation was to proceed with Wayne catheter placement and monitor renal function and degree of hydronephrosis.  This was extensively discussed with the patient today and a 16 Upper sorbian coude catheter was placed without difficulty.  Catheter draining well.  We have elected to follow-up mid next week with renal ultrasonography and a serum creatinine check.  In the meantime, while Wayne catheter in place suggested to proceed with a course of oxybutynin ER 10 mg daily.

## 2023-12-27 ENCOUNTER — APPOINTMENT (OUTPATIENT)
Dept: ULTRASOUND IMAGING | Facility: CLINIC | Age: 58
End: 2023-12-27
Payer: COMMERCIAL

## 2023-12-27 PROCEDURE — 76775 US EXAM ABDO BACK WALL LIM: CPT

## 2023-12-28 ENCOUNTER — APPOINTMENT (OUTPATIENT)
Dept: UROLOGY | Facility: CLINIC | Age: 58
End: 2023-12-28
Payer: COMMERCIAL

## 2023-12-28 ENCOUNTER — NON-APPOINTMENT (OUTPATIENT)
Age: 58
End: 2023-12-28

## 2023-12-28 ENCOUNTER — APPOINTMENT (OUTPATIENT)
Dept: UROLOGY | Facility: CLINIC | Age: 58
End: 2023-12-28

## 2023-12-28 DIAGNOSIS — R30.0 DYSURIA: ICD-10-CM

## 2023-12-28 DIAGNOSIS — R35.1 NOCTURIA: ICD-10-CM

## 2023-12-28 PROCEDURE — 99213 OFFICE O/P EST LOW 20 MIN: CPT

## 2024-01-08 LAB
ANION GAP SERPL CALC-SCNC: 13 MMOL/L
BUN SERPL-MCNC: 44 MG/DL
CALCIUM SERPL-MCNC: 8.8 MG/DL
CHLORIDE SERPL-SCNC: 106 MMOL/L
CO2 SERPL-SCNC: 20 MMOL/L
CREAT SERPL-MCNC: 2.5 MG/DL
EGFR: 29 ML/MIN/1.73M2
GLUCOSE SERPL-MCNC: 78 MG/DL
POTASSIUM SERPL-SCNC: 4.3 MMOL/L
SODIUM SERPL-SCNC: 139 MMOL/L

## 2024-01-12 ENCOUNTER — APPOINTMENT (OUTPATIENT)
Dept: UROLOGY | Facility: CLINIC | Age: 59
End: 2024-01-12
Payer: COMMERCIAL

## 2024-01-12 DIAGNOSIS — B37.41 CANDIDAL CYSTITIS AND URETHRITIS: ICD-10-CM

## 2024-01-12 DIAGNOSIS — N34.2 OTHER URETHRITIS: ICD-10-CM

## 2024-01-12 PROCEDURE — 99214 OFFICE O/P EST MOD 30 MIN: CPT

## 2024-01-12 NOTE — HISTORY OF PRESENT ILLNESS
[FreeTextEntry1] : Was wonderful to see Red again and he continues feeling quite well with Wayne catheter removed.  While in situ, Wayne catheter was not bothersome the patient actually felt quite comfortable and noted some decrease in generalized flank discomfort while the catheter was in place.  Today, we had a long discussion regarding the patient's findings thus far and there very important clinical implications.  While catheter was in place serum creatinine came down to 2.5 mg/dL BUN 44 mg/dL.  Follow-up renal ultrasonography with catheter in place showed no evidence of hydronephrosis.  Previously identified right renal calculus was not present while left renal calculus was unchanged.  These findings suggest the presence of significant vesicoureteral reflux and possibly a small noncompliant bladder.  Further confirmatory testing with video urodynamic evaluation will be performed next week and in the meantime as the catheter has been out for approximately 1 week, we will repeat his basic metabolic profile today.  Despite the patient's lack of symptoms, all studies are pointing towards the presence of a small noncompliant chronically inflamed bladder.  Will consider either repeat renal ultrasound or follow-up CT without contrast to further assess status of right-sided renal calculus.

## 2024-01-16 LAB
ANION GAP SERPL CALC-SCNC: 14 MMOL/L
BUN SERPL-MCNC: 53 MG/DL
CALCIUM SERPL-MCNC: 9.5 MG/DL
CHLORIDE SERPL-SCNC: 105 MMOL/L
CO2 SERPL-SCNC: 19 MMOL/L
CREAT SERPL-MCNC: 3.07 MG/DL
EGFR: 22 ML/MIN/1.73M2
GLUCOSE SERPL-MCNC: 103 MG/DL
POTASSIUM SERPL-SCNC: 4.8 MMOL/L
SODIUM SERPL-SCNC: 139 MMOL/L

## 2024-01-19 ENCOUNTER — RX RENEWAL (OUTPATIENT)
Age: 59
End: 2024-01-19

## 2024-01-22 ENCOUNTER — APPOINTMENT (OUTPATIENT)
Dept: UROLOGY | Facility: CLINIC | Age: 59
End: 2024-01-22
Payer: COMMERCIAL

## 2024-01-22 ENCOUNTER — OUTPATIENT (OUTPATIENT)
Dept: OUTPATIENT SERVICES | Facility: HOSPITAL | Age: 59
LOS: 1 days | End: 2024-01-22
Payer: COMMERCIAL

## 2024-01-22 VITALS
HEART RATE: 85 BPM | OXYGEN SATURATION: 99 % | TEMPERATURE: 98.4 F | SYSTOLIC BLOOD PRESSURE: 138 MMHG | DIASTOLIC BLOOD PRESSURE: 88 MMHG

## 2024-01-22 DIAGNOSIS — Z98.890 OTHER SPECIFIED POSTPROCEDURAL STATES: Chronic | ICD-10-CM

## 2024-01-22 DIAGNOSIS — Z90.89 ACQUIRED ABSENCE OF OTHER ORGANS: Chronic | ICD-10-CM

## 2024-01-22 DIAGNOSIS — N41.9 INFLAMMATORY DISEASE OF PROSTATE, UNSPECIFIED: ICD-10-CM

## 2024-01-22 DIAGNOSIS — R35.0 FREQUENCY OF MICTURITION: ICD-10-CM

## 2024-01-22 PROCEDURE — 51784 ANAL/URINARY MUSCLE STUDY: CPT | Mod: 26

## 2024-01-22 PROCEDURE — 51600 INJECTION FOR BLADDER X-RAY: CPT

## 2024-01-22 PROCEDURE — 51741 ELECTRO-UROFLOWMETRY FIRST: CPT

## 2024-01-22 PROCEDURE — 51784 ANAL/URINARY MUSCLE STUDY: CPT

## 2024-01-22 PROCEDURE — 51728 CYSTOMETROGRAM W/VP: CPT

## 2024-01-22 PROCEDURE — 76000 FLUOROSCOPY <1 HR PHYS/QHP: CPT | Mod: 26

## 2024-01-22 PROCEDURE — 76000 FLUOROSCOPY <1 HR PHYS/QHP: CPT

## 2024-01-22 PROCEDURE — 51797 INTRAABDOMINAL PRESSURE TEST: CPT | Mod: 26

## 2024-01-22 PROCEDURE — 51728 CYSTOMETROGRAM W/VP: CPT | Mod: 26

## 2024-01-22 PROCEDURE — 99214 OFFICE O/P EST MOD 30 MIN: CPT | Mod: 25

## 2024-01-22 PROCEDURE — 51797 INTRAABDOMINAL PRESSURE TEST: CPT

## 2024-01-22 NOTE — HISTORY OF PRESENT ILLNESS
[FreeTextEntry1] : Red underwent video urodynamic evaluation today and is expected, significant vesicoureteral reflux was identified bilaterally, right greater than left.  The lateral wall was smooth but capacity was quite low with poor compliance.  There also appeared to be a small regions of extravasation at the bulbar urethra.  While the patient had an indwelling Wayne catheter, his creatinine came down to 2.5 mg/dL then back up to 3.07 mg/dL 1 week after the catheter was removed.  The patient prefers to keep the catheter out with the understanding that his kidney function over time will likely worsen.  I had a very ana discussion with the patient regarding my concerns regarding his bladder pathology and under ordinary circumstances I would think that the best treatment strategy would be augmentation cystoplasty.  Unfortunately, in this case, I believe that he would be a poor candidate given the existing chronic candiduria and chronic inflammatory disease of the bladder which has not abated.  I will be discussing Red's case with some of my colleagues to best determine whether he has other options of care beyond cystectomy and urinary diversion.  Lastly, I am concerned about the small region of extravasation in the patient's bulbar urethra will review now that he has a small false passage.  I suggested cystoscopy and placement of a Wayne catheter through this area.  The patient would like to hold off and understands that should he have any difficulties with voiding currently to contact us immediately.

## 2024-01-23 ENCOUNTER — NON-APPOINTMENT (OUTPATIENT)
Age: 59
End: 2024-01-23

## 2024-01-23 DIAGNOSIS — N32.89 OTHER SPECIFIED DISORDERS OF BLADDER: ICD-10-CM

## 2024-01-23 DIAGNOSIS — Z22.8 CARRIER OF OTHER INFECTIOUS DISEASES: ICD-10-CM

## 2024-01-23 DIAGNOSIS — N30.20 OTHER CHRONIC CYSTITIS WITHOUT HEMATURIA: ICD-10-CM

## 2024-01-23 DIAGNOSIS — N41.9 INFLAMMATORY DISEASE OF PROSTATE, UNSPECIFIED: ICD-10-CM

## 2024-01-23 DIAGNOSIS — N20.0 CALCULUS OF KIDNEY: ICD-10-CM

## 2024-01-23 LAB
ANION GAP SERPL CALC-SCNC: 11 MMOL/L
BUN SERPL-MCNC: 57 MG/DL
CALCIUM SERPL-MCNC: 9.4 MG/DL
CHLORIDE SERPL-SCNC: 106 MMOL/L
CO2 SERPL-SCNC: 23 MMOL/L
CREAT SERPL-MCNC: 3.42 MG/DL
EGFR: 20 ML/MIN/1.73M2
GLUCOSE SERPL-MCNC: 109 MG/DL
POTASSIUM SERPL-SCNC: 5.1 MMOL/L
SODIUM SERPL-SCNC: 139 MMOL/L

## 2024-01-29 ENCOUNTER — APPOINTMENT (OUTPATIENT)
Dept: UROLOGY | Facility: CLINIC | Age: 59
End: 2024-01-29
Payer: COMMERCIAL

## 2024-01-29 ENCOUNTER — APPOINTMENT (OUTPATIENT)
Dept: UROLOGY | Facility: CLINIC | Age: 59
End: 2024-01-29

## 2024-01-29 ENCOUNTER — OUTPATIENT (OUTPATIENT)
Dept: OUTPATIENT SERVICES | Facility: HOSPITAL | Age: 59
LOS: 1 days | End: 2024-01-29
Payer: COMMERCIAL

## 2024-01-29 VITALS — DIASTOLIC BLOOD PRESSURE: 83 MMHG | SYSTOLIC BLOOD PRESSURE: 120 MMHG | HEART RATE: 103 BPM

## 2024-01-29 DIAGNOSIS — Z98.890 OTHER SPECIFIED POSTPROCEDURAL STATES: Chronic | ICD-10-CM

## 2024-01-29 DIAGNOSIS — Z22.8 CARRIER OF OTHER INFECTIOUS DISEASES: ICD-10-CM

## 2024-01-29 DIAGNOSIS — R35.0 FREQUENCY OF MICTURITION: ICD-10-CM

## 2024-01-29 DIAGNOSIS — Z90.89 ACQUIRED ABSENCE OF OTHER ORGANS: Chronic | ICD-10-CM

## 2024-01-29 PROCEDURE — 52000 CYSTOURETHROSCOPY: CPT

## 2024-01-29 PROCEDURE — ZZZZZ: CPT

## 2024-01-29 PROCEDURE — 52281 CYSTOSCOPY AND TREATMENT: CPT

## 2024-01-29 NOTE — HISTORY OF PRESENT ILLNESS
[FreeTextEntry1] : Red returns today with the understanding that we were going to proceed with cystoscopy and placed a Wayne catheter with the presumption that this is necessary to preserve his renal function.  The patient felt very strongly about holding off on catheter placement today as he had some personal items to attend to but would be willing to do this on another occasion.  I expressed my significant concerns for further impairment of renal function and we elected to proceed with another BMP today assuming more urgent catheterization would be needed if creatinine continues to rise.  We did proceed with cystoscopic examination with report seen in the patient's worksheet.  Essentially, cystoscopy demonstrated 3 formation of a tight urethral stricture of the distal urethra which required urethral dilation to proceed with cystoscopic examination.  The urethral course was overall hypovascular but no further stricture disease was identified until the bulbar/membranous urethra was encountered were very large posterior defect was seen on either side of the verumontanum.  Otherwise, there was no difficulty encountered with passage of the scope into the bladder.  Both ureteral orifices were gaping and close to the bladder neck.  The bladder mucosa was covered completely with a fine squamous metaplastic appearance without any mass lesions identified.  The bladder was gently irrigated with approximately 1 L of sterile water until about 90% of the fibrinous debris was eliminated.  The patient tolerated the procedure well.  He will continue with his fluconazole therapy as directed by infectious disease.  As mentioned above a BMP was ordered and the patient will call for those results within the next 24 to 48 hours.  I again reviewed my concerns regarding the patient's kidney function and what I feel ultimately be the need for very aggressive management to deal with what appears to be a very noncompliant small bladder.  I encouraged the patient to seek further opinions regarding other options of care.  The patient is scheduled to see Dr. Knowles within the next 2 weeks.

## 2024-01-30 DIAGNOSIS — Z22.8 CARRIER OF OTHER INFECTIOUS DISEASES: ICD-10-CM

## 2024-01-30 DIAGNOSIS — N13.30 UNSPECIFIED HYDRONEPHROSIS: ICD-10-CM

## 2024-01-30 DIAGNOSIS — N32.89 OTHER SPECIFIED DISORDERS OF BLADDER: ICD-10-CM

## 2024-01-30 DIAGNOSIS — N30.20 OTHER CHRONIC CYSTITIS WITHOUT HEMATURIA: ICD-10-CM

## 2024-01-31 LAB
ANION GAP SERPL CALC-SCNC: 13 MMOL/L
BUN SERPL-MCNC: 58 MG/DL
CALCIUM SERPL-MCNC: 8.9 MG/DL
CHLORIDE SERPL-SCNC: 106 MMOL/L
CO2 SERPL-SCNC: 17 MMOL/L
CREAT SERPL-MCNC: 3.6 MG/DL
EGFR: 19 ML/MIN/1.73M2
GLUCOSE SERPL-MCNC: 96 MG/DL
POTASSIUM SERPL-SCNC: 4.4 MMOL/L
SODIUM SERPL-SCNC: 136 MMOL/L

## 2024-02-05 ENCOUNTER — APPOINTMENT (OUTPATIENT)
Dept: UROLOGY | Facility: CLINIC | Age: 59
End: 2024-02-05
Payer: COMMERCIAL

## 2024-02-05 VITALS — DIASTOLIC BLOOD PRESSURE: 82 MMHG | HEART RATE: 92 BPM | SYSTOLIC BLOOD PRESSURE: 125 MMHG

## 2024-02-05 DIAGNOSIS — N20.0 CALCULUS OF KIDNEY: ICD-10-CM

## 2024-02-05 DIAGNOSIS — N32.89 OTHER SPECIFIED DISORDERS OF BLADDER: ICD-10-CM

## 2024-02-05 PROCEDURE — 99215 OFFICE O/P EST HI 40 MIN: CPT

## 2024-02-05 NOTE — HISTORY OF PRESENT ILLNESS
[FreeTextEntry1] : Was a pleasure to see Red once again today.  The patient did well after his last cystoscopy and at that time I did need to dilate the distal urethra to enable passage of the flexible cystoscope.  The patient's serum creatinine at that time was 3.6 mg/dL.  The patient was without a catheter for the past week and today we have elected to check another serum creatinine to see if there might have been some improvement in renal function after the stricture was no longer posing a possible obstruction to flow.  The patient was scheduled for cystoscopy today but given the ease of previous coude catheter passage we elected to proceed once again.  A 16 Tong[place] coude was placed without difficulty and the balloon position was confirmed with ultrasound imaging.  The patient will be following up with Dr. Knowles with reference to concerns for further deterioration in renal function in the face of a poorly compliant bladder and bilateral VUR.  I also have asked the patient to follow-up with Dr. Mayen with reference to his urethral stricture disease and its possible relevance to his current clinical situation.

## 2024-02-06 LAB
ANION GAP SERPL CALC-SCNC: 13 MMOL/L
BUN SERPL-MCNC: 69 MG/DL
CALCIUM SERPL-MCNC: 9.1 MG/DL
CHLORIDE SERPL-SCNC: 107 MMOL/L
CO2 SERPL-SCNC: 18 MMOL/L
CREAT SERPL-MCNC: 3.9 MG/DL
EGFR: 17 ML/MIN/1.73M2
GLUCOSE SERPL-MCNC: 105 MG/DL
POTASSIUM SERPL-SCNC: 4.9 MMOL/L
SODIUM SERPL-SCNC: 139 MMOL/L

## 2024-02-13 ENCOUNTER — APPOINTMENT (OUTPATIENT)
Dept: UROLOGY | Facility: CLINIC | Age: 59
End: 2024-02-13
Payer: COMMERCIAL

## 2024-02-16 ENCOUNTER — APPOINTMENT (OUTPATIENT)
Dept: UROLOGY | Facility: CLINIC | Age: 59
End: 2024-02-16
Payer: COMMERCIAL

## 2024-02-16 DIAGNOSIS — N18.30 CHRONIC KIDNEY DISEASE, STAGE 3 UNSPECIFIED: ICD-10-CM

## 2024-02-16 DIAGNOSIS — R35.0 FREQUENCY OF MICTURITION: ICD-10-CM

## 2024-02-16 PROCEDURE — 99214 OFFICE O/P EST MOD 30 MIN: CPT

## 2024-02-16 PROCEDURE — G2211 COMPLEX E/M VISIT ADD ON: CPT

## 2024-02-17 PROBLEM — N18.30 CHRONIC KIDNEY DISEASE, STAGE 3: Status: ACTIVE | Noted: 2022-11-15

## 2024-02-17 PROBLEM — R35.0 URINARY FREQUENCY: Status: ACTIVE | Noted: 2022-10-31

## 2024-02-17 LAB
ANION GAP SERPL CALC-SCNC: 12 MMOL/L
BUN SERPL-MCNC: 45 MG/DL
CALCIUM SERPL-MCNC: 9.1 MG/DL
CHLORIDE SERPL-SCNC: 107 MMOL/L
CO2 SERPL-SCNC: 19 MMOL/L
CREAT SERPL-MCNC: 2.97 MG/DL
EGFR: 23 ML/MIN/1.73M2
GLUCOSE SERPL-MCNC: 93 MG/DL
POTASSIUM SERPL-SCNC: 4.6 MMOL/L
SODIUM SERPL-SCNC: 138 MMOL/L

## 2024-02-17 NOTE — HISTORY OF PRESENT ILLNESS
[FreeTextEntry1] : referred by RM to discuss supra vesical urinary diversion develpped hydronephrosis worsening renal failure nd bladder function. biopsies of bladder noted squamous metaplasia renal scan noted 62% L and 38 R  UroD noted small capacity high pressure bladder with Abelardo VUR R>L  most recently had increase Cr to 3.9 and cho placed

## 2024-02-17 NOTE — ASSESSMENT
[FreeTextEntry1] : will repeat BMP today noted need for supravesical diversion to protect kidneys from further damage. noted ilea conduit over bladder augmentation or continent diversion given his RF  noted how perform Ileal conduit and living with one. would perform simple cystectomy at time given squamous metaplasia diagnosis. discussed this in detail - hard for him to comprehend given the rapid decline and unclear etiology. could be candidate for SPT as less definitive if RF improves with cho

## 2024-02-23 ENCOUNTER — APPOINTMENT (OUTPATIENT)
Dept: UROLOGY | Facility: CLINIC | Age: 59
End: 2024-02-23
Payer: COMMERCIAL

## 2024-02-23 VITALS — HEART RATE: 72 BPM | DIASTOLIC BLOOD PRESSURE: 74 MMHG | SYSTOLIC BLOOD PRESSURE: 124 MMHG

## 2024-02-23 DIAGNOSIS — N30.20 OTHER CHRONIC CYSTITIS W/OUT HEMATURIA: ICD-10-CM

## 2024-02-23 PROCEDURE — 99214 OFFICE O/P EST MOD 30 MIN: CPT

## 2024-02-23 NOTE — ASSESSMENT
[FreeTextEntry1] : Patient is a 59 yo M who presents for poorly compliant bladder, acute on chronic kidney disease, b/l hydro, urethral stricture, chronic cystitis/fungaluria.    I had a long discussion with patient D/w pt that a urinary diversion is likely the best option for him. D/w pt that reconstruction of urethral stricture plus bladder augment does not make sense for someone with CKD/hydro and severe compliance issues.  D/w pt that given CKD he is at risk for electrolyte abnormalities/uremia due to reabsorption if he undergoes augment. D/w pt that botox w CIC is also poor option with the false passages and bladder compliance at such low volumes. D/w pt that chronic cho or SPT may be alternative to urinary diversion.  Reviewed that with either he would need monthly cath changes indefinitely and there is risk of infection as well After discussion, pt will plan to further discuss with Dr Bocanegra and Dr Knowles.  He will have his next cath change with Dr Bocanegra  F/u PRN  I have spent 40 minutes of time on the encounter including reviewing prior records, discussing with pt the above condition and various treatment options, and documentation.

## 2024-02-23 NOTE — HISTORY OF PRESENT ILLNESS
[FreeTextEntry1] : Patient is a 59 yo M who presents for poorly compliant bladder, acute on chronic kidney disease, b/l hydro, urethral stricture, chronic cystitis/fungaluria.  Referred by Dr Bocanegra.  HPI: H/o gross hematuria, UTI, funguria in March 2022.  He was admitted at that time and underwent cysto/bx, R URS/stent.  He then underwent cysto/clot evac for persistent gross hematuria.  He had some urinary symptoms preexisting this, but relatively mild.  Mainly frequency and nocturia. He had been on weekly BLI with Dr Bocanegra for dysuria and LUTS.  He had cystoscopy showing possibly 2 areas of stricture.  He continued to have weekly BLI for metaplasia/inflammation of urethra.  Interval hx: However his renal function began to worsen and he also underwent VUDS which showed significant vesicoureteral reflux was identified bilaterally, right greater than left. The lateral wall was smooth but capacity was quite low with poor compliance. There also appeared to be a small regions of extravasation at the bulbar urethra.  His Cr peaked at 3.9, then cho placed.  His most recent Cr did trend down 2.97 2/16/24.  He did see Dr Knowles for consultation regarding urinary diversion, but pt is extremely hesitant to undergo IC. He is not a good candidate for other options due to his complicated history including urethral strictures and CKD.

## 2024-02-23 NOTE — PHYSICAL EXAM
[General Appearance - Well Developed] : well developed [General Appearance - Well Nourished] : well nourished [Normal Appearance] : normal appearance [Well Groomed] : well groomed [General Appearance - In No Acute Distress] : no acute distress [] : no respiratory distress [Exaggerated Use Of Accessory Muscles For Inspiration] : no accessory muscle use [Respiration, Rhythm And Depth] : normal respiratory rhythm and effort [No Focal Deficits] : no focal deficits [Mood] : the mood was normal

## 2024-02-26 ENCOUNTER — APPOINTMENT (OUTPATIENT)
Age: 59
End: 2024-02-26

## 2024-02-26 ENCOUNTER — APPOINTMENT (OUTPATIENT)
Dept: UROLOGY | Facility: CLINIC | Age: 59
End: 2024-02-26

## 2024-02-26 LAB
ANION GAP SERPL CALC-SCNC: 14 MMOL/L
BUN SERPL-MCNC: 47 MG/DL
CALCIUM SERPL-MCNC: 9.2 MG/DL
CHLORIDE SERPL-SCNC: 102 MMOL/L
CO2 SERPL-SCNC: 21 MMOL/L
CREAT SERPL-MCNC: 3.17 MG/DL
EGFR: 22 ML/MIN/1.73M2
GLUCOSE SERPL-MCNC: 94 MG/DL
POTASSIUM SERPL-SCNC: 4.2 MMOL/L
SODIUM SERPL-SCNC: 137 MMOL/L

## 2024-03-12 ENCOUNTER — APPOINTMENT (OUTPATIENT)
Dept: UROLOGY | Facility: CLINIC | Age: 59
End: 2024-03-12

## 2024-03-18 ENCOUNTER — APPOINTMENT (OUTPATIENT)
Dept: ULTRASOUND IMAGING | Facility: CLINIC | Age: 59
End: 2024-03-18
Payer: COMMERCIAL

## 2024-03-18 PROCEDURE — 76770 US EXAM ABDO BACK WALL COMP: CPT

## 2024-03-18 NOTE — ED ADULT TRIAGE NOTE - INTERNATIONAL TRAVEL
Chronic-not stable:  Discussed importance of treating obstructive sleep apnea and getting sufficient sleep to assist with weight control.  Encouraged him to work on weight loss through diet and exercise. Recommended DASH or Mediterranean diets.     No

## 2024-03-19 ENCOUNTER — APPOINTMENT (OUTPATIENT)
Dept: UROLOGY | Facility: CLINIC | Age: 59
End: 2024-03-19
Payer: COMMERCIAL

## 2024-03-19 DIAGNOSIS — R79.89 OTHER SPECIFIED ABNORMAL FINDINGS OF BLOOD CHEMISTRY: ICD-10-CM

## 2024-03-19 PROCEDURE — 99213 OFFICE O/P EST LOW 20 MIN: CPT

## 2024-03-20 ENCOUNTER — APPOINTMENT (OUTPATIENT)
Dept: UROLOGY | Facility: CLINIC | Age: 59
End: 2024-03-20
Payer: COMMERCIAL

## 2024-03-20 VITALS — SYSTOLIC BLOOD PRESSURE: 132 MMHG | DIASTOLIC BLOOD PRESSURE: 79 MMHG | HEART RATE: 112 BPM

## 2024-03-20 DIAGNOSIS — T83.091A OTHER MECHANICAL COMPLICATION OF INDWELLING URETHRAL CATHETER, INITIAL ENCOUNTER: ICD-10-CM

## 2024-03-20 PROBLEM — R79.89 ELEVATED SERUM CREATININE: Status: ACTIVE | Noted: 2024-03-19

## 2024-03-20 LAB
APPEARANCE: ABNORMAL
BACTERIA: ABNORMAL /HPF
BILIRUBIN URINE: NEGATIVE
BLOOD URINE: ABNORMAL
CAST: 0 /LPF
COLOR: YELLOW
EPITHELIAL CELLS: 1 /HPF
GLUCOSE QUALITATIVE U: NEGATIVE MG/DL
KETONES URINE: NEGATIVE MG/DL
LEUKOCYTE ESTERASE URINE: ABNORMAL
MICROSCOPIC-UA: NORMAL
NITRITE URINE: NEGATIVE
PH URINE: 6
PROTEIN URINE: 30 MG/DL
RED BLOOD CELLS URINE: 479 /HPF
SPECIFIC GRAVITY URINE: 1.01
UROBILINOGEN URINE: 0.2 MG/DL
WHITE BLOOD CELLS URINE: >998 /HPF

## 2024-03-20 PROCEDURE — 99213 OFFICE O/P EST LOW 20 MIN: CPT

## 2024-03-20 NOTE — HISTORY OF PRESENT ILLNESS
[FreeTextEntry1] : referred by RM to discuss supra vesical urinary diversion developed hydronephrosis worsening renal failure nd bladder function. biopsies of bladder noted squamous metaplasia renal scan noted 62% L and 38 R  UroD noted small capacity high pressure bladder with Abelardo VUR R>L  most recently had increase Cr to 3.9 and cho placed  discussed urnary diversion (conduit) or SPOT to protect his kidneys   3/19/24 - Here s/p seeking a second opinion to urinary diversion surgery.  since last visit went to [facility] where he had a stricture repair on 2/26/24 with a bladder biopsy (pt informs was negative). Cho was removed 5 days ago. Denies hematuria. C/o night time incontinence - wakes up wet in the morning.  Labs from yesterday on HIE: CR: 5.96 BUN 75 GFR 10  UA: Large: Leuks, blood, RBC- 216 WBC's - 998 Pro- 100  Culture is no growth

## 2024-03-20 NOTE — ASSESSMENT
[FreeTextEntry1] : emphasized needs to keep the bladder decompressed to avoid further renal damage. noted that he si is danger of further damage without a cho and potentially HD. he refused a Cho today - not sure why - but will return 3/20/ has been told about Botox - don think will help but worth a try though need bladder empty with SLT/Cho.

## 2024-03-22 ENCOUNTER — NON-APPOINTMENT (OUTPATIENT)
Age: 59
End: 2024-03-22

## 2024-03-22 ENCOUNTER — APPOINTMENT (OUTPATIENT)
Dept: UROLOGY | Facility: CLINIC | Age: 59
End: 2024-03-22
Payer: COMMERCIAL

## 2024-03-22 ENCOUNTER — OUTPATIENT (OUTPATIENT)
Dept: OUTPATIENT SERVICES | Facility: HOSPITAL | Age: 59
LOS: 1 days | End: 2024-03-22
Payer: COMMERCIAL

## 2024-03-22 VITALS
SYSTOLIC BLOOD PRESSURE: 121 MMHG | HEART RATE: 101 BPM | HEIGHT: 70 IN | RESPIRATION RATE: 17 BRPM | WEIGHT: 190 LBS | BODY MASS INDEX: 27.2 KG/M2 | DIASTOLIC BLOOD PRESSURE: 80 MMHG

## 2024-03-22 DIAGNOSIS — Z98.890 OTHER SPECIFIED POSTPROCEDURAL STATES: Chronic | ICD-10-CM

## 2024-03-22 DIAGNOSIS — Z90.89 ACQUIRED ABSENCE OF OTHER ORGANS: Chronic | ICD-10-CM

## 2024-03-22 DIAGNOSIS — R35.0 FREQUENCY OF MICTURITION: ICD-10-CM

## 2024-03-22 PROCEDURE — 52281 CYSTOSCOPY AND TREATMENT: CPT

## 2024-03-24 LAB
ANION GAP SERPL CALC-SCNC: 16 MMOL/L
BUN SERPL-MCNC: 73 MG/DL
CALCIUM SERPL-MCNC: 8.9 MG/DL
CHLORIDE SERPL-SCNC: 104 MMOL/L
CO2 SERPL-SCNC: 16 MMOL/L
CREAT SERPL-MCNC: 5.64 MG/DL
EGFR: 11 ML/MIN/1.73M2
GLUCOSE SERPL-MCNC: 89 MG/DL
POTASSIUM SERPL-SCNC: 5.1 MMOL/L
SODIUM SERPL-SCNC: 137 MMOL/L

## 2024-03-25 DIAGNOSIS — N31.9 NEUROMUSCULAR DYSFUNCTION OF BLADDER, UNSPECIFIED: ICD-10-CM

## 2024-03-25 DIAGNOSIS — N18.4 CHRONIC KIDNEY DISEASE, STAGE 4 (SEVERE): ICD-10-CM

## 2024-03-25 PROBLEM — T83.091A: Status: ACTIVE | Noted: 2024-03-25

## 2024-03-25 NOTE — HISTORY OF PRESENT ILLNESS
[FreeTextEntry1] : referred by RM to discuss supra vesical urinary diversion developed hydronephrosis worsening renal failure nd bladder function. biopsies of bladder noted squamous metaplasia renal scan noted 62% L and 38 R  UroD noted small capacity high pressure bladder with Abelardo VUR R>L  most recently had increase Cr to 3.9 and cho placed  discussed urnary diversion (conduit) or SPOT to protect his kidneys   3/19/24 - Here s/p seeking a second opinion to urinary diversion surgery.  since last visit went to [facility] where he had a stricture repair on 2/26/24 with a bladder biopsy (pt informs was negative). Cho was removed 5 days ago. Denies hematuria. C/o night time incontinence - wakes up wet in the morning.  Labs from yesterday on HIE: CR: 5.96 BUN 75 GFR 10 UA: Large: Leuks, blood, RBC- 216 WBC's - 998 Pro- 100 Culture is no growth  3/20/24 - Now returns for cho placement. Attempt at placement of 16 FR coude aborted secondary to inability to advance. Pt then informed that last cho had to be placed with the assistance of ultrasound

## 2024-04-08 NOTE — H&P PST ADULT - NS HIV RISK FACTOR
Schedule right heart cath with labs and appointment afterwards.    Please call Rukhsana at [phone] with any questions.      
No

## 2024-04-16 NOTE — H&P ADULT - DOES THIS PATIENT HAVE A HISTORY OF OR HAS BEEN DX WITH HEART FAILURE?
[None] : The patient complains of no symptoms [Palpitations] : no palpitations [SOB] : no dyspnea [Syncope] : no syncope unknown [Chest Pain] : no chest pain or discomfort [ICD Shocks] : no recent ICD shocks [Erythema at Site] : no erythema at device site [Swelling at Site] : no swelling at device site [de-identified] : 66 Y.O. male with a PMHx of HTN, S/P MV repair , NICM , AV node ablation S/P CRT/D , VT S/P shock , Afib CHADS-VASc 1,S/P GIANFRANCO closure, recurrent AFib S/P DCCV 10/2016 , S/P CRT-D generator change 8/15/2018.   Patient was recently hospitalized for appropriate shock for VF episode. Cardiac catheterization showed no new coronary artery disease.   I reviewed the episode and episode is consistent with long-short initiation of ventricular fibrillation. Patient has approximately 9 PVCs in 1 hour. However, they do occur at a specific coupling interval.   Patient s/p VT ablation. During the ablation, patient had the same morphology PVC-initiated VF after long-short episode similar to what I found on patient's device. After ablation, patient had one long-short episode, which consists of APCs in the blanking period, not refractory, followed by V-paced by the device and PVC initiating VT episode. However, this time, VT episode was terminated by ATP. Patient did not feel the episode.    Cardiac catheterization (06/2023): No significant CAD.

## 2024-04-17 ENCOUNTER — RX RENEWAL (OUTPATIENT)
Age: 59
End: 2024-04-17

## 2024-04-17 LAB
ANION GAP SERPL CALC-SCNC: 15 MMOL/L
BUN SERPL-MCNC: 66 MG/DL
CALCIUM SERPL-MCNC: 8.7 MG/DL
CHLORIDE SERPL-SCNC: 106 MMOL/L
CO2 SERPL-SCNC: 16 MMOL/L
CREAT SERPL-MCNC: 4.55 MG/DL
EGFR: 14 ML/MIN/1.73M2
GLUCOSE SERPL-MCNC: 93 MG/DL
POTASSIUM SERPL-SCNC: 4.7 MMOL/L
SODIUM SERPL-SCNC: 138 MMOL/L

## 2024-04-22 ENCOUNTER — APPOINTMENT (OUTPATIENT)
Dept: UROLOGY | Facility: CLINIC | Age: 59
End: 2024-04-22
Payer: COMMERCIAL

## 2024-04-22 VITALS
SYSTOLIC BLOOD PRESSURE: 133 MMHG | BODY MASS INDEX: 27.2 KG/M2 | HEART RATE: 115 BPM | OXYGEN SATURATION: 98 % | HEIGHT: 70 IN | RESPIRATION RATE: 17 BRPM | TEMPERATURE: 98 F | DIASTOLIC BLOOD PRESSURE: 80 MMHG | WEIGHT: 190 LBS

## 2024-04-22 PROCEDURE — 99214 OFFICE O/P EST MOD 30 MIN: CPT

## 2024-04-22 NOTE — ASSESSMENT
[FreeTextEntry1] : 59-year-old male with end-stage bladder as well as CKD 4/CKD 5  Patient currently has an indwelling urethral catheter.  We had a long discussion about management options including continued urethral catheter, suprapubic tube, high-dose Botox injections with CIC versus suprapubic tube, or urinary diversion.  Given his stricture disease and the progressive nature of his bladder dysfunction, discussed that I think he would most benefit from urinary diversion.  However he is most interested in high-dose Botox with either CIC versus suprapubic tube.  Discussed the Botox procedure with patient as well as suprapubic tube.  For now he is leaning towards high-dose Botox with CIC.  Advised patient to follow-up with his nephrologist as well as his GFR has more recently been less than 15.

## 2024-04-22 NOTE — REASON FOR VISIT
[TextEntry] : 59-year-old male who presents to discuss Botox  Patient with a history of end-stage bladder.  Urodynamic showed small capacity with high-pressure as well as bilateral VUR right greater than left.  He was referred to Dr. Knowels to discuss urinary diversion but preferred bladder sparing.  Patient was referred to me to discuss Botox.  Patient also has a history of an anterior urethral stricture status post dilation at [facility] on 2/2024. Pt follows with Dr. Mayen.  16Fr [place] catheter placed 3/22 by Dr. Knowles.   Patient reports that his bladder issues began in April 2022 with gross hematuria.  He originally was seen by Dr. Winchester who placed a left stent and performed a biopsy.  Around same time he was diagnosed with Aguila and HIV.  Patient required subsequent clot evacuations.  He also underwent viral staining of his bladder and renal tissue biopsy.  Everything was inconclusive.  He developed pelvic pain, LUTS and recurrent candidal bladder infections.    Patient reports that his main goals are to preserve renal function, and preserve the quality of his life.  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  He also is interested in future relationships.  He wants to be sexually active.  Overall he feels well.  He has not seen nephrology since November and is planning to reestablish with them.  Creat 4.55 4/2024

## 2024-04-23 ENCOUNTER — APPOINTMENT (OUTPATIENT)
Dept: UROLOGY | Facility: CLINIC | Age: 59
End: 2024-04-23

## 2024-04-23 ENCOUNTER — APPOINTMENT (OUTPATIENT)
Dept: UROLOGY | Facility: CLINIC | Age: 59
End: 2024-04-23
Payer: COMMERCIAL

## 2024-04-23 ENCOUNTER — OUTPATIENT (OUTPATIENT)
Dept: OUTPATIENT SERVICES | Facility: HOSPITAL | Age: 59
LOS: 1 days | End: 2024-04-23
Payer: COMMERCIAL

## 2024-04-23 VITALS
RESPIRATION RATE: 17 BRPM | HEIGHT: 70 IN | WEIGHT: 190 LBS | DIASTOLIC BLOOD PRESSURE: 86 MMHG | HEART RATE: 99 BPM | SYSTOLIC BLOOD PRESSURE: 138 MMHG | BODY MASS INDEX: 27.2 KG/M2

## 2024-04-23 DIAGNOSIS — Z90.89 ACQUIRED ABSENCE OF OTHER ORGANS: Chronic | ICD-10-CM

## 2024-04-23 DIAGNOSIS — Z98.890 OTHER SPECIFIED POSTPROCEDURAL STATES: Chronic | ICD-10-CM

## 2024-04-23 DIAGNOSIS — R35.0 FREQUENCY OF MICTURITION: ICD-10-CM

## 2024-04-23 DIAGNOSIS — N13.30 UNSPECIFIED HYDRONEPHROSIS: ICD-10-CM

## 2024-04-23 LAB
ANION GAP SERPL CALC-SCNC: 16 MMOL/L
BUN SERPL-MCNC: 72 MG/DL
CALCIUM SERPL-MCNC: 9 MG/DL
CHLORIDE SERPL-SCNC: 104 MMOL/L
CO2 SERPL-SCNC: 15 MMOL/L
CREAT SERPL-MCNC: 5.53 MG/DL
EGFR: 11 ML/MIN/1.73M2
GLUCOSE SERPL-MCNC: 91 MG/DL
POTASSIUM SERPL-SCNC: 5 MMOL/L
SODIUM SERPL-SCNC: 135 MMOL/L

## 2024-04-23 PROCEDURE — 52000 CYSTOURETHROSCOPY: CPT

## 2024-04-30 RX ORDER — LOSARTAN POTASSIUM 25 MG/1
25 TABLET, FILM COATED ORAL DAILY
Qty: 90 | Refills: 3 | Status: DISCONTINUED | COMMUNITY
Start: 2023-04-28 | End: 2024-04-30

## 2024-05-01 ENCOUNTER — APPOINTMENT (OUTPATIENT)
Dept: INFECTIOUS DISEASE | Facility: CLINIC | Age: 59
End: 2024-05-01

## 2024-05-03 ENCOUNTER — APPOINTMENT (OUTPATIENT)
Dept: NEPHROLOGY | Facility: CLINIC | Age: 59
End: 2024-05-03
Payer: COMMERCIAL

## 2024-05-03 VITALS
DIASTOLIC BLOOD PRESSURE: 78 MMHG | SYSTOLIC BLOOD PRESSURE: 126 MMHG | HEART RATE: 99 BPM | HEIGHT: 70 IN | OXYGEN SATURATION: 99 % | WEIGHT: 190 LBS | TEMPERATURE: 97.9 F | BODY MASS INDEX: 27.2 KG/M2

## 2024-05-03 DIAGNOSIS — N18.4 CHRONIC KIDNEY DISEASE, STAGE 4 (SEVERE): ICD-10-CM

## 2024-05-03 DIAGNOSIS — R80.9 PROTEINURIA, UNSPECIFIED: ICD-10-CM

## 2024-05-03 DIAGNOSIS — N13.8 OTHER OBSTRUCTIVE AND REFLUX UROPATHY: ICD-10-CM

## 2024-05-03 PROCEDURE — G2211 COMPLEX E/M VISIT ADD ON: CPT

## 2024-05-03 PROCEDURE — 99215 OFFICE O/P EST HI 40 MIN: CPT

## 2024-05-06 ENCOUNTER — LABORATORY RESULT (OUTPATIENT)
Age: 59
End: 2024-05-06

## 2024-05-06 ENCOUNTER — APPOINTMENT (OUTPATIENT)
Dept: INFECTIOUS DISEASE | Facility: CLINIC | Age: 59
End: 2024-05-06

## 2024-05-06 DIAGNOSIS — N13.30 UNSPECIFIED HYDRONEPHROSIS: ICD-10-CM

## 2024-05-08 ENCOUNTER — APPOINTMENT (OUTPATIENT)
Dept: INFECTIOUS DISEASE | Facility: CLINIC | Age: 59
End: 2024-05-08
Payer: COMMERCIAL

## 2024-05-08 VITALS
DIASTOLIC BLOOD PRESSURE: 78 MMHG | HEART RATE: 91 BPM | SYSTOLIC BLOOD PRESSURE: 135 MMHG | OXYGEN SATURATION: 97 % | BODY MASS INDEX: 26.63 KG/M2 | TEMPERATURE: 97.3 F | WEIGHT: 186 LBS | HEIGHT: 70 IN

## 2024-05-08 DIAGNOSIS — N31.9 NEUROMUSCULAR DYSFUNCTION OF BLADDER, UNSPECIFIED: ICD-10-CM

## 2024-05-08 DIAGNOSIS — B20 HUMAN IMMUNODEFICIENCY VIRUS [HIV] DISEASE: ICD-10-CM

## 2024-05-08 PROBLEM — N18.4 CKD (CHRONIC KIDNEY DISEASE), STAGE IV: Status: ACTIVE | Noted: 2024-02-23

## 2024-05-08 PROBLEM — R80.9 PROTEINURIA: Status: ACTIVE | Noted: 2022-11-15

## 2024-05-08 PROBLEM — N13.8 OBSTRUCTIVE NEPHROPATHY: Status: ACTIVE | Noted: 2024-05-08

## 2024-05-08 PROCEDURE — 99214 OFFICE O/P EST MOD 30 MIN: CPT

## 2024-05-08 RX ORDER — DOLUTEGRAVIR SODIUM AND RILPIVIRINE HYDROCHLORIDE 50; 25 MG/1; MG/1
50-25 TABLET, FILM COATED ORAL DAILY
Qty: 30 | Refills: 2 | Status: ACTIVE | COMMUNITY
Start: 2024-05-08 | End: 1900-01-01

## 2024-05-08 RX ORDER — BICTEGRAVIR SODIUM, EMTRICITABINE, AND TENOFOVIR ALAFENAMIDE FUMARATE 50; 200; 25 MG/1; MG/1; MG/1
50-200-25 TABLET ORAL
Qty: 30 | Refills: 5 | Status: DISCONTINUED | COMMUNITY
Start: 2022-04-25 | End: 2024-05-08

## 2024-05-08 RX ORDER — FLUCONAZOLE 200 MG/1
200 TABLET ORAL DAILY
Qty: 14 | Refills: 1 | Status: DISCONTINUED | COMMUNITY
Start: 2024-01-23 | End: 2024-05-08

## 2024-05-08 NOTE — PHYSICAL EXAM
[General Appearance - Alert] : alert [Sclera] : the sclera and conjunctiva were normal [Neck Appearance] : the appearance of the neck was normal [Heart Rate And Rhythm] : heart rate was normal and rhythm regular [Heart Sounds] : normal S1 and S2 [Full Pulse] : the pedal pulses are present [Edema] : there was no peripheral edema [Bowel Sounds] : normal bowel sounds [Abdomen Soft] : soft [Abdomen Tenderness] : non-tender [No Palpable Adenopathy] : no palpable adenopathy [Skin Color & Pigmentation] : normal skin color and pigmentation [] : no rash [Oriented To Time, Place, And Person] : oriented to person, place, and time [Affect] : the affect was normal

## 2024-05-08 NOTE — CONSULT LETTER
[Dear  ___] : Dear  [unfilled], [Courtesy Letter:] : I had the pleasure of seeing your patient, [unfilled], in my office today. [Please see my note below.] : Please see my note below. [Sincerely,] : Sincerely, [FreeTextEntry3] : Rj Zaragoza MD Nephrology [DrMick  ___] : Dr. GATICA [DrMick ___] : Dr. GATICA [___] : [unfilled]

## 2024-05-08 NOTE — HISTORY OF PRESENT ILLNESS
[FreeTextEntry1] : 58 year old with hpothyroidism, CKD , bladder dysfunction and HIV  He is following with urology and nephrology, his latest creatinine is between 4-5.   He is evaluated by urology.  they have discussed bladder diversion. He would like to explore other options.

## 2024-05-08 NOTE — ASSESSMENT
[FreeTextEntry1] : 58 year old with hpothyroidism, CKD, bladder dysfucntion and HIV  1) HIV Check CD4 and viral load GFR has been less than 30- not clear that it will improve Change biktarvy to Juluca  repat viral load and cmp in 4 weeks  2) CKD eGFR by cystatin-c is 16 Renal follow up  3) Bladder dysfunction Urology is following Has chronic cho Urology has recommended bladder diversion Pt would like to discuss other options  4) hypothyroidism on thyroid replacement  5) HCM He had colonoscopy 4 years ago, plan to repeat next year Will give prevnar 20 , start meningitis series, influenza when other medical issues are less acute  RTC 3 months

## 2024-05-08 NOTE — ASSESSMENT
[FreeTextEntry1] :  Mr. Bo is a 59 year old man with chronic kidney disease stage III with proteinuria, HIV, chronic dysuria, and hypothyrodism, here for ongoing kidney evaluation and management.  Likely etiology of CKD from HIV infection, with resultant proteinuric CKD. Kidney function then stabilized, with creatinine about 2 or so, but now much worse over last 6 months, largely due to ongoing urology issues and obstructive nephropathy.   CKD III with proteinuria - Evaluate significant worsening of kidney function with repeat labs + renal ultrasound - Close f/u with urology to try and address  HIV: Per ID  Hypothyroidism: Per endocrinology   PLAN: - Recheck labs - Kidney ultrasound - Will have to call with results for next steps - Routine follow up should be scheduled in interim   I spent a total of 40 minutes on this encounter.

## 2024-05-08 NOTE — HISTORY OF PRESENT ILLNESS
[FreeTextEntry1] : ***NOTE ENTRY DELAYED DUE TO EHR/TOUCHWORKS DOWNTIME***  Contacts:  email, [email or web address]  Dr. Felipe Aguirre (PCP)  Dr. Demetrius Man (ID)  Dr. Theresa Domingo (endocrinology)  Dr. Donny Bocanegra (urology)  ------------------------------------------------------------------------------- Problem List:  Chronic kidney disease stage III with proteinuria   Earliest note of CKD in 1/2022 = SCr 1.6   At time of HIV diagnosis, nephrotic-range proteinuria of UPCR 12.7 g/g  History of hematuria, presumed bladder etiology  HIV, diagnosed 2022  Dysuria, managed by urology; chronic interstitial cystitis; urethral stricture  Hypothyroidism  ------------------------------------------------------------------------------- HPI: [[place]] Mr. Bo is a 59 year old man with chronic kidney disease stage III with proteinuria, HIV, chronic dysuria, and hypothyrodism, here for ongoing kidney evaluation and management.  Last saw Mr. Bo in Nov. 2023.   Complicated urologic history in the interim. - Urodynamics showed non-compliant bladder in December and was told that he needed urinary diversion - Having reflux into kidneys - Catheterized in December 2023, numbers improved - Catheter out, numbers worse - For 2nd opinion, went to Dr. Kwaku Senior -- thought stricture might be problem... opened up stricture. bx of bladder w/o problem. - Two weeks later, catheter came out and numbers were high. - Considered botox in bladder - Dr. coker - pelvic pain specialist - said he would do botox - Dr. coker thinks kidney blockage might be issue... wants to put stents in kidney    Currently with indwelling cho since February 2024.  Creatinine elevated.  Feeling physically well. Frustrated by situation.  Son got  Daughter got engaged Still relationship issues with wife.  No dizziness/lightheadedness, headaches, chest pain, dyspnea, cough, nausea, vomiting, abdominal pain, diarrhea, constipation, leg swelling.  ------------------------------------------------------------------------------- Social History:  Lifelong [place];  ~30 years, though currently   Has two children in their 20s (son is a physical therapist; daughter finishing PA training)  Son getting  in March 2023  Works in auto repair (owns a repair shop)  No history of tobacco, [significant] alcohol, or illicit drug use  Family History:  Mother had colon cancer  Father has colitis (has colostomy since age 26)  No known history of renal disease, hematuria, proteinuria, ESRD, dialysis, transplant  ------------------------------------------------------------------------------- Allergies: NKDA  Medications:  Biktarvy  Bactrim M/W/F  Levothyroxine 150mcg daily  ------------------------------------------------------------------------------- Physical Exam:   Gen: NAD, well-appearing  HEENT: Supple neck  Pulm: CTA  CV: RRR  Back: No spinal or CVA terndeness  Abd: +BS, soft  UE: Warm, FROM  LE: Warm, FROM, intact strength; no edema  Neuro: No focal deficits, intact gait  Psych: Normal affect  Skin: Warm, without rashes  ------------------------------------------------------------------------------- Labs/Studies: [notable]   Creatinine Trend   2024-04-22 SCr 5.53 (eGFR 11)   2024-04-15 SCr 4.55 (eGFR 14)   2024-03-22 SCr 5.64 (eGFR 11)   2024-02-23 SCr 3.17 (eGFR 22)   2024-02-16 SCr 2.97 (eGFR 23)   2024-02-05 SCr 3.90 (eGFR 17)   2024-01-29 SCr 3.60 (eGFR 19)   2024-01-22 SCr 3.42 (eGFR 20)   2024-01-12 SCr 3.07 (eGFR 22)   2023-12-28 SCr 2.50 (eGFR 29)   2023-12-11 SCr 3.17 (eGFR 22)   2023-11-02 SCr 3.28 (eGFR 21; Cystatin-C 2.17, eGFR 28)   2023-10-30 SCr 3.09 (eGFR 22)   2023-05-08 SCr 1.89 (eGFR 40)   2023-04-24 SCr 1.80 (eGFR 43; Cystatin-C 1.74, eGFR 37)   2023-01-17 SCr 1.95 (eGFR 39; Cystatin-C 1.62, eGFR 41)   2022-12-20 SCr 1.84 (eGFR 42)   2022-11-02 SCr 2.00 (eGFR 38)   2022-10-24 SCr 1.83 (eGFR 42; Cystatin-C 2.05, eGFR 30)   2022-09-12 SCr 1.76 (eGFR 44)   2022-08-01 SCr 1.78 (eGFR 43; Cystatin-C 2.04, eGFR 30)   2022-05-16 SCr 1.65 (eGFR 48; Cystatin-C 1.62, eGFR 41)   2022-05-02 SCr 2.74 (eGFR 26)   2022-04-04 SCr 1.48 (eGFR 54)   ...   2022-03-17 SCr 2.23 (eGFR 33)   2022-03-02 SCr 1.54 (eGFR 52)   ...   2022-01-24 SCr 1.62   2023-11-02 UACR 218 mg/g  2022-12-20 UACR 400 mg/g  2022-11-11 UACR 163 mg/g   2023-11-02 UPCR 0.6 g/g  2023-10-30 UPCR 1.0 g/g  2023-04-24 UPCR 0.6 g/g  2022-12-20 UPCR 1.1 g/g  2022-11-11 UPCR 0.6 g/g  2022-10-24 UPCR 0.6 g/g  2022-03-20 UPCR 12.7 g/g   2023-10-30 Hgb 11.5  2023-10-30   2023-10-30 HbA1c 5.4   2022-12-20 SPEP/SHYANNE mild polyclonal gammopathy  2022-12-20 KL FLC: kappa 6.3, lambda 3.8, ratio = 1.64  2022-12-20 Complement C3 139, C4 30  2022-03-21 Cryoglobulins negative

## 2024-05-09 ENCOUNTER — APPOINTMENT (OUTPATIENT)
Dept: ULTRASOUND IMAGING | Facility: CLINIC | Age: 59
End: 2024-05-09
Payer: COMMERCIAL

## 2024-05-09 LAB
25(OH)D3 SERPL-MCNC: 23.5 NG/ML
ALBUMIN SERPL ELPH-MCNC: 4.1 G/DL
ALP BLD-CCNC: 104 U/L
ALT SERPL-CCNC: 6 U/L
ANION GAP SERPL CALC-SCNC: 15 MMOL/L
APPEARANCE: ABNORMAL
AST SERPL-CCNC: 10 U/L
BACTERIA: ABNORMAL /HPF
BILIRUB SERPL-MCNC: 0.2 MG/DL
BILIRUBIN URINE: NEGATIVE
BLOOD URINE: ABNORMAL
BUN SERPL-MCNC: 64 MG/DL
C TRACH RRNA SPEC QL NAA+PROBE: NOT DETECTED
CALCIUM SERPL-MCNC: 9.5 MG/DL
CAST: 0 /LPF
CD3 CELLS # BLD: 758 CELLS/UL
CD3 CELLS NFR BLD: 83 %
CD3+CD4+ CELLS # BLD: 261 CELLS/UL
CD3+CD4+ CELLS NFR BLD: 29 %
CD3+CD4+ CELLS/CD3+CD8+ CLL SPEC: 0.55 RATIO
CD3+CD8+ CELLS # SPEC: 475 CELLS/UL
CD3+CD8+ CELLS NFR BLD: 52 %
CHLORIDE SERPL-SCNC: 105 MMOL/L
CHOLEST SERPL-MCNC: 190 MG/DL
CO2 SERPL-SCNC: 20 MMOL/L
COLOR: YELLOW
CREAT SERPL-MCNC: 5.06 MG/DL
CREAT SPEC-SCNC: 69 MG/DL
CREAT/PROT UR: 0.8 RATIO
CYSTATIN C SERPL-MCNC: 3.3 MG/L
EGFR: 12 ML/MIN/1.73M2
EPITHELIAL CELLS: 1 /HPF
ESTIMATED AVERAGE GLUCOSE: 111 MG/DL
GFR/BSA.PRED SERPLBLD CYS-BASED-ARV: 16 ML/MIN/1.73M2
GLUCOSE QUALITATIVE U: NEGATIVE MG/DL
GLUCOSE SERPL-MCNC: 82 MG/DL
HBA1C MFR BLD HPLC: 5.5 %
HBV CORE IGG+IGM SER QL: NONREACTIVE
HBV CORE IGM SER QL: NONREACTIVE
HBV E AB SER QL: NONREACTIVE
HBV E AG SER QL: NONREACTIVE
HBV SURFACE AB SER QL: NONREACTIVE
HBV SURFACE AB SERPL IA-ACNC: <3 MIU/ML
HBV SURFACE AG SER QL: NONREACTIVE
HCT VFR BLD CALC: 30.9 %
HDLC SERPL-MCNC: 33 MG/DL
HEPB DNA PCR INT: NOT DETECTED
HEPB DNA PCR LOG: NOT DETECTED LOGIU/ML
HGB BLD-MCNC: 10 G/DL
HIV1 RNA # SERPL NAA+PROBE: ABNORMAL
HIV1 RNA # SERPL NAA+PROBE: ABNORMAL COPIES/ML
KETONES URINE: NEGATIVE MG/DL
LDLC SERPL CALC-MCNC: 110 MG/DL
LEUKOCYTE ESTERASE URINE: ABNORMAL
M TB IFN-G BLD-IMP: NEGATIVE
MCHC RBC-ENTMCNC: 28 PG
MCHC RBC-ENTMCNC: 32.4 GM/DL
MCV RBC AUTO: 86.6 FL
MICROSCOPIC-UA: NORMAL
N GONORRHOEA RRNA SPEC QL NAA+PROBE: NOT DETECTED
NITRITE URINE: NEGATIVE
NONHDLC SERPL-MCNC: 157 MG/DL
PH URINE: 6
PLATELET # BLD AUTO: 406 K/UL
POTASSIUM SERPL-SCNC: 4.3 MMOL/L
PROT SERPL-MCNC: 8 G/DL
PROT UR-MCNC: 58 MG/DL
PROTEIN URINE: 100 MG/DL
QUANTIFERON TB PLUS MITOGEN MINUS NIL: 1.52 IU/ML
QUANTIFERON TB PLUS NIL: 0.02 IU/ML
QUANTIFERON TB PLUS TB1 MINUS NIL: 0 IU/ML
QUANTIFERON TB PLUS TB2 MINUS NIL: 0 IU/ML
RBC # BLD: 3.57 M/UL
RBC # FLD: 16.4 %
RED BLOOD CELLS URINE: 248 /HPF
SODIUM SERPL-SCNC: 139 MMOL/L
SOURCE AMPLIFICATION: NORMAL
SPECIFIC GRAVITY URINE: 1.01
T PALLIDUM AB SER QL IA: POSITIVE
TRIGL SERPL-MCNC: 271 MG/DL
TSH SERPL-ACNC: 2.05 UIU/ML
UROBILINOGEN URINE: 0.2 MG/DL
VIRAL LOAD INTERP: NORMAL
VIRAL LOAD LOG: ABNORMAL LG COP/ML
WBC # FLD AUTO: 6.23 K/UL
WHITE BLOOD CELLS URINE: >998 /HPF

## 2024-05-09 PROCEDURE — 76775 US EXAM ABDO BACK WALL LIM: CPT

## 2024-05-10 ENCOUNTER — NON-APPOINTMENT (OUTPATIENT)
Age: 59
End: 2024-05-10

## 2024-05-20 ENCOUNTER — TRANSCRIPTION ENCOUNTER (OUTPATIENT)
Age: 59
End: 2024-05-20

## 2024-05-20 LAB
ALBUMIN SERPL ELPH-MCNC: 4.2 G/DL
ANION GAP SERPL CALC-SCNC: 16 MMOL/L
APPEARANCE: ABNORMAL
BACTERIA: ABNORMAL /HPF
BILIRUBIN URINE: NEGATIVE
BLOOD URINE: ABNORMAL
BUN SERPL-MCNC: 63 MG/DL
CALCIUM SERPL-MCNC: 9.6 MG/DL
CAST: 1 /LPF
CHLORIDE SERPL-SCNC: 104 MMOL/L
CO2 SERPL-SCNC: 18 MMOL/L
COLOR: YELLOW
CREAT SERPL-MCNC: 4.98 MG/DL
CREAT SPEC-SCNC: 71 MG/DL
CREAT SPEC-SCNC: 71 MG/DL
CREAT/PROT UR: 0.9 RATIO
CYSTATIN C SERPL-MCNC: 3.23 MG/L
EGFR: 12 ML/MIN/1.73M2
EPITHELIAL CELLS: 1 /HPF
GFR/BSA.PRED SERPLBLD CYS-BASED-ARV: 16 ML/MIN/1.73M2
GLUCOSE QUALITATIVE U: NEGATIVE MG/DL
GLUCOSE SERPL-MCNC: 82 MG/DL
HCT VFR BLD CALC: 31.2 %
HGB BLD-MCNC: 9.8 G/DL
KETONES URINE: NEGATIVE MG/DL
LEUKOCYTE ESTERASE URINE: ABNORMAL
MCHC RBC-ENTMCNC: 28.1 PG
MCHC RBC-ENTMCNC: 31.4 GM/DL
MCV RBC AUTO: 89.4 FL
MICROALBUMIN 24H UR DL<=1MG/L-MCNC: 15.7 MG/DL
MICROALBUMIN/CREAT 24H UR-RTO: 222 MG/G
MICROSCOPIC-UA: NORMAL
NITRITE URINE: NEGATIVE
PH URINE: 6
PHOSPHATE SERPL-MCNC: 4.2 MG/DL
PLATELET # BLD AUTO: 405 K/UL
POTASSIUM SERPL-SCNC: 4.3 MMOL/L
PROT UR-MCNC: 60 MG/DL
PROTEIN URINE: 100 MG/DL
RBC # BLD: 3.49 M/UL
RBC # FLD: 16.5 %
RED BLOOD CELLS URINE: 248 /HPF
SODIUM SERPL-SCNC: 137 MMOL/L
SPECIFIC GRAVITY URINE: 1.01
UROBILINOGEN URINE: 0.2 MG/DL
WBC # FLD AUTO: 6.2 K/UL
WHITE BLOOD CELLS URINE: >998 /HPF

## 2024-05-21 ENCOUNTER — NON-APPOINTMENT (OUTPATIENT)
Age: 59
End: 2024-05-21

## 2024-06-10 ENCOUNTER — APPOINTMENT (OUTPATIENT)
Dept: INFECTIOUS DISEASE | Facility: CLINIC | Age: 59
End: 2024-06-10

## 2024-06-21 LAB
ALBUMIN SERPL ELPH-MCNC: 3.8 G/DL
ALP BLD-CCNC: 94 U/L
ALT SERPL-CCNC: <5 U/L
ANION GAP SERPL CALC-SCNC: 15 MMOL/L
AST SERPL-CCNC: 7 U/L
BASOPHILS # BLD AUTO: 0.02 K/UL
BASOPHILS NFR BLD AUTO: 0.3 %
BILIRUB SERPL-MCNC: 0.2 MG/DL
BUN SERPL-MCNC: 42 MG/DL
CALCIUM SERPL-MCNC: 9.1 MG/DL
CHLORIDE SERPL-SCNC: 103 MMOL/L
CO2 SERPL-SCNC: 17 MMOL/L
CREAT SERPL-MCNC: 4.55 MG/DL
EGFR: 14 ML/MIN/1.73M2
EOSINOPHIL # BLD AUTO: 0.16 K/UL
EOSINOPHIL NFR BLD AUTO: 2.5 %
GLUCOSE SERPL-MCNC: 94 MG/DL
HCT VFR BLD CALC: 30 %
HGB BLD-MCNC: 9.3 G/DL
HIV1 RNA # SERPL NAA+PROBE: ABNORMAL
HIV1 RNA # SERPL NAA+PROBE: ABNORMAL COPIES/ML
IMM GRANULOCYTES NFR BLD AUTO: 0.5 %
LYMPHOCYTES # BLD AUTO: 0.85 K/UL
LYMPHOCYTES NFR BLD AUTO: 13.4 %
MAN DIFF?: NORMAL
MCHC RBC-ENTMCNC: 27.9 PG
MCHC RBC-ENTMCNC: 31 GM/DL
MCV RBC AUTO: 90.1 FL
MONOCYTES # BLD AUTO: 0.63 K/UL
MONOCYTES NFR BLD AUTO: 10 %
NEUTROPHILS # BLD AUTO: 4.63 K/UL
NEUTROPHILS NFR BLD AUTO: 73.3 %
PLATELET # BLD AUTO: 358 K/UL
POTASSIUM SERPL-SCNC: 4.3 MMOL/L
PROT SERPL-MCNC: 7.5 G/DL
RBC # BLD: 3.33 M/UL
RBC # FLD: 15.8 %
SODIUM SERPL-SCNC: 135 MMOL/L
VIRAL LOAD INTERP: NORMAL
VIRAL LOAD LOG: ABNORMAL LG COP/ML
WBC # FLD AUTO: 6.32 K/UL

## 2024-07-25 ENCOUNTER — APPOINTMENT (OUTPATIENT)
Dept: NEPHROLOGY | Facility: CLINIC | Age: 59
End: 2024-07-25
Payer: COMMERCIAL

## 2024-07-25 VITALS
OXYGEN SATURATION: 95 % | HEIGHT: 70 IN | HEART RATE: 85 BPM | TEMPERATURE: 97.8 F | BODY MASS INDEX: 26.63 KG/M2 | SYSTOLIC BLOOD PRESSURE: 126 MMHG | DIASTOLIC BLOOD PRESSURE: 73 MMHG | WEIGHT: 186 LBS

## 2024-07-25 DIAGNOSIS — N18.4 CHRONIC KIDNEY DISEASE, STAGE 4 (SEVERE): ICD-10-CM

## 2024-07-25 DIAGNOSIS — N13.8 OTHER OBSTRUCTIVE AND REFLUX UROPATHY: ICD-10-CM

## 2024-07-25 DIAGNOSIS — R80.9 PROTEINURIA, UNSPECIFIED: ICD-10-CM

## 2024-07-25 PROCEDURE — G2211 COMPLEX E/M VISIT ADD ON: CPT

## 2024-07-25 PROCEDURE — 99215 OFFICE O/P EST HI 40 MIN: CPT

## 2024-07-25 NOTE — ASSESSMENT
[FreeTextEntry1] : Mr. Bo is a 59 year old man with chronic kidney disease stage III with proteinuria, HIV, chronic dysuria, and hypothyrodism, here for ongoing kidney evaluation and management.  Likely etiology of CKD from HIV infection, with resultant proteinuric CKD. Kidney function then stabilized, with creatinine about 2 or so, but now much worse over last 6 months, largely due to ongoing urology issues and obstructive nephropathy.  At this point, however, I am not clear on how to proceed. Urologic intervention did not seem to help (and perhaps even temporarily worsened his condition).   CKD III with proteinuria - Will discuss case with colleagues and with urology to figure out next steps - Transplant referral seems appropriate at this point; will discuss eligibility given bladder/urologic issues  HIV: Per ID  Hypothyroidism: Per endocrinology   Spent time discussing recent history; current situation; various next step options; and consideration of kidney transplant evaluation.  PLAN: - Labs next week - Will discuss case with colleagues - Follow up 1 month  I spent a total of 40 minutes on this encounter.

## 2024-07-25 NOTE — HISTORY OF PRESENT ILLNESS
[FreeTextEntry1] : Contacts: email, [email or web address] Dr. Felipe Aguirre (PCP) Dr. Demetrius Man (ID) Dr. Theresa Domingo (endocrinology) Dr. Donny Bocanegra (urology) Dr. John Rangel (urology at [facility])  ------------------------------------------------------------------------------- Problem List: Chronic kidney disease stage IV with proteinuria Earliest note of CKD in 1/2022 = SCr 1.6 At time of HIV diagnosis, nephrotic-range proteinuria of UPCR 12.7 g/g History of hematuria, presumed bladder etiology HIV, diagnosed 2022 Dysuria, managed by urology; chronic interstitial cystitis; urethral stricture Hypothyroidism  ------------------------------------------------------------------------------- HPI: [Vay-ano] Mr. Bo is a 59 year old man with chronic kidney disease stage IV with proteinuria, HIV, chronic dysuria, and hypothyrodism, here for ongoing kidney evaluation and management.  Last saw Mr. Bo in May 2024.  His creatinine has been elevated >3 since late 2023.  Previously he had urodynamics showed non-compliant bladder in December and was told that he needed urinary diversion as he was having reflux into kidneys. A Wayne catheter was placed in Dec. 2023, which improved renal function, and once it came out the kidneys worsened once again. There was concern for ureteral strictures, which was opened up and Botox was considered at some point.   Mr. Bo has essentially had an indwelling Wayne since about Feb. 2024.  In the interim, on Yesica 3 he had bilateral stents placed. This was complicated by abdominal pains and fever and at some point his creatinine went up to 7.4 (eGFR 8) associated with hyperkalemia. The sents were then removed on July 3 and he has been feeling well since. On July 15, creatinine was 5.1 (eGFR 12). He continues to have an indwelling Wayne catheter.  On July 3, a kidney ultrasound showed:  -The right kidney measures 6.1 x 6.2 x 10.8 cm. -The left kidney measures 6.6 x 4.9 x 11.4 cm. -Mild bilateral hydronephrosis. There is no evidence for shadowing calculus of either kidney. Renal cortical echogenicity is increased, bilaterally.  His Biktarvy was changed to Juluca. He is off Bactrim.  No dizziness/lightheadedness, headaches, chest pain, dyspnea, cough, nausea, vomiting, abdominal pain, diarrhea, constipation, leg swelling.  He is not sure what to do at this point for his poor kidney function. He has repeat labs being done next week and he will email results to me.  ------------------------------------------------------------------------------- Social History: Lifelong [place];  ~30 years, though currently  Has two children in their 20s (son is a physical therapist; daughter finishing PA training) Son getting  in March 2023 Works in auto repair (owns a repair shop) No history of tobacco, [significant] alcohol, or illicit drug use  Family History: Mother had colon cancer Father has colitis (has colostomy since age 26) No known history of renal disease, hematuria, proteinuria, ESRD, dialysis, transplant  ------------------------------------------------------------------------------- Allergies: NKDA  Medications: Juluca Levothyroxine 150mcg daily  ------------------------------------------------------------------------------- Physical Exam:  Gen: NAD, well-appearing HEENT: Supple neck Pulm: CTA CV: RRR Back: No spinal or CVA terndeness Abd: +BS, soft UE: Warm, FROM LE: Warm, FROM, intact strength; no edema Neuro: No focal deficits, intact gait Psych: Normal affect Skin: Warm, without rashes  ------------------------------------------------------------------------------- Labs/Studies: [notable]  Creatinine Trend 2024-07-15 SCr 5.10 (eGFR 12) ... 2024-06-10 SCr 4.55 (eGFR 14) 2024-05-06 SCr 4.98 (eGFR 12; Cystatin-C 3.23, eGFR 16) 2024-04-22 SCr 5.53 (eGFR 11) 2024-04-15 SCr 4.55 (eGFR 14) 2024-03-22 SCr 5.64 (eGFR 11) 2024-02-23 SCr 3.17 (eGFR 22) 2024-02-16 SCr 2.97 (eGFR 23) 2024-02-05 SCr 3.90 (eGFR 17) 2024-01-29 SCr 3.60 (eGFR 19) 2024-01-22 SCr 3.42 (eGFR 20) 2024-01-12 SCr 3.07 (eGFR 22) 2023-12-28 SCr 2.50 (eGFR 29) 2023-12-11 SCr 3.17 (eGFR 22) 2023-11-02 SCr 3.28 (eGFR 21; Cystatin-C 2.17, eGFR 28) 2023-10-30 SCr 3.09 (eGFR 22) 2023-05-08 SCr 1.89 (eGFR 40) 2023-04-24 SCr 1.80 (eGFR 43; Cystatin-C 1.74, eGFR 37) 2023-01-17 SCr 1.95 (eGFR 39; Cystatin-C 1.62, eGFR 41) 2022-12-20 SCr 1.84 (eGFR 42) 2022-11-02 SCr 2.00 (eGFR 38) 2022-10-24 SCr 1.83 (eGFR 42; Cystatin-C 2.05, eGFR 30) 2022-09-12 SCr 1.76 (eGFR 44) 2022-08-01 SCr 1.78 (eGFR 43; Cystatin-C 2.04, eGFR 30) 2022-05-16 SCr 1.65 (eGFR 48; Cystatin-C 1.62, eGFR 41) 2022-05-02 SCr 2.74 (eGFR 26) 2022-04-04 SCr 1.48 (eGFR 54) ... 2022-03-17 SCr 2.23 (eGFR 33) 2022-03-02 SCr 1.54 (eGFR 52) ... 2022-01-24 SCr 1.62  2024-07-15 UACR 424 mg/g 2024-05-06 UACR 222 mg/g 2023-11-02 UACR 218 mg/g 2022-12-20 UACR 400 mg/g 2022-11-11 UACR 163 mg/g  2024-07-15 UPCR 1.86 g/g 2024-05-06 UPCR 0.9 g/g 2023-11-02 UPCR 0.6 g/g 2023-10-30 UPCR 1.0 g/g 2023-04-24 UPCR 0.6 g/g 2022-12-20 UPCR 1.1 g/g 2022-11-11 UPCR 0.6 g/g 2022-10-24 UPCR 0.6 g/g 2022-03-20 UPCR 12.7 g/g  2024-07-15 Hgb 8.4  2022-12-20 SPEP/SHYANNE mild polyclonal gammopathy 2022-12-20 KL FLC: kappa 6.3, lambda 3.8, ratio = 1.64 2022-12-20 Complement C3 139, C4 30 2022-03-21 Cryoglobulins negative

## 2024-08-02 ENCOUNTER — RX RENEWAL (OUTPATIENT)
Age: 59
End: 2024-08-02

## 2024-09-03 ENCOUNTER — LABORATORY RESULT (OUTPATIENT)
Age: 59
End: 2024-09-03

## 2024-09-03 ENCOUNTER — APPOINTMENT (OUTPATIENT)
Dept: ULTRASOUND IMAGING | Facility: CLINIC | Age: 59
End: 2024-09-03
Payer: COMMERCIAL

## 2024-09-03 ENCOUNTER — APPOINTMENT (OUTPATIENT)
Dept: INFECTIOUS DISEASE | Facility: CLINIC | Age: 59
End: 2024-09-03

## 2024-09-03 LAB
25(OH)D3 SERPL-MCNC: 22.5 NG/ML
ALBUMIN SERPL ELPH-MCNC: 4.2 G/DL
ALP BLD-CCNC: 90 U/L
ALT SERPL-CCNC: 6 U/L
ANION GAP SERPL CALC-SCNC: 13 MMOL/L
APPEARANCE: ABNORMAL
AST SERPL-CCNC: 6 U/L
BACTERIA: ABNORMAL /HPF
BASOPHILS # BLD AUTO: 0.03 K/UL
BASOPHILS NFR BLD AUTO: 0.3 %
BILIRUB SERPL-MCNC: 0.2 MG/DL
BILIRUBIN URINE: NEGATIVE
BLOOD URINE: ABNORMAL
BUN SERPL-MCNC: 68 MG/DL
C TRACH RRNA SPEC QL NAA+PROBE: NOT DETECTED
CALCIUM SERPL-MCNC: 9.2 MG/DL
CAST: 0 /LPF
CD3 CELLS # BLD: 1186 CELLS/UL
CD3 CELLS NFR BLD: 91 %
CD3+CD4+ CELLS # BLD: 391 CELLS/UL
CD3+CD4+ CELLS NFR BLD: 30 %
CD3+CD4+ CELLS/CD3+CD8+ CLL SPEC: 0.51 RATIO
CD3+CD8+ CELLS # SPEC: 767 CELLS/UL
CD3+CD8+ CELLS NFR BLD: 59 %
CHLORIDE SERPL-SCNC: 105 MMOL/L
CHOLEST SERPL-MCNC: 192 MG/DL
CO2 SERPL-SCNC: 19 MMOL/L
COLOR: YELLOW
CREAT SERPL-MCNC: 4.55 MG/DL
CREAT SPEC-SCNC: 62 MG/DL
CREAT/PROT UR: 0.7 RATIO
CYSTATIN C SERPL-MCNC: 3.42 MG/L
EGFR: 14 ML/MIN/1.73M2
EOSINOPHIL # BLD AUTO: 0.15 K/UL
EOSINOPHIL NFR BLD AUTO: 1.6 %
EPITHELIAL CELLS: 0 /HPF
ESTIMATED AVERAGE GLUCOSE: 100 MG/DL
GFR/BSA.PRED SERPLBLD CYS-BASED-ARV: 15 ML/MIN/1.73M2
GLUCOSE QUALITATIVE U: NEGATIVE MG/DL
GLUCOSE SERPL-MCNC: 92 MG/DL
HBA1C MFR BLD HPLC: 5.1 %
HCT VFR BLD CALC: 31.5 %
HDLC SERPL-MCNC: 49 MG/DL
HGB BLD-MCNC: 9.9 G/DL
IMM GRANULOCYTES NFR BLD AUTO: 0.8 %
KETONES URINE: NEGATIVE MG/DL
LDLC SERPL CALC-MCNC: 116 MG/DL
LEUKOCYTE ESTERASE URINE: ABNORMAL
LYMPHOCYTES # BLD AUTO: 1.17 K/UL
LYMPHOCYTES NFR BLD AUTO: 12.4 %
MAN DIFF?: NORMAL
MCHC RBC-ENTMCNC: 28.2 PG
MCHC RBC-ENTMCNC: 31.4 GM/DL
MCV RBC AUTO: 89.7 FL
MICROSCOPIC-UA: NORMAL
MONOCYTES # BLD AUTO: 0.8 K/UL
MONOCYTES NFR BLD AUTO: 8.5 %
N GONORRHOEA RRNA SPEC QL NAA+PROBE: NOT DETECTED
NEUTROPHILS # BLD AUTO: 7.21 K/UL
NEUTROPHILS NFR BLD AUTO: 76.4 %
NITRITE URINE: POSITIVE
NONHDLC SERPL-MCNC: 143 MG/DL
PH URINE: 6
PLATELET # BLD AUTO: 400 K/UL
POTASSIUM SERPL-SCNC: 4.5 MMOL/L
PROT SERPL-MCNC: 7.8 G/DL
PROT UR-MCNC: 44 MG/DL
PROTEIN URINE: 30 MG/DL
RBC # BLD: 3.51 M/UL
RBC # FLD: 16.3 %
RED BLOOD CELLS URINE: 24 /HPF
SODIUM SERPL-SCNC: 138 MMOL/L
SOURCE AMPLIFICATION: NORMAL
SPECIFIC GRAVITY URINE: 1.01
TRIGL SERPL-MCNC: 149 MG/DL
UROBILINOGEN URINE: 0.2 MG/DL
WBC # FLD AUTO: 9.44 K/UL
WHITE BLOOD CELLS URINE: >998 /HPF

## 2024-09-03 PROCEDURE — 76775 US EXAM ABDO BACK WALL LIM: CPT

## 2024-09-04 ENCOUNTER — APPOINTMENT (OUTPATIENT)
Dept: INFECTIOUS DISEASE | Facility: CLINIC | Age: 59
End: 2024-09-04
Payer: COMMERCIAL

## 2024-09-04 VITALS
HEART RATE: 78 BPM | HEIGHT: 70 IN | SYSTOLIC BLOOD PRESSURE: 126 MMHG | OXYGEN SATURATION: 96 % | WEIGHT: 187 LBS | BODY MASS INDEX: 26.77 KG/M2 | TEMPERATURE: 97.7 F | DIASTOLIC BLOOD PRESSURE: 87 MMHG

## 2024-09-04 DIAGNOSIS — B20 HUMAN IMMUNODEFICIENCY VIRUS [HIV] DISEASE: ICD-10-CM

## 2024-09-04 DIAGNOSIS — N31.9 NEUROMUSCULAR DYSFUNCTION OF BLADDER, UNSPECIFIED: ICD-10-CM

## 2024-09-04 DIAGNOSIS — N18.4 CHRONIC KIDNEY DISEASE, STAGE 4 (SEVERE): ICD-10-CM

## 2024-09-04 DIAGNOSIS — N32.89 OTHER SPECIFIED DISORDERS OF BLADDER: ICD-10-CM

## 2024-09-04 DIAGNOSIS — Z92.89 PERSONAL HISTORY OF OTHER MEDICAL TREATMENT: ICD-10-CM

## 2024-09-04 LAB
HIV1 RNA # SERPL NAA+PROBE: NORMAL
HIV1 RNA # SERPL NAA+PROBE: NORMAL COPIES/ML
T PALLIDUM AB SER QL IA: POSITIVE
VIRAL LOAD INTERP: NORMAL
VIRAL LOAD LOG: NORMAL LG COP/ML

## 2024-09-04 PROCEDURE — 99214 OFFICE O/P EST MOD 30 MIN: CPT

## 2024-09-04 NOTE — ASSESSMENT
[FreeTextEntry1] : 58 year old with hpothyroidism, CKD, bladder dysfucntion and HIV  1) HIV Check CD4 and viral load GFR has been less than 30- not clear that it will improve Continue B[place] Viral load on 9/3 was not detectable   2) CKD eGFR by cystatin-c is 15 Creatine was 4.55 - down from over 7 Follows with urology and nephrology  3) Bladder dysfunction Urology is following Has chronic cho UA had pyuria but he has a chronic cho no new  symptoms  4) hypothyroidism on thyroid replacement  5) recent joint pain to his right first toe Some concern for gout  5) HCM He had colonoscopy 4 years ago, plan to repeat next year Will give prevnar 20 , start meningitis series, influenza when other medical issues are less acute  RTC 3 months.

## 2024-09-04 NOTE — HISTORY OF PRESENT ILLNESS
[FreeTextEntry1] : 59 year old gentleman who is living with HIV and CKD  Red had seen a urolgist, Dr. Rangel, at [facility] who recommended a trial of stents.  Bilateral stents were placed on 6/3.  After the procedure, his creatinine had increased to over 7.  He had his stents removed.  At that point they though the stents were infected.  Since that time, his creatine has decreased to 4.5. He feels well  He is following with Dr. Rangel for urology and Dr Zaragoza for nephrology.   He has been on Juluca since 5/8/2024 without side effects.   He denies missing doses.

## 2024-09-04 NOTE — PHYSICAL EXAM
[General Appearance - Alert] : alert [General Appearance - Well Nourished] : well nourished [Outer Ear] : the ears and nose were normal in appearance [Neck Appearance] : the appearance of the neck was normal [Neck Cervical Mass (___cm)] : no neck mass was observed [Heart Rate And Rhythm] : heart rate was normal and rhythm regular [Heart Sounds] : normal S1 and S2 [Bowel Sounds] : normal bowel sounds [Abdomen Soft] : soft [No Palpable Adenopathy] : no palpable adenopathy [Skin Color & Pigmentation] : normal skin color and pigmentation [] : no rash [Oriented To Time, Place, And Person] : oriented to person, place, and time [Affect] : the affect was normal

## 2024-09-27 ENCOUNTER — APPOINTMENT (OUTPATIENT)
Dept: NEPHROLOGY | Facility: CLINIC | Age: 59
End: 2024-09-27
Payer: COMMERCIAL

## 2024-09-27 VITALS
HEART RATE: 88 BPM | TEMPERATURE: 98 F | OXYGEN SATURATION: 99 % | DIASTOLIC BLOOD PRESSURE: 85 MMHG | WEIGHT: 188 LBS | SYSTOLIC BLOOD PRESSURE: 126 MMHG | BODY MASS INDEX: 26.92 KG/M2 | HEIGHT: 70 IN

## 2024-09-27 DIAGNOSIS — R79.89 OTHER SPECIFIED ABNORMAL FINDINGS OF BLOOD CHEMISTRY: ICD-10-CM

## 2024-09-27 DIAGNOSIS — N13.8 OTHER OBSTRUCTIVE AND REFLUX UROPATHY: ICD-10-CM

## 2024-09-27 DIAGNOSIS — N18.4 CHRONIC KIDNEY DISEASE, STAGE 4 (SEVERE): ICD-10-CM

## 2024-09-27 DIAGNOSIS — N18.30 CHRONIC KIDNEY DISEASE, STAGE 3 UNSPECIFIED: ICD-10-CM

## 2024-09-27 DIAGNOSIS — R80.9 PROTEINURIA, UNSPECIFIED: ICD-10-CM

## 2024-09-27 PROCEDURE — G2211 COMPLEX E/M VISIT ADD ON: CPT | Mod: NC

## 2024-09-27 PROCEDURE — 99215 OFFICE O/P EST HI 40 MIN: CPT

## 2024-09-27 NOTE — ASSESSMENT
[FreeTextEntry1] : . Mr. Bo is a 59 year old man with chronic kidney disease stage IV with proteinuria, HIV, chronic dysuria, and hypothyrodism, here for ongoing kidney evaluation and management.  Likely etiology of CKD from HIV infection, with resultant proteinuric CKD. Kidney function then stabilized, with creatinine about 2 or so, but now much worse over last 6 months, largely due to ongoing urology issues and obstructive nephropathy.  Following with urology at [facility], also seeing transplant for eval on 10/7.   CKD III with proteinuria - Cont. close monitoring; no ARB/SGLT2i given ongoing instability - Agree w/ transplant eval  HIV: Per ID  Hypothyroidism: Per endocrinology   Spent time discussing recent history; current situation; various next step options; and consideration of kidney transplant evaluation.  PLAN: - Follow up 1 month  I spent a total of 40 minutes on this encounter.

## 2024-09-27 NOTE — HISTORY OF PRESENT ILLNESS
[FreeTextEntry1] : Contacts: email, [email or web address] Dr. Felipe Aguirre (PCP) Dr. Demetrius Man (ID) Dr. Theresa Domingo (endocrinology) Dr. Donny Bocanegra (urology) Dr. John Rangel (urology at [facility])  ------------------------------------------------------------------------------- Problem List: Chronic kidney disease stage IV with proteinuria Earliest note of CKD in 1/2022 = SCr 1.6 At time of HIV diagnosis, nephrotic-range proteinuria of UPCR 12.7 g/g History of hematuria, presumed bladder etiology HIV, diagnosed 2022 Dysuria, managed by urology; chronic interstitial cystitis; urethral stricture Hypothyroidism  ------------------------------------------------------------------------------- HPI: [Vay-ano] Mr. Bo is a 59 year old man with chronic kidney disease stage IV with proteinuria, HIV, chronic dysuria, and hypothyrodism, here for ongoing kidney evaluation and management.  Last saw Mr. Bo in July 2024.  On Tuesday, 9/24, the Wayne catheter was removed. On Wednesday, 9/25, the creatinine was 4.4. Dr. Rangel is closely monitoring his kidney function, and if it worsens then the catheter will go back in.  Had MRI on 9/23: - right adrenal adenoma - bilateral moderate hydroureternephrosis  No dizziness/lightheadedness, headaches, chest pain, dyspnea, cough, nausea, vomiting, abdominal pain, diarrhea, constipation, leg swelling.  Overall says he is feeling very well physically and mentally. Seeing a therapist.     PREVIOUS HX:  Previously he had urodynamics showed non-compliant bladder in December 2023 and was told that he needed urinary diversion as he was having reflux into kidneys. A Wayne catheter was placed in Dec. 2023, which improved renal function, and once it came out the kidneys worsened once again. There was concern for ureteral strictures, which was opened up and Botox was considered at some point.  Mr. Bo has essentially had an indwelling Wayne since about Feb. 2024.  In the interim, on Yesica 3 he had bilateral stents placed. This was complicated by abdominal pains and fever and at some point his creatinine went up to 7.4 (eGFR 8) associated with hyperkalemia. The sents were then removed on July 3 and he has been feeling well since. On July 15, creatinine was 5.1 (eGFR 12). He continues to have an indwelling Wayne catheter.  On July 3, a kidney ultrasound showed: -The right kidney measures 6.1 x 6.2 x 10.8 cm. -The left kidney measures 6.6 x 4.9 x 11.4 cm. -Mild bilateral hydronephrosis. There is no evidence for shadowing calculus of either kidney. Renal cortical echogenicity is increased, bilaterally.  His Biktarvy was changed to Juluca. He is off Bactrim.   ------------------------------------------------------------------------------- Social History: Lifelong [place];  ~30 years, though currently  Has two children in their 20s (son is a physical therapist; daughter finishing PA training) Works in auto repair (owns a repair shop) No history of tobacco, [significant] alcohol, or illicit drug use  Family History: Mother had colon cancer Father has colitis (has colostomy since age 26) No known history of renal disease, hematuria, proteinuria, ESRD, dialysis, transplant  ------------------------------------------------------------------------------- Allergies: NKDA  Medications: Juluca Levothyroxine 150mcg daily Sodium bicarbonate  ------------------------------------------------------------------------------- Physical Exam:  Gen: NAD, well-appearing HEENT: Supple neck Pulm: CTA CV: RRR Back: No spinal or CVA terndeness Abd: +BS, soft UE: Warm, FROM LE: Warm, FROM, intact strength; no edema Neuro: No focal deficits, intact gait Psych: Normal affect Skin: Warm, without rashes  ------------------------------------------------------------------------------- Labs/Studies: [notable]  Creatinine Trend  2024-09-03 SCr 4.71 (eGFR 13; Cystatin-C 3.42, eGFR 15) 2024-07-15 SCr 5.10 (eGFR 12) 2023-07-01 SCr 7.46 (eGFR 8) ... 2024-06-10 SCr 4.55 (eGFR 14) 2024-05-06 SCr 4.98 (eGFR 12; Cystatin-C 3.23, eGFR 16) 2024-04-22 SCr 5.53 (eGFR 11) 2024-04-15 SCr 4.55 (eGFR 14) 2024-03-22 SCr 5.64 (eGFR 11) 2024-02-23 SCr 3.17 (eGFR 22) 2024-02-16 SCr 2.97 (eGFR 23) 2024-02-05 SCr 3.90 (eGFR 17) 2024-01-29 SCr 3.60 (eGFR 19) 2024-01-22 SCr 3.42 (eGFR 20) 2024-01-12 SCr 3.07 (eGFR 22) 2023-12-28 SCr 2.50 (eGFR 29) 2023-12-11 SCr 3.17 (eGFR 22) 2023-11-02 SCr 3.28 (eGFR 21; Cystatin-C 2.17, eGFR 28) 2023-10-30 SCr 3.09 (eGFR 22) 2023-05-08 SCr 1.89 (eGFR 40) 2023-04-24 SCr 1.80 (eGFR 43; Cystatin-C 1.74, eGFR 37) 2023-01-17 SCr 1.95 (eGFR 39; Cystatin-C 1.62, eGFR 41) 2022-12-20 SCr 1.84 (eGFR 42) 2022-11-02 SCr 2.00 (eGFR 38) 2022-10-24 SCr 1.83 (eGFR 42; Cystatin-C 2.05, eGFR 30) 2022-09-12 SCr 1.76 (eGFR 44) 2022-08-01 SCr 1.78 (eGFR 43; Cystatin-C 2.04, eGFR 30) 2022-05-16 SCr 1.65 (eGFR 48; Cystatin-C 1.62, eGFR 41) 2022-05-02 SCr 2.74 (eGFR 26) 2022-04-04 SCr 1.48 (eGFR 54) ... 2022-03-17 SCr 2.23 (eGFR 33) 2022-03-02 SCr 1.54 (eGFR 52) ... 2022-01-24 SCr 1.62  2024-08-05 UACR 281 mg/g 2024-07-15 UACR 424 mg/g 2024-05-06 UACR 222 mg/g 2023-11-02 UACR 218 mg/g 2022-12-20 UACR 400 mg/g 2022-11-11 UACR 163 mg/g  2024-09-03 UPCR 0.7 g/g 2024-07-15 UPCR 1.86 g/g 2024-05-06 UPCR 0.9 g/g 2023-11-02 UPCR 0.6 g/g 2023-10-30 UPCR 1.0 g/g 2023-04-24 UPCR 0.6 g/g 2022-12-20 UPCR 1.1 g/g 2022-11-11 UPCR 0.6 g/g 2022-10-24 UPCR 0.6 g/g 2022-03-20 UPCR 12.7 g/g  2024-09-03 Hgb 9.9 2024-09-03  2024-09-03 A1c 5.1  2022-12-20 SPEP/SHYANNE mild polyclonal gammopathy 2022-12-20 KL FLC: kappa 6.3, lambda 3.8, ratio = 1.64 2022-12-20 Complement C3 139, C4 30 2022-03-21 Cryoglobulins negative

## 2024-09-27 NOTE — CONSULT LETTER
[Dear  ___] : Dear  [unfilled], [Please see my note below.] : Please see my note below. [Sincerely,] : Sincerely, [FreeTextEntry3] : Rj Zaragoza MD Nephrology [DrMick  ___] : Dr. GATICA [DrMick ___] : Dr. GATICA [___] : [unfilled]

## 2024-10-14 ENCOUNTER — APPOINTMENT (OUTPATIENT)
Dept: INFECTIOUS DISEASE | Facility: CLINIC | Age: 59
End: 2024-10-14

## 2024-11-19 ENCOUNTER — APPOINTMENT (OUTPATIENT)
Dept: INTERNAL MEDICINE | Facility: CLINIC | Age: 59
End: 2024-11-19

## 2024-11-19 ENCOUNTER — OUTPATIENT (OUTPATIENT)
Dept: OUTPATIENT SERVICES | Facility: HOSPITAL | Age: 59
LOS: 1 days | End: 2024-11-19

## 2024-11-19 DIAGNOSIS — Z98.890 OTHER SPECIFIED POSTPROCEDURAL STATES: Chronic | ICD-10-CM

## 2024-11-19 DIAGNOSIS — Z90.89 ACQUIRED ABSENCE OF OTHER ORGANS: Chronic | ICD-10-CM

## 2025-02-12 ENCOUNTER — NON-APPOINTMENT (OUTPATIENT)
Age: 60
End: 2025-02-12

## 2025-02-12 ENCOUNTER — RX RENEWAL (OUTPATIENT)
Age: 60
End: 2025-02-12

## 2025-03-07 DIAGNOSIS — Z11.3 ENCOUNTER FOR SCREENING FOR INFECTIONS WITH A PREDOMINANTLY SEXUAL MODE OF TRANSMISSION: ICD-10-CM

## 2025-03-07 DIAGNOSIS — E78.5 HYPERLIPIDEMIA, UNSPECIFIED: ICD-10-CM

## 2025-03-07 DIAGNOSIS — E55.9 VITAMIN D DEFICIENCY, UNSPECIFIED: ICD-10-CM

## 2025-03-07 DIAGNOSIS — A53.9 SYPHILIS, UNSPECIFIED: ICD-10-CM

## 2025-03-10 ENCOUNTER — LABORATORY RESULT (OUTPATIENT)
Age: 60
End: 2025-03-10

## 2025-03-10 ENCOUNTER — APPOINTMENT (OUTPATIENT)
Dept: INFECTIOUS DISEASE | Facility: CLINIC | Age: 60
End: 2025-03-10

## 2025-03-12 ENCOUNTER — LABORATORY RESULT (OUTPATIENT)
Age: 60
End: 2025-03-12

## 2025-03-12 ENCOUNTER — APPOINTMENT (OUTPATIENT)
Dept: INFECTIOUS DISEASE | Facility: CLINIC | Age: 60
End: 2025-03-12
Payer: COMMERCIAL

## 2025-03-12 VITALS
BODY MASS INDEX: 26.77 KG/M2 | TEMPERATURE: 97.8 F | OXYGEN SATURATION: 96 % | WEIGHT: 187 LBS | SYSTOLIC BLOOD PRESSURE: 140 MMHG | HEIGHT: 70 IN | DIASTOLIC BLOOD PRESSURE: 81 MMHG | HEART RATE: 105 BPM

## 2025-03-12 DIAGNOSIS — Z92.89 PERSONAL HISTORY OF OTHER MEDICAL TREATMENT: ICD-10-CM

## 2025-03-12 DIAGNOSIS — B20 HUMAN IMMUNODEFICIENCY VIRUS [HIV] DISEASE: ICD-10-CM

## 2025-03-12 DIAGNOSIS — N18.4 CHRONIC KIDNEY DISEASE, STAGE 4 (SEVERE): ICD-10-CM

## 2025-03-12 LAB
25(OH)D3 SERPL-MCNC: 13.5 NG/ML
ALBUMIN SERPL ELPH-MCNC: 3.5 G/DL
ALP BLD-CCNC: 80 U/L
ALT SERPL-CCNC: 9 U/L
ANION GAP SERPL CALC-SCNC: 12 MMOL/L
APPEARANCE: ABNORMAL
AST SERPL-CCNC: 9 U/L
BACTERIA: ABNORMAL /HPF
BILIRUB SERPL-MCNC: 0.2 MG/DL
BILIRUBIN URINE: NEGATIVE
BLOOD URINE: ABNORMAL
BUN SERPL-MCNC: 68 MG/DL
C TRACH RRNA SPEC QL NAA+PROBE: NOT DETECTED
CALCIUM SERPL-MCNC: 9.1 MG/DL
CAST: NORMAL /LPF
CD3 CELLS # BLD: 721 CELLS/UL
CD3 CELLS NFR BLD: 92 %
CD3+CD4+ CELLS # BLD: 213 CELLS/UL
CD3+CD4+ CELLS NFR BLD: 27 %
CD3+CD4+ CELLS/CD3+CD8+ CLL SPEC: 0.44 RATIO
CD3+CD8+ CELLS # SPEC: 484 CELLS/UL
CD3+CD8+ CELLS NFR BLD: 62 %
CHLORIDE SERPL-SCNC: 102 MMOL/L
CHOLEST SERPL-MCNC: 161 MG/DL
CO2 SERPL-SCNC: 21 MMOL/L
COLOR: YELLOW
CREAT SERPL-MCNC: 4.77 MG/DL
CREAT SPEC-SCNC: 75 MG/DL
CREAT/PROT UR: 2.4 RATIO
CYSTATIN C SERPL-MCNC: 3.72 MG/L
EGFRCR SERPLBLD CKD-EPI 2021: 13 ML/MIN/1.73M2
EPITHELIAL CELLS: 5 /HPF
ESTIMATED AVERAGE GLUCOSE: 114 MG/DL
GFR/BSA.PRED SERPLBLD CYS-BASED-ARV: 14 ML/MIN/1.73M2
GLUCOSE QUALITATIVE U: NEGATIVE MG/DL
GLUCOSE SERPL-MCNC: 96 MG/DL
HBA1C MFR BLD HPLC: 5.6 %
HCT VFR BLD CALC: 27.7 %
HDLC SERPL-MCNC: 37 MG/DL
HGB BLD-MCNC: 7.9 G/DL
HIV1 RNA # SERPL NAA+PROBE: ABNORMAL
HIV1 RNA # SERPL NAA+PROBE: ABNORMAL COPIES/ML
KETONES URINE: NEGATIVE MG/DL
LDLC SERPL CALC-MCNC: 87 MG/DL
LEUKOCYTE ESTERASE URINE: ABNORMAL
MCHC RBC-ENTMCNC: 24.8 PG
MCHC RBC-ENTMCNC: 28.5 G/DL
MCV RBC AUTO: 87.1 FL
MICROSCOPIC-UA: NORMAL
N GONORRHOEA RRNA SPEC QL NAA+PROBE: NOT DETECTED
NITRITE URINE: NEGATIVE
NONHDLC SERPL-MCNC: 123 MG/DL
PH URINE: 5.5
PLATELET # BLD AUTO: 354 K/UL
POTASSIUM SERPL-SCNC: 4.6 MMOL/L
PROT SERPL-MCNC: 7.2 G/DL
PROT UR-MCNC: 177 MG/DL
PROTEIN URINE: 300 MG/DL
RBC # BLD: 3.18 M/UL
RBC # FLD: 19 %
RED BLOOD CELLS URINE: 85 /HPF
REVIEW: NORMAL
SODIUM SERPL-SCNC: 136 MMOL/L
SOURCE AMPLIFICATION: NORMAL
SPECIFIC GRAVITY URINE: 1.01
T PALLIDUM AB SER QL IA: POSITIVE
TRIGL SERPL-MCNC: 218 MG/DL
UROBILINOGEN URINE: 1 MG/DL
VIRAL LOAD INTERP: NORMAL
VIRAL LOAD LOG: ABNORMAL LG COP/ML
WBC # FLD AUTO: 7.73 K/UL
WBC CLUMPS: PRESENT
WHITE BLOOD CELLS URINE: >998 /HPF

## 2025-03-12 PROCEDURE — 99214 OFFICE O/P EST MOD 30 MIN: CPT

## 2025-03-14 ENCOUNTER — NON-APPOINTMENT (OUTPATIENT)
Age: 60
End: 2025-03-14

## 2025-03-17 LAB
BASOPHILS # BLD AUTO: 0.02 K/UL
BASOPHILS NFR BLD AUTO: 0.2 %
EOSINOPHIL # BLD AUTO: 0.04 K/UL
EOSINOPHIL NFR BLD AUTO: 0.5 %
HCT VFR BLD CALC: 26.4 %
HGB BLD-MCNC: 7.8 G/DL
IMM GRANULOCYTES NFR BLD AUTO: 0.5 %
LYMPHOCYTES # BLD AUTO: 1.03 K/UL
LYMPHOCYTES NFR BLD AUTO: 11.8 %
MAN DIFF?: NORMAL
MCHC RBC-ENTMCNC: 25.3 PG
MCHC RBC-ENTMCNC: 29.5 G/DL
MCV RBC AUTO: 85.7 FL
MONOCYTES # BLD AUTO: 1.02 K/UL
MONOCYTES NFR BLD AUTO: 11.7 %
NEUTROPHILS # BLD AUTO: 6.6 K/UL
NEUTROPHILS NFR BLD AUTO: 75.3 %
PLATELET # BLD AUTO: 456 K/UL
RBC # BLD: 3.08 M/UL
RBC # FLD: 19 %
TSH SERPL-ACNC: 5.29 UIU/ML
WBC # FLD AUTO: 8.75 K/UL

## 2025-05-16 ENCOUNTER — OUTPATIENT (OUTPATIENT)
Dept: OUTPATIENT SERVICES | Facility: HOSPITAL | Age: 60
LOS: 1 days | End: 2025-05-16

## 2025-05-16 ENCOUNTER — APPOINTMENT (OUTPATIENT)
Dept: INTERNAL MEDICINE | Facility: CLINIC | Age: 60
End: 2025-05-16

## 2025-05-16 DIAGNOSIS — Z90.89 ACQUIRED ABSENCE OF OTHER ORGANS: Chronic | ICD-10-CM

## 2025-05-16 DIAGNOSIS — Z98.890 OTHER SPECIFIED POSTPROCEDURAL STATES: Chronic | ICD-10-CM

## 2025-06-11 ENCOUNTER — APPOINTMENT (OUTPATIENT)
Dept: INFECTIOUS DISEASE | Facility: CLINIC | Age: 60
End: 2025-06-11
Payer: COMMERCIAL

## 2025-06-11 VITALS
DIASTOLIC BLOOD PRESSURE: 75 MMHG | HEART RATE: 104 BPM | OXYGEN SATURATION: 97 % | TEMPERATURE: 98.4 F | SYSTOLIC BLOOD PRESSURE: 123 MMHG

## 2025-06-11 VITALS — BODY MASS INDEX: 24.91 KG/M2 | WEIGHT: 174 LBS | HEIGHT: 70 IN

## 2025-06-11 PROCEDURE — 99214 OFFICE O/P EST MOD 30 MIN: CPT

## 2025-06-25 ENCOUNTER — NON-APPOINTMENT (OUTPATIENT)
Age: 60
End: 2025-06-25

## 2025-07-08 ENCOUNTER — NON-APPOINTMENT (OUTPATIENT)
Age: 60
End: 2025-07-08

## 2025-07-10 ENCOUNTER — NON-APPOINTMENT (OUTPATIENT)
Age: 60
End: 2025-07-10

## 2025-07-29 ENCOUNTER — NON-APPOINTMENT (OUTPATIENT)
Age: 60
End: 2025-07-29

## 2025-08-19 ENCOUNTER — NON-APPOINTMENT (OUTPATIENT)
Age: 60
End: 2025-08-19

## (undated) DEVICE — LABELS BLANK W PEN

## (undated) DEVICE — CANISTER DISPOSABLE THIN WALL 3000CC

## (undated) DEVICE — WARMING BLANKET UPPER ADULT

## (undated) DEVICE — SUT ETHILON 6-0 18" PS-3

## (undated) DEVICE — LONE STAR RETRACTOR RING 12MM BLUNT DISP

## (undated) DEVICE — SOL IRR POUR H2O 500ML

## (undated) DEVICE — ELCTR PLASMA LOOP MEDIUM 24FR 12-30 DEG

## (undated) DEVICE — TUBING LEVEL ONE NORMOFLO SET

## (undated) DEVICE — DRAIN RESERVOIR FOR JACKSON PRATT 100CC CARDINAL

## (undated) DEVICE — DRSG STERISTRIPS 0.25 X 3"

## (undated) DEVICE — SYR LUER LOK 10CC

## (undated) DEVICE — PREP BETADINE SPONGE STICKS

## (undated) DEVICE — DRAPE INSTRUMENT POUCH 6.75" X 11"

## (undated) DEVICE — CABLE DAC ACTIVE CORD

## (undated) DEVICE — FOLEY CATH 3-WAY 22FR 5CC LATEX LUBRICATH

## (undated) DEVICE — GLV 7 PROTEXIS (WHITE)

## (undated) DEVICE — GLV 7.5 PROTEXIS (CREAM) MICRO

## (undated) DEVICE — ELCTR BOVIE PENCIL SMOKE EVACUATION

## (undated) DEVICE — DRAPE TOWEL BLUE 17" X 24"

## (undated) DEVICE — SOL IRR POUR H2O 1500ML

## (undated) DEVICE — DRAPE MAGNETIC INSTRUMENT MEDIUM

## (undated) DEVICE — GOWN TRIMAX LG

## (undated) DEVICE — ELCTR GROUNDING PAD ADULT COVIDIEN

## (undated) DEVICE — SUT SILK 2-0 18" TIES

## (undated) DEVICE — DRAPE EQUIPMENT BANDED BAG 30 X 30" (SHOWER CAP)

## (undated) DEVICE — DRAPE 3/4 SHEET 52X76"

## (undated) DEVICE — RUBBER BANDS STERILE

## (undated) DEVICE — SAFETY PIN

## (undated) DEVICE — POSITIONER FOAM EGG CRATE ULNAR 2PCS (PINK)

## (undated) DEVICE — PACK HEAD & NECK

## (undated) DEVICE — SUT SILK 3-0 18" TIES

## (undated) DEVICE — GLV 8 PROTEXIS (CREAM) NEU-THERA

## (undated) DEVICE — GLV 7.5 PROTEXIS (WHITE)

## (undated) DEVICE — SOL IRR BAG H2O 3000ML

## (undated) DEVICE — VENODYNE/SCD SLEEVE CALF LARGE

## (undated) DEVICE — SUT SILK 2-0 18" FS

## (undated) DEVICE — DRSG CURITY GAUZE SPONGE 4 X 4" 12-PLY

## (undated) DEVICE — ELCTR MONOPOLAR STIMULATOR PROBE FLUSH-TIP

## (undated) DEVICE — LAP PAD W RING 18 X 18"

## (undated) DEVICE — DRSG BENZOIN 0.6CC

## (undated) DEVICE — SUT SILK 2-0 30" SH

## (undated) DEVICE — SUT VICRYL 4-0 27" RB-1 UNDYED

## (undated) DEVICE — ELCTR PLASMA BUTTON OVAL 24FR 12-30 DEG

## (undated) DEVICE — BAG URINE W METER 2L

## (undated) DEVICE — ACMI SELF-SEALING SEAL UP TO 7FR

## (undated) DEVICE — WARMING BLANKET FULL ADULT

## (undated) DEVICE — DRAPE SPLIT SHEET 77" X 120"

## (undated) DEVICE — SYR CATH TIP 2 OZ

## (undated) DEVICE — STAPLER SKIN VISI-STAT 35 WIDE

## (undated) DEVICE — PACK CYSTO

## (undated) DEVICE — POSITIONER STRAP ARMBOARD VELCRO TS-30

## (undated) DEVICE — ADAPTER CHECK FLO 3 WAY STOP COCK

## (undated) DEVICE — GLV 8 PROTEXIS (CREAM) MICRO

## (undated) DEVICE — SYR LUER LOK 5CC

## (undated) DEVICE — IRR BULB PATHFINDER + 10"

## (undated) DEVICE — VENODYNE/SCD SLEEVE CALF MEDIUM

## (undated) DEVICE — DRAIN JACKSON PRATT 7FR ROUND END NO TROCAR

## (undated) DEVICE — GLV 6.5 PROTEXIS (WHITE)

## (undated) DEVICE — PROTECTOR HEEL / ELBOW FLUFFY

## (undated) DEVICE — WARMING BLANKET LOWER ADULT

## (undated) DEVICE — DRAPE FLUID WARMER 44 X 44"

## (undated) DEVICE — DRSG STERISTRIPS 0.25 X 4"

## (undated) DEVICE — NDL HYPO SAFE 22G X 1" (BLACK)

## (undated) DEVICE — FOLEY TRAY 16FR 5CC LTX UMETER CLOSED